# Patient Record
Sex: FEMALE | Race: WHITE | NOT HISPANIC OR LATINO | Employment: OTHER | URBAN - METROPOLITAN AREA
[De-identification: names, ages, dates, MRNs, and addresses within clinical notes are randomized per-mention and may not be internally consistent; named-entity substitution may affect disease eponyms.]

---

## 2014-09-08 LAB — CRC RECOMMENDATION EXT: NORMAL

## 2017-01-30 RX ORDER — OXYBUTYNIN CHLORIDE 5 MG/1
5 TABLET, EXTENDED RELEASE ORAL DAILY
Qty: 90 TABLET | Refills: 2 | Status: SHIPPED | OUTPATIENT
Start: 2017-01-30 | End: 2017-07-10 | Stop reason: SDUPTHER

## 2017-01-30 NOTE — TELEPHONE ENCOUNTER
----- Message from Poly De La Torre sent at 1/30/2017 12:38 PM CST -----  Contact: pt [phone]                                Prescription Refill Request  Name of medication and dose oxybutynin (DITROPAN) 5 MG Tab    Pharmacy [facility]     Are you completely out of your medication Yes    Additional Information:

## 2017-02-02 DIAGNOSIS — N39.44 NOCTURNAL ENURESIS: ICD-10-CM

## 2017-02-02 DIAGNOSIS — N39.0 URINARY TRACT INFECTION WITHOUT HEMATURIA, SITE UNSPECIFIED: Primary | ICD-10-CM

## 2017-02-02 RX ORDER — OXYBUTYNIN CHLORIDE 5 MG/1
5 TABLET ORAL NIGHTLY
Qty: 90 TABLET | Refills: 2 | Status: SHIPPED | OUTPATIENT
Start: 2017-02-02 | End: 2017-07-10 | Stop reason: SDUPTHER

## 2017-02-02 NOTE — TELEPHONE ENCOUNTER
"----- Message from Hilaria Romero MA sent at 2/1/2017  3:09 PM CST -----  Contact: [phone]  The oxybutynin XL was sent to her pharmacy but she needs the oxybutynin (DITROPAN) 5 MG Tab (the one she takes at night). Also she is concerned that she has a very bad UTI,  She said her urine smell like smells "rotten ammonia"     [address] [id number] [phone]    "

## 2017-02-06 ENCOUNTER — LAB VISIT (OUTPATIENT)
Dept: LAB | Facility: HOSPITAL | Age: 67
End: 2017-02-06
Attending: UROLOGY
Payer: MEDICARE

## 2017-02-06 DIAGNOSIS — N39.0 URINARY TRACT INFECTION WITHOUT HEMATURIA, SITE UNSPECIFIED: ICD-10-CM

## 2017-02-06 PROCEDURE — 87186 SC STD MICRODIL/AGAR DIL: CPT

## 2017-02-06 PROCEDURE — 87088 URINE BACTERIA CULTURE: CPT

## 2017-02-06 PROCEDURE — 87077 CULTURE AEROBIC IDENTIFY: CPT

## 2017-02-06 PROCEDURE — 87086 URINE CULTURE/COLONY COUNT: CPT

## 2017-02-08 ENCOUNTER — TELEPHONE (OUTPATIENT)
Dept: UROLOGY | Facility: CLINIC | Age: 67
End: 2017-02-08

## 2017-02-08 DIAGNOSIS — N30.90 BLADDER INFECTION: Primary | ICD-10-CM

## 2017-02-08 LAB — BACTERIA UR CULT: NORMAL

## 2017-02-08 RX ORDER — NITROFURANTOIN 25; 75 MG/1; MG/1
100 CAPSULE ORAL 2 TIMES DAILY
Qty: 20 CAPSULE | Refills: 0 | Status: SHIPPED | OUTPATIENT
Start: 2017-02-08 | End: 2017-02-18

## 2017-02-08 RX ORDER — AMOXICILLIN AND CLAVULANATE POTASSIUM 500; 125 MG/1; MG/1
1 TABLET, FILM COATED ORAL 3 TIMES DAILY
Qty: 21 TABLET | Refills: 0 | Status: SHIPPED | OUTPATIENT
Start: 2017-02-08 | End: 2017-02-08 | Stop reason: SINTOL

## 2017-02-08 NOTE — TELEPHONE ENCOUNTER
Patient states that she did not tolerate the Augmentin but she cannot recall what the reaction is.      Sent Rx for Macrobid.  Cancelled the Augmentin Rx.

## 2017-02-08 NOTE — TELEPHONE ENCOUNTER
Patient had an ESBL E coli, sensitive to Augmentin and macrobid.  Since the Augmentin is bactericidal, this was sent to the [facility] pharmacy on [address].     Also recommended a probiotic.  [facility] Ultra 25 Billion CFU Probiotic Complex, Multi Strain.  The Multi Strain is specifically the one that is important as the greater variety of strains is better.  Make sure the product is not .

## 2017-07-10 DIAGNOSIS — N39.44 NOCTURNAL ENURESIS: ICD-10-CM

## 2017-07-10 RX ORDER — OXYBUTYNIN CHLORIDE 5 MG/1
5 TABLET ORAL NIGHTLY
Qty: 90 TABLET | Refills: 0 | Status: SHIPPED | OUTPATIENT
Start: 2017-07-10 | End: 2017-08-10 | Stop reason: SDUPTHER

## 2017-08-10 DIAGNOSIS — N39.44 NOCTURNAL ENURESIS: ICD-10-CM

## 2017-08-10 RX ORDER — OXYBUTYNIN CHLORIDE 5 MG/1
5 TABLET ORAL NIGHTLY
Qty: 90 TABLET | Refills: 0 | Status: SHIPPED | OUTPATIENT
Start: 2017-08-10 | End: 2017-11-15 | Stop reason: SDUPTHER

## 2017-11-01 ENCOUNTER — OFFICE VISIT (OUTPATIENT)
Dept: NEUROLOGY | Facility: CLINIC | Age: 67
End: 2017-11-01
Payer: MEDICARE

## 2017-11-01 VITALS
BODY MASS INDEX: 30.56 KG/M2 | HEART RATE: 55 BPM | DIASTOLIC BLOOD PRESSURE: 81 MMHG | WEIGHT: 179 LBS | SYSTOLIC BLOOD PRESSURE: 145 MMHG | HEIGHT: 64 IN

## 2017-11-01 DIAGNOSIS — E11.42 TYPE 2 DIABETES MELLITUS WITH DIABETIC POLYNEUROPATHY, WITHOUT LONG-TERM CURRENT USE OF INSULIN: ICD-10-CM

## 2017-11-01 DIAGNOSIS — R29.898 LEG WEAKNESS, BILATERAL: ICD-10-CM

## 2017-11-01 DIAGNOSIS — R20.0 NUMBNESS IN BOTH HANDS: ICD-10-CM

## 2017-11-01 DIAGNOSIS — E03.9 HYPOTHYROIDISM, UNSPECIFIED TYPE: ICD-10-CM

## 2017-11-01 DIAGNOSIS — G56.03 CARPAL TUNNEL SYNDROME, BILATERAL: ICD-10-CM

## 2017-11-01 PROBLEM — E11.49 TYPE 2 DIABETES MELLITUS WITH NEUROLOGIC COMPLICATION: Status: ACTIVE | Noted: 2017-11-01

## 2017-11-01 PROCEDURE — 99204 OFFICE O/P NEW MOD 45 MIN: CPT | Mod: GC,S$GLB,, | Performed by: STUDENT IN AN ORGANIZED HEALTH CARE EDUCATION/TRAINING PROGRAM

## 2017-11-01 PROCEDURE — 99999 PR PBB SHADOW E&M-EST. PATIENT-LVL III: CPT | Mod: PBBFAC,GC,, | Performed by: STUDENT IN AN ORGANIZED HEALTH CARE EDUCATION/TRAINING PROGRAM

## 2017-11-01 RX ORDER — ATORVASTATIN CALCIUM 20 MG/1
20 TABLET, FILM COATED ORAL DAILY
COMMUNITY
Start: 2017-09-04 | End: 2019-07-30

## 2017-11-01 RX ORDER — OXYBUTYNIN CHLORIDE 5 MG/1
TABLET, EXTENDED RELEASE ORAL
Qty: 90 TABLET | Refills: 0 | Status: SHIPPED | OUTPATIENT
Start: 2017-11-01 | End: 2017-11-15 | Stop reason: SDUPTHER

## 2017-11-01 NOTE — PATIENT INSTRUCTIONS
- Purchase neutral posture wrist splints at local drug store and wear at night   - F/u EMG nerve conduction study

## 2017-11-01 NOTE — PROGRESS NOTES
Patient Name: Niki Soriano  MRN: [id number]    CC: Hand numbness    HPI: Niki Soriano is a 67 y.o. female with medical history significant for ? TM (in 1996 with residual B/L LE weakness / sensory affect), B/L carpel tunnel syndrome (s/p release in 1996), T2DM (A1C reported 6.7 on [facility]), hypothyroidism, cervical / lumbar joint complex (unclear back / neck pain) presenting with majorly concerns of B/L hand numbness x 1 month.    Insidious onset of symptoms of numbness / on intermittent basis for the last 1 month. Symptoms worse at night, with mechanical relaxation of wrist relieving the symptoms. Denied any specific localization to fingers. Associated symptoms include B/L tingling sensations in the forearms and right arm cervicalgia. Otherwise denied any focal weakness / cranial nerve deficits or ANS symptoms. No active concerns for infections / toxin exposures or metabolic derangements.     ROS:   Review of Systems   Constitutional: Negative for malaise/fatigue. Negative for weight loss.   HENT: Negative for hearing loss.   Eyes: Negative for blurred vision and double vision.   Respiratory: Negative for shortness of breath and stridor.   Cardiovascular: Negative for chest pain and palpitations.   Gastrointestinal: Negative for nausea, vomiting and constipation.   Genitourinary: Negative for frequency. Negative for urgency.   Musculoskeletal: Positive for joint pain (small joints of hands). Negative for myalgias and falls.   Skin: Negative for rash.   Neurological: Negative for dizziness and tremors. Negative for focal weakness and seizures.   Endo/Heme/Allergies: Does not bruise/bleed easily.   Psychiatric/Behavioral: Negative for memory loss. Negative for depression and hallucinations. The patient is not nervous/anxious.      Past Medical History  Past Medical History:   Diagnosis Date    Diabetes mellitus     Hypercholesteremia     Hypertension     IC (interstitial cystitis)     Spondylisthesis     Vulvodynia,  unspecified        Medications    Current Outpatient Prescriptions:     atenolol (TENORMIN) 50 MG tablet, , Disp: , Rfl:     atorvastatin (LIPITOR) 20 MG tablet, Take 20 mg by mouth once daily., Disp: , Rfl:     baclofen (LIORESAL) 20 MG tablet, , Disp: , Rfl:     duloxetine (CYMBALTA) 30 MG capsule, Take 30 mg by mouth once daily., Disp: , Rfl:     LEVOTHYROXINE SODIUM (LEVOTHYROXINE ORAL), Take 50 mcg by mouth before breakfast. , Disp: , Rfl:     lisinopril (PRINIVIL,ZESTRIL) 40 MG tablet, , Disp: , Rfl:     metformin (GLUCOPHAGE) 500 MG tablet, Take 500 mg by mouth 2 (two) times daily with meals., Disp: , Rfl:     oxybutynin (DITROPAN) 5 MG Tab, TAKE 1 TABLET (5 MG TOTAL) BY MOUTH EVERY EVENING., Disp: 90 tablet, Rfl: 0    oxybutynin (DITROPAN-XL) 5 MG TR24, TAKE 1 TABLET BY MOUTH DAILY, Disp: 90 tablet, Rfl: 0    SYNTHROID 50 mcg tablet, , Disp: , Rfl:     triamterene-hydrochlorothiazide 37.5-25 mg (DYAZIDE) 37.5-25 mg per capsule, , Disp: , Rfl:     alendronate (FOSAMAX) 70 MG tablet, Take 1 tablet (70 mg total) by mouth once a week., Disp: 12 tablet, Rfl: 4    LIDODERM 5 %(700 mg/patch), , Disp: , Rfl:     Allergies  Review of patient's allergies indicates:   Allergen Reactions    Augmentin [amoxicillin-pot clavulanate] Other (See Comments)     Patient cannot recall what she had, only that she could not tolerate the Augmentin    Tramadol Itching    Vicodin [hydrocodone-acetaminophen]     Tegretol [carbamazepine] Itching and Rash       Social History  Social History     Social History    Marital status:      Spouse name: N/A    Number of children: N/A    Years of education: N/A     Occupational History    Not on file.     Social History Main Topics    Smoking status: Never Smoker    Smokeless tobacco: Not on file    Alcohol use No    Drug use: No    Sexual activity: Yes     Partners: Male     Other Topics Concern    Not on file     Social History Narrative    No narrative on  "file       Family History  Family History   Problem Relation Age of Onset    Colon cancer Neg Hx     Ovarian cancer Neg Hx     Breast cancer Paternal Aunt        Physical Exam  BP (!) 145/81 (BP Location: Right arm, Patient Position: Sitting, BP Method: Large (Automatic))   Pulse (!) 55   Ht 5' 4" (1.626 m)   Wt 81.2 kg (179 lb)   BMI 30.73 kg/m²     General appearance: Well-developed, well-groomed.     Neurologic Exam: The patient is awake, alert and oriented. Language is fluent. Recent and remote memory are normal. Attention span and concentration are normal. Fund of knowledge is appropriate.     Cranial nerves: pupils are round and reactive to light and accommodation. Visual fields are full to confrontation. Ocular motility is full in all cardinal positions of gaze. Facial sensation is normal to pinprick and light touch. Corneal reflexes are present bilaterally. Facial activation is symmetric. Hearing is normal bilaterally. Palate elevates symmetrically and gag reflex is intact bilaterally. Shoulder elevation is symmetric and full strength bilaterally. Tongue is midline and neck rotation strength is normal bilaterally. Neck range of motion is normal.     Motor examination of all extremities demonstrates normal bulk and tone in all four limbs. There are no atrophy or fasciculations. Strength is 5/5 in the upper and 4+/5 lower extremities bilaterally without pronator drift.     Sensory examination is normal to pinprick, touch. Decreased vibration in the upper and lower extremities bilaterally, with decreased proprioception in LE. Phalen's test +    Deep tendon reflexes are 3+ and symmetric in the upper and lower extremities bilaterally. Left extensor toes response     Gait: Normal heel, toe, tandem, and casual gait.    Coordination: Finger to nose is normal in both upper extremities. Rapid alternating movements are normal in both upper and lower extremities.     General exam  Cardiovascular: regular rate and " rhythm with no murmurs, rubs or gallops. There are no carotid or vertebral artery bruits. Pulses in both upper and lower extremities are symmetric. There is no peripheral edema.   Head and neck: no cervical lymphadenopathy      Lab and Test Results    No results found for: WBC, HGB, HCT, PLT  No results found for: GLU, NA, K, CL, CO2, BUN, CREATININE, CALCIUM, MG, PHOS  No results found for: ALB, ALKPHOS, ALT, AST    Assessment and Plan    Numbness in both hands  - 67 y.o. female with medical history significant for ? TM (in 1996 with residual B/L LE weakness / sensory affect), B/L carpel tunnel syndrome (s/p release in 1996), T2DM, hypothyroidism, cervical / lumbar joint complex disease presenting with majorly concerns of B/L hand numbness x 1 month.  - Symptoms worse at night, with mechanical relaxation of wrist relieving the symptoms. Denied any specific localization to fingers.   - Associated symptoms include B/L tingling sensations in the forearms and right arm cervicalgia. Otherwise denied any focal weakness / cranial nerve deficits or ANS symptoms.   - No active concerns for infections / toxin exposures or metabolic derangements.   - examination reveals positive Phalen's test, with other chronic UMN findings / sensory polyneuropathy findings    - DDx symptoms more likely to recurrent carpel tunnel syndrome     Plan:   - Neutral posture wrist splints at night  - EMG nerve conduction study, no other labs at this moment  - F/u with possible referral to hand surgeon if positive EMG for release     Type 2 diabetes mellitus with neurologic complication  - as per PCP recs  - strict glycemic control     Hypothyroidism  - as per pcp recs    Leg weakness, bilateral  ? TM (in 1996 with residual B/L LE weakness / sensory affect)  - no active interventions at this moment    Carpal tunnel syndrome, bilateral  - B/L carpel tunnel syndrome (s/p release in 1996)  - presenting symptoms likely due to recurrence  - see section  above      Jolie Dhaliwal MD  Neurology Resident   [facility]  [address] [place] [address]  Pager: [phone]

## 2017-11-02 NOTE — ASSESSMENT & PLAN NOTE
- B/L carpel tunnel syndrome (s/p release in 1996)  - presenting symptoms likely due to recurrence  - see section above

## 2017-11-02 NOTE — ASSESSMENT & PLAN NOTE
? TM (in 1996 with residual B/L LE weakness / sensory affect)  - no active interventions at this moment

## 2017-11-02 NOTE — ASSESSMENT & PLAN NOTE
- 67 y.o. female with medical history significant for ? TM (in 1996 with residual B/L LE weakness / sensory affect), B/L carpel tunnel syndrome (s/p release in 1996), T2DM, hypothyroidism, cervical / lumbar joint complex disease presenting with majorly concerns of B/L hand numbness x 1 month.  - Symptoms worse at night, with mechanical relaxation of wrist relieving the symptoms. Denied any specific localization to fingers.   - Associated symptoms include B/L tingling sensations in the forearms and right arm cervicalgia. Otherwise denied any focal weakness / cranial nerve deficits or ANS symptoms.   - No active concerns for infections / toxin exposures or metabolic derangements.   - examination reveals positive Phalen's test, with other chronic UMN findings / sensory polyneuropathy findings    - DDx symptoms more likely to recurrent carpel tunnel syndrome     Plan:   - Neutral posture wrist splints at night  - EMG nerve conduction study, no other labs at this moment  - F/u with possible referral to hand surgeon if positive EMG for release

## 2017-11-14 ENCOUNTER — TELEPHONE (OUTPATIENT)
Dept: NEUROLOGY | Facility: CLINIC | Age: 67
End: 2017-11-14

## 2017-11-14 NOTE — PROGRESS NOTES
CHIEF COMPLAINT:    Mrs. Soriano is a 67 y.o. female presenting for a follow up on IC and urgency, frequency    PRESENTING ILLNESS:    Niki Soriano is a 67 y.o. female who returns for follow up.  She states she is doing well.  She continues to perform intermittent catheterization with a 10 Fr catheter, 6 times a day, does not urinate in between .  She finds that the oxybutynin XL 5 mg during the day and an extra 5 mg regular release at night helps her greatly.      REVIEW OF SYSTEMS:    Review of Systems   Constitutional: Negative.    HENT: Negative.    Eyes: Negative.    Respiratory: Negative.    Cardiovascular: Negative.    Gastrointestinal: Positive for abdominal pain.   Genitourinary: Positive for frequency and urgency.   Musculoskeletal: Positive for back pain, joint pain and myalgias.   Skin: Negative.    Neurological: Negative.    Endo/Heme/Allergies: Negative.    Psychiatric/Behavioral: Negative.      PATIENT HISTORY:    Past Medical History:   Diagnosis Date    Diabetes mellitus     Hypercholesteremia     Hypertension     IC (interstitial cystitis)     Spondylisthesis     Vulvodynia, unspecified        Past Surgical History:   Procedure Laterality Date     SECTION      x3    CYSTOSCOPY      HYSTERECTOMY      ischemic colitis      OOPHORECTOMY      PA REMOVAL OF OVARY/TUBE(S)         Family History   Problem Relation Age of Onset    Breast cancer Paternal Aunt      Social History    Marital status:      Social History Main Topics    Smoking status: Never Smoker    Smokeless tobacco: Not on file    Alcohol use No    Drug use: No    Sexual activity: Yes     Partners: Male       Allergies:  Augmentin [amoxicillin-pot clavulanate]; Tramadol; Vicodin [hydrocodone-acetaminophen]; and Tegretol [carbamazepine]    Medications:  Outpatient Encounter Prescriptions as of 11/15/2017   Medication Sig Dispense Refill    alendronate (FOSAMAX) 70 MG tablet Take 1 tablet (70 mg total) by  mouth once a week. 12 tablet 4    atenolol (TENORMIN) 50 MG tablet       atorvastatin (LIPITOR) 20 MG tablet Take 20 mg by mouth once daily.      baclofen (LIORESAL) 20 MG tablet       duloxetine (CYMBALTA) 30 MG capsule Take 30 mg by mouth once daily.      LIDODERM 5 %(700 mg/patch)       lisinopril (PRINIVIL,ZESTRIL) 40 MG tablet       metformin (GLUCOPHAGE) 500 MG tablet Take 500 mg by mouth 2 (two) times daily with meals.      oxybutynin (DITROPAN) 5 MG Tab Take 1 tablet (5 mg total) by mouth every evening. 90 tablet 3    oxybutynin (DITROPAN-XL) 5 MG TR24 Take 1 tablet (5 mg total) by mouth once daily. 90 tablet 3    SYNTHROID 50 mcg tablet       triamterene-hydrochlorothiazide 37.5-25 mg (DYAZIDE) 37.5-25 mg per capsule       LEVOTHYROXINE SODIUM (LEVOTHYROXINE ORAL) Take 50 mcg by mouth before breakfast.        No facility-administered encounter medications on file as of 11/15/2017.          PHYSICAL EXAMINATION:    The patient generally appears in good health, is appropriately interactive, and is in no apparent distress.    Skin: No lesions.    Mental: Cooperative with normal affect.    Neuro: Grossly intact.    HEENT: Normal. No evidence of lymphadenopathy.    Chest:  normal inspiratory effort.    Abdomen:  Soft, non-tender. No masses or organomegaly. Bladder is not palpable. No evidence of flank discomfort. No evidence of inguinal hernia.    Extremities: No clubbing, cyanosis, or edema    Normal external female genitalia  Urethral meatus is normal  Urethra and bladder are nontender to bimanual exam  Well supported anteriorly and posteriorly   Uterus and cervix are normal  No adnexal masses    LABS:    UA nitrite and leukocyte esterase negative.     IMPRESSION:    Encounter Diagnoses   Name Primary?    Interstitial cystitis Yes    Urinary urgency     Incomplete emptying of bladder     Type 2 diabetes mellitus with diabetic polyneuropathy, without long-term current use of insulin      Nocturnal enuresis        PLAN:    1.  Refilled the oxybutynin, stay on the same regimen  2.  Follow up in 1 year

## 2017-11-15 ENCOUNTER — OFFICE VISIT (OUTPATIENT)
Dept: UROLOGY | Facility: CLINIC | Age: 67
End: 2017-11-15
Payer: MEDICARE

## 2017-11-15 VITALS
BODY MASS INDEX: 29.47 KG/M2 | HEART RATE: 63 BPM | HEIGHT: 64 IN | DIASTOLIC BLOOD PRESSURE: 72 MMHG | WEIGHT: 172.63 LBS | SYSTOLIC BLOOD PRESSURE: 123 MMHG

## 2017-11-15 DIAGNOSIS — N39.44 NOCTURNAL ENURESIS: ICD-10-CM

## 2017-11-15 DIAGNOSIS — E11.42 TYPE 2 DIABETES MELLITUS WITH DIABETIC POLYNEUROPATHY, WITHOUT LONG-TERM CURRENT USE OF INSULIN: ICD-10-CM

## 2017-11-15 DIAGNOSIS — R33.9 INCOMPLETE EMPTYING OF BLADDER: ICD-10-CM

## 2017-11-15 DIAGNOSIS — R39.15 URINARY URGENCY: ICD-10-CM

## 2017-11-15 DIAGNOSIS — N30.10 INTERSTITIAL CYSTITIS: Primary | ICD-10-CM

## 2017-11-15 PROCEDURE — 99213 OFFICE O/P EST LOW 20 MIN: CPT | Mod: S$GLB,,, | Performed by: UROLOGY

## 2017-11-15 PROCEDURE — 87086 URINE CULTURE/COLONY COUNT: CPT

## 2017-11-15 PROCEDURE — 99999 PR PBB SHADOW E&M-EST. PATIENT-LVL IV: CPT | Mod: PBBFAC,,, | Performed by: UROLOGY

## 2017-11-15 PROCEDURE — 87077 CULTURE AEROBIC IDENTIFY: CPT

## 2017-11-15 PROCEDURE — 87088 URINE BACTERIA CULTURE: CPT

## 2017-11-15 PROCEDURE — 87186 SC STD MICRODIL/AGAR DIL: CPT

## 2017-11-15 RX ORDER — OXYBUTYNIN CHLORIDE 5 MG/1
5 TABLET, EXTENDED RELEASE ORAL DAILY
Qty: 90 TABLET | Refills: 3 | Status: SHIPPED | OUTPATIENT
Start: 2017-11-15 | End: 2018-11-16

## 2017-11-15 RX ORDER — OXYBUTYNIN CHLORIDE 5 MG/1
5 TABLET ORAL NIGHTLY
Qty: 90 TABLET | Refills: 3 | Status: SHIPPED | OUTPATIENT
Start: 2017-11-15 | End: 2018-11-16 | Stop reason: SDUPTHER

## 2017-11-17 LAB — BACTERIA UR CULT: NORMAL

## 2017-11-20 ENCOUNTER — TELEPHONE (OUTPATIENT)
Dept: UROLOGY | Facility: CLINIC | Age: 67
End: 2017-11-20

## 2017-11-20 DIAGNOSIS — N31.9 NEUROGENIC BLADDER: ICD-10-CM

## 2017-11-20 DIAGNOSIS — R82.79 POSITIVE URINE CULTURE: Primary | ICD-10-CM

## 2017-11-20 RX ORDER — GRANULES FOR ORAL 3 G/1
3 POWDER ORAL ONCE
Qty: 3 G | Refills: 0 | Status: SHIPPED | OUTPATIENT
Start: 2017-11-20 | End: 2020-06-11 | Stop reason: SDUPTHER

## 2017-11-21 ENCOUNTER — TELEPHONE (OUTPATIENT)
Dept: UROLOGY | Facility: CLINIC | Age: 67
End: 2017-11-21

## 2017-11-21 NOTE — TELEPHONE ENCOUNTER
----- Message from Jamie Singh sent at 11/21/2017  2:34 PM CST -----  Contact: Pt:[phone]  Pt called and states she is taking fosfomycin (MONUROL) 3 gram Pack and the pt would like to speak with the nurse because she is not sure on what her dosage is. Pt is not sure on how much she can take.

## 2017-11-28 NOTE — PROGRESS NOTES
I have personally seen and examined the patient along with the neurology resident, and agree with assessment and recommendations.    Varsha Chowdhury MD

## 2017-12-28 ENCOUNTER — PROCEDURE VISIT (OUTPATIENT)
Dept: NEUROLOGY | Facility: CLINIC | Age: 67
End: 2017-12-28
Payer: MEDICARE

## 2017-12-28 DIAGNOSIS — R20.0 NUMBNESS IN BOTH HANDS: ICD-10-CM

## 2017-12-28 DIAGNOSIS — G56.03 CARPAL TUNNEL SYNDROME, BILATERAL: Primary | ICD-10-CM

## 2017-12-28 PROCEDURE — 95913 NRV CNDJ TEST 13/> STUDIES: CPT | Mod: S$GLB,,, | Performed by: PSYCHIATRY & NEUROLOGY

## 2017-12-28 PROCEDURE — 95886 MUSC TEST DONE W/N TEST COMP: CPT | Mod: S$GLB,,, | Performed by: PSYCHIATRY & NEUROLOGY

## 2018-01-04 ENCOUNTER — TELEPHONE (OUTPATIENT)
Dept: NEUROLOGY | Facility: HOSPITAL | Age: 68
End: 2018-01-04

## 2018-01-04 DIAGNOSIS — G56.03 CARPAL TUNNEL SYNDROME, BILATERAL: Primary | ICD-10-CM

## 2018-01-05 NOTE — TELEPHONE ENCOUNTER
Inquired about symptoms / updated on EMG studies    Plans:  Referral to hand surgeon for B/L carpel tunnel decompression

## 2018-01-17 ENCOUNTER — TELEPHONE (OUTPATIENT)
Dept: UROLOGY | Facility: CLINIC | Age: 68
End: 2018-01-17

## 2018-01-17 NOTE — TELEPHONE ENCOUNTER
----- Message from Yohana Quiñones sent at 1/17/2018 12:50 PM CST -----  Contact: self - [phone]  [place] - left message for you yesterday - went to er at [facility] Monday night - thought she had the flu - has bladder infection - wants to discuss her condition and antibiotics -  please call patient at  [phone]

## 2018-01-19 NOTE — TELEPHONE ENCOUNTER
----- Message from Evelina Leary MA sent at 1/19/2018  1:41 PM CST -----  Contact: self  [phone]  Please read below.    ----- Message -----  From: Etelvina Dallas MA  Sent: 1/16/2018   4:30 PM  To: REJI Garcia,   I think this is for Dr Garcia.  Angelica  ----- Message -----  From: Evelina Leary MA  Sent: 1/16/2018   3:00 PM  To: Etelvina Dallas MA    States she went to the emergency department at [facility] due to a kidney infection and they gave her IV medication and sent her home with cefdinir 300 mg / omnicef 300 mg BID.  States she is calling for your advise on what she needs to do and she is still with a fever of 102.0.  States she only took one of the pills so far.

## 2018-02-01 NOTE — PROGRESS NOTES
CHIEF COMPLAINT:    Mrs. Soriano is a 67 y.o. female presenting for a follow up on UTI 3 weeks ago in a patient with history of IC    PRESENTING ILLNESS:    Niki Soriano is a 67 y.o. female who has a history of IC, presently only on oxybutynin XL 5 mg during the day and 5 mg at night immediate release.  She states she went to [facility] ER with fever, malaise.  She was found to have a UTI, and was given omnicef.  Someone made a comment that they were concerned that the infection was in her blood but she was not admitted.  She feels back to her baseline.   Has pain associated with the IC but no longer has the malodorous urine which is how she generally knows that she is infected.  She states that she went to the ER thinking that she had the flu.  She states she has had a number of UTI's.  Not using topical estrogen though she was prescribed this in the past by her OBGYN.  She states she takes a probiotic, but still has hard stools.  She has fecal incontinence with diarrhea.     REVIEW OF SYSTEMS:    Review of Systems   Constitutional: Negative.    HENT: Negative.    Eyes: Negative.    Respiratory: Negative.    Cardiovascular: Negative.    Gastrointestinal: Positive for constipation.   Genitourinary:        Pelvic pain   Musculoskeletal: Positive for joint pain and myalgias.   Skin: Negative.    Neurological: Negative.    Endo/Heme/Allergies: Negative.    Psychiatric/Behavioral: Positive for depression.       PATIENT HISTORY:    Past Medical History:   Diagnosis Date    Diabetes mellitus     Hypercholesteremia     Hypertension     IC (interstitial cystitis)     Spondylisthesis     Vulvodynia, unspecified        Past Surgical History:   Procedure Laterality Date     SECTION      x3    CYSTOSCOPY      HYSTERECTOMY      ischemic colitis      OOPHORECTOMY      PA REMOVAL OF OVARY/TUBE(S)         Family History   Problem Relation Age of Onset    Colon cancer Neg Hx     Ovarian cancer Neg Hx     Breast  cancer Paternal Aunt        Social History    Marital status:      Social History Main Topics    Smoking status: Never Smoker    Smokeless tobacco: Not on file    Alcohol use No    Drug use: No    Sexual activity: Yes     Partners: Male       Allergies:  Augmentin [amoxicillin-pot clavulanate]; Tramadol; Vicodin [hydrocodone-acetaminophen]; and Tegretol [carbamazepine]    Medications:  Outpatient Encounter Prescriptions as of 2/2/2018   Medication Sig Dispense Refill    atenolol (TENORMIN) 50 MG tablet       atorvastatin (LIPITOR) 20 MG tablet Take 20 mg by mouth once daily.      baclofen (LIORESAL) 20 MG tablet       duloxetine (CYMBALTA) 30 MG capsule Take 30 mg by mouth once daily.      estradiol (ESTRACE) 0.01 % (0.1 mg/gram) vaginal cream Place 1 g vaginally 3 (three) times a week. Place by fingertip application before bedtime three times a week (Monday, Wednesday, Friday) 45 g 3    LEVOTHYROXINE SODIUM (LEVOTHYROXINE ORAL) Take 50 mcg by mouth before breakfast.       LIDODERM 5 %(700 mg/patch)       lisinopril (PRINIVIL,ZESTRIL) 40 MG tablet       metformin (GLUCOPHAGE) 500 MG tablet Take 500 mg by mouth 2 (two) times daily with meals.      oxybutynin (DITROPAN) 5 MG Tab Take 1 tablet (5 mg total) by mouth every evening. 90 tablet 3    oxybutynin (DITROPAN-XL) 5 MG TR24 Take 1 tablet (5 mg total) by mouth once daily. 90 tablet 3    SYNTHROID 50 mcg tablet       triamterene-hydrochlorothiazide 37.5-25 mg (DYAZIDE) 37.5-25 mg per capsule       [DISCONTINUED] alendronate (FOSAMAX) 70 MG tablet Take 1 tablet (70 mg total) by mouth once a week. 12 tablet 4    [DISCONTINUED] cefdinir (OMNICEF) 300 MG capsule Take 300 mg by mouth every 12 (twelve) hours.  0     No facility-administered encounter medications on file as of 2/2/2018.          PHYSICAL EXAMINATION:    The patient generally appears in good health, is appropriately interactive, and is in no apparent distress.    Skin: No  lesions.    Mental: Cooperative with normal affect.    Neuro: Grossly intact.    HEENT: Normal. No evidence of lymphadenopathy.    Chest:  normal inspiratory effort.    Abdomen:  Soft, non-tender. No masses or organomegaly. Bladder is not palpable. No evidence of flank discomfort. No evidence of inguinal hernia.    Extremities: No clubbing, cyanosis, or edema      LABS:    Lab Results   Component Value Date    BUN 21 11/15/2017    CREATININE 0.9 11/15/2017         IMPRESSION:    Encounter Diagnoses   Name Primary?    Vaginal atrophy Yes    Recurrent UTI        PLAN:    1.  Estrace cream prescribed  2.  Renal ultrasound  3.  She states she can do an awake cystoscopy, so this was ordered  4.  Release of info for [facility].

## 2018-02-02 ENCOUNTER — OFFICE VISIT (OUTPATIENT)
Dept: UROLOGY | Facility: CLINIC | Age: 68
End: 2018-02-02
Payer: MEDICARE

## 2018-02-02 VITALS
HEART RATE: 55 BPM | SYSTOLIC BLOOD PRESSURE: 134 MMHG | HEIGHT: 64 IN | WEIGHT: 173 LBS | DIASTOLIC BLOOD PRESSURE: 69 MMHG | BODY MASS INDEX: 29.53 KG/M2

## 2018-02-02 DIAGNOSIS — N39.0 RECURRENT UTI: ICD-10-CM

## 2018-02-02 DIAGNOSIS — N95.2 VAGINAL ATROPHY: Primary | ICD-10-CM

## 2018-02-02 PROCEDURE — 1159F MED LIST DOCD IN RCRD: CPT | Mod: S$GLB,,, | Performed by: UROLOGY

## 2018-02-02 PROCEDURE — 3008F BODY MASS INDEX DOCD: CPT | Mod: S$GLB,,, | Performed by: UROLOGY

## 2018-02-02 PROCEDURE — 99999 PR PBB SHADOW E&M-EST. PATIENT-LVL IV: CPT | Mod: PBBFAC,,, | Performed by: UROLOGY

## 2018-02-02 PROCEDURE — 1125F AMNT PAIN NOTED PAIN PRSNT: CPT | Mod: S$GLB,,, | Performed by: UROLOGY

## 2018-02-02 PROCEDURE — 99213 OFFICE O/P EST LOW 20 MIN: CPT | Mod: S$GLB,,, | Performed by: UROLOGY

## 2018-02-02 RX ORDER — CEFDINIR 300 MG/1
300 CAPSULE ORAL EVERY 12 HOURS
Refills: 0 | COMMUNITY
Start: 2018-01-16 | End: 2018-02-02 | Stop reason: ALTCHOICE

## 2018-02-02 RX ORDER — LIDOCAINE HYDROCHLORIDE 20 MG/ML
JELLY TOPICAL ONCE
Status: CANCELLED | OUTPATIENT
Start: 2018-02-02 | End: 2018-02-02

## 2018-02-02 RX ORDER — ESTRADIOL 0.1 MG/G
1 CREAM VAGINAL
Qty: 45 G | Refills: 3 | Status: SHIPPED | OUTPATIENT
Start: 2018-02-02 | End: 2019-08-08 | Stop reason: SDUPTHER

## 2018-02-02 RX ORDER — CIPROFLOXACIN 250 MG/1
500 TABLET, FILM COATED ORAL ONCE
Status: CANCELLED | OUTPATIENT
Start: 2018-02-02 | End: 2018-02-02

## 2018-02-02 NOTE — PATIENT INSTRUCTIONS
Cystoscopy    Cystoscopy is a procedure that lets your doctor look directly inside your urethra and bladder. It can be used to:  · Help diagnose a problem with your urethra, bladder, or kidneys.  · Take a sample (biopsy) of bladder or urethral tissue.  · Treat certain problems (such as removing kidney stones).  · Place a stent to bypass an obstruction.  · Take special X-rays of the kidneys.  Based on the findings, your doctor may recommend other tests or treatments.  What is a cystoscope?  A cystoscope is a telescope-like instrument that contains lenses and fiberoptics (small glass wires that make bright light). The cystoscope may be straight and rigid, or flexible to bend around curves in the urethra. The doctor may look directly into the cystoscope, or project the image onto a monitor.  Getting ready  · Ask your doctor if you should stop taking any medicines before the procedure.  · Ask whether you should avoid eating or drinking anything after midnight before the procedure.  · Follow any other instructions your doctor gives you.  Tell your doctor before the exam if you:  · Take any medicines, such as aspirin or blood thinners  · Have allergies to any medicines  · Are pregnant   The procedure  Cystoscopy is done in the doctors office, surgery center, or hospital. The doctor and a nurse are present during the procedure. It takes only a few minutes, longer if a biopsy, X-ray, or treatment needs to be done.  During the procedure:  · You lie on an exam table on your back, knees bent and legs apart. You are covered with a drape.  · Your urethra and the area around it are washed. Anesthetic jelly may be applied to numb the urethra. Other pain medicine is usually not needed. In some cases, you may be offered a mild sedative to help you relax. If a more extensive procedure is to be done, such as a biopsy or kidney stone removal, general anesthesia may be needed.  · The cystoscope is inserted. A sterile fluid is put  into the bladder to expand it. You may feel pressure from this fluid.  · When the procedure is done, the cystoscope is removed.  After the procedure  If you had a sedative, general anesthesia, or spinal anesthesia, you must have someone drive you home. Once youre home:  · Drink plenty of fluids.  · You may have burning or light bleeding when you urinate--this is normal.  · Medicines may be prescribed to ease any discomfort or prevent infection. Take these as directed.  · Call your doctor if you have heavy bleeding or blood clots, burning that lasts more than a day, a fever over 100°F  (38° C), or trouble urinating.  Date Last Reviewed: 1/1/2017  © 4668-7671 [employer]. [address] [place] [address], [place] [address]. All rights reserved. This information is not intended as a substitute for professional medical care. Always follow your healthcare professional's instructions.

## 2018-02-09 ENCOUNTER — HOSPITAL ENCOUNTER (OUTPATIENT)
Dept: RADIOLOGY | Facility: HOSPITAL | Age: 68
Discharge: HOME | End: 2018-02-09
Attending: UROLOGY
Payer: MEDICARE

## 2018-02-09 DIAGNOSIS — N39.0 RECURRENT UTI: ICD-10-CM

## 2018-02-09 PROCEDURE — 76770 US EXAM ABDO BACK WALL COMP: CPT | Mod: 26,,, | Performed by: RADIOLOGY

## 2018-02-09 PROCEDURE — 76770 US EXAM ABDO BACK WALL COMP: CPT | Mod: TC

## 2018-02-12 ENCOUNTER — HOSPITAL ENCOUNTER (OUTPATIENT)
Dept: UROLOGY | Facility: HOSPITAL | Age: 68
Discharge: HOME | End: 2018-02-12
Attending: UROLOGY
Payer: MEDICARE

## 2018-02-12 VITALS — WEIGHT: 173 LBS | TEMPERATURE: 98 F | BODY MASS INDEX: 29.7 KG/M2

## 2018-02-12 DIAGNOSIS — N39.0 RECURRENT UTI: ICD-10-CM

## 2018-02-12 DIAGNOSIS — R82.90 ABNORMAL URINALYSIS: Primary | ICD-10-CM

## 2018-02-12 PROCEDURE — 87077 CULTURE AEROBIC IDENTIFY: CPT

## 2018-02-12 PROCEDURE — 87086 URINE CULTURE/COLONY COUNT: CPT

## 2018-02-12 PROCEDURE — 87186 SC STD MICRODIL/AGAR DIL: CPT

## 2018-02-12 PROCEDURE — 87088 URINE BACTERIA CULTURE: CPT

## 2018-02-12 RX ORDER — CIPROFLOXACIN 500 MG/1
500 TABLET ORAL ONCE
Status: CANCELLED | OUTPATIENT
Start: 2018-02-12 | End: 2018-02-12

## 2018-02-12 RX ORDER — CIPROFLOXACIN 500 MG/1
500 TABLET ORAL ONCE
Status: DISCONTINUED | OUTPATIENT
Start: 2018-02-12 | End: 2018-02-12

## 2018-02-12 RX ORDER — LIDOCAINE HYDROCHLORIDE 20 MG/ML
JELLY TOPICAL ONCE
Status: CANCELLED | OUTPATIENT
Start: 2018-02-12 | End: 2018-02-12

## 2018-02-12 RX ORDER — LIDOCAINE HYDROCHLORIDE 20 MG/ML
JELLY TOPICAL ONCE
Status: DISCONTINUED | OUTPATIENT
Start: 2018-02-12 | End: 2019-07-08

## 2018-02-12 RX ORDER — SULFAMETHOXAZOLE AND TRIMETHOPRIM 800; 160 MG/1; MG/1
1 TABLET ORAL 2 TIMES DAILY
Qty: 20 TABLET | Refills: 0 | Status: SHIPPED | OUTPATIENT
Start: 2018-02-12 | End: 2018-02-22

## 2018-02-12 NOTE — INTERVAL H&P NOTE
The patient has been examined and the H&P has been reviewed:    Case was cancelled.         There are no hospital problems to display for this patient.

## 2018-02-12 NOTE — INTERVAL H&P NOTE
The patient has been examined and the H&P has been reviewed:    I concur with the findings and changes have been noted since the H&P was written: patient has dysuria, urinalysis today was nitrite positive        There are no hospital problems to display for this patient.

## 2018-02-12 NOTE — H&P (VIEW-ONLY)
CHIEF COMPLAINT:    Mrs. Soriano is a 67 y.o. female presenting for a follow up on UTI 3 weeks ago in a patient with history of IC    PRESENTING ILLNESS:    Niki Soriano is a 67 y.o. female who has a history of IC, presently only on oxybutynin XL 5 mg during the day and 5 mg at night immediate release.  She states she went to [facility] ER with fever, malaise.  She was found to have a UTI, and was given omnicef.  Someone made a comment that they were concerned that the infection was in her blood but she was not admitted.  She feels back to her baseline.   Has pain associated with the IC but no longer has the malodorous urine which is how she generally knows that she is infected.  She states that she went to the ER thinking that she had the flu.  She states she has had a number of UTI's.  Not using topical estrogen though she was prescribed this in the past by her OBGYN.  She states she takes a probiotic, but still has hard stools.  She has fecal incontinence with diarrhea.     REVIEW OF SYSTEMS:    Review of Systems   Constitutional: Negative.    HENT: Negative.    Eyes: Negative.    Respiratory: Negative.    Cardiovascular: Negative.    Gastrointestinal: Positive for constipation.   Genitourinary:        Pelvic pain   Musculoskeletal: Positive for joint pain and myalgias.   Skin: Negative.    Neurological: Negative.    Endo/Heme/Allergies: Negative.    Psychiatric/Behavioral: Positive for depression.       PATIENT HISTORY:    Past Medical History:   Diagnosis Date    Diabetes mellitus     Hypercholesteremia     Hypertension     IC (interstitial cystitis)     Spondylisthesis     Vulvodynia, unspecified        Past Surgical History:   Procedure Laterality Date     SECTION      x3    CYSTOSCOPY      HYSTERECTOMY      ischemic colitis      OOPHORECTOMY      HI REMOVAL OF OVARY/TUBE(S)         Family History   Problem Relation Age of Onset    Colon cancer Neg Hx     Ovarian cancer Neg Hx     Breast  cancer Paternal Aunt        Social History    Marital status:      Social History Main Topics    Smoking status: Never Smoker    Smokeless tobacco: Not on file    Alcohol use No    Drug use: No    Sexual activity: Yes     Partners: Male       Allergies:  Augmentin [amoxicillin-pot clavulanate]; Tramadol; Vicodin [hydrocodone-acetaminophen]; and Tegretol [carbamazepine]    Medications:  Outpatient Encounter Prescriptions as of 2/2/2018   Medication Sig Dispense Refill    atenolol (TENORMIN) 50 MG tablet       atorvastatin (LIPITOR) 20 MG tablet Take 20 mg by mouth once daily.      baclofen (LIORESAL) 20 MG tablet       duloxetine (CYMBALTA) 30 MG capsule Take 30 mg by mouth once daily.      estradiol (ESTRACE) 0.01 % (0.1 mg/gram) vaginal cream Place 1 g vaginally 3 (three) times a week. Place by fingertip application before bedtime three times a week (Monday, Wednesday, Friday) 45 g 3    LEVOTHYROXINE SODIUM (LEVOTHYROXINE ORAL) Take 50 mcg by mouth before breakfast.       LIDODERM 5 %(700 mg/patch)       lisinopril (PRINIVIL,ZESTRIL) 40 MG tablet       metformin (GLUCOPHAGE) 500 MG tablet Take 500 mg by mouth 2 (two) times daily with meals.      oxybutynin (DITROPAN) 5 MG Tab Take 1 tablet (5 mg total) by mouth every evening. 90 tablet 3    oxybutynin (DITROPAN-XL) 5 MG TR24 Take 1 tablet (5 mg total) by mouth once daily. 90 tablet 3    SYNTHROID 50 mcg tablet       triamterene-hydrochlorothiazide 37.5-25 mg (DYAZIDE) 37.5-25 mg per capsule       [DISCONTINUED] alendronate (FOSAMAX) 70 MG tablet Take 1 tablet (70 mg total) by mouth once a week. 12 tablet 4    [DISCONTINUED] cefdinir (OMNICEF) 300 MG capsule Take 300 mg by mouth every 12 (twelve) hours.  0     No facility-administered encounter medications on file as of 2/2/2018.          PHYSICAL EXAMINATION:    The patient generally appears in good health, is appropriately interactive, and is in no apparent distress.    Skin: No  lesions.    Mental: Cooperative with normal affect.    Neuro: Grossly intact.    HEENT: Normal. No evidence of lymphadenopathy.    Chest:  normal inspiratory effort.    Abdomen:  Soft, non-tender. No masses or organomegaly. Bladder is not palpable. No evidence of flank discomfort. No evidence of inguinal hernia.    Extremities: No clubbing, cyanosis, or edema      LABS:    Lab Results   Component Value Date    BUN 21 11/15/2017    CREATININE 0.9 11/15/2017         IMPRESSION:    Encounter Diagnoses   Name Primary?    Vaginal atrophy Yes    Recurrent UTI        PLAN:    1.  Estrace cream prescribed  2.  Renal ultrasound  3.  She states she can do an awake cystoscopy, so this was ordered  4.  Release of info for [facility].

## 2018-02-12 NOTE — Clinical Note
I had to cancel her cystoscopy today because she was infected.  Please schedule if possible, next Monday.  Thanks.

## 2018-02-12 NOTE — INTERVAL H&P NOTE
The patient has been examined and the H&P has been reviewed:    Discussed that the renal ultrasound was normal.  The catheterized specimen was sent for culture and Bactrim was sent empirically to her preferred pharmacy.         There are no hospital problems to display for this patient.

## 2018-02-15 LAB — BACTERIA UR CULT: NORMAL

## 2018-02-19 ENCOUNTER — HOSPITAL ENCOUNTER (OUTPATIENT)
Dept: UROLOGY | Facility: HOSPITAL | Age: 68
Discharge: HOME | End: 2018-02-19
Attending: UROLOGY
Payer: MEDICARE

## 2018-02-19 VITALS
HEART RATE: 56 BPM | DIASTOLIC BLOOD PRESSURE: 77 MMHG | WEIGHT: 173 LBS | TEMPERATURE: 99 F | SYSTOLIC BLOOD PRESSURE: 144 MMHG | RESPIRATION RATE: 18 BRPM | HEIGHT: 64 IN | BODY MASS INDEX: 29.53 KG/M2

## 2018-02-19 DIAGNOSIS — N39.0 RECURRENT UTI: ICD-10-CM

## 2018-02-19 DIAGNOSIS — R82.90 ABNORMAL URINALYSIS: ICD-10-CM

## 2018-02-19 PROCEDURE — 52000 CYSTOURETHROSCOPY: CPT | Mod: ,,, | Performed by: UROLOGY

## 2018-02-19 PROCEDURE — 52000 CYSTOURETHROSCOPY: CPT

## 2018-02-19 RX ORDER — LIDOCAINE HYDROCHLORIDE 20 MG/ML
JELLY TOPICAL ONCE
Status: COMPLETED | OUTPATIENT
Start: 2018-02-19 | End: 2018-02-19

## 2018-02-19 RX ORDER — TRIMETHOPRIM 100 MG/1
100 TABLET ORAL NIGHTLY
Qty: 30 TABLET | Refills: 3 | Status: SHIPPED | OUTPATIENT
Start: 2018-02-19 | End: 2018-06-05

## 2018-02-19 RX ADMIN — LIDOCAINE HYDROCHLORIDE: 20 JELLY TOPICAL at 11:02

## 2018-02-19 NOTE — INTERVAL H&P NOTE
The patient has been examined and the H&P has been reviewed:    I concur with the findings and changes have been noted since the H&P was written: was found to have a positive urine culture.  On appropriate antibiotics.    There are no hospital problems to display for this patient.   for cystoscopy

## 2018-02-19 NOTE — PATIENT INSTRUCTIONS
What to Expect After a Cystoscopy  For the next 24-48 hours, you may feel a mild burning when you urinate. This burning is normal and expected. Drink plenty of water to dilute the urine to help relieve the burning sensation. You may also see a small amount of blood in your urine after the procedure.    Unless you are already taking antibiotics, you may be given an antibiotic after the test to prevent infection.    Signs and Symptoms to Report  Call the [facility] at [phone] if you develop any of the following:  · Fever of 101 degrees or higher  · Chills or persistent bleeding  · Inability to urinate  ·

## 2018-02-19 NOTE — H&P (VIEW-ONLY)
CHIEF COMPLAINT:    Mrs. Soriano is a 67 y.o. female presenting for a follow up on UTI 3 weeks ago in a patient with history of IC    PRESENTING ILLNESS:    Niki Soriano is a 67 y.o. female who has a history of IC, presently only on oxybutynin XL 5 mg during the day and 5 mg at night immediate release.  She states she went [place] with fever, malaise.  She was found to have a UTI, and was given omnicef.  Someone made a comment that they were concerned that the infection was in her blood but she was not admitted.  She feels back to her baseline.   Has pain associated with the IC but no longer has the malodorous urine which is how she generally knows that she is infected.  She states that she went to the ER thinking that she had the flu.  She states she has had a number of UTI's.  Not using topical estrogen though she was prescribed this in the past by her OBGYN.  She states she takes a probiotic, but still has hard stools.  She has fecal incontinence with diarrhea.     REVIEW OF SYSTEMS:    Review of Systems   Constitutional: Negative.    HENT: Negative.    Eyes: Negative.    Respiratory: Negative.    Cardiovascular: Negative.    Gastrointestinal: Positive for constipation.   Genitourinary:        Pelvic pain   Musculoskeletal: Positive for joint pain and myalgias.   Skin: Negative.    Neurological: Negative.    Endo/Heme/Allergies: Negative.    Psychiatric/Behavioral: Positive for depression.       PATIENT HISTORY:    Past Medical History:   Diagnosis Date    Diabetes mellitus     Hypercholesteremia     Hypertension     IC (interstitial cystitis)     Spondylisthesis     Vulvodynia, unspecified        Past Surgical History:   Procedure Laterality Date     SECTION      x3    CYSTOSCOPY      HYSTERECTOMY      ischemic colitis      OOPHORECTOMY      MA REMOVAL OF OVARY/TUBE(S)         Family History   Problem Relation Age of Onset    Colon cancer Neg Hx     Ovarian cancer Neg Hx     Breast  cancer Paternal Aunt        Social History    Marital status:      Social History Main Topics    Smoking status: Never Smoker    Smokeless tobacco: Not on file    Alcohol use No    Drug use: No    Sexual activity: Yes     Partners: Male       Allergies:  Augmentin [amoxicillin-pot clavulanate]; Tramadol; Vicodin [hydrocodone-acetaminophen]; and Tegretol [carbamazepine]    Medications:  Outpatient Encounter Prescriptions as of 2/2/2018   Medication Sig Dispense Refill    atenolol (TENORMIN) 50 MG tablet       atorvastatin (LIPITOR) 20 MG tablet Take 20 mg by mouth once daily.      baclofen (LIORESAL) 20 MG tablet       duloxetine (CYMBALTA) 30 MG capsule Take 30 mg by mouth once daily.      estradiol (ESTRACE) 0.01 % (0.1 mg/gram) vaginal cream Place 1 g vaginally 3 (three) times a week. Place by fingertip application before bedtime three times a week (Monday, Wednesday, Friday) 45 g 3    LEVOTHYROXINE SODIUM (LEVOTHYROXINE ORAL) Take 50 mcg by mouth before breakfast.       LIDODERM 5 %(700 mg/patch)       lisinopril (PRINIVIL,ZESTRIL) 40 MG tablet       metformin (GLUCOPHAGE) 500 MG tablet Take 500 mg by mouth 2 (two) times daily with meals.      oxybutynin (DITROPAN) 5 MG Tab Take 1 tablet (5 mg total) by mouth every evening. 90 tablet 3    oxybutynin (DITROPAN-XL) 5 MG TR24 Take 1 tablet (5 mg total) by mouth once daily. 90 tablet 3    SYNTHROID 50 mcg tablet       triamterene-hydrochlorothiazide 37.5-25 mg (DYAZIDE) 37.5-25 mg per capsule       [DISCONTINUED] alendronate (FOSAMAX) 70 MG tablet Take 1 tablet (70 mg total) by mouth once a week. 12 tablet 4    [DISCONTINUED] cefdinir (OMNICEF) 300 MG capsule Take 300 mg by mouth every 12 (twelve) hours.  0     No facility-administered encounter medications on file as of 2/2/2018.          PHYSICAL EXAMINATION:    The patient generally appears in good health, is appropriately interactive, and is in no apparent distress.    Skin: No  lesions.    Mental: Cooperative with normal affect.    Neuro: Grossly intact.    HEENT: Normal. No evidence of lymphadenopathy.    Chest:  normal inspiratory effort.    Abdomen:  Soft, non-tender. No masses or organomegaly. Bladder is not palpable. No evidence of flank discomfort. No evidence of inguinal hernia.    Extremities: No clubbing, cyanosis, or edema      LABS:    Lab Results   Component Value Date    BUN 21 11/15/2017    CREATININE 0.9 11/15/2017         IMPRESSION:    Encounter Diagnoses   Name Primary?    Vaginal atrophy Yes    Recurrent UTI        PLAN:    1.  Estrace cream prescribed  2.  Renal ultrasound  3.  She states she can do an awake cystoscopy, so this was ordered  4.  Release of info for [facility].

## 2018-02-19 NOTE — PROCEDURES
CYSTOSCOPY REPORT    Pre Procedure Diagnosis:  Recurrent UTI    Post Procedure Diagnosis: generalized cystitis, (consistent with history of recent UTI.)    Anesthesia: 10 cc 2% lidocaine jelly applied per urethra.    14 FR Flexible Olympus cystoscope used.    FINDINGS:  Dome, anterior, posterior, lateral walls and bladder base free of papillary abnormalities.  There were butter colored nodules throughout the bladder.  Right and left ureteral orifices in the normal postion and configuration, both effluxed clear urine.  Bladder neck and urethra were normal.    Specimen:  none    The patient was taken to the cystoscopy suite and placed in dorsal lithotomy position.  The genitalia was prepped and draped  in the usual sterile fashion.  Two percent lidocaine jelly was inserted in the urethra.  After sufficent time had passed to allow good local anesthesia, the cystoscope was inserted in the urethra and passed into the bladder visualizing the urethra along its entire course.  The dome, anterior, posterior and lateral walls were examined systematically.  The ureteral orifices were in their usual position and configuration.  The cystoscope was turned upon itself 180 degrees to visualize the bladder neck.  The cystoscope was then brought to the level of the bladder neck, the water was turned on and the urethra was visualized.  The cystoscope was removed and the patient was instructed to urinate prior to leaving the office.     Post procedure medication:  She is on Bactrim for 3 more days.        ASSESSMENT/PLAN:  67 year old woman status post flexible cystoscopy.  1. Push fluids for 24 hours.  2. May see blood in the urine, this should gradually improve over the next 2-3 days.  3. The patient was instructed to return to the office or go to the emergency should fever, chills, cloudy urine, or inability to urinate develop.  4. Follow up in  4 months.

## 2018-04-16 ENCOUNTER — TELEPHONE (OUTPATIENT)
Dept: UROLOGY | Facility: CLINIC | Age: 68
End: 2018-04-16

## 2018-04-16 DIAGNOSIS — N39.0 URINARY TRACT INFECTION WITHOUT HEMATURIA, SITE UNSPECIFIED: Primary | ICD-10-CM

## 2018-04-16 NOTE — TELEPHONE ENCOUNTER
"----- Message from Hilaria Romero MA sent at 4/16/2018  4:20 PM CDT -----  Contact: Home: [phone]   Pt said she has  Another "full blown painful UTI" and said she is on medication to prevent them from happening.        "

## 2018-04-18 ENCOUNTER — LAB VISIT (OUTPATIENT)
Dept: LAB | Facility: HOSPITAL | Age: 68
End: 2018-04-18
Attending: UROLOGY
Payer: MEDICARE

## 2018-04-18 DIAGNOSIS — N39.0 URINARY TRACT INFECTION WITHOUT HEMATURIA, SITE UNSPECIFIED: ICD-10-CM

## 2018-04-18 PROCEDURE — 87088 URINE BACTERIA CULTURE: CPT

## 2018-04-18 PROCEDURE — 87186 SC STD MICRODIL/AGAR DIL: CPT

## 2018-04-18 PROCEDURE — 87086 URINE CULTURE/COLONY COUNT: CPT

## 2018-04-18 PROCEDURE — 87077 CULTURE AEROBIC IDENTIFY: CPT

## 2018-04-20 LAB — BACTERIA UR CULT: NORMAL

## 2018-04-23 ENCOUNTER — TELEPHONE (OUTPATIENT)
Dept: UROLOGY | Facility: CLINIC | Age: 68
End: 2018-04-23

## 2018-04-23 DIAGNOSIS — N30.90 BLADDER INFECTION: Primary | ICD-10-CM

## 2018-04-23 RX ORDER — NITROFURANTOIN 25; 75 MG/1; MG/1
100 CAPSULE ORAL 2 TIMES DAILY
Qty: 14 CAPSULE | Refills: 0 | Status: SHIPPED | OUTPATIENT
Start: 2018-04-23 | End: 2018-04-30

## 2018-05-21 ENCOUNTER — TELEPHONE (OUTPATIENT)
Dept: UROLOGY | Facility: CLINIC | Age: 68
End: 2018-05-21

## 2018-05-21 DIAGNOSIS — N39.0 URINARY TRACT INFECTION WITHOUT HEMATURIA, SITE UNSPECIFIED: Primary | ICD-10-CM

## 2018-05-21 NOTE — TELEPHONE ENCOUNTER
----- Message from Evelina Leary MA sent at 5/21/2018 12:07 PM CDT -----  Contact: self  [phone]  Needs Medical Advice    Who Called:  patient  Symptoms (please be specific):    Extreme pain and burning - strong odor  How long has patient had these symptoms:   X 10 days after competed the medication  Pharmacy name and phone # if needed:   [id number] - [place], [place] - [address] [facility] [phone] (Phone)   Communication Preference (MyChart response to Pt. (or) Call Back # and timeframe):  Phone # above   Additional Information:

## 2018-05-22 ENCOUNTER — LAB VISIT (OUTPATIENT)
Dept: LAB | Facility: HOSPITAL | Age: 68
End: 2018-05-22
Attending: UROLOGY
Payer: MEDICARE

## 2018-05-22 DIAGNOSIS — N39.0 URINARY TRACT INFECTION WITHOUT HEMATURIA, SITE UNSPECIFIED: ICD-10-CM

## 2018-05-22 PROCEDURE — 87077 CULTURE AEROBIC IDENTIFY: CPT

## 2018-05-22 PROCEDURE — 87088 URINE BACTERIA CULTURE: CPT

## 2018-05-22 PROCEDURE — 87086 URINE CULTURE/COLONY COUNT: CPT

## 2018-05-22 PROCEDURE — 87186 SC STD MICRODIL/AGAR DIL: CPT

## 2018-05-24 LAB — BACTERIA UR CULT: NORMAL

## 2018-05-25 ENCOUNTER — TELEPHONE (OUTPATIENT)
Dept: UROLOGY | Facility: CLINIC | Age: 68
End: 2018-05-25

## 2018-05-25 DIAGNOSIS — N31.9 NEUROGENIC BLADDER: ICD-10-CM

## 2018-05-25 DIAGNOSIS — N30.90 BLADDER INFECTION: Primary | ICD-10-CM

## 2018-05-25 RX ORDER — SULFAMETHOXAZOLE AND TRIMETHOPRIM 800; 160 MG/1; MG/1
1 TABLET ORAL 2 TIMES DAILY
Qty: 14 TABLET | Refills: 0 | Status: SHIPPED | OUTPATIENT
Start: 2018-05-25 | End: 2018-06-01

## 2018-05-25 NOTE — TELEPHONE ENCOUNTER
Pan sensitive E coli.  Allergies checked, has normal renal function meds reviewed.  Bactrim ordered, instructing the patient to hold the trimethoprim

## 2018-06-02 ENCOUNTER — HOSPITAL ENCOUNTER (EMERGENCY)
Facility: HOSPITAL | Age: 68
Discharge: HOME | End: 2018-06-02
Attending: EMERGENCY MEDICINE
Payer: MEDICARE

## 2018-06-02 VITALS
BODY MASS INDEX: 30.73 KG/M2 | RESPIRATION RATE: 18 BRPM | DIASTOLIC BLOOD PRESSURE: 79 MMHG | HEIGHT: 64 IN | WEIGHT: 180 LBS | SYSTOLIC BLOOD PRESSURE: 171 MMHG | TEMPERATURE: 99 F | OXYGEN SATURATION: 98 % | HEART RATE: 75 BPM

## 2018-06-02 DIAGNOSIS — M54.9 BACK PAIN, UNSPECIFIED BACK LOCATION, UNSPECIFIED BACK PAIN LATERALITY, UNSPECIFIED CHRONICITY: Primary | ICD-10-CM

## 2018-06-02 DIAGNOSIS — M54.9 BACK PAIN: ICD-10-CM

## 2018-06-02 LAB
ALBUMIN SERPL BCP-MCNC: 4.4 G/DL
ALP SERPL-CCNC: 53 U/L
ALT SERPL W/O P-5'-P-CCNC: 18 U/L
ANION GAP SERPL CALC-SCNC: 10 MMOL/L
AST SERPL-CCNC: 19 U/L
BACTERIA #/AREA URNS AUTO: NORMAL /HPF
BASOPHILS # BLD AUTO: 0.04 K/UL
BASOPHILS NFR BLD: 0.5 %
BILIRUB SERPL-MCNC: 0.7 MG/DL
BILIRUB UR QL STRIP: NEGATIVE
BUN SERPL-MCNC: 30 MG/DL
CALCIUM SERPL-MCNC: 10.3 MG/DL
CHLORIDE SERPL-SCNC: 103 MMOL/L
CLARITY UR REFRACT.AUTO: CLEAR
CO2 SERPL-SCNC: 21 MMOL/L
COLOR UR AUTO: ABNORMAL
CREAT SERPL-MCNC: 1.5 MG/DL
DIFFERENTIAL METHOD: ABNORMAL
EOSINOPHIL # BLD AUTO: 0.2 K/UL
EOSINOPHIL NFR BLD: 2.8 %
ERYTHROCYTE [DISTWIDTH] IN BLOOD BY AUTOMATED COUNT: 12.9 %
EST. GFR  (AFRICAN AMERICAN): 41.6 ML/MIN/1.73 M^2
EST. GFR  (NON AFRICAN AMERICAN): 36 ML/MIN/1.73 M^2
GLUCOSE SERPL-MCNC: 130 MG/DL
GLUCOSE UR QL STRIP: NEGATIVE
HCT VFR BLD AUTO: 35.1 %
HGB BLD-MCNC: 11.9 G/DL
HGB UR QL STRIP: NEGATIVE
IMM GRANULOCYTES # BLD AUTO: 0.02 K/UL
IMM GRANULOCYTES NFR BLD AUTO: 0.3 %
KETONES UR QL STRIP: NEGATIVE
LEUKOCYTE ESTERASE UR QL STRIP: ABNORMAL
LYMPHOCYTES # BLD AUTO: 2.2 K/UL
LYMPHOCYTES NFR BLD: 28.4 %
MCH RBC QN AUTO: 29 PG
MCHC RBC AUTO-ENTMCNC: 33.9 G/DL
MCV RBC AUTO: 86 FL
MICROSCOPIC COMMENT: NORMAL
MONOCYTES # BLD AUTO: 0.4 K/UL
MONOCYTES NFR BLD: 5.5 %
NEUTROPHILS # BLD AUTO: 4.9 K/UL
NEUTROPHILS NFR BLD: 62.5 %
NITRITE UR QL STRIP: NEGATIVE
NRBC BLD-RTO: 0 /100 WBC
PH UR STRIP: 7 [PH] (ref 5–8)
PLATELET # BLD AUTO: 181 K/UL
PMV BLD AUTO: 10.1 FL
POTASSIUM SERPL-SCNC: 4.2 MMOL/L
PROT SERPL-MCNC: 7 G/DL
PROT UR QL STRIP: NEGATIVE
RBC # BLD AUTO: 4.1 M/UL
RBC #/AREA URNS AUTO: 1 /HPF (ref 0–4)
SODIUM SERPL-SCNC: 134 MMOL/L
SP GR UR STRIP: 1.01 (ref 1–1.03)
SQUAMOUS #/AREA URNS AUTO: 1 /HPF
URN SPEC COLLECT METH UR: ABNORMAL
UROBILINOGEN UR STRIP-ACNC: NEGATIVE EU/DL
WBC # BLD AUTO: 7.81 K/UL
WBC #/AREA URNS AUTO: 2 /HPF (ref 0–5)

## 2018-06-02 PROCEDURE — 99284 EMERGENCY DEPT VISIT MOD MDM: CPT | Mod: 25

## 2018-06-02 PROCEDURE — 96360 HYDRATION IV INFUSION INIT: CPT

## 2018-06-02 PROCEDURE — 80053 COMPREHEN METABOLIC PANEL: CPT

## 2018-06-02 PROCEDURE — 85025 COMPLETE CBC W/AUTO DIFF WBC: CPT

## 2018-06-02 PROCEDURE — 96361 HYDRATE IV INFUSION ADD-ON: CPT

## 2018-06-02 PROCEDURE — 25000003 PHARM REV CODE 250: Performed by: EMERGENCY MEDICINE

## 2018-06-02 PROCEDURE — 99283 EMERGENCY DEPT VISIT LOW MDM: CPT | Mod: ,,, | Performed by: EMERGENCY MEDICINE

## 2018-06-02 PROCEDURE — 25000003 PHARM REV CODE 250: Performed by: STUDENT IN AN ORGANIZED HEALTH CARE EDUCATION/TRAINING PROGRAM

## 2018-06-02 PROCEDURE — 81001 URINALYSIS AUTO W/SCOPE: CPT

## 2018-06-02 RX ORDER — OXYCODONE AND ACETAMINOPHEN 5; 325 MG/1; MG/1
1 TABLET ORAL NIGHTLY PRN
Qty: 18 TABLET | Refills: 0 | Status: SHIPPED | OUTPATIENT
Start: 2018-06-02 | End: 2018-06-07

## 2018-06-02 RX ORDER — PREDNISONE 20 MG/1
40 TABLET ORAL DAILY
Qty: 10 TABLET | Refills: 0 | Status: SHIPPED | OUTPATIENT
Start: 2018-06-02 | End: 2018-06-07

## 2018-06-02 RX ORDER — HYDROXYZINE PAMOATE 25 MG/1
25 CAPSULE ORAL
Status: COMPLETED | OUTPATIENT
Start: 2018-06-02 | End: 2018-06-02

## 2018-06-02 RX ADMIN — SODIUM CHLORIDE 500 ML: 9 INJECTION, SOLUTION INTRAVENOUS at 08:06

## 2018-06-02 RX ADMIN — HYDROXYZINE PAMOATE 25 MG: 25 CAPSULE ORAL at 08:06

## 2018-06-02 NOTE — Clinical Note
Niki Soriano discharge to home/self care.    - Condition on Discharge: Good.  - Patient left the emergency department by wheelchair.  - The patient left the ED accompanied by a family member.  - The discharge instructions were discussed with the patient .  - They state an understanding of the discharge instructions.  - Instructed patient to go to the discharge window.

## 2018-06-02 NOTE — ED PROVIDER NOTES
"Encounter Date: 6/2/2018    SCRIBE #1 NOTE: I, Keven Murdock, am scribing for, and in the presence of,  Dr. Nicole. I have scribed the following portions of the note - the Resident attestation.       History     Chief Complaint   Patient presents with    Neck Pain     Pt reports posterior neck pain x3 weeks    Back Pain     Lower back pain x3 weeks with shooting pains down RLE.      67 F with chronic widespread back pain, interstitial cystitis, bilateral carpal tunnel syndrome presents for evaluation of back pain. Patient reports that her lower back pain on the right side has worsened in the last 4 weeks. She was started on cymbalta after relaying this to her pain medicine doctor. Her pain has been constant over this timeframe and not worsening but she became frustrated with her inability to sleep and presented today. She chronically has leg weakness and "neuropathy" and has been using a walker for ambulation for an extended period. Also has chronic incontinence which is followed by urology and apparently more well controlled recently. No fevers/chills. Patient states that her pain progresses and eventually causes pain over her entire body. When specifically questioned she describes it in the right lower back, gluteal region with occasional radiation to anterior thigh. She has tried ibuprofen, tylenol and lidocaine patches without relief and has previously had injections which provided only temporary relief. She has tried gabapentin but became too somnolent, even when taken at low doses only at night. Has nightmares on cymbalta so had to reduce dose. Tramadol and hydrocodone have produced itching and worsening anxiety and she does not want to take any medications in those classes. Following with pain management in [place].           Review of patient's allergies indicates:   Allergen Reactions    Augmentin [amoxicillin-pot clavulanate] Other (See Comments)     Patient cannot recall what she had, only that she " could not tolerate the Augmentin    Tramadol Itching    Vicodin [hydrocodone-acetaminophen]     Tegretol [carbamazepine] Itching and Rash     Past Medical History:   Diagnosis Date    Diabetes mellitus     Hypercholesteremia     Hypertension     IC (interstitial cystitis)     Spondylisthesis     Vulvodynia, unspecified      Past Surgical History:   Procedure Laterality Date     SECTION      x3    CYSTOSCOPY      HYSTERECTOMY      ischemic colitis      OOPHORECTOMY      [place] REMOVAL OF OVARY/TUBE(S)       Family History   Problem Relation Age of Onset    Breast cancer Paternal Aunt     Colon cancer Neg Hx     Ovarian cancer Neg Hx      Social History   Substance Use Topics    Smoking status: Never Smoker    Smokeless tobacco: Never Used    Alcohol use No     Review of Systems   Constitutional: Negative for chills, fever and unexpected weight change.   HENT: Negative for sore throat and trouble swallowing.    Eyes: Negative for photophobia and visual disturbance.   Respiratory: Negative for cough and shortness of breath.    Cardiovascular: Negative for chest pain, palpitations and leg swelling.   Gastrointestinal: Negative for nausea and vomiting.   Genitourinary: Positive for dysuria and hematuria.   Musculoskeletal: Positive for arthralgias, back pain, gait problem, myalgias and neck pain.   Skin: Negative for rash and wound.   Neurological: Positive for weakness and numbness.   Psychiatric/Behavioral: Positive for dysphoric mood and sleep disturbance. The patient is nervous/anxious.        Physical Exam     Initial Vitals [18 0654]   BP Pulse Resp Temp SpO2   (!) 180/91 74 (!) 22 98.2 °F (36.8 °C) 98 %      MAP       120.67         Physical Exam    Nursing note and vitals reviewed.  Constitutional:   Anxious, tearful female. Overweight.    HENT:   Mouth/Throat: No oropharyngeal exudate.   Dry mucous membranes   Eyes: Conjunctivae and EOM are normal. Pupils are equal, round, and  reactive to light. No scleral icterus.   Neck: Normal range of motion. Neck supple. No thyromegaly present.   Cardiovascular: Normal rate, regular rhythm and normal heart sounds.   Pulmonary/Chest: Breath sounds normal. No respiratory distress. She has no wheezes. She has no rales.   Abdominal: Soft. Bowel sounds are normal. She exhibits no distension. There is no tenderness. There is no rebound and no guarding.   Musculoskeletal:   Diffuse tenderness to entire spine over bony processes, paraspinal musculature, anterior upper chest wall.    Lymphadenopathy:     She has no cervical adenopathy.   Neurological: She is alert and oriented to person, place, and time. She has normal reflexes.   Normal strength in bilateral upper extremities. Left wrist in brace  Leg weakness bilaterally in all muscle groups, left greater than right (right is site of most pain)  Decreased sensation bilateral lower extremities, unchanged from baseline per patient  Reflexes 2+ and symmetric bilateral lower extremities.    Skin: Skin is warm and dry. No rash noted.   Psychiatric:   Anxious         ED Course   Procedures  Labs Reviewed   CBC W/ AUTO DIFFERENTIAL - Abnormal; Notable for the following:        Result Value    Hemoglobin 11.9 (*)     Hematocrit 35.1 (*)     All other components within normal limits   COMPREHENSIVE METABOLIC PANEL             Medical Decision Making:   History:   Old Medical Records: I decided to obtain old medical records.  Initial Assessment:   67 F with chronic back pain presents for further management. No imaging in system, she thinks last MRI 4 years ago. Chronic incontinence, sounds well controlled, following with urology. With lower extremity weakness, using walker to ambulate.   Differential Diagnosis:   Degenerative disc disease, radiculopathy, Fibromyalgia, cauda equina, chronic pain  Clinical Tests:   Lab Tests: Ordered and Reviewed  Radiological Study: Ordered and Reviewed  ED Management:  Will get MRI  "of spine given lower extremity weakness.  Will try vistaril to control anxiety, especially while in MRI scanner. Patient reports having adverse (non-allergic) symptoms to opiates and gabapentin and has already taken ibuprofen and tylenol.        APC / Resident Notes:   11:14 AM  Extensive discussion with patient and  about MRI findings. Per patient she was "kicked out" of her previous neurosurgeon's office. Probably not interested in operative management but would consider attending multi-disciplinary back clinic. Will place referral. Will give short course of steroids and short course of opiate medications at night. Continue cymbalta and Tylenol. Cr mildly increased with no evidence of UTI. Advised to hold ibuprofen and stay hydrated until re-evaluated. Has follow up scheduled.     11:38 AM  Discussed with ortho on call. Patient safe for outpatient follow up. Will refer to [facility] for further management.        Scribe Attestation:   Scribe #1: I performed the above scribed service and the documentation accurately describes the services I performed. I attest to the accuracy of the note.    Attending Attestation:   Physician Attestation Statement for Resident:  As the supervising MD   Physician Attestation Statement: I have personally seen and examined this patient.   I agree with the above history. -:   As the supervising MD I agree with the above PE.    As the supervising MD I agree with the above treatment, course, plan, and disposition.                       Clinical Impression:   The encounter diagnosis was Back pain.    MRI Lumbar Spine W WO Cont    (Results Pending)       Disposition:   Disposition: Discharged  Condition: Stable                        Freddie Pratt MD  Resident  06/02/18 5917    "

## 2018-06-02 NOTE — ED TRIAGE NOTES
Presents to ER with complaint of chronic neck and back pain.  Patient's name and date of birth checked and is correct.  LOC: The patient is awake, alert and aware of environment with an appropriate affect, the patient is oriented x 3 and speaking appropriately.  APPEARANCE: Patient resting comfortably and in no acute distress, patient is clean and well groomed, patient's clothing is properly fastened.  CARDIOVASCULAR:  Heart rate regular and even with no peripheral edema noted.  SKIN: The skin is warm and dry, patient has normal skin turgor and moist mucus membranes, skin intact, no breakdown or brusing noted.   MUSKULOSKELETAL: Patient moving all extremities well, no obvious swelling or deformities noted.  RESPIRATORY: Airway is open and patent, respirations are spontaneous, patient has a normal effort and rate.

## 2018-06-02 NOTE — DISCHARGE INSTRUCTIONS
Hold ibuprofen until seen by your other doctors.     Take the prednisone away from bedtime for the next 5 days until you are seen at the back clinic.     Continue using lidocaine patches and you can take Tylenol as needed. Continue with cymbalta and with pain management.

## 2018-06-04 ENCOUNTER — HOSPITAL ENCOUNTER (OUTPATIENT)
Dept: RADIOLOGY | Facility: HOSPITAL | Age: 68
Discharge: HOME | End: 2018-06-04
Attending: PHYSICIAN ASSISTANT
Payer: MEDICARE

## 2018-06-04 ENCOUNTER — TELEPHONE (OUTPATIENT)
Dept: SPINE | Facility: CLINIC | Age: 68
End: 2018-06-04

## 2018-06-04 DIAGNOSIS — M54.50 LUMBAR PAIN: ICD-10-CM

## 2018-06-04 DIAGNOSIS — M54.50 LUMBAR PAIN: Primary | ICD-10-CM

## 2018-06-04 PROCEDURE — 72100 X-RAY EXAM L-S SPINE 2/3 VWS: CPT | Mod: 26,,, | Performed by: RADIOLOGY

## 2018-06-04 PROCEDURE — 72100 X-RAY EXAM L-S SPINE 2/3 VWS: CPT | Mod: TC

## 2018-06-04 PROCEDURE — 72120 X-RAY BEND ONLY L-S SPINE: CPT | Mod: 26,,, | Performed by: RADIOLOGY

## 2018-06-04 NOTE — PROGRESS NOTES
"Subjective:      Patient ID: Niki Soriano is a 67 y.o. female.    Chief Complaint: Low-back Pain and Leg Pain      HPI   ([place])    History of DM, hypothyroidism, HTN, and interstitial cysitis and chronic pain. Sees outside pain management ([facility]). Has seen outside neurosurgery as well.     Seen in ED on 6/2/18 with severe back pain- had lumbar MRI and was given percocet 5 and prednisone.     She complains of constant LBP with intermittent right posterior leg pain to her foot. No left leg pain. LBP > right leg pain. Pain has been severe in last 2 months. No alleviating factors, medications stopped helping. Pain is worse with prolonged sitting. She rates her pain as a 5 on a scale of 1-10. Pain varies and can be sharp, shooting, burning, and deep. She has known DM neuropathy. Was also told she had nerve damage in her legs- she was "paralyzed" in 1996 and could not move due to trauma/stress. She has numbness, tingling. No weakness in her legs. She uses a walker to ambulate- has used this for years.     Minimal change in pain with percocet and prednisone. She is also on baclofen and cymbalta from pain management. She has been to PT years ago and it did not help. She's had lumbar ESIs (Crapazano) years okay with some relief with first injection. No surgery on her back.       Review of Systems   Constitution: Positive for weakness, malaise/fatigue and weight gain. Negative for fever, night sweats and weight loss.   HENT: Negative for hearing loss, nosebleeds and odynophagia.    Eyes: Positive for blurred vision. Negative for double vision.   Cardiovascular: Positive for palpitations. Negative for chest pain and irregular heartbeat.   Respiratory: Negative for cough, hemoptysis, shortness of breath and wheezing.    Endocrine: Negative for cold intolerance and polydipsia.   Hematologic/Lymphatic: Does not bruise/bleed easily.   Skin: Negative for dry skin, poor wound healing, rash and suspicious lesions. "   Musculoskeletal: Positive for back pain, muscle cramps and neck pain.        Positive for swelling. See HPI for pertinent positives.   Gastrointestinal: Negative for bloating, abdominal pain, constipation, diarrhea, hematochezia, melena, nausea and vomiting.   Genitourinary: Positive for dysuria. Negative for bladder incontinence, hematuria, hesitancy and incomplete emptying.        Positive for dark or discolored urine, difficulty starting/ending urine stream, poor bladder control, loss of genital sensation, and any type sexual dysfunction.    Neurological: Positive for headaches, numbness and paresthesias. Negative for disturbances in coordination, dizziness, focal weakness, loss of balance and seizures.        Positive for inability to feel hot/cold, loss of muscle mass, abnormal arm/leg sensations.    Psychiatric/Behavioral: Negative for depression and hallucinations. The patient is nervous/anxious.            Objective:        General: Niki is well-developed, well-nourished, appears stated age, in no acute distress, alert and oriented to time, place and person.     General    Vitals reviewed.  Constitutional: She is oriented to person, place, and time. She appears well-developed and well-nourished.   Pulmonary/Chest: Effort normal.   Abdominal: She exhibits no distension.   Neurological: She is alert and oriented to person, place, and time.   Psychiatric: She has a normal mood and affect. Her behavior is normal. Judgment and thought content normal.           Gait: she is in a WC. She can stand to transfer to chair. She cannot walk with out walker. Gait not tested.     On exam of the lumbar spine, Inspection of back is normal, mild lower lumbar tenderness noted.     Skin in lumbar region is warm to the touch without visible rashes.     muscle tone normal without spasm, limited range of motion without pain  Patient denies pain with lumbar ROM.    Strength testing of the bilateral LEs shows  Right hip  abduction:  +5/5  Left hip abduction:  +5/5  Right hip flexion:  +5/5  Left hip flexion:  +5/5  Right hip extensors:  +5/5  Left hip extensors:  +5/5  Right quadriceps:  +5/5  Left quadriceps:  +5/5  Right hamstring:  +5/5  Left hamstring:  +5/5  Right dorsiflexion:  +5/5  Left dorsiflexion:  +5/5  Right plantar flexion:  +5/5  Left plantar flexion:  +5/5   Right EHL:  +5/5   Left EHL:  +5/5    negative clonus of bilateral LEs.     negative straight leg raise on bilateral LEs.     DTRs:  Right patellar:  +2     Left patellar:  +2  Right achilles:  +2   Left achilles:  +2    Sensation is grossly intact in L2, L3, L4, L5, and S1 distribution.    Right hip has no pain with IR/ER. Left hip has no pain with IR/ER.       On exam of bilateral UEs, patient has full painfree ROM with no signs of clubbing, laxity, cyanosis, edema, instability, weakness, or tenderness.       XRAY INTERPRETATION:  X-rays of lumbar spine (AP/lat/flex/ext) dated 6/4/18 are personally reviewed and show facet hypertrophy L4-S1 with severe DDD, slip L4-L5.    MRI of lumbar spine dated 6/2/18 is personally reviewed and shows mild central stenosis T12-L1 with moderate left foraminal stenosis. Mild central and left foraminal stenosis L1-L2. Mild central and moderate left foraminal stenosis L2-L3. Disc bulge/facet hypertrophy L3-L4 with severe central stenosis and moderate right/severe left foraminal stenosis. Slip L4-L5 with disc bulge/facet hypertrophy, moderate central stenosis, and severe right/moderate left foraminal stenosis. Moderate right/severe left foraminal stenosis L5-S1 with facet hypertrophy.         Assessment:       1. Other spondylosis with radiculopathy, lumbar region    2. Foraminal stenosis of lumbar region    3. Other chronic pain    4. DDD (degenerative disc disease), lumbosacral    5. Spinal stenosis of lumbar region without neurogenic claudication    6. Thoracic and lumbosacral neuritis    7. Acquired spondylolisthesis    8.  "Chronic bilateral low back pain with right-sided sciatica           Plan:       Orders Placed This Encounter    Procedure Order to [facility]    gabapentin (NEURONTIN) 300 MG capsule       Worsening chronic constant LBP with intermittent right posterior leg pain to her foot. No left leg pain. LBP > right leg pain. Pain has been severe in last 2 months. Known diabetic neuropathy with "paralysis" in 1996 due to stress that she never fully recovered from. Known slip L4-L5 with severe central stenosis L3-L4, moderate central stenosis L4-L5, and multilevel foraminal stenosis. Primary pain generators are likely L3-L4 and L4-L5. Minimal improvement with recent dose pack and percocet from ED. Some improvement with previous lumbar injections years ago. Treatment options reviewed with patient along with above lumbar XRs/MRI. Following plan made:     - Set up for caudal ARIA with pain management. May also consider facet injections.   - Restart neurontin 300mg q hs to increase to tid as tolerated. May need to go up to 600mg tid. Reviewed dosing and side effects.   - Continue prn percocet from ED. She understands that we will not refill this.   - Continue cymbalta, baclofen, and prn OTC motrin.   - She is on her last day of prednisone from ED.   - No improvement with PT in the past.   - She is likely a surgical candidate if she fails above. Will see her back on [place] day for recheck. She is interested in any MIS surgery options.     ADDENDUM 6/26/18:  Patient discussed with Christal Jama NP in urology. She has chronic UTIs and was told she needed to be off antibiotics for her ARIA. Per urology, her urine culture is resistant to most oral antibiotics so she is having her take monurol 1 x dose. She also referred her to ID. Her appt is July 6. Reviewed this with Dr. Fu. He wants patient to see ID prior to considering an injection. Patient and urology aware. Recommend she f/u with Nickie in clinic after ID " appointment to discuss further.     Follow-up: Follow-up in about 6 weeks (around 7/17/2018). If there are any questions prior to this, the patient was instructed to contact the office.

## 2018-06-05 ENCOUNTER — OFFICE VISIT (OUTPATIENT)
Dept: SPINE | Facility: CLINIC | Age: 68
End: 2018-06-05
Payer: MEDICARE

## 2018-06-05 ENCOUNTER — TELEPHONE (OUTPATIENT)
Dept: PAIN MEDICINE | Facility: CLINIC | Age: 68
End: 2018-06-05

## 2018-06-05 VITALS
HEIGHT: 64 IN | HEART RATE: 59 BPM | SYSTOLIC BLOOD PRESSURE: 179 MMHG | WEIGHT: 180 LBS | DIASTOLIC BLOOD PRESSURE: 74 MMHG | BODY MASS INDEX: 30.73 KG/M2

## 2018-06-05 DIAGNOSIS — M47.26 OTHER SPONDYLOSIS WITH RADICULOPATHY, LUMBAR REGION: Primary | ICD-10-CM

## 2018-06-05 DIAGNOSIS — M48.061 FORAMINAL STENOSIS OF LUMBAR REGION: ICD-10-CM

## 2018-06-05 DIAGNOSIS — M54.17 THORACIC AND LUMBOSACRAL NEURITIS: ICD-10-CM

## 2018-06-05 DIAGNOSIS — M43.10 ACQUIRED SPONDYLOLISTHESIS: ICD-10-CM

## 2018-06-05 DIAGNOSIS — M54.41 CHRONIC BILATERAL LOW BACK PAIN WITH RIGHT-SIDED SCIATICA: ICD-10-CM

## 2018-06-05 DIAGNOSIS — G89.29 CHRONIC BILATERAL LOW BACK PAIN WITH RIGHT-SIDED SCIATICA: ICD-10-CM

## 2018-06-05 DIAGNOSIS — M54.14 THORACIC AND LUMBOSACRAL NEURITIS: ICD-10-CM

## 2018-06-05 DIAGNOSIS — G89.29 OTHER CHRONIC PAIN: ICD-10-CM

## 2018-06-05 DIAGNOSIS — M48.061 SPINAL STENOSIS OF LUMBAR REGION WITHOUT NEUROGENIC CLAUDICATION: ICD-10-CM

## 2018-06-05 DIAGNOSIS — M51.37 DDD (DEGENERATIVE DISC DISEASE), LUMBOSACRAL: ICD-10-CM

## 2018-06-05 PROCEDURE — 99204 OFFICE O/P NEW MOD 45 MIN: CPT | Mod: S$GLB,,, | Performed by: PHYSICIAN ASSISTANT

## 2018-06-05 PROCEDURE — 99999 PR PBB SHADOW E&M-EST. PATIENT-LVL IV: CPT | Mod: PBBFAC,,, | Performed by: PHYSICIAN ASSISTANT

## 2018-06-05 RX ORDER — GABAPENTIN 300 MG/1
CAPSULE ORAL
Qty: 90 CAPSULE | Refills: 2 | Status: SHIPPED | OUTPATIENT
Start: 2018-06-05 | End: 2018-09-05 | Stop reason: SDUPTHER

## 2018-06-05 NOTE — TELEPHONE ENCOUNTER
Left voice message asking for a return call to schedule for a Caudal with Dr. Fu, referred by Dr. Chaparro.

## 2018-06-07 ENCOUNTER — TELEPHONE (OUTPATIENT)
Dept: PAIN MEDICINE | Facility: CLINIC | Age: 68
End: 2018-06-07

## 2018-06-07 NOTE — TELEPHONE ENCOUNTER
Spoke with patient to schedule for a Caudal injection with Dr. Fu referred by  Dr. Chaparro,,however patient stated she was taking Bactrim for a Bladder infection  And she did not take the last day of a 10 day order and she is not sure if the infection is cleared.  Suggested she contact her PCT to find out if the  Infection has cleared up .  She will call back to scheduled once she know her bladder infection is cleared.

## 2018-06-17 DIAGNOSIS — N39.0 RECURRENT UTI: ICD-10-CM

## 2018-06-18 RX ORDER — TRIMETHOPRIM 100 MG/1
100 TABLET ORAL NIGHTLY
Qty: 30 TABLET | Refills: 3 | Status: SHIPPED | OUTPATIENT
Start: 2018-06-18 | End: 2018-07-06

## 2018-06-19 ENCOUNTER — OFFICE VISIT (OUTPATIENT)
Dept: UROLOGY | Facility: CLINIC | Age: 68
End: 2018-06-19
Payer: MEDICARE

## 2018-06-19 VITALS
WEIGHT: 176 LBS | HEIGHT: 64 IN | DIASTOLIC BLOOD PRESSURE: 76 MMHG | BODY MASS INDEX: 30.05 KG/M2 | SYSTOLIC BLOOD PRESSURE: 138 MMHG | HEART RATE: 69 BPM

## 2018-06-19 DIAGNOSIS — R10.2 PELVIC PAIN: ICD-10-CM

## 2018-06-19 DIAGNOSIS — N39.0 RECURRENT UTI: Primary | ICD-10-CM

## 2018-06-19 LAB
BILIRUB SERPL-MCNC: NORMAL MG/DL
BLOOD URINE, POC: NORMAL
COLOR, POC UA: YELLOW
GLUCOSE UR QL STRIP: NORMAL
KETONES UR QL STRIP: NORMAL
LEUKOCYTE ESTERASE URINE, POC: NORMAL
NITRITE, POC UA: NORMAL
PH, POC UA: 7
PROTEIN, POC: NORMAL
SPECIFIC GRAVITY, POC UA: 1000
UROBILINOGEN, POC UA: NORMAL

## 2018-06-19 PROCEDURE — 99214 OFFICE O/P EST MOD 30 MIN: CPT | Mod: 25,S$GLB,, | Performed by: NURSE PRACTITIONER

## 2018-06-19 PROCEDURE — 87088 URINE BACTERIA CULTURE: CPT

## 2018-06-19 PROCEDURE — 99999 PR PBB SHADOW E&M-EST. PATIENT-LVL IV: CPT | Mod: PBBFAC,,, | Performed by: NURSE PRACTITIONER

## 2018-06-19 PROCEDURE — 87077 CULTURE AEROBIC IDENTIFY: CPT

## 2018-06-19 PROCEDURE — 87086 URINE CULTURE/COLONY COUNT: CPT

## 2018-06-19 PROCEDURE — 87186 SC STD MICRODIL/AGAR DIL: CPT

## 2018-06-19 PROCEDURE — 81002 URINALYSIS NONAUTO W/O SCOPE: CPT | Mod: S$GLB,,, | Performed by: NURSE PRACTITIONER

## 2018-06-19 NOTE — PROGRESS NOTES
CHIEF COMPLAINT:    Mrs. Soriano is a 67 y.o. female presenting for a follow up on UTI 3 weeks ago in a patient with history of IC.    PRESENTING ILLNESS:    Niki Soriano is a 67 y.o. female who has a history of IC, presently only on oxybutynin XL 5 mg during the day and 5 mg at night immediate release.   Last seen with Dr. Garcia on 2/2/18.    She has a hx of recurrent UTIs and IC. She performs CIC 5-6 x daily with no leakage in btw. She has vulvodynia, vaginal atrophy, and suprapubic tenderness which is worse on the right side than it is on the left. She has malodorous urine which is how she generally knows that she is infected.  Not using topical estrogen though she was prescribed this in the past by her OBGYN.  She has a long history of lower back pain and sciatica as well.  She reports needing lower back surgery but cannot have it unless she does not have a UTI and off of an antibiotic for 10 days.     Cysto 2/19/18 with Dr. Garcia- FINDINGS:  Dome, anterior, posterior, lateral walls and bladder base free of papillary abnormalities.  There were butter colored nodules throughout the bladder.  Right and left ureteral orifices in the normal postion and configuration, both effluxed clear urine.  Bladder neck and urethra were normal.    REVIEW OF SYSTEMS:    Review of Systems   Constitutional: Negative.    HENT: Negative.    Eyes: Negative.    Respiratory: Negative.    Cardiovascular: Negative.    Gastrointestinal: Positive for constipation.   Genitourinary:        Pelvic pain   Musculoskeletal: Positive for joint pain and myalgias.   Skin: Negative.    Neurological: Negative.    Endo/Heme/Allergies: Negative.    Psychiatric/Behavioral: Positive for depression.       PATIENT HISTORY:    Past Medical History:   Diagnosis Date    Diabetes mellitus     Hypercholesteremia     Hypertension     IC (interstitial cystitis)     Spondylisthesis     Vulvodynia, unspecified        Past Surgical History:   Procedure  Laterality Date     SECTION      x3    CYSTOSCOPY      HYSTERECTOMY      ischemic colitis      OOPHORECTOMY      [place] REMOVAL OF OVARY/TUBE(S)         Family History   Problem Relation Age of Onset    Breast cancer Paternal Aunt     Colon cancer Neg Hx     Ovarian cancer Neg Hx        Social History    Marital status:      Social History Main Topics    Smoking status: Never Smoker    Smokeless tobacco: Not on file    Alcohol use No    Drug use: No    Sexual activity: Yes     Partners: Male       Allergies:  Augmentin [amoxicillin-pot clavulanate]; Tramadol; Vicodin [hydrocodone-acetaminophen]; and Tegretol [carbamazepine]    Medications:  Outpatient Encounter Prescriptions as of 2018   Medication Sig Dispense Refill    atenolol (TENORMIN) 50 MG tablet       atorvastatin (LIPITOR) 20 MG tablet Take 20 mg by mouth once daily.      baclofen (LIORESAL) 20 MG tablet       duloxetine (CYMBALTA) 30 MG capsule Take 30 mg by mouth once daily.      gabapentin (NEURONTIN) 300 MG capsule 1 po q hs x 3 days, then 1 po bid x 3 days, then 1 po tid. 90 capsule 2    LEVOTHYROXINE SODIUM (LEVOTHYROXINE ORAL) Take 50 mcg by mouth before breakfast.       LIDODERM 5 %(700 mg/patch)       lisinopril (PRINIVIL,ZESTRIL) 40 MG tablet       metformin (GLUCOPHAGE) 500 MG tablet Take 500 mg by mouth 2 (two) times daily with meals.      oxybutynin (DITROPAN) 5 MG Tab Take 1 tablet (5 mg total) by mouth every evening. 90 tablet 3    oxybutynin (DITROPAN-XL) 5 MG TR24 Take 1 tablet (5 mg total) by mouth once daily. 90 tablet 3    triamterene-hydrochlorothiazide 37.5-25 mg (DYAZIDE) 37.5-25 mg per capsule       trimethoprim (TRIMPEX) 100 mg Tab TAKE 1 TABLET (100 MG TOTAL) BY MOUTH EVERY EVENING. 30 tablet 3    estradiol (ESTRACE) 0.01 % (0.1 mg/gram) vaginal cream Place 1 g vaginally 3 (three) times a week. Place by fingertip application before bedtime three times a week (Monday, Wednesday, Friday)  45 g 3     Facility-Administered Encounter Medications as of 6/19/2018   Medication Dose Route Frequency Provider Last Rate Last Dose    lidocaine HCl 2% urojet   Urethral Once Shea Garcia MD             PHYSICAL EXAMINATION:    The patient generally appears in good health, is appropriately interactive, and is in no apparent distress.    Skin: No lesions.    Mental: Cooperative with normal affect.    Neuro: Grossly intact.    HEENT: Normal. No evidence of lymphadenopathy.    Chest:  normal inspiratory effort.    Abdomen:  Soft, non-tender. No masses or organomegaly. Bladder is not palpable. No evidence of flank discomfort.    Extremities: No clubbing, cyanosis, or edema      LABS:    Lab Results   Component Value Date    BUN 30 (H) 06/02/2018    CREATININE 1.5 (H) 06/02/2018         IMPRESSION:    Encounter Diagnoses   Name Primary?    Recurrent UTI Yes    Pelvic pain        PLAN:    -Discussed plan of care with the pt,     -Discussed UTI precautions:  Wipe front to back and avoid constipation.  Avoid caffeine.  Drink 1 liter of water daily  Void before and after intercourse  Avoid hot tub use  Cranberry supplement w/ 36 mg of PAC-helps to block bacteria from attaching to the bacteria wall  Probiotic- [employer] Ultra 25 Billion CFU Probiotic Complex, Multi Strain.  The Multi Strain is specifically the one that is important as the greater variety of strains is better.    Explained the importance of Estrace cream. Explained to use Estrace by applying a pea sized amount to urethra 3 x weekly at night. She did not need a refill.    -UC sent to the lab and will notify her with the results,     -Explained to f/u with Dr. Mclain about vulvodynia,     -Will email Spine surgery to clarify UTI and antibiotics,    -Will discuss with Dr. Garcia and notify the pt,     -RTC depending on discussion with Dr. Garcia     -I spent 25 minutes with the patient of which more than half was spent in direct consultation with the patient  in regards to our treatment and plan.

## 2018-06-21 ENCOUNTER — TELEPHONE (OUTPATIENT)
Dept: UROLOGY | Facility: CLINIC | Age: 68
End: 2018-06-21

## 2018-06-21 DIAGNOSIS — N39.0 E-COLI UTI: Primary | ICD-10-CM

## 2018-06-21 DIAGNOSIS — T83.511D URINARY TRACT INFECTION ASSOCIATED WITH CATHETERIZATION OF URINARY TRACT, UNSPECIFIED INDWELLING URINARY CATHETER TYPE, SUBSEQUENT ENCOUNTER: Primary | ICD-10-CM

## 2018-06-21 DIAGNOSIS — B96.20 E-COLI UTI: Primary | ICD-10-CM

## 2018-06-21 DIAGNOSIS — N39.0 URINARY TRACT INFECTION ASSOCIATED WITH CATHETERIZATION OF URINARY TRACT, UNSPECIFIED INDWELLING URINARY CATHETER TYPE, SUBSEQUENT ENCOUNTER: Primary | ICD-10-CM

## 2018-06-21 LAB — BACTERIA UR CULT: NORMAL

## 2018-06-21 RX ORDER — GRANULES FOR ORAL 3 G/1
3 POWDER ORAL ONCE
Qty: 3 G | Refills: 0 | Status: SHIPPED | OUTPATIENT
Start: 2018-06-21 | End: 2018-06-21

## 2018-06-21 NOTE — TELEPHONE ENCOUNTER
Spoke with pt about UC results. She is resistant to all oral antibiotics but augmentin and nitrofurantoin. She cannot tolerate the augmentin and renal function is contraindicated for nitro. Explained that we would refer to ID and get her an appt. I discussed that I would notify Mrs. Vega. She verbalized understanding.

## 2018-06-26 ENCOUNTER — TELEPHONE (OUTPATIENT)
Dept: SPINE | Facility: CLINIC | Age: 68
End: 2018-06-26

## 2018-07-06 ENCOUNTER — OFFICE VISIT (OUTPATIENT)
Dept: INFECTIOUS DISEASES | Facility: CLINIC | Age: 68
End: 2018-07-06
Payer: MEDICARE

## 2018-07-06 ENCOUNTER — TELEPHONE (OUTPATIENT)
Dept: UROLOGY | Facility: CLINIC | Age: 68
End: 2018-07-06

## 2018-07-06 VITALS
DIASTOLIC BLOOD PRESSURE: 74 MMHG | HEIGHT: 64 IN | HEART RATE: 72 BPM | SYSTOLIC BLOOD PRESSURE: 132 MMHG | TEMPERATURE: 99 F

## 2018-07-06 DIAGNOSIS — Z16.12 INFECTION DUE TO ESBL-PRODUCING ESCHERICHIA COLI: Primary | ICD-10-CM

## 2018-07-06 DIAGNOSIS — L72.3 SEBACEOUS CYST: ICD-10-CM

## 2018-07-06 DIAGNOSIS — N30.10 IC (INTERSTITIAL CYSTITIS): ICD-10-CM

## 2018-07-06 DIAGNOSIS — B96.20 E-COLI UTI: ICD-10-CM

## 2018-07-06 DIAGNOSIS — N39.0 RECURRENT UTI: ICD-10-CM

## 2018-07-06 DIAGNOSIS — N39.0 E-COLI UTI: ICD-10-CM

## 2018-07-06 DIAGNOSIS — A49.8 INFECTION DUE TO ESBL-PRODUCING ESCHERICHIA COLI: Primary | ICD-10-CM

## 2018-07-06 LAB
BACTERIA #/AREA URNS AUTO: ABNORMAL /HPF
BILIRUB UR QL STRIP: NEGATIVE
CLARITY UR REFRACT.AUTO: ABNORMAL
COLOR UR AUTO: YELLOW
GLUCOSE UR QL STRIP: NEGATIVE
HGB UR QL STRIP: NEGATIVE
KETONES UR QL STRIP: NEGATIVE
LEUKOCYTE ESTERASE UR QL STRIP: ABNORMAL
MICROSCOPIC COMMENT: ABNORMAL
NITRITE UR QL STRIP: NEGATIVE
PH UR STRIP: 7 [PH] (ref 5–8)
PROT UR QL STRIP: NEGATIVE
RBC #/AREA URNS AUTO: 1 /HPF (ref 0–4)
SP GR UR STRIP: 1.01 (ref 1–1.03)
SQUAMOUS #/AREA URNS AUTO: 0 /HPF
URN SPEC COLLECT METH UR: ABNORMAL
UROBILINOGEN UR STRIP-ACNC: 2 EU/DL
WBC #/AREA URNS AUTO: 23 /HPF (ref 0–5)

## 2018-07-06 PROCEDURE — 87086 URINE CULTURE/COLONY COUNT: CPT

## 2018-07-06 PROCEDURE — 99205 OFFICE O/P NEW HI 60 MIN: CPT | Mod: S$GLB,,, | Performed by: INTERNAL MEDICINE

## 2018-07-06 PROCEDURE — 87077 CULTURE AEROBIC IDENTIFY: CPT

## 2018-07-06 PROCEDURE — 87088 URINE BACTERIA CULTURE: CPT

## 2018-07-06 PROCEDURE — 87186 SC STD MICRODIL/AGAR DIL: CPT

## 2018-07-06 PROCEDURE — 81001 URINALYSIS AUTO W/SCOPE: CPT

## 2018-07-06 PROCEDURE — 99999 PR PBB SHADOW E&M-EST. PATIENT-LVL III: CPT | Mod: PBBFAC,,, | Performed by: INTERNAL MEDICINE

## 2018-07-06 NOTE — PROGRESS NOTES
Subjective:      Patient ID: Niki Soriano is a 67 y.o. female.    Chief Complaint:No chief complaint on file.      History of Present Illness    Referred for multiple UTIs. Patient has urinary retention from spinal injury in 1996 and spinal stenosis. Multiple self catheterizations 6 times per day - uses catheters but not self contained kits. Leaks when full. In pelvic pain constantly - has interstitial cystitis. Last hospitalized 8 months ago.     Knows that she has infection from smell. Never has external dysuria, never has fever or chills.    Urine cultures have grown multiple organisms with different sensitivities. Recently grew ESBL E.coli on 6/19 - treated with Monurol successfully.     Previous cultures:  6/19/18 ESBL E.coli  5/22/18 K. Pneumoniae  4/18/18 ESBL E.coli  2/12/18 E.coli (NOT ESBL) resistant to ampicillin and amp/sulbactam  11/15/17 E.coli (pan sensitive)  2/6/17 ESBL E.coli  6/10/16 ESBL E.coli (sensitive to ampicillin)    States that she has odor now. No fevers or chills.  Takes dyazide for HTN - causes bladder to fill quickly  Restricts her fluid intake to avoid incontinence and bladder filling      Review of Systems   Constitution: Positive for malaise/fatigue. Negative for chills, decreased appetite, fever, weakness, night sweats, weight gain and weight loss.   HENT: Negative for congestion, ear pain, hearing loss, hoarse voice, sore throat and tinnitus.    Eyes: Negative for blurred vision, redness and visual disturbance.   Cardiovascular: Positive for leg swelling. Negative for chest pain and palpitations.   Respiratory: Negative for cough, hemoptysis, shortness of breath and sputum production.    Hematologic/Lymphatic: Negative for adenopathy. Does not bruise/bleed easily.   Skin: Negative for dry skin, itching, rash and suspicious lesions.   Musculoskeletal: Positive for back pain, joint pain, myalgias and neck pain.   Gastrointestinal: Positive for heartburn. Negative for abdominal  pain, constipation, diarrhea, nausea and vomiting.   Genitourinary: Negative for dysuria, flank pain, frequency, hematuria, hesitancy and urgency.   Neurological: Positive for numbness. Negative for dizziness, headaches and paresthesias.   Psychiatric/Behavioral: Negative for depression and memory loss. The patient is nervous/anxious. The patient does not have insomnia.      Objective:   Physical Exam   Constitutional: She is oriented to person, place, and time. She appears well-developed and well-nourished.   HENT:   Head: Normocephalic and atraumatic.   Eyes: EOM are normal. Pupils are equal, round, and reactive to light.   Cardiovascular: Normal rate, regular rhythm and normal heart sounds.    Pulmonary/Chest: Effort normal and breath sounds normal.           Abdominal: Soft. Bowel sounds are normal. She exhibits no distension. There is no tenderness.   Musculoskeletal: Normal range of motion.   Neurological: She is alert and oriented to person, place, and time.   Skin: Skin is warm and dry.   Nursing note and vitals reviewed.    Assessment:       1. Infection due to ESBL-producing Escherichia coli    2. IC (interstitial cystitis)    3. Recurrent UTI    4. E-coli UTI    5. Sebaceous cyst          Plan:       Check urinalysis and culture  Consider changing Dyazide to other anti-hypertensive  Check renal function  Consider methenamine if renal function normalized  Consider action plan with monurol, followed by Ertapenem if no results.  Discussed pattern of infections suggests that infection is happening over and over again - bacteria are not being retained, but rather re-introduced. This implies that catheterizations are part of the problem - may need more self catheterizations or different method.  I spent over 50% of a 60 minute encounter counseling the patient and reviewing the chart in this complicated patient.

## 2018-07-06 NOTE — LETTER
July 7, 2018      Christal Jama, NP  [address]  [facility] [address]           [facility]ious [facility]  [address]  [facility] [address]  Phone: [phone]  Fax: [phone]          Patient: Niki Soriano   MR Number: [id number]   YOB: 1950   Date of Visit: 7/6/2018       Dear Christal Jama:    Thank you for referring Niki Soriano to me for evaluation. Attached you will find relevant portions of my assessment and plan of care.    If you have questions, please do not hesitate to call me. I look forward to following Niki Soriano along with you.    Sincerely,    Dale Putnam MD    Enclosure  CC:  No Recipients    If you would like to receive this communication electronically, please contact [email or web address] or [phone] to request more information on [facility] access.    For providers and/or their staff who would like to refer a patient to [facility], please contact us through our one-stop-shop provider referral line, [facility], at [phone].    If you feel you have received this communication in error or would no longer like to receive these types of communications, please e-mail [email or web address]

## 2018-07-09 LAB — BACTERIA UR CULT: NORMAL

## 2018-07-26 ENCOUNTER — TELEPHONE (OUTPATIENT)
Dept: INFECTIOUS DISEASES | Facility: CLINIC | Age: 68
End: 2018-07-26

## 2018-07-26 ENCOUNTER — TELEPHONE (OUTPATIENT)
Dept: UROLOGY | Facility: CLINIC | Age: 68
End: 2018-07-26

## 2018-07-26 DIAGNOSIS — N30.00 ACUTE CYSTITIS WITHOUT HEMATURIA: Primary | ICD-10-CM

## 2018-07-26 RX ORDER — CIPROFLOXACIN 500 MG/1
500 TABLET ORAL 2 TIMES DAILY
Qty: 14 TABLET | Refills: 0 | Status: SHIPPED | OUTPATIENT
Start: 2018-07-26 | End: 2018-08-02

## 2018-07-26 NOTE — TELEPHONE ENCOUNTER
Called the patient and explained that I have been in the OR all day and this is the first that I was able to address messages.  She states that antibiotics were sent for her by Dr. Martines.  Just wanted to be sure her needs were addressed.

## 2018-07-26 NOTE — TELEPHONE ENCOUNTER
Called pt to inform he Dr Key is out of town and that the Dr covering Dr Key's messages will call her back with the test results.   Pt is very upset, states this is the fifth message she leaves. Wants to speak to a doctor today to get test results and to talk about her treatment.

## 2018-07-26 NOTE — TELEPHONE ENCOUNTER
Answered the patient's phone call.  Results of urinalysis and urine cultures reviewed.  Will prescribe ciprofloxacin for 7 day course for complicated UTI.  I will send a note to Dr. Putnam to contact the patient to discuss strategies to prevent future UTIs per the patient's request.

## 2018-07-26 NOTE — TELEPHONE ENCOUNTER
----- Message from Meeta Martinez LPN sent at 7/26/2018  2:19 PM CDT -----  Contact: self  [phone]      ----- Message -----  From: Evelina Leary MA  Sent: 7/26/2018   2:07 PM  To: [facility] Staff    Needs Advice    Reason for call:    I am calling to find out if you can help me get my test results from the ID department that you sent me to.  I saw them on Friday, 7-6-18 and they did blood test and an urine test and I have called them several times for the results and they have never called me back.  I have also call patient advocacy and all they did was give me the phone number for ID.  Please help me get these results so I know what to do for my care.  Communication Preference:  Phone# above  Additional Information:  Please help me get these results.

## 2018-07-26 NOTE — TELEPHONE ENCOUNTER
----- Message from Meeta Martinez LPN sent at 7/26/2018  2:19 PM CDT -----  Contact: self  [phone]      ----- Message -----  From: Evelina Leary MA  Sent: 7/26/2018   2:07 PM  To: [facility] Staff    Needs Advice    Reason for call:    I am calling to find out if you can help me get my test results from the ID department that you sent me to.  I saw them on Friday, 7-6-18 and they did blood test and an urine test and I have called them several times for the results and they have never called me back.  I have also call patient advocacy and all they did was give me the phone number for ID.  Please help me get these results so I know what to do for my care.  Communication Preference:  Phone# above  Additional Information:  Please help me get these results.

## 2018-08-28 ENCOUNTER — OFFICE VISIT (OUTPATIENT)
Dept: SPINE | Facility: CLINIC | Age: 68
End: 2018-08-28
Payer: MEDICARE

## 2018-08-28 VITALS — WEIGHT: 176 LBS | BODY MASS INDEX: 30.05 KG/M2 | HEIGHT: 64 IN

## 2018-08-28 DIAGNOSIS — M54.17 THORACIC AND LUMBOSACRAL NEURITIS: ICD-10-CM

## 2018-08-28 DIAGNOSIS — R20.0 BILATERAL HAND NUMBNESS: ICD-10-CM

## 2018-08-28 DIAGNOSIS — N39.0 RECURRENT UTI: Primary | ICD-10-CM

## 2018-08-28 DIAGNOSIS — G89.29 CHRONIC BILATERAL LOW BACK PAIN WITH RIGHT-SIDED SCIATICA: ICD-10-CM

## 2018-08-28 DIAGNOSIS — N30.10 IC (INTERSTITIAL CYSTITIS): ICD-10-CM

## 2018-08-28 DIAGNOSIS — M47.26 OTHER SPONDYLOSIS WITH RADICULOPATHY, LUMBAR REGION: ICD-10-CM

## 2018-08-28 DIAGNOSIS — M54.41 CHRONIC BILATERAL LOW BACK PAIN WITH RIGHT-SIDED SCIATICA: ICD-10-CM

## 2018-08-28 DIAGNOSIS — M51.37 DDD (DEGENERATIVE DISC DISEASE), LUMBOSACRAL: ICD-10-CM

## 2018-08-28 DIAGNOSIS — M54.12 BRACHIAL NEURITIS: ICD-10-CM

## 2018-08-28 DIAGNOSIS — M48.061 FORAMINAL STENOSIS OF LUMBAR REGION: ICD-10-CM

## 2018-08-28 DIAGNOSIS — G89.29 OTHER CHRONIC PAIN: ICD-10-CM

## 2018-08-28 DIAGNOSIS — M54.14 THORACIC AND LUMBOSACRAL NEURITIS: ICD-10-CM

## 2018-08-28 DIAGNOSIS — M43.10 ACQUIRED SPONDYLOLISTHESIS: ICD-10-CM

## 2018-08-28 DIAGNOSIS — M48.061 SPINAL STENOSIS OF LUMBAR REGION WITHOUT NEUROGENIC CLAUDICATION: ICD-10-CM

## 2018-08-28 DIAGNOSIS — M54.2 NECK PAIN: ICD-10-CM

## 2018-08-28 PROCEDURE — 99999 PR PBB SHADOW E&M-EST. PATIENT-LVL IV: CPT | Mod: PBBFAC,,, | Performed by: PHYSICIAN ASSISTANT

## 2018-08-28 PROCEDURE — 99214 OFFICE O/P EST MOD 30 MIN: CPT | Mod: S$GLB,,, | Performed by: PHYSICIAN ASSISTANT

## 2018-08-28 NOTE — PROGRESS NOTES
"Subjective:      Patient ID: Niki Soriano is a 67 y.o. female.    Chief Complaint: Follow-up      HPI   ([facility])    History of DM, hypothyroidism, HTN, and interstitial cysitis and chronic pain. Sees outside pain management ([facility]). Has seen outside neurosurgery as well.     Known diabetic neuropathy with "paralysis" in 1996 due to stress that she never fully recovered from. Known slip L4-L5 with severe central stenosis L3-L4, moderate central stenosis L4-L5, and multilevel foraminal stenosis. Primary pain generators are likely L3-L4 and L4-L5. Minimal improvement with recent dose pack and percocet from ED. Some improvement with previous lumbar injections years ago.    Repeat ARIA (caudal) ordered at last visit but not able to be done due to frequent UTIs. She has seen ID. Was to f/u with pain management to discuss further and cancelled this appointment. Also started back on neurontin at her last visit. She is here for follow up.     Her pain has improved since her last visit. She continues with constant LBP with intermittent right posterior leg pain to her foot. No left leg pain. LBP > right leg pain. At this point her LBP is tolerable. She continues on neurontin.     Primary complaint today is intermittent neck pain with bilateral arm pain into both hands (entire). Neck pain > arm pain. Right arm pain > left arm pain. She is right hand dominant. Her neck pain is worse at night, during the day it is manageable. She rates her pain as a 4 on a scale of 1-10. Minimal improvement with ice. Pain is aching in nature with pressure. Has known bilateral CTS. Has been told she has "disc issue" in her neck years ago. She has numbness and tingling in both hands/fingers. No gross weakness noted.     She is also on baclofen and cymbalta from pain management. She is back on neurontin. No PT, injections, or surgery on her neck.     Patient denies balance issues, changes in handwriting, problems with hand dexterity, and frequent " dropping of items.      Review of Systems   Constitution: Negative for chills, fever, night sweats and weight gain.   Gastrointestinal: Negative for bowel incontinence, nausea and vomiting.   Genitourinary: Negative for bladder incontinence.   Neurological: Negative for disturbances in coordination and loss of balance.           Objective:        General: Niki is well-developed, well-nourished, appears stated age, in no acute distress, alert and oriented to time, place and person.     Ortho/SPM Exam     Patient sits comfortably in the exam room and answers questions appropriately. Grossly patient is able to move bilateral LEs without difficulty. She is in WC. Can ambulate with walker.     On exam of the cervical spine, pt has mild tenderness at base of skull on right side, no posterior cervical tenderness.     Patient has reasonable ROM of cervical spine without pain.     Skin in the cervical region is warm to the touch without visible rashes.     Strength Testing of Bilateral UEs shows  Right :  +5/5   Left :  +5/5  Right deltoid:  +5/5   Left deltoid:  +5/5  Right biceps:  +5/5   Left biceps:  +5/5  Right triceps:  +5/5   Left triceps:  +5/5  Right wrist extension:  Not tested Left wrist extension:  +5/5  Right wrist flexion:  Not tested Left wrist flexion:  +5/5  Right interosseus:  +5/5  Left interosseus:  +5/5    She has velcro wrist brace on right UE.     Sensation is grossly intact to light touch in C5, C6, C7, C8, T1 distribution.     Right shoulder has no pain with IR/ER. Good ROM of shoulder and no tenderness.   Left shoulder has no pain with IR/ER. Good ROM of shoulder and no tenderness.     negative hoffmans left UEs.    DTRs:  Right biceps:  +2     Left biceps:  +2   Right brachioradialis:  +2  Left brachioradialis:  +2        Assessment:       1. Recurrent UTI    2. IC (interstitial cystitis)    3. Bilateral hand numbness    4. Other chronic pain    5. Chronic bilateral low back pain with  "right-sided sciatica    6. Acquired spondylolisthesis    7. Thoracic and lumbosacral neuritis    8. Spinal stenosis of lumbar region without neurogenic claudication    9. Other spondylosis with radiculopathy, lumbar region    10. Foraminal stenosis of lumbar region    11. DDD (degenerative disc disease), lumbosacral    12. Brachial neuritis    13. Neck pain           Plan:       Orders Placed This Encounter    MRI CERVICAL SPINE WITHOUT CONTRAST    X-Ray Cervical Spine AP Lat with Flexion  Extension       Improvement in LBP and intermittent right posterior leg pain to her foot. No left leg pain. LBP > right leg pain. Currently her LBP is tolerable. Unable to have ESIs due to recurrent UTIs. Known diabetic neuropathy with "paralysis" in 1996 due to stress that she never fully recovered from. Known slip L4-L5 with severe central stenosis L3-L4, moderate central stenosis L4-L5, and multilevel foraminal stenosis. Primary pain generators are likely L3-L4 and L4-L5.    Primary complaint is  intermittent neck pain with bilateral arm pain into both hands (entire). Neck pain > arm pain. Right arm pain > left arm pain. No imaging to review. Previously diagnosed with bilateral CTS.     Treatment options reviewed with patient and following plan made:     - Continue with neurontin from us. Continue cymbalta and baclofen from other providers.   - Consider revisiting lumbar ESIs (caudal) if she gets worse, but will need to be off antibiotics. She should continue to follow up with ID/urology.   - May need to see pain management prior to any injections for evaluation.   - MRI of cervical spine along with cervical XRs to further evaluate bilateral UE radiculitis.   - Depending on results of imaging, consider PT. Would still need to be off antibiotics for any cervical injections.     Follow-up: Follow-up in about 2 weeks (around 9/11/2018). If there are any questions prior to this, the patient was instructed to contact the office. "

## 2018-09-05 DIAGNOSIS — M48.061 SPINAL STENOSIS OF LUMBAR REGION WITHOUT NEUROGENIC CLAUDICATION: ICD-10-CM

## 2018-09-05 DIAGNOSIS — M54.17 THORACIC AND LUMBOSACRAL NEURITIS: ICD-10-CM

## 2018-09-05 DIAGNOSIS — M47.26 OTHER SPONDYLOSIS WITH RADICULOPATHY, LUMBAR REGION: ICD-10-CM

## 2018-09-05 DIAGNOSIS — M43.10 ACQUIRED SPONDYLOLISTHESIS: ICD-10-CM

## 2018-09-05 DIAGNOSIS — G89.29 CHRONIC BILATERAL LOW BACK PAIN WITH RIGHT-SIDED SCIATICA: ICD-10-CM

## 2018-09-05 DIAGNOSIS — M51.37 DDD (DEGENERATIVE DISC DISEASE), LUMBOSACRAL: ICD-10-CM

## 2018-09-05 DIAGNOSIS — G89.29 OTHER CHRONIC PAIN: ICD-10-CM

## 2018-09-05 DIAGNOSIS — M54.14 THORACIC AND LUMBOSACRAL NEURITIS: ICD-10-CM

## 2018-09-05 DIAGNOSIS — M54.41 CHRONIC BILATERAL LOW BACK PAIN WITH RIGHT-SIDED SCIATICA: ICD-10-CM

## 2018-09-05 DIAGNOSIS — M48.061 FORAMINAL STENOSIS OF LUMBAR REGION: ICD-10-CM

## 2018-09-05 RX ORDER — GABAPENTIN 300 MG/1
300 CAPSULE ORAL 3 TIMES DAILY
Qty: 90 CAPSULE | Refills: 2 | OUTPATIENT
Start: 2018-09-05 | End: 2019-04-08 | Stop reason: SDUPTHER

## 2018-09-06 ENCOUNTER — HOSPITAL ENCOUNTER (OUTPATIENT)
Dept: RADIOLOGY | Facility: HOSPITAL | Age: 68
Discharge: HOME | End: 2018-09-06
Attending: PHYSICIAN ASSISTANT
Payer: MEDICARE

## 2018-09-06 DIAGNOSIS — R20.0 BILATERAL HAND NUMBNESS: ICD-10-CM

## 2018-09-06 DIAGNOSIS — N30.10 IC (INTERSTITIAL CYSTITIS): ICD-10-CM

## 2018-09-06 DIAGNOSIS — N39.0 RECURRENT UTI: ICD-10-CM

## 2018-09-06 DIAGNOSIS — M54.12 BRACHIAL NEURITIS: ICD-10-CM

## 2018-09-06 DIAGNOSIS — M54.2 NECK PAIN: ICD-10-CM

## 2018-09-06 PROCEDURE — 72050 X-RAY EXAM NECK SPINE 4/5VWS: CPT | Mod: 26,,, | Performed by: RADIOLOGY

## 2018-09-06 PROCEDURE — 72141 MRI NECK SPINE W/O DYE: CPT | Mod: TC

## 2018-09-06 PROCEDURE — 72050 X-RAY EXAM NECK SPINE 4/5VWS: CPT | Mod: TC

## 2018-09-06 PROCEDURE — 72141 MRI NECK SPINE W/O DYE: CPT | Mod: 26,,, | Performed by: RADIOLOGY

## 2018-09-10 ENCOUNTER — TELEPHONE (OUTPATIENT)
Dept: SPINE | Facility: CLINIC | Age: 68
End: 2018-09-10

## 2018-09-10 NOTE — PROGRESS NOTES
"Subjective:      Patient ID: Niki Soriano is a 67 y.o. female.    Chief Complaint: Follow-up      HPI   ([facility])    History of DM, hypothyroidism, HTN, and interstitial cysitis and chronic pain. Sees outside pain management ([facility]). Has seen outside neurosurgery as well.     Known diabetic neuropathy with "paralysis" in 1996 due to stress that she never fully recovered from. Known slip L4-L5 with severe central stenosis L3-L4, moderate central stenosis L4-L5, and multilevel foraminal stenosis. Primary pain generators are likely L3-L4 and L4-L5. Repeat ARIA (caudal) ordered at last visit but not able to be done due to frequent UTIs. She has seen ID.     Here to review her cervical XRs/MRI.     She continues with intermittent neck pain with bilateral arm pain into both hands (entire). Neck pain > arm pain. Right arm pain > left arm pain. Known bilateral CTS per EMG 12/17. Her neck pain is worse at night, during the day it is manageable. She rates her pain as a 4 on a scale of 1-10. Minimal improvement with ice. Pain is aching in nature with pressure. She has numbness and tingling in both hands/fingers. No gross weakness noted. We are prescribing neurontin. She is also on baclofen and cymbalta from pain management. No PT, injections, or surgery on her neck.     Lumbar spine continues to be okay.     Patient denies balance issues, changes in handwriting, problems with hand dexterity, and frequent dropping of items.      Review of Systems   Constitution: Negative for chills, fever, night sweats and weight gain.   Gastrointestinal: Negative for bowel incontinence, nausea and vomiting.   Genitourinary: Negative for bladder incontinence.   Neurological: Negative for disturbances in coordination and loss of balance.           Objective:        General: Niki is well-developed, well-nourished, appears stated age, in no acute distress, alert and oriented to time, place and person.     Ortho/SPM Exam     Patient sits " "comfortably in the exam room and answers questions appropriately. Grossly patient is able to move bilateral UEs/LEs without difficulty. She is in WC.       XRAY INTERPRETATION:  X-rays of cervical spine (AP/lat/flex/ext) dated 9/6/18 are personally reviewed and show slip C2-C3 and C3-C4 and C4-C5, moderate DDD/spondylosis C5-C7.    MRI of cervical spine dated 9/6/18 is personally reviewed and shows mild right foraminal stenosis C2-C3. Mild right/moderate left foraminal stenosis C3-C4. Mild left foraminal stenosis C4-C5. Spur/disc C5-C6 with mild central stenosis and mild right/moderate left foraminal stenosis. Spur/disc C6-C7 with mild central stenosis and moderate bilateral foraminal stenosis.         Assessment:       1. Recurrent UTI    2. IC (interstitial cystitis)    3. Bilateral hand numbness    4. Chronic bilateral low back pain with right-sided sciatica    5. Acquired spondylolisthesis    6. Thoracic and lumbosacral neuritis    7. Spinal stenosis of lumbar region without neurogenic claudication    8. Other spondylosis with radiculopathy, lumbar region    9. Foraminal stenosis of lumbar region    10. DDD (degenerative disc disease), lumbosacral    11. Neck pain    12. Brachial neuritis    13. Foraminal stenosis of cervical region    14. Cervical stenosis of spinal canal           Plan:            Improvement in LBP and intermittent right posterior leg pain to her foot. No left leg pain. LBP > right leg pain. Currently her LBP is tolerable. Unable to have ESIs due to recurrent UTIs. Known diabetic neuropathy with "paralysis" in 1996 due to stress that she never fully recovered from. Known slip L4-L5 with severe central stenosis L3-L4, moderate central stenosis L4-L5, and multilevel foraminal stenosis. Primary pain generators are likely L3-L4 and L4-L5.    She continues with mostly tolerable intermittent neck pain with bilateral arm pain into both hands (entire). Neck pain > arm pain. Right arm pain > left arm " pain. Known slip C2-C3 and C3-C4 and C4-C5 along with multilevel foraminal stenosis. She has mild central stenosis and mild right/moderate left foraminal stenosis at C5-C6 and mild central stenosis and moderate bilateral foraminal stenosis C6-C7. Neck pain likely multifactorial. Arm pain likely from foraminal stenosis C5-C7. Also with known bilateral CTS per EMG.     Treatment options reviewed with patient and following plan made:     - Continue with neurontin from us. Continue cymbalta and baclofen from other providers.   - Follow up with ID for frequent UTIs.   - Follow up with ortho hand as previously recommended by neurology. Given number for hand clinic.   - May still consider both cervical (C7-T1 IL) and lumbar ESIs (caudal), but would need to see Dr. Fu to discuss in clinic due to frequent UTIs/antibiotics.   - Given number for pain management to make appointment with Dr. Fu when she is ready.     Follow-up: Follow-up if symptoms worsen or fail to improve. If there are any questions prior to this, the patient was instructed to contact the office.

## 2018-09-11 ENCOUNTER — OFFICE VISIT (OUTPATIENT)
Dept: SPINE | Facility: CLINIC | Age: 68
End: 2018-09-11
Payer: MEDICARE

## 2018-09-11 VITALS
HEART RATE: 66 BPM | WEIGHT: 176 LBS | SYSTOLIC BLOOD PRESSURE: 124 MMHG | DIASTOLIC BLOOD PRESSURE: 66 MMHG | HEIGHT: 64 IN | BODY MASS INDEX: 30.05 KG/M2

## 2018-09-11 DIAGNOSIS — N39.0 RECURRENT UTI: Primary | ICD-10-CM

## 2018-09-11 DIAGNOSIS — M48.061 FORAMINAL STENOSIS OF LUMBAR REGION: ICD-10-CM

## 2018-09-11 DIAGNOSIS — M54.14 THORACIC AND LUMBOSACRAL NEURITIS: ICD-10-CM

## 2018-09-11 DIAGNOSIS — M54.41 CHRONIC BILATERAL LOW BACK PAIN WITH RIGHT-SIDED SCIATICA: ICD-10-CM

## 2018-09-11 DIAGNOSIS — M47.26 OTHER SPONDYLOSIS WITH RADICULOPATHY, LUMBAR REGION: ICD-10-CM

## 2018-09-11 DIAGNOSIS — M54.12 BRACHIAL NEURITIS: ICD-10-CM

## 2018-09-11 DIAGNOSIS — M48.061 SPINAL STENOSIS OF LUMBAR REGION WITHOUT NEUROGENIC CLAUDICATION: ICD-10-CM

## 2018-09-11 DIAGNOSIS — G89.29 CHRONIC BILATERAL LOW BACK PAIN WITH RIGHT-SIDED SCIATICA: ICD-10-CM

## 2018-09-11 DIAGNOSIS — R20.0 BILATERAL HAND NUMBNESS: ICD-10-CM

## 2018-09-11 DIAGNOSIS — M48.02 FORAMINAL STENOSIS OF CERVICAL REGION: ICD-10-CM

## 2018-09-11 DIAGNOSIS — M54.17 THORACIC AND LUMBOSACRAL NEURITIS: ICD-10-CM

## 2018-09-11 DIAGNOSIS — M43.10 ACQUIRED SPONDYLOLISTHESIS: ICD-10-CM

## 2018-09-11 DIAGNOSIS — N30.10 IC (INTERSTITIAL CYSTITIS): ICD-10-CM

## 2018-09-11 DIAGNOSIS — M48.02 CERVICAL STENOSIS OF SPINAL CANAL: ICD-10-CM

## 2018-09-11 DIAGNOSIS — M51.37 DDD (DEGENERATIVE DISC DISEASE), LUMBOSACRAL: ICD-10-CM

## 2018-09-11 DIAGNOSIS — M54.2 NECK PAIN: ICD-10-CM

## 2018-09-11 PROCEDURE — 99999 PR PBB SHADOW E&M-EST. PATIENT-LVL III: CPT | Mod: PBBFAC,,, | Performed by: PHYSICIAN ASSISTANT

## 2018-09-11 PROCEDURE — 1101F PT FALLS ASSESS-DOCD LE1/YR: CPT | Mod: CPTII,,, | Performed by: PHYSICIAN ASSISTANT

## 2018-09-11 PROCEDURE — 99214 OFFICE O/P EST MOD 30 MIN: CPT | Mod: S$PBB,,, | Performed by: PHYSICIAN ASSISTANT

## 2018-09-11 PROCEDURE — 99213 OFFICE O/P EST LOW 20 MIN: CPT | Mod: PBBFAC | Performed by: PHYSICIAN ASSISTANT

## 2018-09-11 NOTE — PATIENT INSTRUCTIONS
Pain management doctor I want you to see in clinic to discuss injections is Dr. Fu. He is on 9th floor ([address]) in [facility] (same place you see me) at [facility]. His number is [phone].     The hand surgeon is Dr. Bimal Daley. She is on 9th floor ([address]) in [place] [facility] (same place you see me) at [facility]. Her number is [phone].     Let me know if you need anything. My number is [phone].

## 2018-09-25 ENCOUNTER — OFFICE VISIT (OUTPATIENT)
Dept: INFECTIOUS DISEASES | Facility: CLINIC | Age: 68
End: 2018-09-25
Payer: MEDICARE

## 2018-09-25 VITALS
HEART RATE: 81 BPM | HEIGHT: 64 IN | WEIGHT: 216.5 LBS | TEMPERATURE: 98 F | SYSTOLIC BLOOD PRESSURE: 151 MMHG | DIASTOLIC BLOOD PRESSURE: 91 MMHG | BODY MASS INDEX: 36.96 KG/M2

## 2018-09-25 DIAGNOSIS — B96.1 KLEBSIELLA CYSTITIS: ICD-10-CM

## 2018-09-25 DIAGNOSIS — N30.10 IC (INTERSTITIAL CYSTITIS): ICD-10-CM

## 2018-09-25 DIAGNOSIS — K21.00 REFLUX ESOPHAGITIS: ICD-10-CM

## 2018-09-25 DIAGNOSIS — A49.8 INFECTION DUE TO ESBL-PRODUCING ESCHERICHIA COLI: ICD-10-CM

## 2018-09-25 DIAGNOSIS — N39.0 RECURRENT UTI: ICD-10-CM

## 2018-09-25 DIAGNOSIS — N30.90 KLEBSIELLA CYSTITIS: ICD-10-CM

## 2018-09-25 DIAGNOSIS — L72.3 SEBACEOUS CYST: Primary | ICD-10-CM

## 2018-09-25 DIAGNOSIS — Z16.12 INFECTION DUE TO ESBL-PRODUCING ESCHERICHIA COLI: ICD-10-CM

## 2018-09-25 PROCEDURE — 1101F PT FALLS ASSESS-DOCD LE1/YR: CPT | Mod: CPTII,,, | Performed by: INTERNAL MEDICINE

## 2018-09-25 PROCEDURE — 99999 PR PBB SHADOW E&M-EST. PATIENT-LVL III: CPT | Mod: PBBFAC,,, | Performed by: INTERNAL MEDICINE

## 2018-09-25 PROCEDURE — 99213 OFFICE O/P EST LOW 20 MIN: CPT | Mod: S$PBB,,, | Performed by: INTERNAL MEDICINE

## 2018-09-25 PROCEDURE — 99213 OFFICE O/P EST LOW 20 MIN: CPT | Mod: PBBFAC | Performed by: INTERNAL MEDICINE

## 2018-09-25 RX ORDER — CIPROFLOXACIN 500 MG/1
500 TABLET ORAL 2 TIMES DAILY
Qty: 10 TABLET | Refills: 3 | Status: SHIPPED | OUTPATIENT
Start: 2018-09-25 | End: 2018-09-30

## 2018-09-25 RX ORDER — METHENAMINE HIPPURATE 1000 MG/1
1 TABLET ORAL 2 TIMES DAILY
Qty: 60 TABLET | Refills: 5 | Status: SHIPPED | OUTPATIENT
Start: 2018-09-25 | End: 2018-10-25

## 2018-09-25 NOTE — PROGRESS NOTES
Subjective:      Patient ID: Niki Soriano is a 68 y.o. female.    Chief Complaint:No chief complaint on file.      History of Present Illness    Complains that she has a UTI now - has had odor for 10 days. Denies fever, chills, or dysuria. States that she has persistent pelvic discomfort from interstitial cystitis.   Also states that she has reflux symptoms. Was taking Zantac previously.     Took Cipro for last UTI with Klebsiella in July - no symptoms until now. Only symptom is odor.    Sebaceous cyst has resolved and not returned.    Review of Systems   Constitution: Positive for malaise/fatigue and weight loss. Negative for chills, decreased appetite, fever, weakness, night sweats and weight gain.   HENT: Negative for congestion, ear pain, hearing loss, hoarse voice, sore throat and tinnitus.    Eyes: Negative for blurred vision, redness and visual disturbance.   Cardiovascular: Positive for palpitations. Negative for chest pain and leg swelling.   Respiratory: Negative for cough, hemoptysis, shortness of breath and sputum production.    Hematologic/Lymphatic: Negative for adenopathy. Does not bruise/bleed easily.   Skin: Negative for dry skin, itching, rash and suspicious lesions.   Musculoskeletal: Positive for back pain, joint pain and neck pain. Negative for myalgias.   Gastrointestinal: Positive for heartburn. Negative for abdominal pain, constipation, diarrhea, nausea and vomiting.   Genitourinary: Negative for dysuria, flank pain, frequency, hematuria, hesitancy and urgency.   Neurological: Positive for numbness. Negative for dizziness, headaches and paresthesias.   Psychiatric/Behavioral: Negative for depression and memory loss. The patient does not have insomnia and is not nervous/anxious.      Objective:   Physical Exam   Constitutional: She appears well-developed and well-nourished.   Eyes: Conjunctivae and EOM are normal. Pupils are equal, round, and reactive to light.   Abdominal: Soft. Bowel sounds  are normal. There is no tenderness.   Nursing note and vitals reviewed.    Assessment:       1. Sebaceous cyst    2. Reflux esophagitis    3. Infection due to ESBL-producing Escherichia coli    4. Recurrent UTI    5. IC (interstitial cystitis)    6. Klebsiella cystitis          Plan:       Take Cipro for UTI. Will start methenamine for UTI prevention. Continue treatment for interstitial cystitis.

## 2018-09-28 ENCOUNTER — TELEPHONE (OUTPATIENT)
Dept: SPINE | Facility: CLINIC | Age: 68
End: 2018-09-28

## 2018-09-28 NOTE — TELEPHONE ENCOUNTER
----- Message from Katherine Bolaños sent at 9/28/2018  9:54 AM CDT -----  Contact: pt            Name of Who is Calling: Niki      What is the request in detail: requesting a call back in reference to not receiving her gabapentin. I informed the pt that I see the medication was prescribed on 9/05 with 3 refills. Pt states that she never received it, I do see that it shows print on the prescription, so does the the pt need to come and pick the script up?Are can you call in the prescription as soon as possible because she is almost out.  Please call and advise      Can the clinic reply by [facility]: no      What Number to Call Back if not in [facility]: [phone]

## 2018-10-25 ENCOUNTER — OFFICE VISIT (OUTPATIENT)
Dept: INFECTIOUS DISEASES | Facility: CLINIC | Age: 68
End: 2018-10-25
Payer: MEDICARE

## 2018-10-25 VITALS
HEIGHT: 64 IN | DIASTOLIC BLOOD PRESSURE: 72 MMHG | BODY MASS INDEX: 37.16 KG/M2 | HEART RATE: 59 BPM | SYSTOLIC BLOOD PRESSURE: 119 MMHG | TEMPERATURE: 99 F

## 2018-10-25 DIAGNOSIS — Z16.12 INFECTION DUE TO ESBL-PRODUCING ESCHERICHIA COLI: ICD-10-CM

## 2018-10-25 DIAGNOSIS — N39.0 RECURRENT UTI: Primary | ICD-10-CM

## 2018-10-25 DIAGNOSIS — R10.2 VULVAR PAIN: ICD-10-CM

## 2018-10-25 DIAGNOSIS — A49.8 INFECTION DUE TO ESBL-PRODUCING ESCHERICHIA COLI: ICD-10-CM

## 2018-10-25 DIAGNOSIS — N30.10 IC (INTERSTITIAL CYSTITIS): ICD-10-CM

## 2018-10-25 DIAGNOSIS — N94.819 VULVODYNIA, UNSPECIFIED: ICD-10-CM

## 2018-10-25 LAB
BACTERIA #/AREA URNS AUTO: NORMAL /HPF
BILIRUB UR QL STRIP: NEGATIVE
CLARITY UR REFRACT.AUTO: CLEAR
COLOR UR AUTO: YELLOW
GLUCOSE UR QL STRIP: NEGATIVE
HGB UR QL STRIP: NEGATIVE
HYALINE CASTS UR QL AUTO: 1 /LPF
KETONES UR QL STRIP: NEGATIVE
LEUKOCYTE ESTERASE UR QL STRIP: ABNORMAL
MICROSCOPIC COMMENT: NORMAL
NITRITE UR QL STRIP: NEGATIVE
PH UR STRIP: 6 [PH] (ref 5–8)
PROT UR QL STRIP: NEGATIVE
RBC #/AREA URNS AUTO: 1 /HPF (ref 0–4)
SP GR UR STRIP: 1.01 (ref 1–1.03)
SQUAMOUS #/AREA URNS AUTO: 1 /HPF
URN SPEC COLLECT METH UR: ABNORMAL
WBC #/AREA URNS AUTO: 1 /HPF (ref 0–5)

## 2018-10-25 PROCEDURE — 99213 OFFICE O/P EST LOW 20 MIN: CPT | Mod: S$PBB,,, | Performed by: INTERNAL MEDICINE

## 2018-10-25 PROCEDURE — 3288F FALL RISK ASSESSMENT DOCD: CPT | Mod: CPTII,,, | Performed by: INTERNAL MEDICINE

## 2018-10-25 PROCEDURE — 99214 OFFICE O/P EST MOD 30 MIN: CPT | Mod: PBBFAC | Performed by: INTERNAL MEDICINE

## 2018-10-25 PROCEDURE — 87086 URINE CULTURE/COLONY COUNT: CPT

## 2018-10-25 PROCEDURE — 1100F PTFALLS ASSESS-DOCD GE2>/YR: CPT | Mod: CPTII,,, | Performed by: INTERNAL MEDICINE

## 2018-10-25 PROCEDURE — 81001 URINALYSIS AUTO W/SCOPE: CPT

## 2018-10-25 PROCEDURE — 99999 PR PBB SHADOW E&M-EST. PATIENT-LVL IV: CPT | Mod: PBBFAC,,, | Performed by: INTERNAL MEDICINE

## 2018-10-25 RX ORDER — METHENAMINE HIPPURATE 1000 MG/1
1 TABLET ORAL
Qty: 90 TABLET | Refills: 5 | Status: SHIPPED | OUTPATIENT
Start: 2018-10-25 | End: 2019-07-08

## 2018-10-25 NOTE — PROGRESS NOTES
"Subjective:      Patient ID: Niki Soriano is a 68 y.o. female.    Chief Complaint:Follow-up      History of Present Illness      Notes that her urine has a different odor.  States that she still has pain but has been stating it is now "slight. "  She states that she was surprised to say this today.  She denies fever or chills.  She does continue to have some pelvic pain.    Review of Systems   Constitution: Positive for malaise/fatigue and weight gain. Negative for chills, decreased appetite, fever, weakness, night sweats and weight loss.   HENT: Negative for congestion, ear pain, hearing loss, hoarse voice, sore throat and tinnitus.    Eyes: Positive for visual disturbance. Negative for blurred vision and redness.   Cardiovascular: Positive for leg swelling. Negative for chest pain and palpitations.   Respiratory: Negative for cough, hemoptysis, shortness of breath and sputum production.    Hematologic/Lymphatic: Negative for adenopathy. Does not bruise/bleed easily.   Skin: Negative for dry skin, itching, rash and suspicious lesions.   Musculoskeletal: Positive for back pain, joint pain, myalgias and neck pain.   Gastrointestinal: Positive for heartburn. Negative for abdominal pain, constipation, diarrhea, nausea and vomiting.   Genitourinary: Negative for dysuria, flank pain, frequency, hematuria, hesitancy and urgency.   Neurological: Negative for dizziness, headaches, numbness and paresthesias.   Psychiatric/Behavioral: Negative for depression and memory loss. The patient has insomnia. The patient is not nervous/anxious.      Objective:   Physical Exam   Constitutional: She appears well-developed and well-nourished.   Eyes: Conjunctivae and EOM are normal. Pupils are equal, round, and reactive to light.   Abdominal: Soft. Bowel sounds are normal. There is no tenderness.   Nursing note and vitals reviewed.    Assessment:       1. Recurrent UTI    2. Infection due to ESBL-producing Escherichia coli    3. IC " (interstitial cystitis)    4. Vulvar pain    5. Vulvodynia, unspecified          Plan:      Increase methenamine to t.i.d..  Check urinalysis and urine culture today.   Continue IC diet.

## 2018-10-26 LAB — BACTERIA UR CULT: NO GROWTH

## 2018-11-08 ENCOUNTER — TELEPHONE (OUTPATIENT)
Dept: INFECTIOUS DISEASES | Facility: CLINIC | Age: 68
End: 2018-11-08

## 2018-11-16 DIAGNOSIS — N39.44 NOCTURNAL ENURESIS: ICD-10-CM

## 2018-11-16 RX ORDER — OXYBUTYNIN CHLORIDE 5 MG/1
5 TABLET ORAL NIGHTLY
Qty: 90 TABLET | Refills: 2 | Status: SHIPPED | OUTPATIENT
Start: 2018-11-16 | End: 2019-08-08 | Stop reason: ALTCHOICE

## 2019-01-23 ENCOUNTER — TELEPHONE (OUTPATIENT)
Dept: SPINE | Facility: CLINIC | Age: 69
End: 2019-01-23

## 2019-01-23 NOTE — TELEPHONE ENCOUNTER
Staff contacted patient and left a message on MV for patient to contact the office regarding her medication

## 2019-01-23 NOTE — TELEPHONE ENCOUNTER
----- Message from Annabelle Cuadra sent at 1/23/2019  3:35 PM CST -----  Contact: pt  Name of Who is Calling: MARCELA DO [[id number]]      What is the request in detail: pt would like a lower dosage for gabapentin (NEURONTIN) 300 MG capsule.. Please advise    Can the clinic reply by [facility]: no    What Number to Call Back if not in [facility]: [phone]

## 2019-02-11 RX ORDER — OXYBUTYNIN CHLORIDE 5 MG/1
TABLET, EXTENDED RELEASE ORAL
Qty: 90 TABLET | Refills: 1 | Status: SHIPPED | OUTPATIENT
Start: 2019-02-11 | End: 2019-05-07 | Stop reason: SDUPTHER

## 2019-03-27 RX ORDER — METHENAMINE HIPPURATE 1000 MG/1
1 TABLET ORAL 2 TIMES DAILY
Qty: 60 TABLET | Refills: 4 | Status: SHIPPED | OUTPATIENT
Start: 2019-03-27 | End: 2019-07-08

## 2019-04-08 DIAGNOSIS — M54.17 THORACIC AND LUMBOSACRAL NEURITIS: ICD-10-CM

## 2019-04-08 DIAGNOSIS — M47.26 OTHER SPONDYLOSIS WITH RADICULOPATHY, LUMBAR REGION: ICD-10-CM

## 2019-04-08 DIAGNOSIS — M43.10 ACQUIRED SPONDYLOLISTHESIS: ICD-10-CM

## 2019-04-08 DIAGNOSIS — M48.061 SPINAL STENOSIS OF LUMBAR REGION WITHOUT NEUROGENIC CLAUDICATION: ICD-10-CM

## 2019-04-08 DIAGNOSIS — G89.29 OTHER CHRONIC PAIN: ICD-10-CM

## 2019-04-08 DIAGNOSIS — M51.37 DDD (DEGENERATIVE DISC DISEASE), LUMBOSACRAL: ICD-10-CM

## 2019-04-08 DIAGNOSIS — M48.061 FORAMINAL STENOSIS OF LUMBAR REGION: ICD-10-CM

## 2019-04-08 DIAGNOSIS — M54.14 THORACIC AND LUMBOSACRAL NEURITIS: ICD-10-CM

## 2019-04-08 DIAGNOSIS — G89.29 CHRONIC BILATERAL LOW BACK PAIN WITH RIGHT-SIDED SCIATICA: ICD-10-CM

## 2019-04-08 DIAGNOSIS — M54.41 CHRONIC BILATERAL LOW BACK PAIN WITH RIGHT-SIDED SCIATICA: ICD-10-CM

## 2019-04-08 RX ORDER — GABAPENTIN 300 MG/1
300 CAPSULE ORAL 3 TIMES DAILY
Qty: 90 CAPSULE | Refills: 2 | Status: SHIPPED | OUTPATIENT
Start: 2019-04-08 | End: 2019-07-05 | Stop reason: SDUPTHER

## 2019-04-10 ENCOUNTER — TELEPHONE (OUTPATIENT)
Dept: SPINE | Facility: CLINIC | Age: 69
End: 2019-04-10

## 2019-04-10 NOTE — TELEPHONE ENCOUNTER
Patient was contacted and informed that at the moment the next available with Tatianna Vega is 05/06/19 at 8am and that she will be placed on a waiting list in case there is a cancellation she will be contacted. Patient verbalized understanding

## 2019-05-07 RX ORDER — OXYBUTYNIN CHLORIDE 5 MG/1
TABLET, EXTENDED RELEASE ORAL
Qty: 90 TABLET | Refills: 0 | Status: SHIPPED | OUTPATIENT
Start: 2019-05-07 | End: 2019-07-08 | Stop reason: SDUPTHER

## 2019-07-05 DIAGNOSIS — M54.14 THORACIC AND LUMBOSACRAL NEURITIS: ICD-10-CM

## 2019-07-05 DIAGNOSIS — M47.26 OTHER SPONDYLOSIS WITH RADICULOPATHY, LUMBAR REGION: ICD-10-CM

## 2019-07-05 DIAGNOSIS — M48.061 FORAMINAL STENOSIS OF LUMBAR REGION: ICD-10-CM

## 2019-07-05 DIAGNOSIS — M54.41 CHRONIC BILATERAL LOW BACK PAIN WITH RIGHT-SIDED SCIATICA: ICD-10-CM

## 2019-07-05 DIAGNOSIS — G89.29 CHRONIC BILATERAL LOW BACK PAIN WITH RIGHT-SIDED SCIATICA: ICD-10-CM

## 2019-07-05 DIAGNOSIS — M48.061 SPINAL STENOSIS OF LUMBAR REGION WITHOUT NEUROGENIC CLAUDICATION: ICD-10-CM

## 2019-07-05 DIAGNOSIS — M43.10 ACQUIRED SPONDYLOLISTHESIS: ICD-10-CM

## 2019-07-05 DIAGNOSIS — M54.17 THORACIC AND LUMBOSACRAL NEURITIS: ICD-10-CM

## 2019-07-05 DIAGNOSIS — M51.37 DDD (DEGENERATIVE DISC DISEASE), LUMBOSACRAL: ICD-10-CM

## 2019-07-05 DIAGNOSIS — G89.29 OTHER CHRONIC PAIN: ICD-10-CM

## 2019-07-05 RX ORDER — GABAPENTIN 300 MG/1
300 CAPSULE ORAL 3 TIMES DAILY
Qty: 90 CAPSULE | Refills: 2 | Status: SHIPPED | OUTPATIENT
Start: 2019-07-05 | End: 2019-09-28 | Stop reason: SDUPTHER

## 2019-07-08 ENCOUNTER — TELEPHONE (OUTPATIENT)
Dept: UROLOGY | Facility: CLINIC | Age: 69
End: 2019-07-08

## 2019-07-08 ENCOUNTER — OFFICE VISIT (OUTPATIENT)
Dept: UROLOGY | Facility: CLINIC | Age: 69
End: 2019-07-08
Payer: MEDICARE

## 2019-07-08 VITALS
DIASTOLIC BLOOD PRESSURE: 77 MMHG | BODY MASS INDEX: 30.39 KG/M2 | WEIGHT: 178 LBS | SYSTOLIC BLOOD PRESSURE: 117 MMHG | HEIGHT: 64 IN | HEART RATE: 65 BPM

## 2019-07-08 DIAGNOSIS — N39.44 NOCTURNAL ENURESIS: ICD-10-CM

## 2019-07-08 DIAGNOSIS — N30.10 IC (INTERSTITIAL CYSTITIS): ICD-10-CM

## 2019-07-08 DIAGNOSIS — N39.0 RECURRENT UTI: Primary | ICD-10-CM

## 2019-07-08 DIAGNOSIS — G89.29 CHRONIC PELVIC PAIN IN FEMALE: ICD-10-CM

## 2019-07-08 DIAGNOSIS — R10.2 CHRONIC PELVIC PAIN IN FEMALE: ICD-10-CM

## 2019-07-08 DIAGNOSIS — R82.71 BACTERIA IN URINE: ICD-10-CM

## 2019-07-08 DIAGNOSIS — R33.9 INCOMPLETE EMPTYING OF BLADDER: ICD-10-CM

## 2019-07-08 LAB
BILIRUB SERPL-MCNC: ABNORMAL MG/DL
BLOOD URINE, POC: ABNORMAL
COLOR, POC UA: YELLOW
GLUCOSE UR QL STRIP: ABNORMAL
KETONES UR QL STRIP: ABNORMAL
LEUKOCYTE ESTERASE URINE, POC: ABNORMAL
NITRITE, POC UA: ABNORMAL
PH, POC UA: 7
PROTEIN, POC: ABNORMAL
SPECIFIC GRAVITY, POC UA: 1.01
UROBILINOGEN, POC UA: ABNORMAL

## 2019-07-08 PROCEDURE — 1101F PR PT FALLS ASSESS DOC 0-1 FALLS W/OUT INJ PAST YR: ICD-10-PCS | Mod: CPTII,S$GLB,, | Performed by: NURSE PRACTITIONER

## 2019-07-08 PROCEDURE — 87077 CULTURE AEROBIC IDENTIFY: CPT | Mod: 59

## 2019-07-08 PROCEDURE — 99999 PR PBB SHADOW E&M-EST. PATIENT-LVL IV: CPT | Mod: PBBFAC,,, | Performed by: NURSE PRACTITIONER

## 2019-07-08 PROCEDURE — 51701 PR INSERTION OF NON-INDWELLING BLADDER CATHETERIZATION FOR RESIDUAL UR: ICD-10-PCS | Mod: S$GLB,,, | Performed by: NURSE PRACTITIONER

## 2019-07-08 PROCEDURE — 81002 URINALYSIS NONAUTO W/O SCOPE: CPT | Mod: S$GLB,,, | Performed by: NURSE PRACTITIONER

## 2019-07-08 PROCEDURE — 87086 URINE CULTURE/COLONY COUNT: CPT

## 2019-07-08 PROCEDURE — 99214 PR OFFICE/OUTPT VISIT, EST, LEVL IV, 30-39 MIN: ICD-10-PCS | Mod: 25,S$GLB,, | Performed by: NURSE PRACTITIONER

## 2019-07-08 PROCEDURE — 1101F PT FALLS ASSESS-DOCD LE1/YR: CPT | Mod: CPTII,S$GLB,, | Performed by: NURSE PRACTITIONER

## 2019-07-08 PROCEDURE — 81002 POCT URINE DIPSTICK WITHOUT MICROSCOPE: ICD-10-PCS | Mod: S$GLB,,, | Performed by: NURSE PRACTITIONER

## 2019-07-08 PROCEDURE — 99999 PR PBB SHADOW E&M-EST. PATIENT-LVL IV: ICD-10-PCS | Mod: PBBFAC,,, | Performed by: NURSE PRACTITIONER

## 2019-07-08 PROCEDURE — 87186 SC STD MICRODIL/AGAR DIL: CPT | Mod: 59

## 2019-07-08 PROCEDURE — 87088 URINE BACTERIA CULTURE: CPT

## 2019-07-08 PROCEDURE — 51701 INSERT BLADDER CATHETER: CPT | Mod: S$GLB,,, | Performed by: NURSE PRACTITIONER

## 2019-07-08 PROCEDURE — 99214 OFFICE O/P EST MOD 30 MIN: CPT | Mod: 25,S$GLB,, | Performed by: NURSE PRACTITIONER

## 2019-07-08 RX ORDER — NITROFURANTOIN 25; 75 MG/1; MG/1
100 CAPSULE ORAL 2 TIMES DAILY
Qty: 14 CAPSULE | Refills: 0 | Status: SHIPPED | OUTPATIENT
Start: 2019-07-08 | End: 2019-07-15

## 2019-07-08 RX ORDER — OXYBUTYNIN CHLORIDE 5 MG/1
5 TABLET, EXTENDED RELEASE ORAL DAILY
Qty: 90 TABLET | Refills: 3 | Status: SHIPPED | OUTPATIENT
Start: 2019-07-08 | End: 2019-08-08 | Stop reason: ALTCHOICE

## 2019-07-08 NOTE — TELEPHONE ENCOUNTER
Patient notified that Macrobid was sent  to her pharmacy.   It worked on her lasts cultures.   Also we will know more when final culture comes back.

## 2019-07-08 NOTE — PATIENT INSTRUCTIONS
"  1. Daily Probiotic (~25 billion)  2. D-Mannose 2 grams daily        Bladder Infection, Female (Adult)    Urine is normally doesn't have any bacteria in it. But bacteria can get into the urinary tract from the skin around the rectum. Or they can travel in the blood from elsewhere in the body. Once they are in your urinary tract, they can cause infection in the urethra (urethritis), the bladder (cystitis), or the kidneys (pyelonephritis).  The most common place for an infection is in the bladder. This is called a bladder infection. This is one of the most common infections in women. Most bladder infections are easily treated. They are not serious unless the infection spreads to the kidney.  The phrases "bladder infection," "UTI," and "cystitis" are often used to describe the same thing. But they are not always the same. Cystitis is an inflammation of the bladder. The most common cause of cystitis is an infection.  Symptoms  The infection causes inflammation in the urethra and bladder. This causes many of the symptoms. The most common symptoms of a bladder infection are:  · Pain or burning when urinating  · Having to urinate more often than usual  · Urgent need to urinate  · Only a small amount of urine comes out  · Blood in urine  · Abdominal discomfort. This is usually in the lower abdomen above the pubic bone.  · Cloudy urine  · Strong- or bad-smelling urine  · Unable to urinate (urinary retention)  · Unable to hold urine in (urinary incontinence)  · Fever  · Loss of appetite  · Confusion (in older adults)  Causes  Bladder infections are not contagious. You can't get one from someone else, from a toilet seat, or from sharing a bath.  The most common cause of bladder infections is bacteria from the bowels. The bacteria get onto the skin around the opening of the urethra. From there, they can get into the urine and travel up to the bladder, causing inflammation and infection. This usually happens because " of:  · Wiping improperly after urinating. Always wipe from front to back.  · Bowel incontinence  · Pregnancy  · Procedures such as having a catheter inserted  · Older age  · Not emptying your bladder. This can allow bacteria a chance to grow in your urine.  · Dehydration  · Constipation  · Sex  · Use of a diaphragm for birth control   Treatment  Bladder infections are diagnosed by a urine test. They are treated with antibiotics and usually clear up quickly without complications. Treatment helps prevent a more serious kidney infection.  Medicines  Medicines can help in the treatment of a bladder infection:  · Take antibiotics until they are used up, even if you feel better. It is important to finish them to make sure the infection has cleared.  · You can use acetaminophen or ibuprofen for pain, fever, or discomfort, unless another medicine was prescribed. If you have chronic liver or kidney disease, talk with your healthcare provider before using these medicines. Also talk with your provider if you've ever had a stomach ulcer or gastrointestinal bleeding, or are taking blood-thinner medicines.  · If you are given phenazopydridine to reduce burning with urination, it will cause your urine to become a bright orange color. This can stain clothing.  Care and prevention  These self-care steps can help prevent future infections:  · Drink plenty of fluids to prevent dehydration and flush out your bladder. Do this unless you must restrict fluids for other health reasons, or your doctor told you not to.  · Proper cleaning after going to the bathroom is important. Wipe from front to back after using the toilet to prevent the spread of bacteria.  · Urinate more often. Don't try to hold urine in for a long time.  · Wear loose-fitting clothes and cotton underwear. Avoid tight-fitting pants.  · Improve your diet and prevent constipation. Eat more fresh fruit and vegetables, and fiber, and less junk and fatty foods.  · Avoid sex  until your symptoms are gone.  · Avoid caffeine, alcohol, and spicy foods. These can irritate your bladder.  · Urinate right after intercourse to flush out your bladder.  · If you use birth control pills and have frequent bladder infections, discuss it with your doctor.  Follow-up care  Call your healthcare provider if all symptoms are not gone after 3 days of treatment. This is especially important if you have repeat infections.  If a culture was done, you will be told if your treatment needs to be changed. If directed, you can call to find out the results.  If X-rays were done, you will be told if the results will affect your treatment.  Call 911  Call 911 if any of the following occur:  · Trouble breathing  · Hard to wake up or confusion  · Fainting or loss of consciousness  · Rapid heart rate  When to seek medical advice  Call your healthcare provider right away if any of these occur:  · Fever of 100.4ºF (38.0ºC) or higher, or as directed by your healthcare provider  · Symptoms are not better by the third day of treatment  · Back or belly (abdominal) pain that gets worse  · Repeated vomiting, or unable to keep medicine down  · Weakness or dizziness  · Vaginal discharge  · Pain, redness, or swelling in the outer vaginal area (labia)  Date Last Reviewed: 10/1/2016  © 7461-2289 [employer]. [address] [place] [address], [place] [address]. All rights reserved. This information is not intended as a substitute for professional medical care. Always follow your healthcare professional's instructions.

## 2019-07-08 NOTE — PROGRESS NOTES
Subjective:       Patient ID: Niki Soriano is a 68 y.o. female.    Chief Complaint: Urinary Tract Infection    Niki Soriano is a 68 y.o. female who has a history of IC, chronic pelvic pain (vulvodynia) and recurrent UTI's.  She had been managing her incomplete bladder emptying with CIC 5-6x using a 10fr catheter.    She takes oxybutynin XL 5 mg during the day and 5 mg at night immediate release.    She is here today due to recurrent UTI. She has been seeing a ID at ;   She just completed 10 days of Cephalexin; her symptoms have not really improved.  She still have pelvic pain; urine odor has worsened; having to increased the CIC.   No n/v or fever; not hematuria.  Not having issues with constipation.  Not taking probiotic/Hipprex, estrace cream     She needs to have surgery on her hand but unable to due to recurrent uti and unable to stay off antibiotics.     Last seen with Dr. Garcia on 18.  Cysto 18 with Dr. Garcia- FINDINGS:  Dome, anterior, posterior, lateral walls and bladder base free of papillary abnormalities.    -There were butter colored nodules throughout the bladder.    -Right and left ureteral orifices in the normal postion and configuration, both effluxed clear urine.    -Bladder neck and urethra were normal.    She was last seen in clinic with RODRIGUE Jama NP 2018.      10/25/2018: No growth     2018: KLEBSIELLA PNEUMONIAE >100,000 cfu/ml   2018: ESCHERICHIA COLI ESBL >100,000 cfu/ml (MDR)  2018: KLEBSIELLA PNEUMONIAE >100,000 cfu/ml  2018: ESCHERICHIA COLI ESBL >100,000 cfu/ml (MDR)        Past Medical History:  No date: Diabetes mellitus  No date: Hypercholesteremia  No date: Hypertension  No date: IC (interstitial cystitis)  2017: Numbness in both hands  No date: Spondylisthesis  No date: Vulvodynia, unspecified    Past Surgical History:  No date:  SECTION      Comment:  x3  No date: CYSTOSCOPY  No date: HYSTERECTOMY  No date: ischemic  colitis  No date: OOPHORECTOMY  No date: IA REMOVAL OF OVARY/TUBE(S)    Review of patient's family history indicates:  Problem: Breast cancer      Relation: Paternal Aunt          Age of Onset: (Not Specified)  Problem: Colon cancer      Relation: Neg Hx          Age of Onset: (Not Specified)  Problem: Ovarian cancer      Relation: Neg Hx          Age of Onset: (Not Specified)      Social History    Socioeconomic History      Marital status:       Spouse name: Not on file      Number of children: Not on file      Years of education: Not on file      Highest education level: Not on file    Occupational History      Not on file    Social Needs      Financial resource strain: Not on file      Food insecurity:        Worry: Not on file        Inability: Not on file      Transportation needs:        Medical: Not on file        Non-medical: Not on file    Tobacco Use      Smoking status: Never Smoker      Smokeless tobacco: Never Used    Substance and Sexual Activity      Alcohol use: No      Drug use: No      Sexual activity: Yes        Partners: Male    Lifestyle      Physical activity:        Days per week: Not on file        Minutes per session: Not on file      Stress: Not on file    Relationships      Social connections:        Talks on phone: Not on file        Gets together: Not on file        Attends Congregation service: Not on file        Active member of club or organization: Not on file        Attends meetings of clubs or organizations: Not on file        Relationship status: Not on file    Other Topics      Concerns:        Not on file    Social History Narrative      Not on file      Allergies:  Augmentin (amoxicillin-pot clavulanate); Tramadol; Vicodin (hydrocodone-acetaminophen); and Tegretol (carbamazepine)    Medications:  Current Outpatient Medications:   atenolol (TENORMIN) 50 MG tablet, , Disp: , Rfl:   atorvastatin (LIPITOR) 20 MG tablet, Take 20 mg by mouth once daily., Disp: , Rfl:    baclofen (LIORESAL) 20 MG tablet, , Disp: , Rfl:   duloxetine (CYMBALTA) 30 MG capsule, Take 30 mg by mouth once daily., Disp: , Rfl:   gabapentin (NEURONTIN) 300 MG capsule, Take 1 capsule (300 mg total) by mouth 3 (three) times daily., Disp: 90 capsule, Rfl: 2  LEVOTHYROXINE SODIUM (LEVOTHYROXINE ORAL), Take 50 mcg by mouth before breakfast. , Disp: , Rfl:   lisinopril (PRINIVIL,ZESTRIL) 40 MG tablet, , Disp: , Rfl:   metformin (GLUCOPHAGE) 500 MG tablet, Take 500 mg by mouth 2 (two) times daily with meals., Disp: , Rfl:   oxybutynin (DITROPAN) 5 MG Tab, Take 1 tablet (5 mg total) by mouth every evening., Disp: 90 tablet, Rfl: 2  oxybutynin (DITROPAN-XL) 5 MG TR24, Take 1 tablet (5 mg total) by mouth once daily., Disp: 90 tablet, Rfl: 3  triamterene-hydrochlorothiazide 37.5-25 mg (DYAZIDE) 37.5-25 mg per capsule, , Disp: , Rfl:   estradiol (ESTRACE) 0.01 % (0.1 mg/gram) vaginal cream, Place 1 g vaginally 3 (three) times a week. Place by fingertip application before bedtime three times a week (Monday, Wednesday, Friday), Disp: 45 g, Rfl: 3  Current Facility-Administered Medications:   lidocaine HCl 2% urojet, , Urethral, Once, Shea Garcia MD        Review of Systems   Constitutional: Negative.  Negative for chills and fever.   HENT: Negative for facial swelling and trouble swallowing.    Eyes: Negative for visual disturbance.   Respiratory: Negative for cough, chest tightness, shortness of breath and wheezing.    Cardiovascular: Negative for chest pain and palpitations.   Gastrointestinal: Negative for abdominal pain, constipation, nausea and vomiting.   Genitourinary: Positive for difficulty urinating, frequency and urgency. Negative for dysuria, hematuria and vaginal bleeding.        (+) urgency with UTI  Chronic pelvic pain  Urine having strong odor.  No hematuria     Musculoskeletal: Positive for arthralgias, gait problem and myalgias.   Skin: Negative for rash.   Neurological: Negative for  dizziness and headaches.   Hematological: Does not bruise/bleed easily.   Psychiatric/Behavioral: Negative for behavioral problems.       Objective:      Physical Exam   Nursing note and vitals reviewed.  Constitutional: She is oriented to person, place, and time. Vital signs are normal. She appears well-developed and well-nourished. She is active and cooperative.   Urine collected by I&O cath was (+) leuks, nitrites and blood.     HENT:   Head: Normocephalic.   Right Ear: External ear normal.   Left Ear: External ear normal.   Nose: Nose normal.   Eyes: Conjunctivae and lids are normal. Right eye exhibits no discharge. Left eye exhibits no discharge. No scleral icterus.   Neck: Trachea normal and normal range of motion. Neck supple. No tracheal deviation present.   Cardiovascular: Normal rate, regular rhythm and intact distal pulses.    Pulmonary/Chest: Effort normal. No respiratory distress.   Abdominal: Soft. Bowel sounds are normal. She exhibits no distension and no mass. There is no tenderness. There is no rebound, no guarding and no CVA tenderness.   Chronic back pain     Musculoskeletal: She exhibits no edema.   Neurological: She is alert and oriented to person, place, and time.   Skin: Skin is warm, dry and intact.         Assessment:       1. Recurrent UTI    2. Bacteria in urine    3. Incomplete emptying of bladder    4. IC (interstitial cystitis)    5. Chronic pelvic pain in female    6. Nocturnal enuresis        Plan:         I spent 35 minutes with the patient of which more than half was spent in direct consultation with the patient in regards to our treatment and plan.    Education and recommendations of today's plan of care including home remedies.  We discussed her LUTS and contributory factors.  Reviewed her past urine cultures and ones with MDR.;  Cath urine collected and sent to lab for cx.  Recheck her Cr level.  Will send Rx to cover until final results.  Reviewed preventive measures:  1.  Estrace Cream  2. Daily probiotic with at least 25 billion  3. D-Mannose 2 grams daily.  Her Creatinine came back normal so started her on Macrobid based off her last cultures.

## 2019-07-08 NOTE — LETTER
July 8, 2019      Moises Webster MD  [address] [facility] [address]  [address]  [facility] [address]           [facility] - Urology 4th Floor  [address]  [place] [address]  Phone: [phone]          Patient: Niki Soriano   MR Number: [id number]   YOB: 1950   Date of Visit: 7/8/2019       Dear Dr. Moises Webster:    Thank you for referring Niki Soriano to me for evaluation. Attached you will find relevant portions of my assessment and plan of care.    If you have questions, please do not hesitate to call me. I look forward to following Niki Soriano along with you.    Sincerely,    Dina Cormier, NP    Enclosure  CC:  No Recipients    If you would like to receive this communication electronically, please contact [email or web address] or [phone] to request more information on [employer] Link access.    For providers and/or their staff who would like to refer a patient to [facility], please contact us through our one-stop-shop provider referral line, [facility], at [phone].    If you feel you have received this communication in error or would no longer like to receive these types of communications, please e-mail [email or web address]

## 2019-07-10 LAB
BACTERIA UR CULT: ABNORMAL
BACTERIA UR CULT: ABNORMAL

## 2019-07-11 ENCOUNTER — TELEPHONE (OUTPATIENT)
Dept: UROLOGY | Facility: CLINIC | Age: 69
End: 2019-07-11

## 2019-07-11 NOTE — TELEPHONE ENCOUNTER
Patient notified that the antibiotic she is taking is appropriate for the bacteria found in her urine.   Complete them;   Make sure taking the preventive meds we had discussed. Patient states she is taking the probiotic as suggested.

## 2019-07-15 ENCOUNTER — TELEPHONE (OUTPATIENT)
Dept: ORTHOPEDICS | Facility: CLINIC | Age: 69
End: 2019-07-15

## 2019-07-15 NOTE — TELEPHONE ENCOUNTER
Left a voicemail for patient to return my phone call in regards to scheduling an x-ray at their convenience. Provided the clinic's phone number.

## 2019-07-16 ENCOUNTER — TELEPHONE (OUTPATIENT)
Dept: ORTHOPEDICS | Facility: CLINIC | Age: 69
End: 2019-07-16

## 2019-07-16 DIAGNOSIS — M79.642 BILATERAL HAND PAIN: Primary | ICD-10-CM

## 2019-07-16 DIAGNOSIS — M79.641 BILATERAL HAND PAIN: Primary | ICD-10-CM

## 2019-07-16 NOTE — TELEPHONE ENCOUNTER
Called patient back in regard to phone message. Explained to her the need for XR and she verbalized understanding.

## 2019-07-16 NOTE — TELEPHONE ENCOUNTER
----- Message from Kamila Green sent at 7/16/2019  1:57 PM CDT -----  Contact: MARCELA DO [[id number]]  Name of Who is Calling: MARCELA DO [[id number]]      What is the request in detail: patient states she took nerve conduction test and it confirmed she has carpal tunnel a year ago. She would like to know do she still have to take x-ray    Can the clinic reply by [facility]:no    [facility]: [phone]

## 2019-07-17 ENCOUNTER — HOSPITAL ENCOUNTER (OUTPATIENT)
Dept: RADIOLOGY | Facility: OTHER | Age: 69
Discharge: HOME | End: 2019-07-17
Attending: PHYSICIAN ASSISTANT
Payer: MEDICARE

## 2019-07-17 ENCOUNTER — OFFICE VISIT (OUTPATIENT)
Dept: ORTHOPEDICS | Facility: CLINIC | Age: 69
End: 2019-07-17
Payer: MEDICARE

## 2019-07-17 VITALS
WEIGHT: 177.94 LBS | DIASTOLIC BLOOD PRESSURE: 76 MMHG | HEIGHT: 64 IN | BODY MASS INDEX: 30.38 KG/M2 | SYSTOLIC BLOOD PRESSURE: 121 MMHG | HEART RATE: 59 BPM

## 2019-07-17 DIAGNOSIS — R52 PAIN: ICD-10-CM

## 2019-07-17 DIAGNOSIS — R52 PAIN: Primary | ICD-10-CM

## 2019-07-17 DIAGNOSIS — G56.03 CARPAL TUNNEL SYNDROME, BILATERAL: Primary | ICD-10-CM

## 2019-07-17 PROCEDURE — 73130 XR HAND COMPLETE 3 VIEWS BILATERAL: ICD-10-PCS | Mod: 26,50,, | Performed by: RADIOLOGY

## 2019-07-17 PROCEDURE — 73130 X-RAY EXAM OF HAND: CPT | Mod: 26,50,, | Performed by: RADIOLOGY

## 2019-07-17 PROCEDURE — 73130 X-RAY EXAM OF HAND: CPT | Mod: 50,TC,FY

## 2019-07-17 PROCEDURE — 99999 PR PBB SHADOW E&M-EST. PATIENT-LVL III: CPT | Mod: PBBFAC,,, | Performed by: PHYSICIAN ASSISTANT

## 2019-07-17 PROCEDURE — 1101F PR PT FALLS ASSESS DOC 0-1 FALLS W/OUT INJ PAST YR: ICD-10-PCS | Mod: CPTII,S$GLB,, | Performed by: PHYSICIAN ASSISTANT

## 2019-07-17 PROCEDURE — 99999 PR PBB SHADOW E&M-EST. PATIENT-LVL III: ICD-10-PCS | Mod: PBBFAC,,, | Performed by: PHYSICIAN ASSISTANT

## 2019-07-17 PROCEDURE — 99203 PR OFFICE/OUTPT VISIT, NEW, LEVL III, 30-44 MIN: ICD-10-PCS | Mod: S$GLB,,, | Performed by: PHYSICIAN ASSISTANT

## 2019-07-17 PROCEDURE — 1101F PT FALLS ASSESS-DOCD LE1/YR: CPT | Mod: CPTII,S$GLB,, | Performed by: PHYSICIAN ASSISTANT

## 2019-07-17 PROCEDURE — 99203 OFFICE O/P NEW LOW 30 MIN: CPT | Mod: S$GLB,,, | Performed by: PHYSICIAN ASSISTANT

## 2019-07-17 NOTE — PROGRESS NOTES
"Subjective:      Patient ID: Niki Soriano is a 68 y.o. female.    Chief Complaint: Pain of the Right Hand and Pain of the Left Hand      HPI  Niki Soriano is a 68 y.o. female presenting today for bilateral carpal tunnel syndrome. She notes numbness, tingling, and pain in bl hands. She states she has a hx of bilateral carpal tunnel over 10 years ago. She has had recurrence of symptoms for at least two years. Symptoms are in the median nerve distribution. Numbness is constant in both hands. She has intermittent pain. She wears carpal tunnel braces with some improvement. She has previously had an EMG, results below. She has not tried injections- she is not interested in "covering up" symptoms. She is wheelchair bound. She does also use a walker, which requires her holding her full body weight with her hands. Pt has a urinary catheter and has chronic bladder infections.      Review of patient's allergies indicates:   Allergen Reactions    Augmentin [amoxicillin-pot clavulanate] Other (See Comments)     Patient cannot recall what she had, only that she could not tolerate the Augmentin    Tramadol Itching    Vicodin [hydrocodone-acetaminophen]     Tegretol [carbamazepine] Itching and Rash         Current Outpatient Medications   Medication Sig Dispense Refill    atenolol (TENORMIN) 50 MG tablet       atorvastatin (LIPITOR) 20 MG tablet Take 20 mg by mouth once daily.      baclofen (LIORESAL) 20 MG tablet       duloxetine (CYMBALTA) 30 MG capsule Take 30 mg by mouth once daily.      gabapentin (NEURONTIN) 300 MG capsule Take 1 capsule (300 mg total) by mouth 3 (three) times daily. 90 capsule 2    LEVOTHYROXINE SODIUM (LEVOTHYROXINE ORAL) Take 50 mcg by mouth before breakfast.       lisinopril (PRINIVIL,ZESTRIL) 40 MG tablet       metformin (GLUCOPHAGE) 500 MG tablet Take 500 mg by mouth 2 (two) times daily with meals.      oxybutynin (DITROPAN) 5 MG Tab Take 1 tablet (5 mg total) by mouth every evening. 90 " "tablet 2    oxybutynin (DITROPAN-XL) 5 MG TR24 Take 1 tablet (5 mg total) by mouth once daily. 90 tablet 3    triamterene-hydrochlorothiazide 37.5-25 mg (DYAZIDE) 37.5-25 mg per capsule       estradiol (ESTRACE) 0.01 % (0.1 mg/gram) vaginal cream Place 1 g vaginally 3 (three) times a week. Place by fingertip application before bedtime three times a week (Monday, Wednesday, Friday) 45 g 3     No current facility-administered medications for this visit.        Past Medical History:   Diagnosis Date    Diabetes mellitus     Hypercholesteremia     Hypertension     IC (interstitial cystitis)     Numbness in both hands 2017    Spondylisthesis     Vulvodynia, unspecified        Past Surgical History:   Procedure Laterality Date     SECTION      x3    CYSTOSCOPY      HYSTERECTOMY      ischemic colitis      OOPHORECTOMY      NM REMOVAL OF OVARY/TUBE(S)         Review of Systems:  Constitutional: Negative for chills and fever.   Respiratory: Negative for cough and shortness of breath.    Gastrointestinal: Negative for nausea and vomiting.   Skin: Negative for rash.   Neurological: Negative for dizziness and headaches.   Psychiatric/Behavioral: Negative for depression.   MSK as in HPI       OBJECTIVE:     PHYSICAL EXAM:  /76   Pulse (!) 59   Ht 5' 4" (1.626 m)   Wt 80.7 kg (177 lb 14.6 oz)   BMI 30.54 kg/m²     GEN:  NAD, well-developed, well-groomed.  NEURO: Awake, alert, and oriented. Normal attention and concentration.    PSYCH: Normal mood and affect. Behavior is normal.  HEENT: No cervical lymphadenopathy noted.  CARDIOVASCULAR: Radial pulses 2+ bilaterally. No LE edema noted.  PULMONARY: Breath sounds normal. No respiratory distress.  SKIN: Intact, no rashes.      MSK:   RUE:  Good active ROM of the wrist and fingers. +tinels and durkans over bl carpal tunnel. Negative tinels at the elbow. +thenar atrophy bilaterally. AIN/PIN/Radial/Median/Ulnar Nerves assessed in isolation without " deficit. Radial & Ulnar arteries palpated 2+. Capillary Refill <3s.      RADIOGRAPHS:  Xray bl hands 7/17/19  Impression     1. Mild osteoarthritis  2. High density focus adjacent to the left 4th DIP joint could reflect fragmented osteophyte, soft tissue calcification or small foreign body, age indeterminate.   Comments: I have personally reviewed the imaging and I agree with the above radiologist's report.    EMG 12/28/17  Impression   This is an abnormal study. There is electrophysiologic evidence of:   1. Moderate to severe bilateral carpal tunnel syndrome with active denervation of the right APB muscle and chronic denervation of bilateral APB muscles.   2. A primarily sensory neuropathy with axonal losses and reduced conduction velocities of bilateral ulnar nerves.   3. Chronic denervation of the right C7 and bilateral C8 myotomes consistent with chronic radiculopathy.       ASSESSMENT/PLAN:       ICD-10-CM ICD-9-CM   1. Carpal tunnel syndrome, bilateral G56.03 354.0     Plan:   -will have pt return to clinic to discuss carpal tunnel revision with Dr. Cid. In the meantime, she will discuss with her  and he would need to be her main caregiver for transfers, assisting with her catheter, etc. while she is recovering. She declines injections.       The patient indicates understanding of these issues and agrees to the plan.    Ca Lowe PA-C  [facility]   [facility] [place], [place]

## 2019-07-24 DIAGNOSIS — N39.0 RECURRENT UTI: Primary | ICD-10-CM

## 2019-07-30 ENCOUNTER — LAB VISIT (OUTPATIENT)
Dept: LAB | Facility: OTHER | Age: 69
End: 2019-07-30
Payer: MEDICARE

## 2019-07-30 ENCOUNTER — OFFICE VISIT (OUTPATIENT)
Dept: ORTHOPEDICS | Facility: CLINIC | Age: 69
End: 2019-07-30
Payer: MEDICARE

## 2019-07-30 VITALS
WEIGHT: 177.94 LBS | HEIGHT: 64 IN | BODY MASS INDEX: 30.38 KG/M2 | HEART RATE: 56 BPM | SYSTOLIC BLOOD PRESSURE: 120 MMHG | DIASTOLIC BLOOD PRESSURE: 76 MMHG

## 2019-07-30 DIAGNOSIS — G56.03 CARPAL TUNNEL SYNDROME, BILATERAL: Primary | ICD-10-CM

## 2019-07-30 DIAGNOSIS — N39.0 RECURRENT UTI: ICD-10-CM

## 2019-07-30 PROCEDURE — 87086 URINE CULTURE/COLONY COUNT: CPT

## 2019-07-30 PROCEDURE — 1101F PT FALLS ASSESS-DOCD LE1/YR: CPT | Mod: CPTII,S$GLB,, | Performed by: ORTHOPAEDIC SURGERY

## 2019-07-30 PROCEDURE — 99213 PR OFFICE/OUTPT VISIT, EST, LEVL III, 20-29 MIN: ICD-10-PCS | Mod: S$GLB,,, | Performed by: ORTHOPAEDIC SURGERY

## 2019-07-30 PROCEDURE — 1101F PR PT FALLS ASSESS DOC 0-1 FALLS W/OUT INJ PAST YR: ICD-10-PCS | Mod: CPTII,S$GLB,, | Performed by: ORTHOPAEDIC SURGERY

## 2019-07-30 PROCEDURE — 99213 OFFICE O/P EST LOW 20 MIN: CPT | Mod: S$GLB,,, | Performed by: ORTHOPAEDIC SURGERY

## 2019-07-30 PROCEDURE — 99999 PR PBB SHADOW E&M-EST. PATIENT-LVL III: ICD-10-PCS | Mod: PBBFAC,,, | Performed by: ORTHOPAEDIC SURGERY

## 2019-07-30 PROCEDURE — 87186 SC STD MICRODIL/AGAR DIL: CPT

## 2019-07-30 PROCEDURE — 87088 URINE BACTERIA CULTURE: CPT

## 2019-07-30 PROCEDURE — 99999 PR PBB SHADOW E&M-EST. PATIENT-LVL III: CPT | Mod: PBBFAC,,, | Performed by: ORTHOPAEDIC SURGERY

## 2019-07-30 PROCEDURE — 87077 CULTURE AEROBIC IDENTIFY: CPT

## 2019-07-30 RX ORDER — ATORVASTATIN CALCIUM 40 MG/1
80 TABLET, FILM COATED ORAL NIGHTLY
Refills: 3 | COMMUNITY
Start: 2019-06-11 | End: 2020-11-25 | Stop reason: ALTCHOICE

## 2019-07-30 NOTE — PROGRESS NOTES
"Subjective:      Patient ID: Niki Soriano is a 68 y.o. female.    Chief Complaint: Follow-up (right hand)      HPI  Niki Soriano is a 68 y.o. female presenting today for bilateral carpal tunnel syndrome. She notes numbness, tingling, and pain in bl hands. She states she has a hx of bilateral carpal tunnel over 10 years ago. She has had recurrence of symptoms for at least two years. Symptoms are in the median nerve distribution. Numbness is constant in both hands. She has intermittent pain. She wears carpal tunnel braces with some improvement. She has previously had an EMG, results below. She has not tried injections- she is not interested in "covering up" symptoms. She is wheelchair bound. She does also use a walker, which requires her holding her full body weight with her hands. Pt has a urinary catheter and has chronic bladder infections.    7/30/19  Pt presents for follow up to discuss possible surgery with Dr. Cid. Symptoms remain stable.     Review of patient's allergies indicates:   Allergen Reactions    Augmentin [amoxicillin-pot clavulanate] Other (See Comments)     Patient cannot recall what she had, only that she could not tolerate the Augmentin    Tramadol Itching    Vicodin [hydrocodone-acetaminophen]     Tegretol [carbamazepine] Itching and Rash         Current Outpatient Medications   Medication Sig Dispense Refill    atenolol (TENORMIN) 50 MG tablet       atorvastatin (LIPITOR) 40 MG tablet Take 40 mg by mouth once daily.  3    baclofen (LIORESAL) 20 MG tablet       duloxetine (CYMBALTA) 30 MG capsule Take 30 mg by mouth once daily.      gabapentin (NEURONTIN) 300 MG capsule Take 1 capsule (300 mg total) by mouth 3 (three) times daily. 90 capsule 2    LEVOTHYROXINE SODIUM (LEVOTHYROXINE ORAL) Take 50 mcg by mouth before breakfast.       lisinopril (PRINIVIL,ZESTRIL) 40 MG tablet       metformin (GLUCOPHAGE) 500 MG tablet Take 500 mg by mouth 2 (two) times daily with meals.      " "oxybutynin (DITROPAN) 5 MG Tab Take 1 tablet (5 mg total) by mouth every evening. 90 tablet 2    oxybutynin (DITROPAN-XL) 5 MG TR24 Take 1 tablet (5 mg total) by mouth once daily. 90 tablet 3    ranitidine (ZANTAC) 150 MG tablet Take 150 mg by mouth once daily.  0    triamterene-hydrochlorothiazide 37.5-25 mg (DYAZIDE) 37.5-25 mg per capsule       estradiol (ESTRACE) 0.01 % (0.1 mg/gram) vaginal cream Place 1 g vaginally 3 (three) times a week. Place by fingertip application before bedtime three times a week (Monday, Wednesday, Friday) 45 g 3     No current facility-administered medications for this visit.        Past Medical History:   Diagnosis Date    Diabetes mellitus     Hypercholesteremia     Hypertension     IC (interstitial cystitis)     Numbness in both hands 2017    Spondylisthesis     Vulvodynia, unspecified        Past Surgical History:   Procedure Laterality Date     SECTION      x3    CYSTOSCOPY      HYSTERECTOMY      ischemic colitis      OOPHORECTOMY      [place] REMOVAL OF OVARY/TUBE(S)         Review of Systems:  Constitutional: Negative for chills and fever.   Respiratory: Negative for cough and shortness of breath.    Gastrointestinal: Negative for nausea and vomiting.   Skin: Negative for rash.   Neurological: Negative for dizziness and headaches.   Psychiatric/Behavioral: Negative for depression.   MSK as in HPI       OBJECTIVE:     PHYSICAL EXAM:  /76   Pulse (!) 56   Ht 5' 4" (1.626 m)   Wt 80.7 kg (177 lb 14.6 oz)   BMI 30.54 kg/m²     GEN:  NAD, well-developed, well-groomed.  NEURO: Awake, alert, and oriented. Normal attention and concentration.    PSYCH: Normal mood and affect. Behavior is normal.  HEENT: No cervical lymphadenopathy noted.  CARDIOVASCULAR: Radial pulses 2+ bilaterally. No LE edema noted.  PULMONARY: Breath sounds normal. No respiratory distress.  SKIN: Intact, no rashes.      MSK:   RUE:  Good active ROM of the wrist and fingers. +tinels " and durkans over bl carpal tunnel. Negative tinels at the elbow. +thenar atrophy bilaterally. AIN/PIN/Radial/Median/Ulnar Nerves assessed in isolation without deficit. Radial & Ulnar arteries palpated 2+. Capillary Refill <3s.      RADIOGRAPHS:  Xray bl hands 7/17/19  Impression     1. Mild osteoarthritis  2. High density focus adjacent to the left 4th DIP joint could reflect fragmented osteophyte, soft tissue calcification or small foreign body, age indeterminate.   Comments: I have personally reviewed the imaging and I agree with the above radiologist's report.    EMG 12/28/17  Impression   This is an abnormal study. There is electrophysiologic evidence of:   1. Moderate to severe bilateral carpal tunnel syndrome with active denervation of the right APB muscle and chronic denervation of bilateral APB muscles.   2. A primarily sensory neuropathy with axonal losses and reduced conduction velocities of bilateral ulnar nerves.   3. Chronic denervation of the right C7 and bilateral C8 myotomes consistent with chronic radiculopathy.       ASSESSMENT/PLAN:       ICD-10-CM ICD-9-CM   1. Carpal tunnel syndrome, bilateral G56.03 354.0     Plan:   -Treatment options discussed. She wishes to proceed with a right carpal tunnel release revision. Consents reviewed and signed in clinic. She understands post operative protocol.   -RTC post op       The patient indicates understanding of these issues and agrees to the plan.    Ca Lowe PA-C  [facility]   [facility]  [place], [place]

## 2019-07-30 NOTE — PROGRESS NOTES
I have personally taken the history and examined the patient. I agree with the Hand Surgery PA's note. The plan will be CTR revision, pt has constant numbness which may not resolve after surgery. Pt understands this but has pain that worsens at night or with activities ( washing dishes , writing, cutting vegetables)  She will have to hang her hand off the bed at night due to pain. Pt also neck pain and has seen spine surgery in the past.  Discussed revision CTR with pt. We also discussed her arthritic long finger pain- discussed paraffin and CC

## 2019-07-30 NOTE — H&P (VIEW-ONLY)
"Subjective:      Patient ID: Niki Soriano is a 68 y.o. female.    Chief Complaint: Follow-up (right hand)      HPI  Niki Soriano is a 68 y.o. female presenting today for bilateral carpal tunnel syndrome. She notes numbness, tingling, and pain in bl hands. She states she has a hx of bilateral carpal tunnel over 10 years ago. She has had recurrence of symptoms for at least two years. Symptoms are in the median nerve distribution. Numbness is constant in both hands. She has intermittent pain. She wears carpal tunnel braces with some improvement. She has previously had an EMG, results below. She has not tried injections- she is not interested in "covering up" symptoms. She is wheelchair bound. She does also use a walker, which requires her holding her full body weight with her hands. Pt has a urinary catheter and has chronic bladder infections.    7/30/19  Pt presents for follow up to discuss possible surgery with Dr. Cid. Symptoms remain stable.     Review of patient's allergies indicates:   Allergen Reactions    Augmentin [amoxicillin-pot clavulanate] Other (See Comments)     Patient cannot recall what she had, only that she could not tolerate the Augmentin    Tramadol Itching    Vicodin [hydrocodone-acetaminophen]     Tegretol [carbamazepine] Itching and Rash         Current Outpatient Medications   Medication Sig Dispense Refill    atenolol (TENORMIN) 50 MG tablet       atorvastatin (LIPITOR) 40 MG tablet Take 40 mg by mouth once daily.  3    baclofen (LIORESAL) 20 MG tablet       duloxetine (CYMBALTA) 30 MG capsule Take 30 mg by mouth once daily.      gabapentin (NEURONTIN) 300 MG capsule Take 1 capsule (300 mg total) by mouth 3 (three) times daily. 90 capsule 2    LEVOTHYROXINE SODIUM (LEVOTHYROXINE ORAL) Take 50 mcg by mouth before breakfast.       lisinopril (PRINIVIL,ZESTRIL) 40 MG tablet       metformin (GLUCOPHAGE) 500 MG tablet Take 500 mg by mouth 2 (two) times daily with meals.      " "oxybutynin (DITROPAN) 5 MG Tab Take 1 tablet (5 mg total) by mouth every evening. 90 tablet 2    oxybutynin (DITROPAN-XL) 5 MG TR24 Take 1 tablet (5 mg total) by mouth once daily. 90 tablet 3    ranitidine (ZANTAC) 150 MG tablet Take 150 mg by mouth once daily.  0    triamterene-hydrochlorothiazide 37.5-25 mg (DYAZIDE) 37.5-25 mg per capsule       estradiol (ESTRACE) 0.01 % (0.1 mg/gram) vaginal cream Place 1 g vaginally 3 (three) times a week. Place by fingertip application before bedtime three times a week (Monday, Wednesday, Friday) 45 g 3     No current facility-administered medications for this visit.        Past Medical History:   Diagnosis Date    Diabetes mellitus     Hypercholesteremia     Hypertension     IC (interstitial cystitis)     Numbness in both hands 2017    Spondylisthesis     Vulvodynia, unspecified        Past Surgical History:   Procedure Laterality Date     SECTION      x3    CYSTOSCOPY      HYSTERECTOMY      ischemic colitis      OOPHORECTOMY      DE REMOVAL OF OVARY/TUBE(S)         Review of Systems:  Constitutional: Negative for chills and fever.   Respiratory: Negative for cough and shortness of breath.    Gastrointestinal: Negative for nausea and vomiting.   Skin: Negative for rash.   Neurological: Negative for dizziness and headaches.   Psychiatric/Behavioral: Negative for depression.   MSK as in HPI       OBJECTIVE:     PHYSICAL EXAM:  /76   Pulse (!) 56   Ht 5' 4" (1.626 m)   Wt 80.7 kg (177 lb 14.6 oz)   BMI 30.54 kg/m²     GEN:  NAD, well-developed, well-groomed.  NEURO: Awake, alert, and oriented. Normal attention and concentration.    PSYCH: Normal mood and affect. Behavior is normal.  HEENT: No cervical lymphadenopathy noted.  CARDIOVASCULAR: Radial pulses 2+ bilaterally. No LE edema noted.  PULMONARY: Breath sounds normal. No respiratory distress.  SKIN: Intact, no rashes.      MSK:   RUE:  Good active ROM of the wrist and fingers. +tinels " and durkans over bl carpal tunnel. Negative tinels at the elbow. +thenar atrophy bilaterally. AIN/PIN/Radial/Median/Ulnar Nerves assessed in isolation without deficit. Radial & Ulnar arteries palpated 2+. Capillary Refill <3s.      RADIOGRAPHS:  Xray bl hands 7/17/19  Impression     1. Mild osteoarthritis  2. High density focus adjacent to the left 4th DIP joint could reflect fragmented osteophyte, soft tissue calcification or small foreign body, age indeterminate.   Comments: I have personally reviewed the imaging and I agree with the above radiologist's report.    EMG 12/28/17  Impression   This is an abnormal study. There is electrophysiologic evidence of:   1. Moderate to severe bilateral carpal tunnel syndrome with active denervation of the right APB muscle and chronic denervation of bilateral APB muscles.   2. A primarily sensory neuropathy with axonal losses and reduced conduction velocities of bilateral ulnar nerves.   3. Chronic denervation of the right C7 and bilateral C8 myotomes consistent with chronic radiculopathy.       ASSESSMENT/PLAN:       ICD-10-CM ICD-9-CM   1. Carpal tunnel syndrome, bilateral G56.03 354.0     Plan:   -Treatment options discussed. She wishes to proceed with a right carpal tunnel release revision. Consents reviewed and signed in clinic. She understands post operative protocol.   -RTC post op       The patient indicates understanding of these issues and agrees to the plan.    Ca Lowe PA-C  [facility]   [facility]  [place], [place]

## 2019-07-31 DIAGNOSIS — G56.01 CARPAL TUNNEL SYNDROME OF RIGHT WRIST: Primary | ICD-10-CM

## 2019-08-01 LAB — BACTERIA UR CULT: ABNORMAL

## 2019-08-02 ENCOUNTER — TELEPHONE (OUTPATIENT)
Dept: UROLOGY | Facility: CLINIC | Age: 69
End: 2019-08-02

## 2019-08-02 NOTE — TELEPHONE ENCOUNTER
Left message; called to check on how she doing.  She was treated recently for (+) cx and UTI symptoms.  Repeat Cx shows some bacteria; called to see if symptomatic; she does [facility]

## 2019-08-08 ENCOUNTER — OFFICE VISIT (OUTPATIENT)
Dept: UROLOGY | Facility: CLINIC | Age: 69
End: 2019-08-08
Payer: MEDICARE

## 2019-08-08 VITALS
HEART RATE: 54 BPM | WEIGHT: 173 LBS | BODY MASS INDEX: 29.53 KG/M2 | SYSTOLIC BLOOD PRESSURE: 138 MMHG | DIASTOLIC BLOOD PRESSURE: 72 MMHG | HEIGHT: 64 IN

## 2019-08-08 DIAGNOSIS — R39.15 URINARY URGENCY: ICD-10-CM

## 2019-08-08 DIAGNOSIS — N39.0 RECURRENT UTI: Primary | ICD-10-CM

## 2019-08-08 DIAGNOSIS — N95.2 VAGINAL ATROPHY: ICD-10-CM

## 2019-08-08 DIAGNOSIS — N39.41 URGE INCONTINENCE: ICD-10-CM

## 2019-08-08 PROCEDURE — 81002 URINALYSIS NONAUTO W/O SCOPE: CPT | Mod: S$GLB,,, | Performed by: UROLOGY

## 2019-08-08 PROCEDURE — 99214 PR OFFICE/OUTPT VISIT, EST, LEVL IV, 30-39 MIN: ICD-10-PCS | Mod: 25,S$GLB,, | Performed by: UROLOGY

## 2019-08-08 PROCEDURE — 1101F PR PT FALLS ASSESS DOC 0-1 FALLS W/OUT INJ PAST YR: ICD-10-PCS | Mod: CPTII,S$GLB,, | Performed by: UROLOGY

## 2019-08-08 PROCEDURE — 99999 PR PBB SHADOW E&M-EST. PATIENT-LVL IV: CPT | Mod: PBBFAC,,, | Performed by: UROLOGY

## 2019-08-08 PROCEDURE — 81002 PR URINALYSIS NONAUTO W/O SCOPE: ICD-10-PCS | Mod: S$GLB,,, | Performed by: UROLOGY

## 2019-08-08 PROCEDURE — 99999 PR PBB SHADOW E&M-EST. PATIENT-LVL IV: ICD-10-PCS | Mod: PBBFAC,,, | Performed by: UROLOGY

## 2019-08-08 PROCEDURE — 1101F PT FALLS ASSESS-DOCD LE1/YR: CPT | Mod: CPTII,S$GLB,, | Performed by: UROLOGY

## 2019-08-08 PROCEDURE — 99214 OFFICE O/P EST MOD 30 MIN: CPT | Mod: 25,S$GLB,, | Performed by: UROLOGY

## 2019-08-08 RX ORDER — DARIFENACIN 7.5 MG/1
7.5 TABLET, EXTENDED RELEASE ORAL DAILY
Qty: 30 TABLET | Refills: 11 | Status: SHIPPED | OUTPATIENT
Start: 2019-08-08 | End: 2019-10-16

## 2019-08-08 RX ORDER — ESTRADIOL 0.1 MG/G
1 CREAM VAGINAL
Qty: 45 G | Refills: 3 | Status: SHIPPED | OUTPATIENT
Start: 2019-08-09 | End: 2019-12-09 | Stop reason: SDUPTHER

## 2019-08-08 NOTE — PATIENT INSTRUCTIONS
Use the Estrace cream three times a week before bedtime  Roverto daily, see brochure  Try to have a bowel movement three times a week  Do what you can to have good diabetic control  Discontinue the oxybutynin, take the darifenacin once a day

## 2019-08-08 NOTE — PROGRESS NOTES
CHIEF COMPLAINT:    Mrs. Soriano is a 68 y.o. female presenting for a follow up on recurrent UTI    PRESENTING ILLNESS:    Niki Soriano is a 68 y.o. female who returns stating that she has had ongoing recurrent UTI's.  She was last seen by Dina Cormier and there is a culture from 2019 but she is asymptomatic.   When she has a UTI, she has worsening of the frequency, urgency symptoms.   She is has not been using the Estrace cream.  She states she has tried to increase the fiber in her diet so bowel movements are better, but she does not defecate 3 times a week.  If she has a big bowel movement, she may not have another for a whole week.  She tried D mannose but found that it increased her glucose levels.     She self catheterizes with sterile catheterization using a 10 Fr Hi Slip Plus Ref # HS. [id number].  7 times a day.  This comes with a sterile water pouch which is burst inside of the larger pouch containing the catheter so she does not need to use, nor does she, extra lubricant.      REVIEW OF SYSTEMS:    Review of Systems   Constitutional: Negative.    HENT: Negative.    Eyes: Negative.    Respiratory: Negative.    Cardiovascular: Negative.    Gastrointestinal: Negative.    Genitourinary: Negative for dysuria and frequency.   Musculoskeletal: Positive for back pain.   Skin: Negative.    Neurological: Positive for weakness.   Endo/Heme/Allergies: Negative.    Psychiatric/Behavioral: Negative.        PATIENT HISTORY:    Past Medical History:   Diagnosis Date    Diabetes mellitus     Hypercholesteremia     Hypertension     IC (interstitial cystitis)     Numbness in both hands 2017    Spondylisthesis     Vulvodynia, unspecified        Past Surgical History:   Procedure Laterality Date     SECTION      x3    CYSTOSCOPY      HYSTERECTOMY      ischemic colitis      OOPHORECTOMY      CO REMOVAL OF OVARY/TUBE(S)         Family History   Problem Relation Age of Onset    Breast cancer  Paternal Aunt      Socioeconomic History    Marital status:    Tobacco Use    Smoking status: Never Smoker    Smokeless tobacco: Never Used   Substance and Sexual Activity    Alcohol use: No    Drug use: No    Sexual activity: Yes     Partners: Male       Allergies:  Augmentin [amoxicillin-pot clavulanate]; Tramadol; Vicodin [hydrocodone-acetaminophen]; and Tegretol [carbamazepine]    Medications:  Outpatient Encounter Medications as of 8/8/2019   Medication Sig Dispense Refill    atenolol (TENORMIN) 50 MG tablet       atorvastatin (LIPITOR) 40 MG tablet Take 40 mg by mouth once daily.  3    baclofen (LIORESAL) 20 MG tablet       duloxetine (CYMBALTA) 30 MG capsule Take 30 mg by mouth once daily.      gabapentin (NEURONTIN) 300 MG capsule Take 1 capsule (300 mg total) by mouth 3 (three) times daily. 90 capsule 2    LEVOTHYROXINE SODIUM (LEVOTHYROXINE ORAL) Take 50 mcg by mouth before breakfast.       lisinopril (PRINIVIL,ZESTRIL) 40 MG tablet       metformin (GLUCOPHAGE) 500 MG tablet Take 500 mg by mouth 2 (two) times daily with meals.      oxybutynin (DITROPAN) 5 MG Tab Take 1 tablet (5 mg total) by mouth every evening. 90 tablet 2    oxybutynin (DITROPAN-XL) 5 MG TR24 Take 1 tablet (5 mg total) by mouth once daily. 90 tablet 3    ranitidine (ZANTAC) 150 MG tablet Take 150 mg by mouth once daily.  0    triamterene-hydrochlorothiazide 37.5-25 mg (DYAZIDE) 37.5-25 mg per capsule       estradiol (ESTRACE) 0.01 % (0.1 mg/gram) vaginal cream Place 1 g vaginally 3 (three) times a week. Place by fingertip application before bedtime three times a week (Monday, Wednesday, Friday) 45 g 3     No facility-administered encounter medications on file as of 8/8/2019.          PHYSICAL EXAMINATION:    The patient generally appears in good health, is appropriately interactive, and is in no apparent distress.    Skin: No lesions.    Mental: Cooperative with normal affect.    Neuro: Grossly intact.    HEENT:  Normal. No evidence of lymphadenopathy.    Chest:  normal inspiratory effort.    Abdomen:  Soft, non-tender. No masses or organomegaly. Bladder is not palpable. No evidence of flank discomfort. No evidence of inguinal hernia.    Extremities: No clubbing, cyanosis, or edema    Normal external female genitalia  Grade II urogenital atrophy  Urethral meatus is normal  Urethra and bladder are nontender to bimanual exam  Well supported anteriorly and posteriorly   Uterus and cervix are normal  No adnexal masses    LABS:    Lab Results   Component Value Date    BUN 28 (H) 07/06/2018    CREATININE 1.0 07/08/2019     UA 1.010, pH 6, ++ leuk, tr protein, tr blood.      Her last cystoscopy was 2/2018    IMPRESSION:    Encounter Diagnoses   Name Primary?    Recurrent UTI Yes       PLAN:    1. Discontinue the oxybutynin due to concern for cognitive function  2.  darifenacin was prescribed.  3.  The catheterized specimen was sent for culture  4.  Renewed the Estrace cream  5.  Ellura instead of the D mannose  6.  She is having hand surgery on 8/21.  Recommended that she start the antibiotics leonel sent 4 days prior to surgery so she can have her surgery.        Note:  Urine culture was not sent today as she just had a pan sensitive culture from 7/30/2019

## 2019-08-13 ENCOUNTER — TELEPHONE (OUTPATIENT)
Dept: UROLOGY | Facility: CLINIC | Age: 69
End: 2019-08-13

## 2019-08-13 DIAGNOSIS — N39.0 BACTERIAL UTI: Primary | ICD-10-CM

## 2019-08-13 DIAGNOSIS — A49.9 BACTERIAL UTI: Primary | ICD-10-CM

## 2019-08-13 RX ORDER — SULFAMETHOXAZOLE AND TRIMETHOPRIM 800; 160 MG/1; MG/1
1 TABLET ORAL 2 TIMES DAILY
Qty: 20 TABLET | Refills: 0 | Status: SHIPPED | OUTPATIENT
Start: 2019-08-13 | End: 2019-08-23

## 2019-08-13 NOTE — TELEPHONE ENCOUNTER
----- Message from Meeta Martinez LPN sent at 8/13/2019  4:12 PM CDT -----  Contact: pt: [phone]      ----- Message -----  From: Jag Pittman  Sent: 8/13/2019   3:10 PM  To: Jose [facility]div[facility] Staff    Needs Advice    Reason for call: pt state a Rx was suppose to be sent to pharmacy for UTI but was not sent         Communication Preference: pt: [phone]

## 2019-08-14 ENCOUNTER — TELEPHONE (OUTPATIENT)
Dept: UROLOGY | Facility: CLINIC | Age: 69
End: 2019-08-14

## 2019-08-14 NOTE — TELEPHONE ENCOUNTER
----- Message from Hilaria Romero MA sent at 8/14/2019  2:53 PM CDT -----  Contact: [phone]  Needs Advice    Reason for call: pt states that she is supposed to start taking antibiotics on Sunday for an upcoming procedure with Dr Garcia but as of today nothing has been called in yet         Communication Preference:    Additional Information: [id number]    [facility]/pharmacy #[id number] [facility], [place] - [address] [place] [address] [address] [phone] (Phone)  [phone] (Fax)

## 2019-08-16 ENCOUNTER — HOSPITAL ENCOUNTER (OUTPATIENT)
Dept: RADIOLOGY | Facility: HOSPITAL | Age: 69
Discharge: HOME | End: 2019-08-16
Attending: UROLOGY
Payer: MEDICARE

## 2019-08-16 DIAGNOSIS — N39.0 RECURRENT UTI: ICD-10-CM

## 2019-08-16 PROCEDURE — 76770 US KIDNEY: ICD-10-PCS | Mod: 26,,, | Performed by: RADIOLOGY

## 2019-08-16 PROCEDURE — 76770 US EXAM ABDO BACK WALL COMP: CPT | Mod: 26,,, | Performed by: RADIOLOGY

## 2019-08-16 PROCEDURE — 76770 US EXAM ABDO BACK WALL COMP: CPT | Mod: TC

## 2019-08-20 ENCOUNTER — TELEPHONE (OUTPATIENT)
Dept: ORTHOPEDICS | Facility: CLINIC | Age: 69
End: 2019-08-20

## 2019-08-20 ENCOUNTER — ANESTHESIA EVENT (OUTPATIENT)
Dept: SURGERY | Facility: HOSPITAL | Age: 69
End: 2019-08-20
Payer: MEDICARE

## 2019-08-20 NOTE — TELEPHONE ENCOUNTER
Called and spoke with patient and give arrived time for 6:30 also give location of Sx and give Post op appointment information. Patient communicated understanding.

## 2019-08-21 ENCOUNTER — ANESTHESIA (OUTPATIENT)
Dept: SURGERY | Facility: HOSPITAL | Age: 69
End: 2019-08-21
Payer: MEDICARE

## 2019-08-21 ENCOUNTER — HOSPITAL ENCOUNTER (OUTPATIENT)
Facility: HOSPITAL | Age: 69
Discharge: HOME | End: 2019-08-21
Attending: ORTHOPAEDIC SURGERY | Admitting: ORTHOPAEDIC SURGERY
Payer: MEDICARE

## 2019-08-21 VITALS
SYSTOLIC BLOOD PRESSURE: 128 MMHG | OXYGEN SATURATION: 97 % | RESPIRATION RATE: 15 BRPM | HEIGHT: 64 IN | WEIGHT: 173 LBS | BODY MASS INDEX: 29.53 KG/M2 | DIASTOLIC BLOOD PRESSURE: 62 MMHG | HEART RATE: 78 BPM | TEMPERATURE: 98 F

## 2019-08-21 DIAGNOSIS — G56.00 CARPAL TUNNEL SYNDROME: Primary | ICD-10-CM

## 2019-08-21 LAB — POCT GLUCOSE: 120 MG/DL (ref 70–110)

## 2019-08-21 PROCEDURE — 64721 CARPAL TUNNEL SURGERY: CPT | Mod: RT,,, | Performed by: ORTHOPAEDIC SURGERY

## 2019-08-21 PROCEDURE — 71000044 HC DOSC ROUTINE RECOVERY FIRST HOUR: Performed by: ORTHOPAEDIC SURGERY

## 2019-08-21 PROCEDURE — 64727 INTERNAL NEUROLYSIS: CPT | Mod: RT,,, | Performed by: ORTHOPAEDIC SURGERY

## 2019-08-21 PROCEDURE — 63600175 PHARM REV CODE 636 W HCPCS: Performed by: NURSE ANESTHETIST, CERTIFIED REGISTERED

## 2019-08-21 PROCEDURE — 64999 PR, ORTHO NERVE WRAPPING: ICD-10-PCS | Mod: ,,, | Performed by: ORTHOPAEDIC SURGERY

## 2019-08-21 PROCEDURE — 82962 GLUCOSE BLOOD TEST: CPT | Performed by: ORTHOPAEDIC SURGERY

## 2019-08-21 PROCEDURE — 71000015 HC POSTOP RECOV 1ST HR: Performed by: ORTHOPAEDIC SURGERY

## 2019-08-21 PROCEDURE — D9220A PRA ANESTHESIA: ICD-10-PCS | Mod: CRNA,,, | Performed by: NURSE ANESTHETIST, CERTIFIED REGISTERED

## 2019-08-21 PROCEDURE — 25000003 PHARM REV CODE 250: Performed by: NURSE ANESTHETIST, CERTIFIED REGISTERED

## 2019-08-21 PROCEDURE — D9220A PRA ANESTHESIA: Mod: ANES,,, | Performed by: ANESTHESIOLOGY

## 2019-08-21 PROCEDURE — 37000008 HC ANESTHESIA 1ST 15 MINUTES: Performed by: ORTHOPAEDIC SURGERY

## 2019-08-21 PROCEDURE — 64727 PR INTERN NERVE REVIS,OPER MICROSCOPE: ICD-10-PCS | Mod: RT,,, | Performed by: ORTHOPAEDIC SURGERY

## 2019-08-21 PROCEDURE — D9220A PRA ANESTHESIA: ICD-10-PCS | Mod: ANES,,, | Performed by: ANESTHESIOLOGY

## 2019-08-21 PROCEDURE — 63600175 PHARM REV CODE 636 W HCPCS: Performed by: STUDENT IN AN ORGANIZED HEALTH CARE EDUCATION/TRAINING PROGRAM

## 2019-08-21 PROCEDURE — 37000009 HC ANESTHESIA EA ADD 15 MINS: Performed by: ORTHOPAEDIC SURGERY

## 2019-08-21 PROCEDURE — 25000003 PHARM REV CODE 250: Performed by: ORTHOPAEDIC SURGERY

## 2019-08-21 PROCEDURE — 36000706: Performed by: ORTHOPAEDIC SURGERY

## 2019-08-21 PROCEDURE — D9220A PRA ANESTHESIA: Mod: CRNA,,, | Performed by: NURSE ANESTHETIST, CERTIFIED REGISTERED

## 2019-08-21 PROCEDURE — 64721 PR REVISE MEDIAN N/CARPAL TUNNEL SURG: ICD-10-PCS | Mod: RT,,, | Performed by: ORTHOPAEDIC SURGERY

## 2019-08-21 PROCEDURE — 64999 UNLISTED PX NERVOUS SYSTEM: CPT | Mod: ,,, | Performed by: ORTHOPAEDIC SURGERY

## 2019-08-21 PROCEDURE — 36000707: Performed by: ORTHOPAEDIC SURGERY

## 2019-08-21 PROCEDURE — 27800903 OPTIME MED/SURG SUP & DEVICES OTHER IMPLANTS: Performed by: ORTHOPAEDIC SURGERY

## 2019-08-21 PROCEDURE — 27201423 OPTIME MED/SURG SUP & DEVICES STERILE SUPPLY: Performed by: ORTHOPAEDIC SURGERY

## 2019-08-21 DEVICE — PROTECTOR AXOGUARD NERVE 10X40: Type: IMPLANTABLE DEVICE | Site: HAND | Status: FUNCTIONAL

## 2019-08-21 DEVICE — MEMBRANE CLARIX 1K 2.5CMX2.5CM: Type: IMPLANTABLE DEVICE | Site: HAND | Status: FUNCTIONAL

## 2019-08-21 RX ORDER — SODIUM CHLORIDE 9 MG/ML
INJECTION, SOLUTION INTRAVENOUS CONTINUOUS
Status: DISCONTINUED | OUTPATIENT
Start: 2019-08-21 | End: 2019-08-21 | Stop reason: HOSPADM

## 2019-08-21 RX ORDER — GLYCOPYRROLATE 0.2 MG/ML
INJECTION INTRAMUSCULAR; INTRAVENOUS
Status: DISCONTINUED | OUTPATIENT
Start: 2019-08-21 | End: 2019-08-21

## 2019-08-21 RX ORDER — FENTANYL CITRATE 50 UG/ML
25 INJECTION, SOLUTION INTRAMUSCULAR; INTRAVENOUS EVERY 5 MIN PRN
Status: DISCONTINUED | OUTPATIENT
Start: 2019-08-21 | End: 2019-08-21 | Stop reason: HOSPADM

## 2019-08-21 RX ORDER — BUPIVACAINE HYDROCHLORIDE 2.5 MG/ML
INJECTION, SOLUTION EPIDURAL; INFILTRATION; INTRACAUDAL
Status: DISCONTINUED
Start: 2019-08-21 | End: 2019-08-21 | Stop reason: WASHOUT

## 2019-08-21 RX ORDER — BACITRACIN 500 [USP'U]/G
OINTMENT TOPICAL
Status: DISCONTINUED
Start: 2019-08-21 | End: 2019-08-21 | Stop reason: WASHOUT

## 2019-08-21 RX ORDER — FENTANYL CITRATE 50 UG/ML
INJECTION, SOLUTION INTRAMUSCULAR; INTRAVENOUS
Status: DISCONTINUED | OUTPATIENT
Start: 2019-08-21 | End: 2019-08-21

## 2019-08-21 RX ORDER — SODIUM CHLORIDE 0.9 % (FLUSH) 0.9 %
10 SYRINGE (ML) INJECTION
Status: DISCONTINUED | OUTPATIENT
Start: 2019-08-21 | End: 2019-08-21 | Stop reason: HOSPADM

## 2019-08-21 RX ORDER — LIDOCAINE HYDROCHLORIDE 10 MG/ML
INJECTION, SOLUTION EPIDURAL; INFILTRATION; INTRACAUDAL; PERINEURAL
Status: DISCONTINUED | OUTPATIENT
Start: 2019-08-21 | End: 2019-08-21 | Stop reason: HOSPADM

## 2019-08-21 RX ORDER — EPHEDRINE SULFATE 50 MG/ML
INJECTION, SOLUTION INTRAVENOUS
Status: DISCONTINUED | OUTPATIENT
Start: 2019-08-21 | End: 2019-08-21

## 2019-08-21 RX ORDER — ONDANSETRON 2 MG/ML
4 INJECTION INTRAMUSCULAR; INTRAVENOUS ONCE AS NEEDED
Status: DISCONTINUED | OUTPATIENT
Start: 2019-08-21 | End: 2019-08-21 | Stop reason: HOSPADM

## 2019-08-21 RX ORDER — CEFAZOLIN SODIUM 1 G/3ML
2 INJECTION, POWDER, FOR SOLUTION INTRAMUSCULAR; INTRAVENOUS
Status: COMPLETED | OUTPATIENT
Start: 2019-08-21 | End: 2019-08-21

## 2019-08-21 RX ORDER — HYDROCODONE BITARTRATE AND ACETAMINOPHEN 10; 325 MG/1; MG/1
1 TABLET ORAL EVERY 4 HOURS PRN
Qty: 20 TABLET | Refills: 0 | Status: SHIPPED | OUTPATIENT
Start: 2019-08-21 | End: 2020-01-26

## 2019-08-21 RX ORDER — LIDOCAINE HYDROCHLORIDE 10 MG/ML
INJECTION INFILTRATION; PERINEURAL
Status: DISCONTINUED
Start: 2019-08-21 | End: 2019-08-21 | Stop reason: HOSPADM

## 2019-08-21 RX ORDER — PROPOFOL 10 MG/ML
VIAL (ML) INTRAVENOUS
Status: DISCONTINUED | OUTPATIENT
Start: 2019-08-21 | End: 2019-08-21

## 2019-08-21 RX ORDER — PROPOFOL 10 MG/ML
VIAL (ML) INTRAVENOUS CONTINUOUS PRN
Status: DISCONTINUED | OUTPATIENT
Start: 2019-08-21 | End: 2019-08-21

## 2019-08-21 RX ORDER — DOCUSATE SODIUM 100 MG/1
100 CAPSULE, LIQUID FILLED ORAL 2 TIMES DAILY
Qty: 60 CAPSULE | Refills: 0 | Status: SHIPPED | OUTPATIENT
Start: 2019-08-21 | End: 2019-09-04

## 2019-08-21 RX ORDER — ONDANSETRON 2 MG/ML
INJECTION INTRAMUSCULAR; INTRAVENOUS
Status: DISCONTINUED | OUTPATIENT
Start: 2019-08-21 | End: 2019-08-21

## 2019-08-21 RX ORDER — PHENYLEPHRINE HYDROCHLORIDE 10 MG/ML
INJECTION INTRAVENOUS
Status: DISCONTINUED | OUTPATIENT
Start: 2019-08-21 | End: 2019-08-21

## 2019-08-21 RX ORDER — MIDAZOLAM HYDROCHLORIDE 1 MG/ML
INJECTION, SOLUTION INTRAMUSCULAR; INTRAVENOUS
Status: DISCONTINUED | OUTPATIENT
Start: 2019-08-21 | End: 2019-08-21

## 2019-08-21 RX ORDER — BUPIVACAINE HYDROCHLORIDE 2.5 MG/ML
INJECTION, SOLUTION EPIDURAL; INFILTRATION; INTRACAUDAL
Status: DISCONTINUED | OUTPATIENT
Start: 2019-08-21 | End: 2019-08-21 | Stop reason: HOSPADM

## 2019-08-21 RX ORDER — BACITRACIN ZINC 500 UNIT/G
OINTMENT (GRAM) TOPICAL
Status: DISCONTINUED | OUTPATIENT
Start: 2019-08-21 | End: 2019-08-21 | Stop reason: HOSPADM

## 2019-08-21 RX ADMIN — PROPOFOL 40 MG: 10 INJECTION, EMULSION INTRAVENOUS at 09:08

## 2019-08-21 RX ADMIN — PHENYLEPHRINE HYDROCHLORIDE 100 MCG: 10 INJECTION INTRAVENOUS at 09:08

## 2019-08-21 RX ADMIN — EPHEDRINE SULFATE 10 MG: 50 INJECTION, SOLUTION INTRAMUSCULAR; INTRAVENOUS; SUBCUTANEOUS at 09:08

## 2019-08-21 RX ADMIN — SODIUM CHLORIDE, SODIUM GLUCONATE, SODIUM ACETATE, POTASSIUM CHLORIDE, MAGNESIUM CHLORIDE, SODIUM PHOSPHATE, DIBASIC, AND POTASSIUM PHOSPHATE: .53; .5; .37; .037; .03; .012; .00082 INJECTION, SOLUTION INTRAVENOUS at 09:08

## 2019-08-21 RX ADMIN — EPHEDRINE SULFATE 15 MG: 50 INJECTION, SOLUTION INTRAMUSCULAR; INTRAVENOUS; SUBCUTANEOUS at 09:08

## 2019-08-21 RX ADMIN — MIDAZOLAM HYDROCHLORIDE 1 MG: 1 INJECTION, SOLUTION INTRAMUSCULAR; INTRAVENOUS at 08:08

## 2019-08-21 RX ADMIN — SODIUM CHLORIDE 1000 ML: 0.9 INJECTION, SOLUTION INTRAVENOUS at 07:08

## 2019-08-21 RX ADMIN — ONDANSETRON 4 MG: 2 INJECTION INTRAMUSCULAR; INTRAVENOUS at 09:08

## 2019-08-21 RX ADMIN — PHENYLEPHRINE HYDROCHLORIDE 200 MCG: 10 INJECTION INTRAVENOUS at 09:08

## 2019-08-21 RX ADMIN — PROPOFOL 150 MCG/KG/MIN: 10 INJECTION, EMULSION INTRAVENOUS at 09:08

## 2019-08-21 RX ADMIN — FENTANYL CITRATE 25 MCG: 50 INJECTION, SOLUTION INTRAMUSCULAR; INTRAVENOUS at 09:08

## 2019-08-21 RX ADMIN — PROPOFOL 30 MG: 10 INJECTION, EMULSION INTRAVENOUS at 10:08

## 2019-08-21 RX ADMIN — CEFAZOLIN 2 G: 330 INJECTION, POWDER, FOR SOLUTION INTRAMUSCULAR; INTRAVENOUS at 09:08

## 2019-08-21 RX ADMIN — GLYCOPYRROLATE 0.2 MG: 0.2 INJECTION, SOLUTION INTRAMUSCULAR; INTRAVENOUS at 09:08

## 2019-08-21 RX ADMIN — SODIUM CHLORIDE: 0.9 INJECTION, SOLUTION INTRAVENOUS at 09:08

## 2019-08-21 NOTE — ANESTHESIA POSTPROCEDURE EVALUATION
Anesthesia Post Evaluation    Patient: Niki Soriano    Procedure(s) Performed: Procedure(s) (LRB):  RELEASE, CARPAL TUNNEL, REVISION (Right)    Final Anesthesia Type: MAC  Patient location during evaluation: PACU  Patient participation: Yes- Able to Participate  Level of consciousness: awake and alert and oriented  Post-procedure vital signs: reviewed and stable  Pain management: adequate  Airway patency: patent  PONV status at discharge: No PONV  Anesthetic complications: no      Cardiovascular status: blood pressure returned to baseline and hemodynamically stable  Respiratory status: unassisted, spontaneous ventilation and room air  Hydration status: euvolemic  Follow-up not needed.          Vitals Value Taken Time   /62 8/21/2019 11:01 AM   Temp 36.7 °C (98 °F) 8/21/2019 11:00 AM   Pulse 76 8/21/2019 11:03 AM   Resp 15 8/21/2019 11:00 AM   SpO2 98 % 8/21/2019 11:03 AM   Vitals shown include unvalidated device data.      No case tracking events are documented in the log.      Pain/Phylicia Score: Hpylicia Score: 10 (8/21/2019 11:00 AM)

## 2019-08-21 NOTE — ANESTHESIA PREPROCEDURE EVALUATION
08/21/2019  Niki Soriano is a 68 y.o., female   Pre-operative evaluation for Procedure(s) (LRB):  RELEASE, CARPAL TUNNEL, REVISION (Right)    Niki Soriano is a 68 y.o. female     Patient Active Problem List   Diagnosis    Vulvar pain    Vulvodynia, unspecified    Vulvar vestibulitis    Spasm of muscle    Type 2 diabetes mellitus with neurologic complication    Hypothyroidism    Leg weakness, bilateral    Carpal tunnel syndrome, bilateral    IC (interstitial cystitis)    Recurrent UTI    E-coli UTI    Sebaceous cyst    Reflux esophagitis    Klebsiella cystitis    Carpal tunnel syndrome       Review of patient's allergies indicates:   Allergen Reactions    Augmentin [amoxicillin-pot clavulanate] Other (See Comments)     Patient cannot recall what she had, only that she could not tolerate the Augmentin    Tramadol Itching    Tegretol [carbamazepine] Itching and Rash    Vicodin [hydrocodone-acetaminophen] Itching and Anxiety       No current facility-administered medications on file prior to encounter.      Current Outpatient Medications on File Prior to Encounter   Medication Sig Dispense Refill    atenolol (TENORMIN) 50 MG tablet every evening.       atorvastatin (LIPITOR) 40 MG tablet Take 40 mg by mouth once daily.  3    baclofen (LIORESAL) 20 MG tablet 2 (two) times daily.       duloxetine (CYMBALTA) 30 MG capsule Take 30 mg by mouth once daily.      gabapentin (NEURONTIN) 300 MG capsule Take 1 capsule (300 mg total) by mouth 3 (three) times daily. 90 capsule 2    LEVOTHYROXINE SODIUM (LEVOTHYROXINE ORAL) Take 50 mcg by mouth before breakfast.       lisinopril (PRINIVIL,ZESTRIL) 40 MG tablet every evening.       metformin (GLUCOPHAGE) 500 MG tablet Take 500 mg by mouth 2 (two) times daily with meals.      ranitidine (ZANTAC) 150 MG tablet Take 150 mg by mouth once daily.  0     triamterene-hydrochlorothiazide 37.5-25 mg (DYAZIDE) 37.5-25 mg per capsule every morning.          Past Surgical History:   Procedure Laterality Date     SECTION      x3    CYSTOSCOPY      HYSTERECTOMY      ischemic colitis      OOPHORECTOMY      [place] REMOVAL OF OVARY/TUBE(S)       Anesthesia Evaluation    I have reviewed the Patient Summary Reports.     I have reviewed the Medications.     Review of Systems  Anesthesia Hx:  No problems with previous Anesthesia Denies Hx of Anesthetic complications  History of prior surgery of interest to airway management or planning: Denies Family Hx of Anesthesia complications.   Denies Personal Hx of Anesthesia complications.   Social:  No Alcohol Use, Non-Smoker    Hematology/Oncology:  Hematology Normal   Oncology Normal     EENT/Dental:EENT/Dental Normal   Cardiovascular:   Exercise tolerance: good Hypertension hyperlipidemia    Pulmonary:  Pulmonary Normal    Renal/:  Renal/ Normal     Hepatic/GI:  Hepatic/GI Normal    Neurological:  Neurology Normal    Endocrine:   Diabetes, type 2 Hypothyroidism    Psych:  Psychiatric Normal       Body mass index is 29.7 kg/m².      Physical Exam  General:  Well nourished, Obesity    Airway/Jaw/Neck:  Airway Findings: Mouth Opening: Normal Tongue: Normal  General Airway Assessment: Adult, Average  Mallampati: II  TM Distance: Normal, at least 6 cm  Jaw/Neck Findings:  Neck ROM: Normal ROM      Dental:  Dental Findings: In tact   Chest/Lungs:  Chest/Lungs Findings: Normal Respiratory Rate, Clear to auscultation     Heart/Vascular:  Heart Findings: Rate: Normal  Rhythm: Regular Rhythm        Mental Status:  Mental Status Findings:  Alert and Oriented, Cooperative         Anesthesia Plan  Type of Anesthesia, risks & benefits discussed:  Anesthesia Type:  MAC  Patient's Preference:   Intra-op Monitoring Plan: standard ASA monitors  Intra-op Monitoring Plan Comments:   Post Op Pain Control Plan: per primary service following  discharge from PACU, IV/PO Opioids PRN and multimodal analgesia  Post Op Pain Control Plan Comments:   Induction:   IV  Beta Blocker:  Patient is on a Beta-Blocker and has received one dose within the past 24 hours (No further documentation required).       Informed Consent: Patient understands risks and agrees with Anesthesia plan.  Questions answered. Anesthesia consent signed with patient.  ASA Score: 2     Day of Surgery Review of History & Physical:    H&P update referred to the surgeon.         Ready For Surgery From Anesthesia Perspective.

## 2019-08-21 NOTE — DISCHARGE INSTRUCTIONS
PATIENT INSTRUCTIONS  POST-ANESTHESIA    IMMEDIATELY FOLLOWING SURGERY:  Do not drive or operate machinery for the first twenty four hours after surgery.  Do not make any important decisions for twenty four hours after surgery or while taking narcotic pain medications or sedatives.  If you develop intractable nausea and vomiting or a severe headache please notify your doctor immediately.    FOLLOW-UP:  Please make an appointment with your surgeon as instructed. You do not need to follow up with anesthesia unless specifically instructed to do so.    WOUND CARE INSTRUCTIONS (if applicable):  Keep a dry clean dressing on the anesthesia/puncture wound site if there is drainage.  Once the wound has quit draining you may leave it open to air.  Generally you should leave the bandage intact for twenty four hours unless there is drainage.  If the epidural site drains for more than 36-48 hours please call the anesthesia department.    QUESTIONS?:  Please feel free to call your physician or the hospital  if you have any questions, and they will be happy to assist you.           Recovery After Procedural Sedation (Adult)  You have been given medicine by vein to make you sleep during your surgery. This may have included both a pain medicine and sleeping medicine. Most of the effects have worn off. But you may still have some drowsiness for the next 6 to 8 hours.  Home care  Follow these guidelines when you get home:  · For the next 8 hours, you should be watched by a responsible adult. This person should make sure your condition is not getting worse.  · Don't drink any alcohol for the next 24 hours.  · Don't drive, operate dangerous machinery, or make important business or personal decisions during the next 24 hours.  Note: Your healthcare provider may tell you not to take any medicine by mouth for pain or sleep in the next 4 hours. These medicines may react with the medicines you were given in the hospital. This could  cause a much stronger response than usual.  Follow-up care  Follow up with your healthcare provider if you are not alert and back to your usual level of activity within 12 hours.  When to seek medical advice  Call your healthcare provider right away if any of these occur:  · Drowsiness gets worse  · Weakness or dizziness gets worse  · Repeated vomiting  · You can't be awakened   Date Last Reviewed: 10/18/2016  © 6085-9638 "[employer]. [address] [place] [address], [place] [address]. All rights reserved. This information is not intended as a substitute for professional medical care. Always follow your healthcare professional's instructions.  [facility] Anesthesia Department  [address] [facility]  [phone]

## 2019-08-21 NOTE — PLAN OF CARE
Pt and  at bedside. Pt and family updated on d/c instructions and verbalized understanding. Pt consent forms from procedure in chart. Pt states pain in left wrist is a 4 out of 10 and denies wanting to take any medications. Will continue to prepare pt for discharge. Pharmacy called and bringing prescriptions to bedside

## 2019-08-21 NOTE — BRIEF OP NOTE
[facility]  Brief Operative Note     SUMMARY     Surgery Date: 8/21/2019     Surgeon(s) and Role:     * Annabelle Cid MD - Primary     * Honorio Workman MD - Resident - Assisting        Pre-op Diagnosis:  Carpal tunnel syndrome of right wrist [G56.01]    Post-op Diagnosis:  Post-Op Diagnosis Codes:     * Carpal tunnel syndrome of right wrist [G56.01]    Procedure(s) (LRB):  RELEASE, CARPAL TUNNEL, REVISION (Right)    Anesthesia: Local MAC    Description of the findings of the procedure: as above    Findings/Key Components: as above    Estimated Blood Loss: * No values recorded between 8/21/2019  9:18 AM and 8/21/2019 10:15 AM *         Specimens:   Specimen (12h ago, onward)    None          Discharge Note    SUMMARY     Admit Date: 8/21/2019    Discharge Date and Time:  08/21/2019 10:16 AM    Hospital Course (synopsis of major diagnoses, care, treatment, and services provided during the course of the hospital stay): Pt admitted for outpatient procedure, tolerated well.  Recovered in PACU and was discharged home on day of surgery.       Final Diagnosis: Post-Op Diagnosis Codes:     * Carpal tunnel syndrome of right wrist [G56.01]    Disposition: Home or Self Care    Follow Up/Patient Instructions:     Medications:  Reconciled Home Medications:      Medication List      START taking these medications    docusate sodium 100 mg capsule  Take 100 mg by mouth 2 (two) times daily.     HYDROcodone-acetaminophen  mg per tablet  Commonly known as:  NORCO  Take 1 tablet by mouth every 4 (four) hours as needed for Pain.        CONTINUE taking these medications    atenolol 50 MG tablet  Commonly known as:  TENORMIN  every evening.     atorvastatin 40 MG tablet  Commonly known as:  LIPITOR  Take 40 mg by mouth once daily.     baclofen 20 MG tablet  Commonly known as:  LIORESAL  2 (two) times daily.     darifenacin 7.5 MG Tb24  Commonly known as:  ENABLEX  Take 1 tablet (7.5 mg total) by mouth  once daily.     DULoxetine 30 MG capsule  Commonly known as:  CYMBALTA  Take 30 mg by mouth once daily.     estradiol 0.01 % (0.1 mg/gram) vaginal cream  Commonly known as:  ESTRACE  Place 1 g vaginally 3 (three) times a week. Place by fingertip application before bedtime three times a week (Monday, Wednesday, Friday)     gabapentin 300 MG capsule  Commonly known as:  NEURONTIN  Take 1 capsule (300 mg total) by mouth 3 (three) times daily.     LEVOTHYROXINE ORAL  Take 50 mcg by mouth before breakfast.     lisinopril 40 MG tablet  Commonly known as:  PRINIVIL,ZESTRIL  every evening.     metFORMIN 500 MG tablet  Commonly known as:  GLUCOPHAGE  Take 500 mg by mouth 2 (two) times daily with meals.     ranitidine 150 MG tablet  Commonly known as:  ZANTAC  Take 150 mg by mouth once daily.     sulfamethoxazole-trimethoprim 800-160mg 800-160 mg Tab  Commonly known as:  BACTRIM DS  Take 1 tablet by mouth 2 (two) times daily. for 10 days     triamterene-hydrochlorothiazide 37.5-25 mg 37.5-25 mg per capsule  Commonly known as:  DYAZIDE  every morning.          Discharge Procedure Orders   Call MD for:  temperature >100.4     Call MD for:  persistent nausea and vomiting or diarrhea     Call MD for:  severe uncontrolled pain     Call MD for:  redness, tenderness, or signs of infection (pain, swelling, redness, odor or green/yellow discharge around incision site)     Call MD for:  difficulty breathing or increased cough     Call MD for:  severe persistent headache     Call MD for:  worsening rash     Call MD for:  persistent dizziness, light-headedness, or visual disturbances     Call MD for:  increased confusion or weakness     Leave dressing on - Keep it clean, dry, and intact until clinic visit     Follow-up Information     Follow up In 2 weeks.

## 2019-08-21 NOTE — TRANSFER OF CARE
"Anesthesia Transfer of Care Note    Patient: Niki Soriano    Procedure(s) Performed: Procedure(s) (LRB):  RELEASE, CARPAL TUNNEL, REVISION (Right)    Patient location: PACU    Anesthesia Type: MAC    Transport from OR: Transported from OR on 2-3 L/min O2 by NC with adequate spontaneous ventilation    Post pain: adequate analgesia    Post assessment: no apparent anesthetic complications and tolerated procedure well    Post vital signs: stable    Level of consciousness: awake and alert    Nausea/Vomiting: no nausea/vomiting    Complications: none    Transfer of care protocol was followed      Last vitals:   Visit Vitals  BP (!) 157/74   Pulse (!) 52   Temp 36.8 °C (98.2 °F) (Oral)   Resp 17   Ht 5' 4" (1.626 m)   Wt 78.5 kg (173 lb)   SpO2 97%   Breastfeeding? No   BMI 29.70 kg/m²     "

## 2019-08-22 ENCOUNTER — TELEPHONE (OUTPATIENT)
Dept: ORTHOPEDICS | Facility: CLINIC | Age: 69
End: 2019-08-22

## 2019-08-22 NOTE — TELEPHONE ENCOUNTER
Called patient in regard to phone message. She discussed her concerns with me and I informed her that I would talk with one of the physician assistants in regard to what we discussed. I informed her that I would follow up with her once I got a response back.     She verbalized understanding.

## 2019-08-22 NOTE — OP NOTE
DATE OF PROCEDURE:  08/21/2019.    SERVICE:  Orthopedics.    ATTENDING SURGEON:  Annabelle Cid M.D.    RESIDENT SURGEON:  Nakul Workman.    PREOPERATIVE DIAGNOSIS:  Right recurrent carpal tunnel syndrome.    POSTOPERATIVE DIAGNOSIS:  Right recurrent carpal tunnel syndrome.    PROCEDURES PERFORMED:  1.  Right median nerve neurolysis at the wrist under loupe magnification.  2.  Bi-layer technique of median nerve with AxoGen nerve wrap and Clarix patch.  3.  Splint application, right upper extremity.    ANESTHESIA:  Regional MAC.    FLUIDS:  Lactated Ringer's.    BLOOD LOSS:  None.  No blood was given.    TOURNIQUET TIME:  Less than 1 hour.    PACKS AND DRAINS:  None.    IMPLANTS:  1.  AxoGen nerve wrap.  2.  Clarix patch.    COMPLICATIONS:  None.    INDICATIONS FOR PROCEDURE:  Ms. Soriano is a 68-year-old female who has failed   conservative treatment for her numbness and tingling of right hand.  She is   wheelchair bound or uses a walker.  She relies on her hands significantly and   this was a difficult decision for the patient.  However, she continued to have   significant difficulty with the hand.  Nerve testing was positive.  After much   discussion with the patient, we elected for surgical intervention.  Risks and   benefits were explained to the patient in clinic.  Consents were signed in the   clinic.    PROCEDURE IN DETAIL:  After the correct site was marked with the patient's   participation in the holding area, the patient underwent regional anesthesia,   was brought to the Operating Room, placed in supine position, and underwent MAC   anesthesia.  A well-padded nonsterile tourniquet was placed on the right upper   extremity.  The right upper extremity was prepped and draped in normal sterile   fashion.  A timeout was conducted for the correct site and procedure to be   indicated.  Intravenous antibiotics were given to the patient preoperatively.    After the arm was prepped and draped and timeout was  conducted, a standard   longitudinal incision was marked out in Alvarado's cardinal lines using her   original incision and then extending it across the forearm.  The arm was   exsanguinated with an Esmarch.  Tourniquet was insufflated to 250 mmHg.    Incision was made.  Careful dissection down to the median nerve was maintained   in the forearm.  The patient did appear to have swelling visually under the   skin.  However, when the incision was made, the median nerve was very bulbous   and this in fact was the structure that was swollen underneath the skin.    Careful dissection around the median nerve was performed.  Of note, the   retinaculum was still present in the proximal wrist, retinaculum was released.    A complete median nerve neurolysis was conducted under loupe magnification of   this very bulbous median nerve.  The nerve was then wrapped with an AxoGen nerve   wrap using 6-0 nylon.  Clarix was placed into the area with a second bilayer   technique.  The area was irrigated with copious amounts of normal saline.    Vicryl and nylon closed the skin.  Sterile dressing was applied.  Tourniquet was   deflated.  Brisk capillary refill ensued.  The patient was placed in a   well-padded volar slab splint, tolerated the procedure well and was brought to   the recovery area in stable condition.    POSTOPERATIVE PLAN FOR THIS PATIENT:  Keep the dressing clean, dry and intact.    At 2 weeks' time sutures to be removed and the patient to be in a brace.  She   will still be weight protective for four to six weeks.      LES/HN  dd: 08/22/2019 08:12:04 (CDT)  td: 08/22/2019 08:27:08 (SHREYAS)  Doc ID   #[id number]  Job ID #[id number]    CC:

## 2019-08-22 NOTE — TELEPHONE ENCOUNTER
----- Message from Sandoval Escobar sent at 8/22/2019  1:38 PM CDT -----  Contact: Pt   Name of Who is Calling: MARCELA DO [[id number]]    What is the request in detail:Pt is requesting a call back regarding her surgery......  Please contact to further discuss and advise      Can the clinic reply by [facility]: No     [facility] [phone]

## 2019-08-22 NOTE — OP NOTE
[facility]  Surgery Department  Operative Note    SUMMARY     Date of Procedure: 8/21/2019     Procedure: Procedure(s) (LRB):  RELEASE, CARPAL TUNNEL, REVISION (Right)     Surgeon(s) and Role:     * Annabelle Cid MD - Primary     * Honorio Workman MD - Resident - Assisting      dictation [id number]  PrImplants:   Implant Name Type Inv. Item Serial No.  Lot No. LRB No. Used   PROTECTOR AXOGUARD NERVE [id number] - [id number]  PROTECTOR AXOGUARD NERVE 10X40  [employer] [id number] Right 1   MEMBRANE CLARIX 1K 2.5CMX2.5CM - [id number]  MEMBRANE CLARIX 1K 2.5CMX2.5CM [id number] [employer]  Right 1       Specimens:   Specimen (12h ago, onward)    None                  Condition: Good    Disposition: PACU - hemodynamically stable.    Attestation: I performed the procedure.    Discharge Note    SUMMARY     Admit Date: 8/21/2019    Discharge Date and Time:  08/22/2019 8:12 AM    Hospital Course (synopsis of major diagnoses, care, treatment, and services provided during the course of the hospital stay): surgery     Final Diagnosis: Post-Op Diagnosis Codes:     * Carpal tunnel syndrome of right wrist [G56.01]    Disposition: Home or Self Care    Follow Up/Patient Instructions:     Medications:  Reconciled Home Medications:      Medication List      START taking these medications    HYDROcodone-acetaminophen  mg per tablet  Commonly known as:  NORCO  Take 1 tablet by mouth every 4 (four) hours as needed for Pain.     STOOL SOFTENER 100 MG capsule  Generic drug:  docusate sodium  Take 1 capsule (100 mg total) by mouth 2 (two) times daily.        CONTINUE taking these medications    atenolol 50 MG tablet  Commonly known as:  TENORMIN  every evening.     atorvastatin 40 MG tablet  Commonly known as:  LIPITOR  Take 40 mg by mouth once daily.     baclofen 20 MG tablet  Commonly known as:  LIORESAL  2 (two) times daily.     darifenacin 7.5 MG Tb24  Commonly known as:   ENABLEX  Take 1 tablet (7.5 mg total) by mouth once daily.     DULoxetine 30 MG capsule  Commonly known as:  CYMBALTA  Take 30 mg by mouth once daily.     estradiol 0.01 % (0.1 mg/gram) vaginal cream  Commonly known as:  ESTRACE  Place 1 g vaginally 3 (three) times a week. Place by fingertip application before bedtime three times a week (Monday, Wednesday, Friday)     gabapentin 300 MG capsule  Commonly known as:  NEURONTIN  Take 1 capsule (300 mg total) by mouth 3 (three) times daily.     LEVOTHYROXINE ORAL  Take 50 mcg by mouth before breakfast.     lisinopril 40 MG tablet  Commonly known as:  PRINIVIL,ZESTRIL  every evening.     metFORMIN 500 MG tablet  Commonly known as:  GLUCOPHAGE  Take 500 mg by mouth 2 (two) times daily with meals.     ranitidine 150 MG tablet  Commonly known as:  ZANTAC  Take 150 mg by mouth once daily.     sulfamethoxazole-trimethoprim 800-160mg 800-160 mg Tab  Commonly known as:  BACTRIM DS  Take 1 tablet by mouth 2 (two) times daily. for 10 days     triamterene-hydrochlorothiazide 37.5-25 mg 37.5-25 mg per capsule  Commonly known as:  DYAZIDE  every morning.          Discharge Procedure Orders   Call MD for:  temperature >100.4     Call MD for:  persistent nausea and vomiting or diarrhea     Call MD for:  severe uncontrolled pain     Call MD for:  redness, tenderness, or signs of infection (pain, swelling, redness, odor or green/yellow discharge around incision site)     Call MD for:  difficulty breathing or increased cough     Call MD for:  severe persistent headache     Call MD for:  worsening rash     Call MD for:  persistent dizziness, light-headedness, or visual disturbances     Call MD for:  increased confusion or weakness     Leave dressing on - Keep it clean, dry, and intact until clinic visit     Follow-up Information     Follow up In 2 weeks.

## 2019-08-27 NOTE — INTERVAL H&P NOTE
The patient has been examined and the H&P has been reviewed:    I concur with the findings and no changes have occurred since H&P was written.    Anesthesia/Surgery risks, benefits and alternative options discussed and understood by patient/family.          Active Hospital Problems    Diagnosis  POA    *Carpal tunnel syndrome [G56.00]  Yes      Resolved Hospital Problems   No resolved problems to display.

## 2019-08-29 ENCOUNTER — TELEPHONE (OUTPATIENT)
Dept: ORTHOPEDICS | Facility: CLINIC | Age: 69
End: 2019-08-29

## 2019-08-29 NOTE — TELEPHONE ENCOUNTER
Called patient to inform her of what Ca Lowe advised. Was unable to make contact or leave a voice mail on the cell number listed.

## 2019-08-29 NOTE — TELEPHONE ENCOUNTER
----- Message from Ca Lowe PA-C sent at 8/22/2019  4:38 PM CDT -----  If it is causing pain, recommend getting assistance with catherization. Otherwise, she can use her other hand as much as possible.  ----- Message -----  From: Ciro Holley, PCT  Sent: 8/22/2019   3:59 PM  To: Ca Lowe PA-C    This patient had CTR yesterday with Bimal.    Evidently she has to catheterize herself multiple times daily and she has been using the hand that was operated on. She said that she and Dr. Wallis discussed that prior to surgery and Dr. Wallis did not seem concerned.     She said it's causing her some additional pain when she does it (of course) and is concerned she is going to mess up the result by continuing to do so.     Let me know what you think,  CP

## 2019-08-30 ENCOUNTER — TELEPHONE (OUTPATIENT)
Dept: ORTHOPEDICS | Facility: CLINIC | Age: 69
End: 2019-08-30

## 2019-09-04 ENCOUNTER — DOCUMENTATION ONLY (OUTPATIENT)
Dept: ORTHOPEDICS | Facility: CLINIC | Age: 69
End: 2019-09-04

## 2019-09-04 ENCOUNTER — OFFICE VISIT (OUTPATIENT)
Dept: ORTHOPEDICS | Facility: CLINIC | Age: 69
End: 2019-09-04
Payer: MEDICARE

## 2019-09-04 VITALS
DIASTOLIC BLOOD PRESSURE: 74 MMHG | BODY MASS INDEX: 29.53 KG/M2 | HEIGHT: 64 IN | SYSTOLIC BLOOD PRESSURE: 112 MMHG | WEIGHT: 173 LBS | HEART RATE: 60 BPM

## 2019-09-04 DIAGNOSIS — G56.01 CARPAL TUNNEL SYNDROME OF RIGHT WRIST: Primary | ICD-10-CM

## 2019-09-04 DIAGNOSIS — Z47.89 ORTHOPEDIC AFTERCARE: ICD-10-CM

## 2019-09-04 PROCEDURE — 99024 PR POST-OP FOLLOW-UP VISIT: ICD-10-PCS | Mod: S$GLB,,, | Performed by: PHYSICIAN ASSISTANT

## 2019-09-04 PROCEDURE — 29125 APPL SHORT ARM SPLINT STATIC: CPT | Mod: 58,RT,S$GLB, | Performed by: PHYSICIAN ASSISTANT

## 2019-09-04 PROCEDURE — 99999 PR PBB SHADOW E&M-EST. PATIENT-LVL V: ICD-10-PCS | Mod: PBBFAC,,, | Performed by: PHYSICIAN ASSISTANT

## 2019-09-04 PROCEDURE — 99999 PR PBB SHADOW E&M-EST. PATIENT-LVL V: CPT | Mod: PBBFAC,,, | Performed by: PHYSICIAN ASSISTANT

## 2019-09-04 PROCEDURE — 29125 PR APPLY FOREARM SPLINT,STATIC: ICD-10-PCS | Mod: 58,RT,S$GLB, | Performed by: PHYSICIAN ASSISTANT

## 2019-09-04 PROCEDURE — 99024 POSTOP FOLLOW-UP VISIT: CPT | Mod: S$GLB,,, | Performed by: PHYSICIAN ASSISTANT

## 2019-09-04 NOTE — PROGRESS NOTES
"Ms. Soriano is here today for a post-operative visit.  She is 14 days status post Right median nerve neurolysis at the wrist under loupe magnification with Bi-layer technique of median nerve with AxoGen nerve wrap and Clarix patch by Dr. Cid on 8/21/19. She reports that she has needed to use the hand regularly for helping with self cath and placing pressure on the elbow and forearm for wheelchair or walker positioning."  Pain is mild.  She has not yet noticed any improvement in finger sensation.  She reports that she did not have any pain associated with carpal tunnel syndrome prior to surgery, still does not have any pain.  She denies fever, chills, and sweats since the time of the surgery.     Physical exam:    Vitals:    09/04/19 1114   BP: 112/74   Pulse: 60   Weight: 78.5 kg (173 lb)   Height: 5' 4" (1.626 m)   PainSc: 0-No pain     Vital signs are stable, patient is afebrile.  Patient is well dressed and well groomed, no acute distress.  Alert and oriented to person, place, and time.  Post op dressing taken down.  Incision is clean; incision is superficially open/raw in the central aspect of the incision, deep layers are intact. Incision is intact distally and proximally.  There is no erythema or exudate.  There is no sign of any infection. She is NVI- continues to have decreased sensation right median nerve distribution. Sutures removed at the proximal and distal aspects of the incision, sutures remain in place in the central aspect of the incision.      Assessment: 14 days status post Right median nerve neurolysis at the wrist under loupe magnification with Bi-layer technique of median nerve with AxoGen nerve wrap and Clarix patch    Plan:  Niki was seen today for post-op evaluation and post-op evaluation.    Diagnoses and all orders for this visit:    Carpal tunnel syndrome of right wrist    Orthopedic aftercare        - PO instruction reviewed and provided to patient  - New clean dressing and Volar " slab splint applied today  - discussed nerve healing time line  - Follow up next week  - Call with questions or concerns  - per OP note: At 2 weeks' time sutures to be removed and the patient to be in a brace.  She will still be weight protective for four to six weeks.

## 2019-09-11 ENCOUNTER — OFFICE VISIT (OUTPATIENT)
Dept: ORTHOPEDICS | Facility: CLINIC | Age: 69
End: 2019-09-11
Payer: MEDICARE

## 2019-09-11 VITALS
SYSTOLIC BLOOD PRESSURE: 131 MMHG | HEIGHT: 64 IN | WEIGHT: 173 LBS | DIASTOLIC BLOOD PRESSURE: 81 MMHG | BODY MASS INDEX: 29.53 KG/M2 | HEART RATE: 59 BPM

## 2019-09-11 DIAGNOSIS — G56.01 CARPAL TUNNEL SYNDROME OF RIGHT WRIST: Primary | ICD-10-CM

## 2019-09-11 DIAGNOSIS — Z47.89 ORTHOPEDIC AFTERCARE: ICD-10-CM

## 2019-09-11 PROCEDURE — 99024 PR POST-OP FOLLOW-UP VISIT: ICD-10-PCS | Mod: S$GLB,,, | Performed by: PHYSICIAN ASSISTANT

## 2019-09-11 PROCEDURE — 99999 PR PBB SHADOW E&M-EST. PATIENT-LVL IV: ICD-10-PCS | Mod: PBBFAC,,, | Performed by: PHYSICIAN ASSISTANT

## 2019-09-11 PROCEDURE — 99999 PR PBB SHADOW E&M-EST. PATIENT-LVL IV: CPT | Mod: PBBFAC,,, | Performed by: PHYSICIAN ASSISTANT

## 2019-09-11 PROCEDURE — 99024 POSTOP FOLLOW-UP VISIT: CPT | Mod: S$GLB,,, | Performed by: PHYSICIAN ASSISTANT

## 2019-09-11 NOTE — PROGRESS NOTES
"Ms. Soriano is here today for a post-operative visit.  She is 21 days status post Right median nerve neurolysis at the wrist under loupe magnification with Bi-layer technique of median nerve with AxoGen nerve wrap and Clarix patch by Dr. Cid on 8/21/19. She reports that she has needed to use the hand regularly for helping with self cath and placing pressure on the elbow and forearm for wheelchair or walker positioning."  Pain is mild, 2/10.  She has not yet noticed any improvement in finger sensation.  She reports that she did not have any pain associated with carpal tunnel syndrome prior to surgery, still does not have any pain.  She denies fever, chills, and sweats since the time of the surgery.     Physical exam:    Vitals:    09/11/19 0954   BP: 131/81   Pulse: (!) 59   Weight: 78.5 kg (173 lb)   Height: 5' 4" (1.626 m)   PainSc:   2     Vital signs are stable, patient is afebrile.  Patient is well dressed and well groomed, no acute distress.  Alert and oriented to person, place, and time.  Splint and dressing taken down.  Incision is clean; incision is superficially open/raw in the central aspect of the incision, deep layers are intact - improved slightly from prior visit. Incision is intact distally and proximally.  There is no erythema or purulent drainage.  There is no sign of any infection. She is NVI- continues to have decreased sensation right median nerve distribution. Remaining sutures removed today, incision remains intact deeply.      Assessment: 21 days status post Right median nerve neurolysis at the wrist under loupe magnification with Bi-layer technique of median nerve with AxoGen nerve wrap and Clarix patch    Plan:  Niki was seen today for pain.    Diagnoses and all orders for this visit:    Carpal tunnel syndrome of right wrist    Orthopedic aftercare        - PO instruction reviewed and provided to patient  - Wrist brace provided (15 minutes spent preparing, fitting, and educating on " brace)  - Discussed incision care  - again discussed nerve healing time line  - Follow up next week  - Call with questions or concerns  - per OP note: At 2 weeks' time sutures to be removed and the patient to be in a brace.  She will still be weight protective for four to six weeks.

## 2019-09-24 ENCOUNTER — CLINICAL SUPPORT (OUTPATIENT)
Dept: REHABILITATION | Facility: HOSPITAL | Age: 69
End: 2019-09-24
Payer: MEDICARE

## 2019-09-24 DIAGNOSIS — R29.898 DECREASED GRIP STRENGTH: ICD-10-CM

## 2019-09-24 PROCEDURE — 97110 THERAPEUTIC EXERCISES: CPT

## 2019-09-24 PROCEDURE — 97165 OT EVAL LOW COMPLEX 30 MIN: CPT

## 2019-09-24 PROCEDURE — G8985 CARRY GOAL STATUS: HCPCS | Mod: CJ

## 2019-09-24 PROCEDURE — G8984 CARRY CURRENT STATUS: HCPCS | Mod: CK

## 2019-09-24 NOTE — PLAN OF CARE
"  [facility] Occupational Therapy  Initial Evaluation     Name: Niki Soriano  [facility] Number: [id number]    Therapy Diagnosis:   Encounter Diagnosis   Name Primary?    Decreased  strength      Physician: Riya Villa PA    Physician Orders:   status post Right median nerve neurolysis at the wrist under loupe magnification with Bi-layer technique of median nerve with AxoGen nerve wrap and Clarix patch Eval and Treat, modalities as needed 1-2 times/week for 6+ weeks     Medical Diagnosis: G56.01 (ICD-10-CM) - Carpal tunnel syndrome of right wrist  Surgical Procedure and Date: DATE OF PROCEDURE:  08/21/2019.     status post Right median nerve neurolysis at the wrist under loupe magnification with Bi-layer technique of median nerve with AxoGen nerve wrap and Clarix patch Eval and Treat, modalities as needed 1-2 times/week for 6+ weeks       Evaluation Date: 9/24/2019  Insurance: [employer]  Insurance Authorization period Expiration: 10/08/2019     Plan of Care Certification Period: 9/24/2019 to 11/24/2019    Visit # / Visits Authortized: 1 / 6     Time In:10:00 AM  Time Out: 11:00 AM  Total Billable Time: 60 minutes  Charges: 1 low eval, 1 TE    Precautions: Diabetes    Subjective     Involved Side: right  Dominant Side: Right  Date of Onset: Pt reports that she began having num bness in her fingers approximately 2 years ago  Mechanism of Injury: incidious  History of Current Condition: Pt reports that she had a CTR over 20 years ago and did not have any issues until 2 years ago.  Imaging: X-ray Hand 7/17/2019      Impression       1. Mild osteoarthritis  2. High density focus adjacent to the left 4th DIP joint could reflect fragmented osteophyte, soft tissue calcification or small foreign body, age indeterminate.       Previous Therapy: yes    Patient's Goals for Therapy: "I don't know what to expect." "I would like to decrease the numbness."    Pain:  Functional Pain Scale Rating 0-10: "   0/10 on average  0/10 at best  1/10 at worst  Location: right wrist  Description: stinging  Aggravating Factors: wearing brace with bandaid  Easing Factors: taking brace off    Occupation:  retired  Working presently: home-maker  Duties: household chores    Functional Limitations/Social History:    Previous functional status includes: Independent with all ADLs.     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    Niki Soriano  has a past surgical history that includes Hysterectomy; pr removal of ovary/tube(s);  section; Oophorectomy; Cystoscopy; ischemic colitis; and Carpal tunnel release (Right, 2019).    Niki has a current medication list which includes the following prescription(s): atenolol, atorvastatin, baclofen, darifenacin, duloxetine, estradiol, gabapentin, hydrocodone-acetaminophen, levothyroxine sodium, lisinopril, metformin, ranitidine, and triamterene-hydrochlorothiazide 37.5-25 mg.    Review of patient's allergies indicates:   Allergen Reactions    Augmentin [amoxicillin-pot clavulanate] Other (See Comments)     Patient cannot recall what she had, only that she could not tolerate the Augmentin    Tramadol Itching    Tegretol [carbamazepine] Itching and Rash    Vicodin [hydrocodone-acetaminophen] Itching and Anxiety          Objective   Observation/Appearance:  Deformities noted: Arthritic nodules  in PIPs and DIPs and small wound opening over incision site approximately 1 cm by 1 cm over mid volar wrist crease.    Edema. Measured in centimeters.   9/24/2019 9/24/2019    Left Right   Proximal Wrist Crease 16 17.5   Mid palm 19 19.5   MCPs 19.5 19.5     Hand ROM:  WFL    Wrist ROM  Date 9/24/2019 9/24/2019    Left Right   Wrist ext/flex 60/55 45/30   Wrist RD/UD 15/25 10/15       Sensation  Median Nerve Distribution 9/24/2019 9/24/2019    Left Right   [place] Kenneth     Normal 1.65-2.83     Diminished Light Touch 3.22-3.61 X median and ulnar X ulnar   Diminished Protective 3.84-4.31     Loss of Protective 4.56-6.65     Untestable >6.65  X median      Strength (Dyanmometer) and Pinch Strength (Pinch Gauge)  Measured in pdeounds and psi. Average of three trials.   9/24/2019 9/24/2019    Left Right   Rung II def def   Vanegas Pinch def def   3pt Pinch def def   2pt Pinch def def           CMS Impairment/Limitation/Restriction for FOTO Hand Survey    Therapist reviewed FOTO scores for Niki Soriano on 9/24/2019.   FOTO documents entered into [employer] - see Media section.    Limitation Score: 50%  Category: Carrying    Current : CK = at least 40% but < 60% impaired, limited or restricted  Goal: CJ = at least 20% but < 40% impaired, limited or restricted           Treatment     Treatment Time In: 10:00 AM  Treatment Time Out: 11:00 AM  Total Treatment time separate from Evaluation time:10 minutes    Niki received the following supervised modalities after being cleared for contradictions for 15 minutes:   -Patient received MH x 15 min to right hand to increase blood flow, circulation and tissue elasticity prior to therex    Niki received the following manual therapy techniques for 0 minutes:   -NT    Niki received therapeutic exercises for 10 minutes including:  -AROM as follows: TGEs, finger ext, abd/add, wrist ext/flex and RD/UD x 10 reps    Niki participated in dynamic functional therapeutic activities to improve  functional performance for 0  minutes, including:  -NT    Home Exercise Program/Education:  Issued HEP (see patient instructions in EMR) and educated on modality use for pain management . Exercises were reviewed and Niki was able to demonstrate them prior to the end of the session.   Pt received a written copy of exercises to perform at home. Niki demonstrated good  understanding of the education provided.  Pt was advised to perform these exercises free of pain, and to stop performing them if pain occurs.    Patient/Family Education: role of OT, goals for OT, scheduling/cancellations - pt verbalized understanding. Discussed insurance limitations with patient.    Additional Education provided: wound care, wash with antibacterial soap,  sterile guaze then wrap with Coban    Assessment     Niki Soriano is a 69 y.o. female referred to outpatient occupational/hand therapy and presents with a medical diagnosis of right carpal tunnel syndrome, resulting in range of motion deficit, edema, decreased functional use and demonstrates limitations as described in the chart below. Following a brief medical record review it is determined that pt will benefit from occupational therapy services in order to maximize pain free and/or functional use of right hand.     The patient's rehab potential is Good.     Anticipated barriers to occupational therapy: DM  Pt has no cultural, educational or language barriers to learning provided.    Profile and History Assessment of Occupational Performance Level of Clinical Decision Making Complexity Score   Occupational Profile:   Niki Soriano is a 69 y.o. 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 Niki Soriano has difficulty with  grooming and dressing  housework/household chores  affecting his/her daily functional abilities. His/her main goal for therapy is to decrease numbness in her hand.     Comorbidities:    has a past medical history of Diabetes mellitus,  Hypercholesteremia, Hypertension, IC (interstitial cystitis), Numbness in both hands, Spondylisthesis, and Vulvodynia, unspecified.        Medical and Therapy History Review:   Brief               Performance Deficits    Physical:  Joint Mobility  Muscle Power/Strength  Muscle Endurance  Skin Integrity/Scar Formation  Edema   Strength  Pinch Strength    Cognitive:  No Deficits    Psychosocial:    No Deficits     Clinical Decision Making:  low    Assessment Process:  Problem-Focused Assessments    Modification/Need for Assistance:  Not Necessary    Intervention Selection:  Limited Treatment Options       low  Based on PMHX, co morbidities , data from assessments and functional level of assistance required with task and clinical presentation directly impacting function.       The following goals were discussed with the patient and patient is in agreement with them as to be addressed in the treatment plan.     Goals:   Short Term (4 weeks on 10/24/2019):  1)   Patient to be IND with HEP and modalities for pain management  2)   Increase ROM 10 to 15 degrees for wrist ext/flex  to increase functional hand use for grasping  3)   Measure /pinch at 6 weeks post op.  4)   Decrease edema .2-.3 cm to increase joint mobility /flexibility for improved overall functional hand use.   5) Pt's wound will have full wound closure.    Long Term (by discharge):    1)   Patient to score at CJ on FOTO to demonstrate improved perception of functional right hand use.  2)   Pt will return to prior level of function for ADLs and household management.       Plan   Certification Period/Plan of care expiration: 9/24/2019 to 11/24/2019.    Outpatient Occupational Therapy 1 times weekly for 8 weeks may include the following interventions: Paraffin, Fluidotherapy, Modalities for pain management, US 3 mhz, Therapeutic exercises/activities., Strengthening, Edema Control, Scar Management and Wound Care.      Jennifer Peguero, OT

## 2019-09-24 NOTE — PATIENT INSTRUCTIONS
"[facility] - OCCUPATIONAL THERAPY  HOME EXERCISE PROGRAM     Complete the following massages for 2 minutes each, 2 x/day.                       Complete the following exercises with 10 repetitions each, 4 x/day.       AROM: Isolated MCP Flexion / Extension ("Wave")   Bend only your large, bottom knuckles. Hold 3 seconds. Keep the tips of your fingers straight. Straighten fingers.    AROM: Isolated IPJ Flexion / Extension ("Hook")  Bend only your middle and end knuckles. Hold 3 seconds.   Straighten your fingers.     AROM: MCP and PIP Flexion / Extension ("Straight Fist")  Bend your bottom and middle knuckles, keeping the tips of your fingers straight. Try to touch the pads of your fingers on your palm. Hold 3 seconds. Straighten your fingers.     AROM: Composite Flexion / Extension ("Full Fist")  Bend every joint in your hand into a fist. Hold 3 seconds. Straighten your fingers.     AROM: Composte Extension ("Finger Lifts")  Lift your finger off of the table one at a time. Hold 3 seconds. Relax your finger.    AROM: Abduction / Adduction  With hand flat on table, spread all fingers apart, then bring them together as close as possible.        AROM: Wrist Flexion / Extension               Bend your wrist forward and back as far as possible.      AROM: Wrist Radial / Ulnar Deviation  Bend your wrist from side to side as far as possible.    Copyright © [employer]. All rights reserved.     Therapist: ENMANUEL Whitaker CHT       "

## 2019-09-26 ENCOUNTER — OFFICE VISIT (OUTPATIENT)
Dept: ORTHOPEDICS | Facility: CLINIC | Age: 69
End: 2019-09-26
Payer: MEDICARE

## 2019-09-26 VITALS
DIASTOLIC BLOOD PRESSURE: 66 MMHG | HEIGHT: 64 IN | SYSTOLIC BLOOD PRESSURE: 100 MMHG | BODY MASS INDEX: 29.53 KG/M2 | WEIGHT: 173 LBS | HEART RATE: 77 BPM

## 2019-09-26 DIAGNOSIS — Z47.89 ORTHOPEDIC AFTERCARE: ICD-10-CM

## 2019-09-26 DIAGNOSIS — G56.01 CARPAL TUNNEL SYNDROME OF RIGHT WRIST: Primary | ICD-10-CM

## 2019-09-26 PROCEDURE — 99999 PR PBB SHADOW E&M-EST. PATIENT-LVL IV: CPT | Mod: PBBFAC,,, | Performed by: PHYSICIAN ASSISTANT

## 2019-09-26 PROCEDURE — 99024 POSTOP FOLLOW-UP VISIT: CPT | Mod: S$GLB,,, | Performed by: PHYSICIAN ASSISTANT

## 2019-09-26 PROCEDURE — 99999 PR PBB SHADOW E&M-EST. PATIENT-LVL IV: ICD-10-PCS | Mod: PBBFAC,,, | Performed by: PHYSICIAN ASSISTANT

## 2019-09-26 PROCEDURE — 99024 PR POST-OP FOLLOW-UP VISIT: ICD-10-PCS | Mod: S$GLB,,, | Performed by: PHYSICIAN ASSISTANT

## 2019-09-26 NOTE — PROGRESS NOTES
"Ms. Soriano is here today for a post-operative visit.  She is 36 days status post Right median nerve neurolysis at the wrist under loupe magnification with Bi-layer technique of median nerve with AxoGen nerve wrap and Clarix patch by Dr. Cid on 8/21/19. She reports that she has needed to use the hand regularly for helping with self cath."  Pain is mild, 2/10.  She has not yet noticed any improvement in finger sensation, but does have improvement in long finger pain and motion.  She has left carpal tunnel symptoms as well, owns a wrist brace but does not use it.  She reports that her left hand numbness is worse at night.  She denies fever, chills, and sweats since the time of the surgery.     Physical exam:    Vitals:    09/26/19 1048   BP: 100/66   Pulse: 77   Weight: 78.5 kg (173 lb)   Height: 5' 4" (1.626 m)   PainSc:   3     Vital signs are stable, patient is afebrile.  Patient is well dressed and well groomed, no acute distress.  Alert and oriented to person, place, and time.  Dressing taken down.  Incision is clean; incision has a small scab in the central aspect of the incision. Incision is intact distally and proximally.  There is no erythema or purulent drainage. There is mild-moderate edema  There is no sign of any infection. She is NVI- continues to have decreased sensation right median nerve distribution.       Assessment: 36 days status post Right median nerve neurolysis at the wrist under loupe magnification with Bi-layer technique of median nerve with AxoGen nerve wrap and Clarix patch    Plan:  Niki was seen today for post-op evaluation.    Diagnoses and all orders for this visit:    Carpal tunnel syndrome of right wrist    Orthopedic aftercare          - Wrist brace as needed  - Discussed wound care  - again discussed nerve healing time line  - Night time bracing for left wrist  - Follow up in 6 weeks, sooner if needed  - OT as scheduled  - Call with questions or concerns  - per OP note: At 2 " weeks' time sutures to be removed and the patient to be in a brace.  She will still be weight protective for four to six weeks.

## 2019-09-28 DIAGNOSIS — M54.17 THORACIC AND LUMBOSACRAL NEURITIS: ICD-10-CM

## 2019-09-28 DIAGNOSIS — M54.41 CHRONIC BILATERAL LOW BACK PAIN WITH RIGHT-SIDED SCIATICA: ICD-10-CM

## 2019-09-28 DIAGNOSIS — G89.29 CHRONIC BILATERAL LOW BACK PAIN WITH RIGHT-SIDED SCIATICA: ICD-10-CM

## 2019-09-28 DIAGNOSIS — M51.37 DDD (DEGENERATIVE DISC DISEASE), LUMBOSACRAL: ICD-10-CM

## 2019-09-28 DIAGNOSIS — M54.14 THORACIC AND LUMBOSACRAL NEURITIS: ICD-10-CM

## 2019-09-28 DIAGNOSIS — M43.10 ACQUIRED SPONDYLOLISTHESIS: ICD-10-CM

## 2019-09-28 DIAGNOSIS — M48.061 FORAMINAL STENOSIS OF LUMBAR REGION: ICD-10-CM

## 2019-09-28 DIAGNOSIS — M48.061 SPINAL STENOSIS OF LUMBAR REGION WITHOUT NEUROGENIC CLAUDICATION: ICD-10-CM

## 2019-09-28 DIAGNOSIS — M47.26 OTHER SPONDYLOSIS WITH RADICULOPATHY, LUMBAR REGION: ICD-10-CM

## 2019-09-28 DIAGNOSIS — G89.29 OTHER CHRONIC PAIN: ICD-10-CM

## 2019-09-30 RX ORDER — GABAPENTIN 300 MG/1
CAPSULE ORAL
Qty: 90 CAPSULE | Refills: 0 | Status: SHIPPED | OUTPATIENT
Start: 2019-09-30 | End: 2020-01-06

## 2019-09-30 NOTE — TELEPHONE ENCOUNTER
Patient not seen in over a year. I gave her a one month supply of gabapentin. She needs to get from PCP from now on or be seen for recheck in clinic. Please let her know.

## 2019-10-01 ENCOUNTER — CLINICAL SUPPORT (OUTPATIENT)
Dept: REHABILITATION | Facility: HOSPITAL | Age: 69
End: 2019-10-01
Payer: MEDICARE

## 2019-10-01 DIAGNOSIS — R29.898 DECREASED GRIP STRENGTH: ICD-10-CM

## 2019-10-01 PROCEDURE — 97530 THERAPEUTIC ACTIVITIES: CPT

## 2019-10-01 NOTE — PROGRESS NOTES
Occupational Therapy Daily Treatment Note     Date: 10/1/2019  Name: Niki Soriano  [facility] Number: [id number]    Therapy Diagnosis:   Encounter Diagnosis   Name Primary?    Decreased  strength      Physician: Riya Villa PA    Physician Orders:   status post Right median nerve neurolysis at the wrist under loupe magnification with Bi-layer technique of median nerve with AxoGen nerve wrap and Clarix patch Eval and Treat, modalities as needed 1-2 times/week for 6+ weeks      Medical Diagnosis: G56.01 (ICD-10-CM) - Carpal tunnel syndrome of right wrist  Surgical Procedure and Date: DATE OF PROCEDURE:  08/21/2019.     status post Right median nerve neurolysis at the wrist under loupe magnification with Bi-layer technique of median nerve with AxoGen nerve wrap and Clarix patch Eval and Treat, modalities as needed 1-2 times/week for 6+ weeks         Evaluation Date: 9/24/2019  Insurance: [employer]  Insurance Authorization period Expiration: 10/08/2019      Plan of Care Certification Period: 9/24/2019 to 11/24/2019     Visit # / Visits Authortized: 2 / 6      Time In:12:00 PM  Time Out: 12:45 PM  Total Billable Time: 30 minutes  Charges: 2 TA     Precautions: Diabetes      Subjective     Pt reports: she was compliant with home exercise program given last session.   Response to previous treatment: wound is healing  Functional change: none noted    Pain: 1/10  Location: right wrist      Objective   Observation/Appearance:  Deformities noted: Arthritic nodules in PIPs and DIPs and small scab noted over incision site approximately 1 cm by 1 cm over mid volar wrist crease.     Edema. Measured in centimeters.    9/24/2019 9/24/2019     Left Right   Proximal Wrist Crease 16 17.5   Mid palm 19 19.5   MCPs 19.5 19.5      Hand ROM:  WFL     Wrist ROM  Date 9/24/2019 9/24/2019     Left Right   Wrist ext/flex 60/55 45/30   Wrist RD/UD 15/25 10/15         Sensation  Median Nerve Distribution  9/24/2019 9/24/2019     Left Right   [place] Kenneth       Normal 1.65-2.83       Diminished Light Touch 3.22-3.61 X median and ulnar X ulnar   Diminished Protective 3.84-4.31       Loss of Protective 4.56-6.65       Untestable >6.65   X median       Strength (Dyanmometer) and Pinch Strength (Pinch Gauge)  Measured in pdeounds and psi. Average of three trials.    9/24/2019 9/24/2019     Left Right   Rung II def def   Vanegas Pinch def def   3pt Pinch def def   2pt Pinch def def       Niki received the following supervised modalities after being cleared for contradictions for 15 minutes:   -Patient received MH x 15 min to right hand to increase blood flow, circulation and tissue elasticity prior to therex     Niki received the following manual therapy techniques for 5 minutes:   -STM to right wrist     Niki participated in dynamic functional therapeutic activities to improve functional performance for 25 minutes, including:  -AROM as follows: TGEs, finger abd/add, finger ext, pinky slides, wrist ext/flex and RD/UD x 10 reps  -towel scrunches x 5 reps  -hand gripper with 2 yellow bands x 30 reps  -3 containers small pom poms nesting        Home Exercises and Education Provided     Education provided:   - none today  - Progress towards goals: Excellent    Written Home Exercises Provided: Patient instructed to cont prior HEP.  Exercises were reviewed and Niki was able to demonstrate them prior to the end of the session.  Niki demonstrated good  understanding of the education provided.     See EMR under Patient Instructions for exercises provided prior visit.     Niki demonstrated good  understanding of the education provided.         Assessment   Niki Soriano is a 69 y.o. female referred to outpatient occupational/hand therapy and presents with a medical diagnosis of right carpal tunnel syndrome, resulting in range of motion deficit, edema and decreased functional use of right hand. Advanced functional  activities wit mild difficulty. Wound is healing.    Pt would continue to benefit from skilled OT.      Niki is progressing well towards her goals and there are no updates to goals at this time. Pt prognosis is Excellent.     Pt will continue to benefit from skilled outpatient occupational therapy to address the deficits listed in the problem list on initial evaluation provide pt/family education and to maximize pt's level of independence in the home and community environment.     Anticipated barriers to occupational therapy: DM    Pt's spiritual, cultural and educational needs considered and pt agreeable to plan of care and goals.    Goals:  Short Term (4 weeks on 10/24/2019):  1)   Patient to be IND with HEP and modalities for pain management. Met 10/1/2019  2)   Increase ROM 10 to 15 degrees for wrist ext/flex  to increase functional hand use for grasping. -progressing  3)   Measure /pinch at 6 weeks post op.- progressing  4)   Decrease edema .2-.3 cm to increase joint mobility /flexibility for improved overall functional hand use. -progressing  5) Pt's wound will have full wound closure.-progressing     Long Term (by discharge):     1)   Patient to score at  on FOTO to demonstrate improved perception of functional right hand use.-progressing  2)   Pt will return to prior level of function for ADLs and household management.-progressing         Plan   Certification Period/Plan of care expiration: 9/24/2019 to 11/24/2019.     Outpatient Occupational Therapy 1 times weekly for 8 weeks may include the following interventions: Paraffin, Fluidotherapy, Modalities for pain management, US 3 mhz, Therapeutic exercises/activities., Strengthening, Edema Control, Scar Management and Wound Care.    Discussed Plan of Care with patient: Yes  Updates/Grading for next session: Measure /pinch next session.      Jennifer Peguero, OT

## 2019-10-08 ENCOUNTER — CLINICAL SUPPORT (OUTPATIENT)
Dept: REHABILITATION | Facility: HOSPITAL | Age: 69
End: 2019-10-08
Payer: MEDICARE

## 2019-10-08 DIAGNOSIS — R29.898 DECREASED GRIP STRENGTH: ICD-10-CM

## 2019-10-08 PROCEDURE — 97530 THERAPEUTIC ACTIVITIES: CPT

## 2019-10-08 NOTE — PROGRESS NOTES
Occupational Therapy Daily Treatment Note     Date: 10/8/2019  Name: Niki Soriano  [facility] Number: [id number]    Therapy Diagnosis:   Encounter Diagnosis   Name Primary?    Decreased  strength      Physician: Riya Villa PA    Physician Orders:   status post Right median nerve neurolysis at the wrist under loupe magnification with Bi-layer technique of median nerve with AxoGen nerve wrap and Clarix patch Eval and Treat, modalities as needed 1-2 times/week for 6+ weeks      Medical Diagnosis: G56.01 (ICD-10-CM) - Carpal tunnel syndrome of right wrist  Surgical Procedure and Date: DATE OF PROCEDURE:  08/21/2019.     status post Right median nerve neurolysis at the wrist under loupe magnification with Bi-layer technique of median nerve with AxoGen nerve wrap and Clarix patch Eval and Treat, modalities as needed 1-2 times/week for 6+ weeks         Evaluation Date: 9/24/2019  Insurance: [employer]  Insurance Authorization period Expiration: 10/08/2019      Plan of Care Certification Period: 9/24/2019 to 11/24/2019     Visit # / Visits Authortized: 3 / 6      Time In:10:00 AM  Time Out: 10:45 PM  Total Billable Time: 30 minutes  Charges: 2 TA     Precautions: Diabetes      Subjective     Pt reports: she was compliant with home exercise program given last session.   Response to previous treatment: wound is healing  Functional change: none noted    Pain: 1/10  Location: right wrist      Objective   Observation/Appearance:  Deformities noted: Arthritic nodules in PIPs and DIPs and small scab noted over incision site approximately .5 cm by .5 cm over mid volar wrist crease.     Edema. Measured in centimeters.    9/24/2019 9/24/2019     Left Right   Proximal Wrist Crease 16 17.5   Mid palm 19 19.5   MCPs 19.5 19.5      Hand ROM:  WFL     Wrist ROM  Date 9/24/2019 9/24/2019     Left Right   Wrist ext/flex 60/55 45/30   Wrist RD/UD 15/25 10/15         Sensation  Median Nerve Distribution  9/24/2019 9/24/2019     Left Right   [place] Kenneth       Normal 1.65-2.83       Diminished Light Touch 3.22-3.61 X median and ulnar X ulnar   Diminished Protective 3.84-4.31       Loss of Protective 4.56-6.65       Untestable >6.65   X median       Strength (Dyanmometer) and Pinch Strength (Pinch Gauge)  Measured in pdeounds and psi. Average of three trials.    9/24/2019 9/24/2019     Left Right   Rung II def def   Vanegas Pinch def def   3pt Pinch def def   2pt Pinch def def       Niki received the following supervised modalities after being cleared for contradictions for 15 minutes:   -Patient received MH x 15 min to right hand to increase blood flow, circulation and tissue elasticity prior to therex     Niki received the following manual therapy techniques for 5 minutes:   -STM to right wrist     Niki participated in dynamic functional therapeutic activities to improve functional performance for 25 minutes, including:  -AROM as follows: TGEs, finger abd/add, finger ext, pinky slides, wrist ext/flex and RD/UD x 10 reps  -towel scrunches x 5 reps  -hand gripper with 2 yellow bands x 30 reps  -3 containers small pom poms nesting        Home Exercises and Education Provided     Education provided:   - none today  - Progress towards goals: Excellent    Written Home Exercises Provided: Patient instructed to cont prior HEP.  Exercises were reviewed and Niki was able to demonstrate them prior to the end of the session.  Niki demonstrated good  understanding of the education provided.     See EMR under Patient Instructions for exercises provided prior visit.     Niki demonstrated good  understanding of the education provided.         Assessment   Niki Soriano is a 69 y.o. female referred to outpatient occupational/hand therapy and presents with a medical diagnosis of right carpal tunnel syndrome, resulting in range of motion deficit, edema and decreased functional use of right hand. Continued functional  activities wit mild difficulty. Wound is healing.    Pt would continue to benefit from skilled OT.      Niki is progressing well towards her goals and there are no updates to goals at this time. Pt prognosis is Excellent.     Pt will continue to benefit from skilled outpatient occupational therapy to address the deficits listed in the problem list on initial evaluation provide pt/family education and to maximize pt's level of independence in the home and community environment.     Anticipated barriers to occupational therapy: DM    Pt's spiritual, cultural and educational needs considered and pt agreeable to plan of care and goals.    Goals:  Short Term (4 weeks on 10/24/2019):  1)   Patient to be IND with HEP and modalities for pain management. Met 10/1/2019  2)   Increase ROM 10 to 15 degrees for wrist ext/flex  to increase functional hand use for grasping. -progressing  3)   Measure /pinch at 6 weeks post op.- progressing  4)   Decrease edema .2-.3 cm to increase joint mobility /flexibility for improved overall functional hand use. -progressing  5) Pt's wound will have full wound closure.-progressing     Long Term (by discharge):     1)   Patient to score at  on FOTO to demonstrate improved perception of functional right hand use.-progressing  2)   Pt will return to prior level of function for ADLs and household management.-progressing         Plan   Certification Period/Plan of care expiration: 9/24/2019 to 11/24/2019.     Outpatient Occupational Therapy 1 times weekly for 8 weeks may include the following interventions: Paraffin, Fluidotherapy, Modalities for pain management, US 3 mhz, Therapeutic exercises/activities., Strengthening, Edema Control, Scar Management and Wound Care.    Discussed Plan of Care with patient: Yes  Updates/Grading for next session: Measure /pinch next session.      Jennifer Peguero, OT

## 2019-10-16 ENCOUNTER — OFFICE VISIT (OUTPATIENT)
Dept: UROLOGY | Facility: CLINIC | Age: 69
End: 2019-10-16
Payer: MEDICARE

## 2019-10-16 VITALS
DIASTOLIC BLOOD PRESSURE: 72 MMHG | SYSTOLIC BLOOD PRESSURE: 120 MMHG | BODY MASS INDEX: 29.53 KG/M2 | WEIGHT: 173 LBS | HEIGHT: 64 IN | HEART RATE: 58 BPM

## 2019-10-16 DIAGNOSIS — N30.10 IC (INTERSTITIAL CYSTITIS): ICD-10-CM

## 2019-10-16 DIAGNOSIS — R39.15 URINARY URGENCY: ICD-10-CM

## 2019-10-16 DIAGNOSIS — N39.0 RECURRENT UTI: ICD-10-CM

## 2019-10-16 DIAGNOSIS — N39.41 URGE INCONTINENCE: ICD-10-CM

## 2019-10-16 DIAGNOSIS — N30.90 BLADDER INFECTION: Primary | ICD-10-CM

## 2019-10-16 DIAGNOSIS — R33.9 INCOMPLETE EMPTYING OF BLADDER: ICD-10-CM

## 2019-10-16 PROBLEM — B96.1 KLEBSIELLA CYSTITIS: Status: RESOLVED | Noted: 2018-09-25 | Resolved: 2019-10-16

## 2019-10-16 PROBLEM — B96.20 E-COLI UTI: Status: RESOLVED | Noted: 2018-07-06 | Resolved: 2019-10-16

## 2019-10-16 PROCEDURE — 87088 URINE BACTERIA CULTURE: CPT

## 2019-10-16 PROCEDURE — 87186 SC STD MICRODIL/AGAR DIL: CPT

## 2019-10-16 PROCEDURE — 51701 PR INSERTION OF NON-INDWELLING BLADDER CATHETERIZATION FOR RESIDUAL UR: ICD-10-PCS | Mod: S$GLB,,, | Performed by: UROLOGY

## 2019-10-16 PROCEDURE — 1101F PR PT FALLS ASSESS DOC 0-1 FALLS W/OUT INJ PAST YR: ICD-10-PCS | Mod: CPTII,S$GLB,, | Performed by: UROLOGY

## 2019-10-16 PROCEDURE — 87077 CULTURE AEROBIC IDENTIFY: CPT

## 2019-10-16 PROCEDURE — 99213 PR OFFICE/OUTPT VISIT, EST, LEVL III, 20-29 MIN: ICD-10-PCS | Mod: 25,S$GLB,, | Performed by: UROLOGY

## 2019-10-16 PROCEDURE — 81002 PR URINALYSIS NONAUTO W/O SCOPE: ICD-10-PCS | Mod: S$GLB,,, | Performed by: UROLOGY

## 2019-10-16 PROCEDURE — 87086 URINE CULTURE/COLONY COUNT: CPT

## 2019-10-16 PROCEDURE — 99999 PR PBB SHADOW E&M-EST. PATIENT-LVL IV: ICD-10-PCS | Mod: PBBFAC,,, | Performed by: UROLOGY

## 2019-10-16 PROCEDURE — 99213 OFFICE O/P EST LOW 20 MIN: CPT | Mod: 25,S$GLB,, | Performed by: UROLOGY

## 2019-10-16 PROCEDURE — 99999 PR PBB SHADOW E&M-EST. PATIENT-LVL IV: CPT | Mod: PBBFAC,,, | Performed by: UROLOGY

## 2019-10-16 PROCEDURE — 1101F PT FALLS ASSESS-DOCD LE1/YR: CPT | Mod: CPTII,S$GLB,, | Performed by: UROLOGY

## 2019-10-16 PROCEDURE — 51701 INSERT BLADDER CATHETER: CPT | Mod: S$GLB,,, | Performed by: UROLOGY

## 2019-10-16 PROCEDURE — 81002 URINALYSIS NONAUTO W/O SCOPE: CPT | Mod: S$GLB,,, | Performed by: UROLOGY

## 2019-10-16 RX ORDER — DARIFENACIN 7.5 MG/1
7.5 TABLET, EXTENDED RELEASE ORAL 2 TIMES DAILY
Qty: 60 TABLET | Refills: 11 | Status: SHIPPED | OUTPATIENT
Start: 2019-10-16 | End: 2020-02-13

## 2019-10-16 RX ORDER — DOXYCYCLINE 100 MG/1
100 CAPSULE ORAL 2 TIMES DAILY
Qty: 14 CAPSULE | Refills: 0 | Status: SHIPPED | OUTPATIENT
Start: 2019-10-16 | End: 2019-10-23

## 2019-10-16 NOTE — PROGRESS NOTES
CHIEF COMPLAINT:    Mrs. Soriano is a 69 y.o. female presenting for a bladder infection    PRESENTING ILLNESS:    Niki Soriano is a 69 y.o. female who has a history of IC, recurrent UTI's.  She states she noted a malodorous urine and dysuria.  She states that she has taken the Ellura and is applying the Estrace cream.  She states she understood why she had to use it after Googling it and she realized it was to prevent UTI (she has been counselled on this in the past.)  This is her first UTI since she started Ellura and using the cream.     She states that the darifenacin does not work as well as the oxybutynin.  However, it is safer for cognitive function than the oxybutynin.  It is not lasting long enough to cover throughout the night.  She feels that it works during the day.      REVIEW OF SYSTEMS:    Review of Systems   Constitutional: Negative.    HENT: Negative.    Eyes: Negative.    Respiratory: Negative.    Cardiovascular: Negative.    Gastrointestinal: Negative.    Genitourinary:        Incomplete bladder emptying.  Self catheterizes   Musculoskeletal: Positive for back pain.        Uses a wheel chair   Skin: Negative.    Endo/Heme/Allergies: Negative.    Psychiatric/Behavioral: Negative.        PATIENT HISTORY:    Past Medical History:   Diagnosis Date    Diabetes mellitus     Hypercholesteremia     Hypertension     IC (interstitial cystitis)     Numbness in both hands 2017    Spondylisthesis     Vulvodynia, unspecified        Past Surgical History:   Procedure Laterality Date    CARPAL TUNNEL RELEASE Right 2019    Procedure: RELEASE, CARPAL TUNNEL, REVISION;  Surgeon: Annabelle Cid MD;  Location: [facility] OR [address] [place] [address];  Service: Orthopedics;  Laterality: Right;  Axogen, Clarix     SECTION      x3    CYSTOSCOPY      HYSTERECTOMY      ischemic colitis      OOPHORECTOMY      MS REMOVAL OF OVARY/TUBE(S)         Family History   Problem Relation Age of Onset    Breast cancer  Paternal Aunt      Socioeconomic History    Marital status:    Tobacco Use    Smoking status: Never Smoker    Smokeless tobacco: Never Used   Substance and Sexual Activity    Alcohol use: No    Drug use: No    Sexual activity: Yes     Partners: Male       Allergies:  Augmentin [amoxicillin-pot clavulanate]; Tramadol; Tegretol [carbamazepine]; and Vicodin [hydrocodone-acetaminophen]    Medications:  Outpatient Encounter Medications as of 10/16/2019   Medication Sig Dispense Refill    atenolol (TENORMIN) 50 MG tablet every evening.       atorvastatin (LIPITOR) 40 MG tablet Take 40 mg by mouth once daily.  3    baclofen (LIORESAL) 20 MG tablet 2 (two) times daily.       darifenacin (ENABLEX) 7.5 MG Tb24 Take 1 tablet (7.5 mg total) by mouth once daily. 30 tablet 11    duloxetine (CYMBALTA) 30 MG capsule Take 30 mg by mouth once daily.      gabapentin (NEURONTIN) 300 MG capsule TAKE 1 CAPSULE BY MOUTH THREE TIMES A DAY 90 capsule 0    LEVOTHYROXINE SODIUM (LEVOTHYROXINE ORAL) Take 50 mcg by mouth before breakfast.       lisinopril (PRINIVIL,ZESTRIL) 40 MG tablet every evening.       metformin (GLUCOPHAGE) 500 MG tablet Take 500 mg by mouth 2 (two) times daily with meals.      ranitidine (ZANTAC) 150 MG tablet Take 150 mg by mouth once daily.  0    triamterene-hydrochlorothiazide 37.5-25 mg (DYAZIDE) 37.5-25 mg per capsule every morning.       doxycycline (MONODOX) 100 MG capsule Take 1 capsule (100 mg total) by mouth 2 (two) times daily. Use sunscreen or cover and continue 1 week after completed. for 7 days 14 capsule 0    estradiol (ESTRACE) 0.01 % (0.1 mg/gram) vaginal cream Place 1 g vaginally 3 (three) times a week. Place by fingertip application before bedtime three times a week (Monday, Wednesday, Friday) 45 g 3    HYDROcodone-acetaminophen (NORCO)  mg per tablet Take 1 tablet by mouth every 4 (four) hours as needed for Pain. (Patient not taking: Reported on 10/16/2019) 20 tablet 0      No facility-administered encounter medications on file as of 10/16/2019.          PHYSICAL EXAMINATION:    The patient generally appears in good health, is appropriately interactive, and is in no apparent distress.    Skin: No lesions.    Mental: Cooperative with normal affect.    Neuro: Grossly intact.    HEENT: Normal. No evidence of lymphadenopathy.    Chest:  normal inspiratory effort.    Abdomen:  Soft, non-tender. No masses or organomegaly. Bladder is not palpable. No evidence of flank discomfort. No evidence of inguinal hernia.    Extremities: No clubbing, cyanosis, or edema    Normal external female genitalia  Urethral meatus is normal  Urethra and bladder are nontender to bimanual exam  Well supported anteriorly and posteriorly   Uterus and cervix are normal  No adnexal masses  Amount in bladder (she had catheterized earlier) was 40 ml    LABS:    Lab Results   Component Value Date    BUN 28 (H) 07/06/2018    CREATININE 1.0 07/08/2019     UA 1.005, pH 7, ++ leuk, ++ nitrite, tr blood otherwise, negative    IMPRESSION:    Encounter Diagnoses   Name Primary?    Bladder infection Yes    Incomplete emptying of bladder     IC (interstitial cystitis)     Recurrent UTI        PLAN:    1.  The catheterized specimen was sent for culture  2.  Doxycycline sent to preferred pharmacy  3.  Follow up in 6 months.   4.  Continue the Ellura and the use of Estrace cream.  5.  Reordered the darifenacin to bid dosing since it is not lasting long enough.

## 2019-10-18 LAB — BACTERIA UR CULT: ABNORMAL

## 2019-10-21 ENCOUNTER — TELEPHONE (OUTPATIENT)
Dept: UROLOGY | Facility: CLINIC | Age: 69
End: 2019-10-21

## 2019-10-22 ENCOUNTER — CLINICAL SUPPORT (OUTPATIENT)
Dept: REHABILITATION | Facility: HOSPITAL | Age: 69
End: 2019-10-22
Payer: MEDICARE

## 2019-10-22 DIAGNOSIS — R29.898 DECREASED GRIP STRENGTH: ICD-10-CM

## 2019-10-22 PROCEDURE — 97530 THERAPEUTIC ACTIVITIES: CPT

## 2019-10-22 PROCEDURE — 97022 WHIRLPOOL THERAPY: CPT

## 2019-10-22 NOTE — PROGRESS NOTES
Occupational Therapy Discharge Note     Date: 10/22/2019  Name: Niki Soriano  [facility] Number: [id number]    Therapy Diagnosis:   Encounter Diagnosis   Name Primary?    Decreased  strength      Physician: Riya Villa PA    Physician Orders:   status post Right median nerve neurolysis at the wrist under loupe magnification with Bi-layer technique of median nerve with AxoGen nerve wrap and Clarix patch Eval and Treat, modalities as needed 1-2 times/week for 6+ weeks      Medical Diagnosis: G56.01 (ICD-10-CM) - Carpal tunnel syndrome of right wrist  Surgical Procedure and Date: DATE OF PROCEDURE:  08/21/2019.     status post Right median nerve neurolysis at the wrist under loupe magnification with Bi-layer technique of median nerve with AxoGen nerve wrap and Clarix patch Eval and Treat, modalities as needed 1-2 times/week for 6+ weeks         Evaluation Date: 9/24/2019  Insurance: [employer]  Insurance Authorization period Expiration: 10/08/2019      Plan of Care Certification Period: 9/24/2019 to 11/24/2019     Visit # / Visits Authortized: 4/ 6      Time In:10:00 AM  Time Out: 11:00 AM  Total Billable Time: 30 minutes  Charges: 2 TA, fluido     Precautions: Diabetes      Subjective     Pt reports: she was compliant with home exercise program given last session. She states that only her middle finger hurts occasionally. Pt states that she is pleased with her progress and would like to be discharged.   Response to previous treatment: wound is healing  Functional change: Pt reports that she is able scrub her stove and perform all ADLs without difficulty.    Pain: 0/10  Location: right wrist      Objective   Observation/Appearance:  Deformities noted: Arthritic nodules in PIPs and DIPs and small scab noted over incision site approximately .25 cm by .25 cm over mid volar wrist crease.     Edema. Measured in centimeters.    9/24/2019 9/24/2019 10/22/2019     Left Right Right    Proximal Wrist Crease 16 17.5 18   Mid palm 19 19.5 19.7   MCPs 19.5 19.5 20      Hand ROM:  WFL     Wrist ROM  Date   9/24/2019   9/24/2019   10/22/2019     Left Right Right   Wrist ext/flex 60/55 45/30 62/50   Wrist RD/UD 15/25 10/15 20/35         Sensation  Median Nerve Distribution 9/24/2019 9/24/2019 10/22/2019     Left Right Right   [place] Kenneth        Normal 1.65-2.83        Diminished Light Touch 3.22-3.61 X median and ulnar X ulnar X-ulnar   Diminished Protective 3.84-4.31     X- thenar eminence median   Loss of Protective 4.56-6.65     X-thumb and index median   Untestable >6.65   X median        Strength (Dyanmometer) and Pinch Strength (Pinch Gauge)  Measured in pdeounds and psi. Average of three trials.      9/24/2019   9/24/2019   10/22/2019   10/22/2019     Left Right Left Right   Rung II def def 45 47   Vanegas Pinch def def 12 13   3pt Pinch def def 9 9   2pt Pinch def def 9 9       CMS Impairment/Limitation/Restriction for FOTO Hand Survey    Therapist reviewed FOTO scores for Niki Soriano on 10/22/2019.   FOTO documents entered into [employer] - see Media section.    Limitation Score: 28%  Category: Carrying    Current : CJ = at least 20% but < 40% impaired, limited or restricted  Goal: CJ = at least 20% but < 40% impaired, limited or restricted  Discharge: CJ = at least 20% but < 40% impaired, limited or restricted             Niki received the following supervised modalities after being cleared for contradictions for 15 minutes:   -Patient received fluido x 15 min to right hand to increase blood flow, circulation and tissue elasticity prior to therex     Niki received the following manual therapy techniques for 5 minutes:   -STM to right wrist manual and with mini-massager     Niki participated in dynamic functional therapeutic activities to improve functional performance for 25 minutes, including:  -AROM as follows: TGEs, finger abd/add, finger ext, pinky slides, wrist ext/flex and RD/UD x  10 reps  -towel scrunches x 5 reps  -hand gripper with 1 red and  2 yellow bands x 30 reps  -wrist 3 ways with 2 lbs. 2 x 15 reps  -large pom poms with green clip x 3 containers    Issued medium compression glove for edema.    Home Exercises and Education Provided     Education provided:   - reviewed scar massage  - Progress towards goals: Excellent    Written Home Exercises Provided: Patient instructed to cont prior HEP.  Exercises were reviewed and Niki was able to demonstrate them prior to the end of the session.  Niki demonstrated good  understanding of the education provided.     See EMR under Patient Instructions for exercises provided prior visit.     Niki demonstrated good  understanding of the education provided.         Assessment   Niki Soriano is a 69 y.o. female referred to outpatient occupational/hand therapy and presents with a medical diagnosis of right carpal tunnel syndrome, resulting in range of motion deficit, edema and decreased functional use of right hand. Pt has made significant gains in AROM of her wrist. Edema has slightly increased in her right hand so she was issued a compression glove. Sensory testing show improvement in the median nerve.  and pinch measured today and those measurements are WFL for ADL activities. Pt reports no pain in her wrist, but occasional middle finger pain. Pt stated that she would like to be d/c because she is able to perform all of her activities at home without difficulty.    Pt would continue to benefit from skilled OT.      Niki is progressing well towards her goals and there are no updates to goals at this time. Pt prognosis is Excellent.     Pt will continue to benefit from skilled outpatient occupational therapy to address the deficits listed in the problem list on initial evaluation provide pt/family education and to maximize pt's level of independence in the home and community environment.     Anticipated barriers to occupational therapy:  DM    Pt's spiritual, cultural and educational needs considered and pt agreeable to plan of care and goals.    Goals:  Short Term (4 weeks on 10/24/2019):  1)   Patient to be IND with HEP and modalities for pain management. Met 10/1/2019  2)   Increase ROM 10 to 15 degrees for wrist ext/flex  to increase functional hand use for grasping. -Met 10/22/2019  3)   Measure /pinch at 6 weeks post op.- Met 10/22/2019  4)   Decrease edema .2-.3 cm to increase joint mobility /flexibility for improved overall functional hand use. -progressing  5) Pt's wound will have full wound closure.- progressing     Long Term (by discharge):     1)   Patient to score at  on FOTO to demonstrate improved perception of functional right hand use.-progressing  2)   Pt will return to prior level of function for ADLs and household management.- Met 10/22/2019         Plan: Discharge from OT per pt request. Pt is pleased with her progress.   Certification Period/Plan of care expiration: 9/24/2019 to 11/24/2019.     Outpatient Occupational Therapy 1 times weekly for 8 weeks may include the following interventions: Paraffin, Fluidotherapy, Modalities for pain management, US 3 mhz, Therapeutic exercises/activities., Strengthening, Edema Control, Scar Management and Wound Care.    Discussed Plan of Care with patient: Yes  Updates/Grading for next session: Measure /pinch next session.      Jennifer Peguero, OT

## 2019-10-23 PROBLEM — R29.898 DECREASED GRIP STRENGTH: Status: RESOLVED | Noted: 2019-09-24 | Resolved: 2019-10-23

## 2019-10-29 ENCOUNTER — TELEPHONE (OUTPATIENT)
Dept: UROLOGY | Facility: CLINIC | Age: 69
End: 2019-10-29

## 2019-10-29 NOTE — TELEPHONE ENCOUNTER
----- Message from Peggy Manrique MA sent at 10/28/2019 12:47 PM CDT -----  Contact: self/[phone]  PT states she was told to start to take 2 a day and not she is in need of a refill to be sent to [facility] Pt states taking 2 a day left her with enough until Thursday.

## 2019-11-05 ENCOUNTER — TELEPHONE (OUTPATIENT)
Dept: ORTHOPEDICS | Facility: CLINIC | Age: 69
End: 2019-11-05

## 2019-11-07 ENCOUNTER — OFFICE VISIT (OUTPATIENT)
Dept: ORTHOPEDICS | Facility: CLINIC | Age: 69
End: 2019-11-07
Payer: MEDICARE

## 2019-11-07 VITALS
HEART RATE: 92 BPM | DIASTOLIC BLOOD PRESSURE: 65 MMHG | SYSTOLIC BLOOD PRESSURE: 102 MMHG | BODY MASS INDEX: 29.53 KG/M2 | WEIGHT: 173 LBS | HEIGHT: 64 IN

## 2019-11-07 DIAGNOSIS — G56.03 CARPAL TUNNEL SYNDROME, BILATERAL: Primary | ICD-10-CM

## 2019-11-07 DIAGNOSIS — M65.331 TRIGGER MIDDLE FINGER OF RIGHT HAND: ICD-10-CM

## 2019-11-07 PROCEDURE — 99024 POSTOP FOLLOW-UP VISIT: CPT | Mod: S$GLB,,, | Performed by: PHYSICIAN ASSISTANT

## 2019-11-07 PROCEDURE — 99999 PR PBB SHADOW E&M-EST. PATIENT-LVL IV: ICD-10-PCS | Mod: PBBFAC,,, | Performed by: PHYSICIAN ASSISTANT

## 2019-11-07 PROCEDURE — 99999 PR PBB SHADOW E&M-EST. PATIENT-LVL IV: CPT | Mod: PBBFAC,,, | Performed by: PHYSICIAN ASSISTANT

## 2019-11-07 PROCEDURE — 99024 PR POST-OP FOLLOW-UP VISIT: ICD-10-PCS | Mod: S$GLB,,, | Performed by: PHYSICIAN ASSISTANT

## 2019-11-07 NOTE — PROGRESS NOTES
"Ms. Soriano is here today for a post-operative visit.  She is 78 days status post Right median nerve neurolysis at the wrist under loupe magnification with Bi-layer technique of median nerve with AxoGen nerve wrap and Clarix patch by Dr. Cid on 8/21/19. She reports that she has noticed some improvement in her right finger numbness, but continues to experience numbness.  She reports that the right long finger continues to be most bothersome, it seems to occasionally stick in a flexed position.  She has left carpal tunnel symptoms as well, owns a wrist brace but does not regularly use it.  She reports that her left hand numbness is worse at night, but not as severe as the right.  She was attending occupational therapy, has discontinued this but continues to perform HEP.  She denies fever, chills, and sweats since the time of the surgery.     Physical exam:    Vitals:    11/07/19 1010   BP: 102/65   Pulse: 92   Weight: 78.5 kg (173 lb)   Height: 5' 4" (1.626 m)   PainSc:   4     Vital signs are stable, patient is afebrile.  Patient is well dressed and well groomed, no acute distress.  Alert and oriented to person, place, and time.  Incision is healing well-clean dry and intact.  There is no erythema or purulent drainage. There is mild edema, thickening at the incision. She is tender to palpation at the A1 pulley of the right long finger, palpable thickening appreciated. There is no sign of any infection. She is NVI- continues to have decreased sensation right median nerve distribution, but reports that it is improved.  Fair to good finger range of motion bilaterally. No triggering on exam today.       Assessment: 78 days status post Right median nerve neurolysis at the wrist under loupe magnification with Bi-layer technique of median nerve with AxoGen nerve wrap and Clarix patch  Right long finger trigger finger    Plan:  Niki was seen today for post-op evaluation.    Diagnoses and all orders for this " visit:    Carpal tunnel syndrome, bilateral    Trigger middle finger of right hand          - Discussed arthritis and trigger finger signs and symptoms, discussed exam findings.  Discussed conservative and surgical treatment options.  She is not interested in steroid injection or surgery.  - again discussed nerve healing time line  - Night time bracing for left wrist  - Follow up as needed  - Continue HEP  - Call with questions or concerns

## 2019-12-09 DIAGNOSIS — N39.0 RECURRENT UTI: ICD-10-CM

## 2019-12-09 DIAGNOSIS — N95.2 VAGINAL ATROPHY: ICD-10-CM

## 2019-12-09 RX ORDER — ESTRADIOL 0.1 MG/G
CREAM VAGINAL
Qty: 42.5 G | Refills: 3 | Status: SHIPPED | OUTPATIENT
Start: 2019-12-09 | End: 2021-11-11 | Stop reason: SDUPTHER

## 2020-01-06 DIAGNOSIS — M51.37 DDD (DEGENERATIVE DISC DISEASE), LUMBOSACRAL: ICD-10-CM

## 2020-01-06 DIAGNOSIS — M43.10 ACQUIRED SPONDYLOLISTHESIS: ICD-10-CM

## 2020-01-06 DIAGNOSIS — M54.17 THORACIC AND LUMBOSACRAL NEURITIS: ICD-10-CM

## 2020-01-06 DIAGNOSIS — M48.061 FORAMINAL STENOSIS OF LUMBAR REGION: ICD-10-CM

## 2020-01-06 DIAGNOSIS — M54.41 CHRONIC BILATERAL LOW BACK PAIN WITH RIGHT-SIDED SCIATICA: ICD-10-CM

## 2020-01-06 DIAGNOSIS — M54.14 THORACIC AND LUMBOSACRAL NEURITIS: ICD-10-CM

## 2020-01-06 DIAGNOSIS — G89.29 OTHER CHRONIC PAIN: ICD-10-CM

## 2020-01-06 DIAGNOSIS — M47.26 OTHER SPONDYLOSIS WITH RADICULOPATHY, LUMBAR REGION: ICD-10-CM

## 2020-01-06 DIAGNOSIS — M48.061 SPINAL STENOSIS OF LUMBAR REGION WITHOUT NEUROGENIC CLAUDICATION: ICD-10-CM

## 2020-01-06 DIAGNOSIS — G89.29 CHRONIC BILATERAL LOW BACK PAIN WITH RIGHT-SIDED SCIATICA: ICD-10-CM

## 2020-01-06 RX ORDER — GABAPENTIN 300 MG/1
CAPSULE ORAL
Qty: 270 CAPSULE | Refills: 0 | Status: SHIPPED | OUTPATIENT
Start: 2020-01-06 | End: 2020-02-13 | Stop reason: SDUPTHER

## 2020-01-26 ENCOUNTER — HOSPITAL ENCOUNTER (EMERGENCY)
Facility: HOSPITAL | Age: 70
Discharge: HOME | End: 2020-01-26
Attending: EMERGENCY MEDICINE
Payer: MEDICARE

## 2020-01-26 VITALS
SYSTOLIC BLOOD PRESSURE: 131 MMHG | OXYGEN SATURATION: 97 % | HEART RATE: 95 BPM | HEIGHT: 64 IN | WEIGHT: 166 LBS | DIASTOLIC BLOOD PRESSURE: 76 MMHG | TEMPERATURE: 98 F | BODY MASS INDEX: 28.34 KG/M2 | RESPIRATION RATE: 18 BRPM

## 2020-01-26 DIAGNOSIS — G89.29 CHRONIC NECK PAIN: Primary | ICD-10-CM

## 2020-01-26 DIAGNOSIS — M54.2 CHRONIC NECK PAIN: Primary | ICD-10-CM

## 2020-01-26 DIAGNOSIS — M54.2 NECK PAIN: ICD-10-CM

## 2020-01-26 LAB
ALBUMIN SERPL BCP-MCNC: 4.4 G/DL (ref 3.5–5.2)
ALP SERPL-CCNC: 60 U/L (ref 55–135)
ALT SERPL W/O P-5'-P-CCNC: 35 U/L (ref 10–44)
ANION GAP SERPL CALC-SCNC: 13 MMOL/L (ref 8–16)
AST SERPL-CCNC: 48 U/L (ref 10–40)
BACTERIA #/AREA URNS AUTO: ABNORMAL /HPF
BASOPHILS # BLD AUTO: 0.06 K/UL (ref 0–0.2)
BASOPHILS NFR BLD: 0.8 % (ref 0–1.9)
BILIRUB SERPL-MCNC: 0.4 MG/DL (ref 0.1–1)
BILIRUB UR QL STRIP: NEGATIVE
BUN SERPL-MCNC: 28 MG/DL (ref 8–23)
CALCIUM SERPL-MCNC: 10.6 MG/DL (ref 8.7–10.5)
CHLORIDE SERPL-SCNC: 106 MMOL/L (ref 95–110)
CLARITY UR REFRACT.AUTO: ABNORMAL
CO2 SERPL-SCNC: 21 MMOL/L (ref 23–29)
COLOR UR AUTO: ABNORMAL
CREAT SERPL-MCNC: 1.1 MG/DL (ref 0.5–1.4)
DIFFERENTIAL METHOD: ABNORMAL
EOSINOPHIL # BLD AUTO: 0.3 K/UL (ref 0–0.5)
EOSINOPHIL NFR BLD: 3.8 % (ref 0–8)
ERYTHROCYTE [DISTWIDTH] IN BLOOD BY AUTOMATED COUNT: 13 % (ref 11.5–14.5)
EST. GFR  (AFRICAN AMERICAN): 59.6 ML/MIN/1.73 M^2
EST. GFR  (NON AFRICAN AMERICAN): 51.7 ML/MIN/1.73 M^2
GLUCOSE SERPL-MCNC: 172 MG/DL (ref 70–110)
GLUCOSE UR QL STRIP: NEGATIVE
HCT VFR BLD AUTO: 41 % (ref 37–48.5)
HGB BLD-MCNC: 13 G/DL (ref 12–16)
HGB UR QL STRIP: NEGATIVE
IMM GRANULOCYTES # BLD AUTO: 0.02 K/UL (ref 0–0.04)
IMM GRANULOCYTES NFR BLD AUTO: 0.3 % (ref 0–0.5)
KETONES UR QL STRIP: NEGATIVE
LEUKOCYTE ESTERASE UR QL STRIP: NEGATIVE
LYMPHOCYTES # BLD AUTO: 1.9 K/UL (ref 1–4.8)
LYMPHOCYTES NFR BLD: 27 % (ref 18–48)
MCH RBC QN AUTO: 28.6 PG (ref 27–31)
MCHC RBC AUTO-ENTMCNC: 31.7 G/DL (ref 32–36)
MCV RBC AUTO: 90 FL (ref 82–98)
MICROSCOPIC COMMENT: ABNORMAL
MONOCYTES # BLD AUTO: 0.4 K/UL (ref 0.3–1)
MONOCYTES NFR BLD: 5.6 % (ref 4–15)
NEUTROPHILS # BLD AUTO: 4.5 K/UL (ref 1.8–7.7)
NEUTROPHILS NFR BLD: 62.5 % (ref 38–73)
NITRITE UR QL STRIP: NEGATIVE
NRBC BLD-RTO: 0 /100 WBC
PH UR STRIP: 6 [PH] (ref 5–8)
PLATELET # BLD AUTO: 191 K/UL (ref 150–350)
PMV BLD AUTO: 10.5 FL (ref 9.2–12.9)
POTASSIUM SERPL-SCNC: 4.3 MMOL/L (ref 3.5–5.1)
PROT SERPL-MCNC: 7.8 G/DL (ref 6–8.4)
PROT UR QL STRIP: NEGATIVE
RBC # BLD AUTO: 4.54 M/UL (ref 4–5.4)
RBC #/AREA URNS AUTO: 2 /HPF (ref 0–4)
SODIUM SERPL-SCNC: 140 MMOL/L (ref 136–145)
SP GR UR STRIP: 1.01 (ref 1–1.03)
SQUAMOUS #/AREA URNS AUTO: 0 /HPF
URN SPEC COLLECT METH UR: ABNORMAL
WBC # BLD AUTO: 7.18 K/UL (ref 3.9–12.7)
WBC #/AREA URNS AUTO: 5 /HPF (ref 0–5)

## 2020-01-26 PROCEDURE — 25000003 PHARM REV CODE 250: Performed by: EMERGENCY MEDICINE

## 2020-01-26 PROCEDURE — 93005 ELECTROCARDIOGRAM TRACING: CPT

## 2020-01-26 PROCEDURE — 96374 THER/PROPH/DIAG INJ IV PUSH: CPT

## 2020-01-26 PROCEDURE — 85025 COMPLETE CBC W/AUTO DIFF WBC: CPT

## 2020-01-26 PROCEDURE — 81001 URINALYSIS AUTO W/SCOPE: CPT

## 2020-01-26 PROCEDURE — 99285 PR EMERGENCY DEPT VISIT,LEVEL V: ICD-10-PCS | Mod: ,,, | Performed by: EMERGENCY MEDICINE

## 2020-01-26 PROCEDURE — 96375 TX/PRO/DX INJ NEW DRUG ADDON: CPT

## 2020-01-26 PROCEDURE — 99285 EMERGENCY DEPT VISIT HI MDM: CPT | Mod: ,,, | Performed by: EMERGENCY MEDICINE

## 2020-01-26 PROCEDURE — 96361 HYDRATE IV INFUSION ADD-ON: CPT

## 2020-01-26 PROCEDURE — 63600175 PHARM REV CODE 636 W HCPCS: Performed by: EMERGENCY MEDICINE

## 2020-01-26 PROCEDURE — 93010 EKG 12-LEAD: ICD-10-PCS | Mod: ,,, | Performed by: INTERNAL MEDICINE

## 2020-01-26 PROCEDURE — 80053 COMPREHEN METABOLIC PANEL: CPT

## 2020-01-26 PROCEDURE — 93010 ELECTROCARDIOGRAM REPORT: CPT | Mod: ,,, | Performed by: INTERNAL MEDICINE

## 2020-01-26 PROCEDURE — 99285 EMERGENCY DEPT VISIT HI MDM: CPT | Mod: 25

## 2020-01-26 RX ORDER — LIDOCAINE 50 MG/G
1 PATCH TOPICAL
Status: DISCONTINUED | OUTPATIENT
Start: 2020-01-26 | End: 2020-01-26 | Stop reason: HOSPADM

## 2020-01-26 RX ORDER — LIDOCAINE 50 MG/G
1 PATCH TOPICAL DAILY
Qty: 10 PATCH | Refills: 0 | Status: SHIPPED | OUTPATIENT
Start: 2020-01-26 | End: 2020-02-05

## 2020-01-26 RX ORDER — METHOCARBAMOL 500 MG/1
500 TABLET, FILM COATED ORAL 3 TIMES DAILY PRN
Qty: 15 TABLET | Refills: 0 | Status: SHIPPED | OUTPATIENT
Start: 2020-01-26 | End: 2020-01-31

## 2020-01-26 RX ORDER — NAPROXEN 375 MG/1
375 TABLET ORAL 2 TIMES DAILY WITH MEALS
Qty: 10 TABLET | Refills: 0 | Status: SHIPPED | OUTPATIENT
Start: 2020-01-26 | End: 2020-01-31

## 2020-01-26 RX ORDER — KETOROLAC TROMETHAMINE 30 MG/ML
15 INJECTION, SOLUTION INTRAMUSCULAR; INTRAVENOUS
Status: COMPLETED | OUTPATIENT
Start: 2020-01-26 | End: 2020-01-26

## 2020-01-26 RX ORDER — DIAZEPAM 10 MG/2ML
2 INJECTION INTRAMUSCULAR
Status: COMPLETED | OUTPATIENT
Start: 2020-01-26 | End: 2020-01-26

## 2020-01-26 RX ADMIN — KETOROLAC TROMETHAMINE 15 MG: 30 INJECTION, SOLUTION INTRAMUSCULAR; INTRAVENOUS at 12:01

## 2020-01-26 RX ADMIN — LIDOCAINE 1 PATCH: 50 PATCH TOPICAL at 12:01

## 2020-01-26 RX ADMIN — SODIUM CHLORIDE 1000 ML: 0.9 INJECTION, SOLUTION INTRAVENOUS at 12:01

## 2020-01-26 RX ADMIN — DIAZEPAM 2 MG: 5 INJECTION, SOLUTION INTRAMUSCULAR; INTRAVENOUS at 01:01

## 2020-01-26 NOTE — ED NOTES
Discharge instructions and medications reviewed with the pt and the pt assisted to the waiting room.  Pt states no other questions or concerns and VSS at discharge.  All needs met.

## 2020-01-26 NOTE — ED TRIAGE NOTES
C/o intermittent posterior neck pain/pressure with right arm numbness x 1 month. Had a recent MRI but reports symptoms are worse. Takes Neurontin.  Denies recent injury or trauma.

## 2020-01-26 NOTE — ED NOTES
Patient identifiers have been checked and are correct.       LOC: The patient is awake, alert, and aware of environment. The patient is oriented x 3 and speaking appropriately.   PSYCHOSOCIAL: Patient is tearful but cooperative.   SKIN: The skin is warm, dry  RESPIRATORY: Airway is open and patent. Bilateral chest rise and fall. Respirations are spontaneous, even and unlabored. Normal effort and rate noted. No accessory muscle use noted.   CARDIAC: Patient has a normal rate.  NEUROLOGIC: Eyes open spontaneously. Speech clear. Tolerating saliva secretions well. Able to follow commands, demonstrating ability to actively and appropriately communicate within context of current conversation. Symmetrical facial muscles. Moving all extremities. Movement is purposeful. No evidence of impaired sensation.   MUSCULOSKELETAL: No obvious deformities noted.

## 2020-01-26 NOTE — DISCHARGE INSTRUCTIONS
Today your evaluation including your CT scan of your brain and your labs were normal. There was no abnormality seen.  Your treated with anti-inflammatory and muscle relaxant.  Your were also given a lidocaine patch.  Please follow-up with your primary care or your spine specialist for repeat evaluation.  You will be discharged with anti-inflammatory and muscle relaxant, please take these as prescribed.  If you have any worsening symptoms, follow up sooner.    Our goal in the emergency department is to always give you outstanding care and exceptional service. You may receive a survey by mail or e-mail in the next week regarding your experience in our ED. We would greatly appreciate your completing and returning the survey. Your feedback provides us with a way to recognize our staff who give very good care and it helps us learn how to improve when your experience was below our aspiration of excellence.

## 2020-01-26 NOTE — ED NOTES
Pt instructed to provide a urine sample and states that she self caths at home.  She will provide a sample shortly.

## 2020-01-26 NOTE — ED PROVIDER NOTES
Encounter Date: 1/26/2020    SCRIBE #1 NOTE: I, Bryon Armstrong, am scribing for, and in the presence of,  Dr. Castelan. I have scribed the entire note.       History     Chief Complaint   Patient presents with    Neck Pain     worsening over the last night - has known neck problems - reports head pressure - no unilateral weakness noted at triage, no blurred vision      Time patient was seen by the provider: 11:47 AM      The patient is a 69 y.o. female with co-morbidities including: HTN, diabetes, and spondylisthesis, who presents to the ED with a complaint of right sided neck pain, described as pressure, with associated pressure on the right side of her face and head, and right arm numbness. Her symptoms are worsened when lying down. She has taken ibuprofen and tizanidine without relief. She was recently seen at the spine clinic at [facility] and had a recent MRI. She is on Neurontin for other pain. The onset of her symptoms have been gradual, chronic, and similar to her pain 6 weeks ago.  Patient just states she has worsening exacerbation periodically, and last night was 1 of those nights.  Current pain scale 7/10, worsened with pressure in the posterior head.  She denies any neurologic deficits, weakness, falls or trauma. She endorses having radicular symptoms of her right arm similar previous symptoms which she was evaluated for by her Spine Clinic.  She had a previous MRI recently.  Denies any fevers, chills, nausea, vomiting, diarrhea, abdominal pain, chest pain, lightheadedness or dizziness.  Denies any gait instability.  No other aggravating or alleviating factors.    The history is provided by the patient.     Review of patient's allergies indicates:   Allergen Reactions    Augmentin [amoxicillin-pot clavulanate] Other (See Comments)     Patient cannot recall what she had, only that she could not tolerate the Augmentin    Tramadol Itching    Tegretol [carbamazepine] Itching and Rash    Vicodin  [hydrocodone-acetaminophen] Itching and Anxiety     Past Medical History:   Diagnosis Date    Diabetes mellitus     Hypercholesteremia     Hypertension     IC (interstitial cystitis)     Numbness in both hands 2017    Spondylisthesis     Vulvodynia, unspecified      Past Surgical History:   Procedure Laterality Date    CARPAL TUNNEL RELEASE Right 2019    Procedure: RELEASE, CARPAL TUNNEL, REVISION;  Surgeon: Annabelle Cid MD;  Location: [facility] OR [address] [place] [address];  Service: Orthopedics;  Laterality: Right;  Axogen, Clarix     SECTION      x3    CYSTOSCOPY      HYSTERECTOMY      ischemic colitis      OOPHORECTOMY      TN REMOVAL OF OVARY/TUBE(S)       Family History   Problem Relation Age of Onset    Breast cancer Paternal Aunt     Colon cancer Neg Hx     Ovarian cancer Neg Hx      Social History     Tobacco Use    Smoking status: Never Smoker    Smokeless tobacco: Never Used   Substance Use Topics    Alcohol use: No    Drug use: No     Review of Systems   Constitutional: Negative for chills and fever.   HENT: Negative for rhinorrhea and sore throat.    Eyes: Negative for pain and visual disturbance.   Respiratory: Negative for shortness of breath.    Cardiovascular: Negative for chest pain and palpitations.   Gastrointestinal: Negative for nausea and vomiting.   Genitourinary: Negative for dysuria and frequency.   Musculoskeletal: Positive for neck pain (right sided). Negative for back pain.   Skin: Negative for rash.   Neurological: Positive for numbness (right arm) and headaches (pressure on the right side of her head and face). Negative for weakness.       Physical Exam     Initial Vitals [20 1134]   BP Pulse Resp Temp SpO2   (!) 163/109 97 16 97.3 °F (36.3 °C) 100 %      MAP       --         Physical Exam    Nursing note and vitals reviewed.    Gen/Constitutional: Interactive. No acute distress  Head: Normocephalic, Atraumatic  Neck: supple, no masses or LAD, no  JVD  Eyes: PERRLA, conjunctiva clear  Ears, Nose and Throat: No rhinorrhea or stridor.  Cardiac:  Regular rate, Reg Rhythm, No murmur, rubs or gallops  Pulmonary: CTA Bilat, no wheezes, rhonchi, rales.  GI: Abdomen soft, non-tender, non-distended; no rebound or guarding  : No CVA tenderness.  Musculoskeletal: Extremities warm, well perfused, no erythema, no edema  Cervical spines:  Upper neck muscles along the sides of her neck tender with point tenderness along the musculature.  There is no bony step-off, no midline spinal tenderness.  Skin: No rashes    Psych: Normal affect  Detailed Neurologic exam:  Mental Status:  Normal attention and reasoning. There is no evidence of a thought disorder. Affect and mood were unremarkable. Speech was normal and prosody was intact. Verbal expression and comprehension are intact. Immediate recall, recent and remote memory were intact.  Neck:  Supple. No cervical bruits.  Cranial Nerves:  Visual fields were normal to confrontation and visual acuity was at baseline. Funduscopy was limited by pupillary light response. Pupils were of equal (4mm to 3mm bilaterally) and exhibited a normal direct and consensual response to light. There was no APD. Ocular motility was full and there was no nystagmus evident. Strength of masticatory muscles was normal. Facial sensation was normal. Corneal reflexes were present. No facial weakness. Hearing acuity was normal. Palatal movements and gag reflex were intact. Sternocleidomastoid and trapezii strength were normal. Tongue protruded in the midline and showed no atrophy, tremor or fasciculations.  Motor Examination (bulk, tone, strength):  Motor examination showed normal muscle bulk, tone, and strength throughout. Rapid alternating movements were normal. There were no adventitious movements noted.  Muscle Stretch reflexes (MSR's):  Muscle stretch reflexes were symmetric and normoactive. Plantar responses were flexor bilaterally. No pathologic  reflexes were appreciated.  Sensory:  Normal light-touch and position sense.  Cerebellar and coordination:  Coordination examination showed normal rapid alternating movements. Finger-finger and finger-nose testing were unremarkable.  Romberg test:  Romberg was negative.  Gait and Station:  Erect posture was normal. There was no postural instability.   Spine:  Normal range of motion. No tenderness on palpation. SLR negative.      ED Course   Procedures  Labs Reviewed   URINALYSIS, REFLEX TO URINE CULTURE - Abnormal; Notable for the following components:       Result Value    Appearance, UA Cloudy (*)     All other components within normal limits    Narrative:     Preferred Collection Type->Urine, Clean Catch   CBC W/ AUTO DIFFERENTIAL - Abnormal; Notable for the following components:    Mean Corpuscular Hemoglobin Conc 31.7 (*)     All other components within normal limits   COMPREHENSIVE METABOLIC PANEL - Abnormal; Notable for the following components:    CO2 21 (*)     Glucose 172 (*)     BUN, Bld 28 (*)     Calcium 10.6 (*)     AST 48 (*)     eGFR if  59.6 (*)     eGFR if non  51.7 (*)     All other components within normal limits   URINALYSIS MICROSCOPIC - Abnormal; Notable for the following components:    Bacteria Many (*)     All other components within normal limits    Narrative:     Preferred Collection Type->Urine, Clean Catch     EKG Readings: (Independently Interpreted)   Initial Reading: No STEMI. Rhythm: Normal Sinus Rhythm. Heart Rate: 94.   Normal intervals. Normal axis.      ECG Results          EKG 12-lead (Final result)  Result time 01/27/20 09:19:13    Final result by Interface, Lab In [facility] (01/27/20 09:19:13)                 Narrative:    Test Reason : M54.2,    Vent. Rate : 094 BPM     Atrial Rate : 094 BPM     P-R Int : 188 ms          QRS Dur : 092 ms      QT Int : 356 ms       P-R-T Axes : 027 040 028 degrees     QTc Int : 445 ms    Normal sinus  rhythm  Cannot rule out Inferior infarct ,age undetermined  Cannot rule out Anterior infarct ,age undetermined  Abnormal ECG  No previous ECGs available  Confirmed by CLIVE GLYNN MD ([id number]) on 1/27/2020 9:18:59 AM    Referred By: [email or web address]   SELF           Confirmed By:CLIVE GLYNN MD                            Imaging Results          CT Head Without Contrast (Final result)  Result time 01/26/20 14:45:48    Final result by Pedro Gilmore MD (01/26/20 14:45:48)                 Impression:      1. No acute intracranial abnormalities, noting sequela of chronic microvascular ischemic change and senescent change.  2. Focus of hypoattenuation along the periphery of the left cerebellum most likely correlates with artifact as artifact is seen on orthogonal imaging in this location.      Electronically signed by: Pedro Gilmore MD  Date:    01/26/2020  Time:    14:45             Narrative:    EXAMINATION:  CT HEAD WITHOUT CONTRAST    CLINICAL HISTORY:  Headache, basilar or orbital;Focal neuro deficit, new, fixed or worsening, >6 hours;    TECHNIQUE:  Low dose axial images were obtained through the head.  Coronal and sagittal reformations were also performed. Contrast was not administered.    COMPARISON:  None.    FINDINGS:  There is generalized cerebral volume loss.  There is hypoattenuation in a periventricular fashion, likely sequela of chronic microvascular ischemic change.  There is a focus of low attenuation along the periphery of the left cerebellum, likely artifact.  There is no evidence of acute major vascular territory infarct, hemorrhage, or mass.  There is no hydrocephalus.  There are no abnormal extra-axial fluid collections.  There is mild mucous membrane thickening of the ethmoid sinuses, otherwise the visualized paranasal sinuses and mastoid air cells are clear, and there is no evidence of calvarial fracture.  The visualized soft tissues are unremarkable.                                 Medical  Decision Making:   History:   Old Medical Records: I decided to obtain old medical records.  Old Records Summarized: records from clinic visits.  Initial Assessment:   The patient is a 69 y.o. female with co-morbidities including: HTN, diabetes, and spondylisthesis, who presents to the ED with a complaint of right sided neck pain.  Differential Diagnosis:   Differential diagnosis includes but is not limited to:  Acute on chronic neck pain, cervical radiculopathy, cervical stenosis, hydrocephaly, intracranial process, subarachnoid hemorrhage, tension headache, musculoskeletal, infectious/meningitis.      Independently Interpreted Test(s):   I have ordered and independently interpreted EKG Reading(s) - see prior notes  Clinical Tests:   Lab Tests: Ordered and Reviewed  Radiological Study: Ordered and Reviewed  Medical Tests: Reviewed and Ordered    Emergent evaluation of patient presenting with acute exacerbation of chronic neck pain. She has known radiculopathy, and states acute exacerbation.  She is afebrile, vital signs are stable. Physical exam findings with no focal neurologic deficits, 5/5 strength, DTRs 2+, no ataxia, no gait instability, and no stroke-like symptoms.  She has NIH negative. However she has point tenderness along the posterior scalp extending up to the parietal region.  She describes this as pressure with numbness in her right hand.  It is unclear with this is similar to her radiculopathy but pain appears to be much worse than before.  ECG was obtained with no signs of ischemia or STEMI on my read.  An IV line was placed, labs were drawn, patient given IV pain medication to include Toradol, IV fluids and a lidocaine patch.  CT head was obtained and UA obtained.  Negative CT head without hydrocephaly, or intracranial findings acutely.  Her pain is improved with the lidocaine patch and anti-inflammatory.  I also gave her IV Valium as a muscle relaxant which showed significant improvement in her  symptoms.  Will trial and outpatient course of muscle relaxants and NSAIDs with close follow-up with her back and Spine Clinic.  I do not suspect acute stroke, CVA, meningitis, carotid dissection or acute cervical myelopathy requiring surgical intervention.  No fevers, chills, doubt infectious etiology to include meningitis or encephalitis.  Plan is to have patient follow up with her back and Spine Clinic in the next few days for repeat evaluation, continued anti-inflammatory use, lidocaine patches and muscle relaxants as needed.  Instructed use heat packs, massage and creams as needed.  At this time no other red flags for any other acute or emergent pathology. Patient agreeable to discharge plan. Strict ED precautions and return instructions discussed at length and patient verbalized understanding. All questions were answered and ample time was given for questions.      Complexity:  High risk-level 5          Scribe Attestation:   Scribe #1: I performed the above scribed service and the documentation accurately describes the services I performed. I attest to the accuracy of the note.    I, Dr. Macario Castelan, personally performed the services described in this documentation. All medical record entries made by the scribe were at my direction and in my presence.  I have reviewed the chart and agree that the record reflects my personal performance and is accurate and complete.                         Clinical Impression:       ICD-10-CM ICD-9-CM   1. Chronic neck pain M54.2 723.1    G89.29 338.29   2. Neck pain M54.2 723.1         Disposition:   Disposition: Discharged  Condition: Stable            Macario Castelan DO  Dept of Emergency Medicine   [facility]  Spectralink: [id number]           Macario Castelan DO  01/27/20 2108

## 2020-01-31 ENCOUNTER — TELEPHONE (OUTPATIENT)
Dept: UROLOGY | Facility: CLINIC | Age: 70
End: 2020-01-31

## 2020-01-31 DIAGNOSIS — N30.90 BLADDER INFECTION: Primary | ICD-10-CM

## 2020-01-31 DIAGNOSIS — N39.0 RECURRENT UTI: ICD-10-CM

## 2020-01-31 NOTE — TELEPHONE ENCOUNTER
----- Message from Sheyla Carter sent at 1/31/2020  1:36 PM CST -----  Contact: Niki  Ms. Soriano needs to schedule an appointment sooner than April 2. She has a bladder infection. She also need discuss her darifenacin (ENABLEX) 7.5 MG Tb24 prescription. She can be reached at [phone]

## 2020-01-31 NOTE — LETTER
2020    Niki Soriano  [address]  [place] [address]             [facility] - Urology 4th Floor  [address]  [place] [address]  Phone: [phone] To the Medical Director at [facility]    Niki Soriano  1950 has a long history of interstitial cystitis (IC).  She cannot empty her bladder and has been on sterile intermittent catheterization due to recurrent UTI and the incomplete bladder emptying.  In spite of performing catheterization, she still has urgency which is quite uncomfortable given the IC.  She also has lower extremity weakness so it is not like she can run to the toilet to self catheterize.  She has been on oxybutynin but since the publication of the article in [employer] last year, ([employer] [facility]. 2019;179(8):2410-6674), which showed a correlation with the dose of antimuscarinic agent and dementia, she was switched to darifenacin which is a safer antimuscarinic agent because it does not cross the blood brain barrier and if there is greater porosity and some does get through, it is actively pumped out.      Given that she is 69, has other comorbidities, and given that the darifenacin works for her (it works best when given 7.5 mg twice a day) I have seen that her symptoms are better and the treatment is appropriate, and medically necessary.  She tolerates it well.  If I can provide you with any additional information please contact me at the number on the letterhead, or fax me at [phone].    Sincerely,        Shea Garcia MD

## 2020-02-03 ENCOUNTER — LAB VISIT (OUTPATIENT)
Dept: LAB | Facility: HOSPITAL | Age: 70
End: 2020-02-03
Attending: UROLOGY
Payer: MEDICARE

## 2020-02-03 DIAGNOSIS — N39.0 RECURRENT UTI: ICD-10-CM

## 2020-02-03 PROCEDURE — 87086 URINE CULTURE/COLONY COUNT: CPT

## 2020-02-03 PROCEDURE — 87088 URINE BACTERIA CULTURE: CPT

## 2020-02-03 PROCEDURE — 87186 SC STD MICRODIL/AGAR DIL: CPT

## 2020-02-03 PROCEDURE — 87077 CULTURE AEROBIC IDENTIFY: CPT

## 2020-02-05 LAB — BACTERIA UR CULT: ABNORMAL

## 2020-02-06 ENCOUNTER — TELEPHONE (OUTPATIENT)
Dept: UROLOGY | Facility: CLINIC | Age: 70
End: 2020-02-06

## 2020-02-06 DIAGNOSIS — N31.9 NEUROGENIC BLADDER: ICD-10-CM

## 2020-02-06 DIAGNOSIS — N39.41 URGE INCONTINENCE: ICD-10-CM

## 2020-02-06 DIAGNOSIS — N30.90 BLADDER INFECTION: Primary | ICD-10-CM

## 2020-02-06 RX ORDER — CEFPODOXIME PROXETIL 200 MG/1
200 TABLET, FILM COATED ORAL 2 TIMES DAILY
Qty: 14 TABLET | Refills: 0 | Status: SHIPPED | OUTPATIENT
Start: 2020-02-06 | End: 2020-02-13

## 2020-02-07 RX ORDER — OXYBUTYNIN CHLORIDE 5 MG/1
5 TABLET, EXTENDED RELEASE ORAL DAILY
Qty: 30 TABLET | Refills: 11 | Status: SHIPPED | OUTPATIENT
Start: 2020-02-07 | End: 2020-05-12 | Stop reason: ALTCHOICE

## 2020-02-07 RX ORDER — SULFAMETHOXAZOLE AND TRIMETHOPRIM 800; 160 MG/1; MG/1
1 TABLET ORAL 2 TIMES DAILY
Qty: 14 TABLET | Refills: 0 | Status: SHIPPED | OUTPATIENT
Start: 2020-02-07 | End: 2020-02-14

## 2020-02-07 NOTE — TELEPHONE ENCOUNTER
Bactrim DS sent instead of the vantin  She is running out of the darifenacin and has been taking the oxybutynin XL 5 mg.  I will write a letter of necessity and will see if they will go back to providing the darifenacin at the $10 copay, the copay went up to $100 (which I think is unethical)

## 2020-02-07 NOTE — TELEPHONE ENCOUNTER
----- Message from Sravani Montaño sent at 2/7/2020  2:05 PM CST -----  Contact: [phone]  Calling in reference to alternative medications due cost rxdarifenacin (ENABLEX) 7.5 MG Tb24 and cefpodoxime (VANTIN) 200 MG tablet. Please call

## 2020-02-13 ENCOUNTER — OFFICE VISIT (OUTPATIENT)
Dept: SPINE | Facility: CLINIC | Age: 70
End: 2020-02-13
Payer: MEDICARE

## 2020-02-13 VITALS
SYSTOLIC BLOOD PRESSURE: 96 MMHG | DIASTOLIC BLOOD PRESSURE: 55 MMHG | HEART RATE: 91 BPM | HEIGHT: 64 IN | BODY MASS INDEX: 28.34 KG/M2 | RESPIRATION RATE: 18 BRPM | WEIGHT: 166 LBS | OXYGEN SATURATION: 100 %

## 2020-02-13 DIAGNOSIS — M48.061 SPINAL STENOSIS OF LUMBAR REGION WITHOUT NEUROGENIC CLAUDICATION: ICD-10-CM

## 2020-02-13 DIAGNOSIS — M48.061 FORAMINAL STENOSIS OF LUMBAR REGION: ICD-10-CM

## 2020-02-13 DIAGNOSIS — M51.37 DDD (DEGENERATIVE DISC DISEASE), LUMBOSACRAL: ICD-10-CM

## 2020-02-13 DIAGNOSIS — M54.12 CERVICAL RADICULOPATHY: Primary | ICD-10-CM

## 2020-02-13 DIAGNOSIS — G89.29 CHRONIC BILATERAL LOW BACK PAIN WITH RIGHT-SIDED SCIATICA: ICD-10-CM

## 2020-02-13 DIAGNOSIS — G89.29 OTHER CHRONIC PAIN: ICD-10-CM

## 2020-02-13 DIAGNOSIS — M47.26 OTHER SPONDYLOSIS WITH RADICULOPATHY, LUMBAR REGION: ICD-10-CM

## 2020-02-13 DIAGNOSIS — M43.10 ACQUIRED SPONDYLOLISTHESIS: ICD-10-CM

## 2020-02-13 DIAGNOSIS — M54.14 THORACIC AND LUMBOSACRAL NEURITIS: ICD-10-CM

## 2020-02-13 DIAGNOSIS — M54.41 CHRONIC BILATERAL LOW BACK PAIN WITH RIGHT-SIDED SCIATICA: ICD-10-CM

## 2020-02-13 DIAGNOSIS — M54.17 THORACIC AND LUMBOSACRAL NEURITIS: ICD-10-CM

## 2020-02-13 PROCEDURE — 99999 PR PBB SHADOW E&M-EST. PATIENT-LVL IV: CPT | Mod: PBBFAC,,, | Performed by: NURSE PRACTITIONER

## 2020-02-13 PROCEDURE — 1159F PR MEDICATION LIST DOCUMENTED IN MEDICAL RECORD: ICD-10-PCS | Mod: S$GLB,,, | Performed by: NURSE PRACTITIONER

## 2020-02-13 PROCEDURE — 99204 OFFICE O/P NEW MOD 45 MIN: CPT | Mod: S$GLB,,, | Performed by: NURSE PRACTITIONER

## 2020-02-13 PROCEDURE — 1125F PR PAIN SEVERITY QUANTIFIED, PAIN PRESENT: ICD-10-PCS | Mod: S$GLB,,, | Performed by: NURSE PRACTITIONER

## 2020-02-13 PROCEDURE — 1159F MED LIST DOCD IN RCRD: CPT | Mod: S$GLB,,, | Performed by: NURSE PRACTITIONER

## 2020-02-13 PROCEDURE — 99999 PR PBB SHADOW E&M-EST. PATIENT-LVL IV: ICD-10-PCS | Mod: PBBFAC,,, | Performed by: NURSE PRACTITIONER

## 2020-02-13 PROCEDURE — 1125F AMNT PAIN NOTED PAIN PRSNT: CPT | Mod: S$GLB,,, | Performed by: NURSE PRACTITIONER

## 2020-02-13 PROCEDURE — 99204 PR OFFICE/OUTPT VISIT, NEW, LEVL IV, 45-59 MIN: ICD-10-PCS | Mod: S$GLB,,, | Performed by: NURSE PRACTITIONER

## 2020-02-13 PROCEDURE — 1101F PR PT FALLS ASSESS DOC 0-1 FALLS W/OUT INJ PAST YR: ICD-10-PCS | Mod: CPTII,S$GLB,, | Performed by: NURSE PRACTITIONER

## 2020-02-13 PROCEDURE — 1101F PT FALLS ASSESS-DOCD LE1/YR: CPT | Mod: CPTII,S$GLB,, | Performed by: NURSE PRACTITIONER

## 2020-02-13 RX ORDER — GABAPENTIN 300 MG/1
300 CAPSULE ORAL 3 TIMES DAILY
Qty: 270 CAPSULE | Refills: 0 | Status: SHIPPED | OUTPATIENT
Start: 2020-02-13 | End: 2021-04-26

## 2020-02-13 RX ORDER — METHOCARBAMOL 500 MG/1
500 TABLET, FILM COATED ORAL 2 TIMES DAILY PRN
Qty: 60 TABLET | Refills: 0 | Status: SHIPPED | OUTPATIENT
Start: 2020-02-13 | End: 2020-03-14

## 2020-02-13 NOTE — PROGRESS NOTES
Chronic Pain - New Consult    Referring Physician: No ref. provider found    Chief Complaint: No chief complaint on file.       SUBJECTIVE: Disclaimer: This note has been generated using voice-recognition software. There may be typographical errors that have been missed during proof-reading    Initial encounter:    Niki Soriano presents to the clinic for the evaluation of neck and right upper extremity pain. The pain started years ago and symptoms have been worsening.  She was evaluated in the ED on 1/26/20 after she said the symptoms significantly worsened over about 3 days.  She reported right sided neck pain and pressure.  She has pain to there right posterior skull with radiation upwards, causing headaches.  She has radiation into the arm and numbness in the fingers.  She underwent right CTR surgery on 8/21/19 with Dr. Cid.  She is asking for an updated cervical MRI.  She did have a head CT in the ED which did not show any acute changes.      Brief history:    Pain Description:    The pain is located in the neck area and radiates to the right upper extremity.      At BEST  2/10     At WORST  9/10 on the WORST day.      On average pain is rated as 4/10.     Today the pain is rated as 3/10    The pain is described as crushing, stabbing and throbbing      Symptoms interfere with daily activity and sleeping.     Exacerbating factors: Laying.      Mitigating factors rest.     Patient denies urinary incontinence and bowel incontinence.  Patient denies any suicidal or homicidal ideations    Pain Medications:  Current:  Gabapentin 300 mg TID  Robaxin 500 mg PRN  Baclofen 20 mg QHS  Cymbalta 30 mg QD    Tried in Past:  NSAIDs - yes  TCA -Never  SNRI - Cymbalta  Anti-convulsants - Gabapentin  Muscle Relaxants - Robaxin (helpful), Baclofen (for back), Zanaflex (side effects)  Opioids- yes    Physical Therapy/Home Exercise: yes       report:  Not applicable    Pain Procedures:   Reports [facility] in the past    Chiropractor -never  Acupuncture - never  TENS unit -never  Spinal decompression -never  Joint replacement -never    CMP  Sodium   Date Value Ref Range Status   01/26/2020 140 136 - 145 mmol/L Final     Potassium   Date Value Ref Range Status   01/26/2020 4.3 3.5 - 5.1 mmol/L Final     Comment:     *Result may be interfered by visible hemolysis     Chloride   Date Value Ref Range Status   01/26/2020 106 95 - 110 mmol/L Final     CO2   Date Value Ref Range Status   01/26/2020 21 (L) 23 - 29 mmol/L Final     Glucose   Date Value Ref Range Status   01/26/2020 172 (H) 70 - 110 mg/dL Final     BUN, Bld   Date Value Ref Range Status   01/26/2020 28 (H) 8 - 23 mg/dL Final     Creatinine   Date Value Ref Range Status   01/26/2020 1.1 0.5 - 1.4 mg/dL Final     Calcium   Date Value Ref Range Status   01/26/2020 10.6 (H) 8.7 - 10.5 mg/dL Final     Total Protein   Date Value Ref Range Status   01/26/2020 7.8 6.0 - 8.4 g/dL Final     Comment:     *Result may be interfered by visible hemolysis     Albumin   Date Value Ref Range Status   01/26/2020 4.4 3.5 - 5.2 g/dL Final     Total Bilirubin   Date Value Ref Range Status   01/26/2020 0.4 0.1 - 1.0 mg/dL Final     Comment:     For infants and newborns, interpretation of results should be based  on gestational age, weight and in agreement with clinical  observations.  Premature Infant recommended reference ranges:  Up to 24 hours.............<8.0 mg/dL  Up to 48 hours............<12.0 mg/dL  3-5 days..................<15.0 mg/dL  6-29 days.................<15.0 mg/dL       Alkaline Phosphatase   Date Value Ref Range Status   01/26/2020 60 55 - 135 U/L Final     AST   Date Value Ref Range Status   01/26/2020 48 (H) 10 - 40 U/L Final     Comment:     *Result may be interfered by visible hemolysis     ALT   Date Value Ref Range Status   01/26/2020 35 10 - 44 U/L Final     Anion Gap   Date Value Ref Range Status   01/26/2020 13 8 - 16 mmol/L Final      eGFR if    Date Value Ref Range Status   01/26/2020 59.6 (A) >60 mL/min/1.73 m^2 Final     eGFR if non    Date Value Ref Range Status   01/26/2020 51.7 (A) >60 mL/min/1.73 m^2 Final     Comment:     Calculation used to obtain the estimated glomerular filtration  rate (eGFR) is the CKD-EPI equation.        Lab Results   Component Value Date    WBC 7.18 01/26/2020    HGB 13.0 01/26/2020    HCT 41.0 01/26/2020    MCV 90 01/26/2020     01/26/2020         Imaging:     EXAMINATION:  CT HEAD WITHOUT CONTRAST    CLINICAL HISTORY:  Headache, basilar or orbital;Focal neuro deficit, new, fixed or worsening, >6 hours;    TECHNIQUE:  Low dose axial images were obtained through the head.  Coronal and sagittal reformations were also performed. Contrast was not administered.    COMPARISON:  None.    FINDINGS:  There is generalized cerebral volume loss.  There is hypoattenuation in a periventricular fashion, likely sequela of chronic microvascular ischemic change.  There is a focus of low attenuation along the periphery of the left cerebellum, likely artifact.  There is no evidence of acute major vascular territory infarct, hemorrhage, or mass.  There is no hydrocephalus.  There are no abnormal extra-axial fluid collections.  There is mild mucous membrane thickening of the ethmoid sinuses, otherwise the visualized paranasal sinuses and mastoid air cells are clear, and there is no evidence of calvarial fracture.  The visualized soft tissues are unremarkable.   Impression       1. No acute intracranial abnormalities, noting sequela of chronic microvascular ischemic change and senescent change.  2. Focus of hypoattenuation along the periphery of the left cerebellum most likely correlates with artifact as artifact is seen on orthogonal imaging in this location.     Jayden Saenz MD 9/6/2018       Narrative     EXAMINATION:  MRI CERVICAL SPINE WITHOUT CONTRAST    CLINICAL  HISTORY:  evaluate bilateral UE radiculitis; Urinary tract infection, site not specified    TECHNIQUE:  Multiplanar, multisequence MR images of the cervical spine were performed without the administration of contrast.    COMPARISON:  Cervical spine radiograph 09/06/2018.    FINDINGS:  Alignment: Straightening of the normal cervical lordosis of the spine.  Retrolisthesis of C5 on C6 and C6 on C7.    Vertebrae: Normal marrow signal. No fracture.    Discs: Mild disc space height narrowing at the T3-T4 level.  Small annular fissure at the C3-C4 level.    Cord: No abnormal cord signal    Skull base and craniocervical junction: Normal.    Degenerative findings:    C2-C3: Right facet arthropathy results in mild right-sided neural narrowing.    C3-C4: Uncovertebral spurring and facet arthropathy results in mild right and moderate left neural foraminal narrowing.    C4-C5: Left facet arthropathy result in mild left-sided neural foraminal narrowing.    C5-C6: Posterior disc osteophyte complex and ligamentum flavum hypertrophy result in mild spinal canal stenosis and moderate left and mild right neural foraminal narrowing.    C6-C7: Posterior disc osteophyte complex, ligamentum flavum hypertrophy, and facet arthropathy results in mild spinal canal stenosis and moderate bilateral neural foraminal narrowing.    C7-T1: No significant spinal canal stenosis or neural foraminal narrowing.    T1-T2: No significant spinal canal stenosis or neural foraminal narrowing.    T2-T3: No significant spinal canal stenosis or neural foraminal narrowing.    Paraspinal muscles & soft tissues: Unremarkable.      Impression       Multilevel degenerative changes of the cervical spine most pronounced at the C5-C6 and C6-C7 levels as above         Past Medical History:   Diagnosis Date    Diabetes mellitus     Hypercholesteremia     Hypertension     IC (interstitial cystitis)     Numbness in both hands 11/1/2017    Spondylisthesis      Vulvodynia, unspecified      Past Surgical History:   Procedure Laterality Date    CARPAL TUNNEL RELEASE Right 2019    Procedure: RELEASE, CARPAL TUNNEL, REVISION;  Surgeon: Annabelle iCd MD;  Location: [facility] OR [address] [place] [address];  Service: Orthopedics;  Laterality: Right;  Axogen, Clarix     SECTION      x3    CYSTOSCOPY      HYSTERECTOMY      ischemic colitis      OOPHORECTOMY      OK REMOVAL OF OVARY/TUBE(S)       Social History     Socioeconomic History    Marital status:      Spouse name: Not on file    Number of children: Not on file    Years of education: Not on file    Highest education level: Not on file   Occupational History    Not on file   Social Needs    Financial resource strain: Not on file    Food insecurity:     Worry: Not on file     Inability: Not on file    Transportation needs:     Medical: Not on file     Non-medical: Not on file   Tobacco Use    Smoking status: Never Smoker    Smokeless tobacco: Never Used   Substance and Sexual Activity    Alcohol use: No    Drug use: No    Sexual activity: Yes     Partners: Male   Lifestyle    Physical activity:     Days per week: Not on file     Minutes per session: Not on file    Stress: Not on file   Relationships    Social connections:     Talks on phone: Not on file     Gets together: Not on file     Attends Quaker service: Not on file     Active member of club or organization: Not on file     Attends meetings of clubs or organizations: Not on file     Relationship status: Not on file   Other Topics Concern    Not on file   Social History Narrative    Not on file     Family History   Problem Relation Age of Onset    Breast cancer Paternal Aunt     Colon cancer Neg Hx     Ovarian cancer Neg Hx        Review of patient's allergies indicates:   Allergen Reactions    Augmentin [amoxicillin-pot clavulanate] Other (See Comments)     Patient cannot recall what she had, only that she could not tolerate the  Augmentin    Tramadol Itching    Tegretol [carbamazepine] Itching and Rash    Vicodin [hydrocodone-acetaminophen] Itching and Anxiety       Current Outpatient Medications   Medication Sig    atorvastatin (LIPITOR) 40 MG tablet Take 40 mg by mouth once daily.    baclofen (LIORESAL) 20 MG tablet 2 (two) times daily.     duloxetine (CYMBALTA) 30 MG capsule Take 30 mg by mouth once daily.    estradiol (ESTRACE) 0.01 % (0.1 mg/gram) vaginal cream PLEASE SEE ATTACHED FOR DETAILED DIRECTIONS    gabapentin (NEURONTIN) 300 MG capsule Take 1 capsule (300 mg total) by mouth 3 (three) times daily.    LEVOTHYROXINE SODIUM (LEVOTHYROXINE ORAL) Take 50 mcg by mouth before breakfast.     lisinopril (PRINIVIL,ZESTRIL) 40 MG tablet every evening.     metformin (GLUCOPHAGE) 500 MG tablet Take 500 mg by mouth 2 (two) times daily with meals.    oxybutynin (DITROPAN-XL) 5 MG TR24 Take 1 tablet (5 mg total) by mouth once daily.    sulfamethoxazole-trimethoprim 800-160mg (BACTRIM DS) 800-160 mg Tab Take 1 tablet by mouth 2 (two) times daily. for 7 days    triamterene-hydrochlorothiazide 37.5-25 mg (DYAZIDE) 37.5-25 mg per capsule every morning.     methocarbamol (ROBAXIN) 500 MG Tab Take 1 tablet (500 mg total) by mouth 2 (two) times daily as needed (muscle spasms).     No current facility-administered medications for this visit.        REVIEW OF SYSTEMS:    GENERAL:  No weight loss, malaise or fevers.  HEENT:   No recent changes in vision or hearing  NECK:  Negative for lumps, no difficulty with swallowing.  RESPIRATORY:  Negative for cough, wheezing or shortness of breath, patient denies any recent URI.  CARDIOVASCULAR:  Negative for chest pain, leg swelling or palpitations.  GI:  Negative for abdominal discomfort, blood in stools or black stools or change in bowel habits. H/O IC.  MUSCULOSKELETAL:  See HPI.  SKIN:  Negative for lesions, rash, and itching.  PSYCH:  No mood disorder or recent psychosocial stressors.   "Patients sleep is not disturbed secondary to pain.  HEMATOLOGY/LYMPHOLOGY:  Negative for prolonged bleeding, bruising easily or swollen nodes.  Patient is not currently taking any anti-coagulants  ENDO: No history of diabetes or thyroid dysfunction  NEURO:   No history of headaches, syncope, paralysis, seizures or tremors.  All other reviewed and negative other than HPI.    OBJECTIVE:    BP (!) 96/55   Pulse 91   Resp 18   Ht 5' 4" (1.626 m)   Wt 75.3 kg (166 lb 0.1 oz)   SpO2 100%   BMI 28.49 kg/m²     PHYSICAL EXAMINATION:    GENERAL: Well appearing, in no acute distress, alert and oriented x3.  PSYCH:  Mood and affect appropriate.  SKIN: Skin color, texture, turgor normal, no rashes or lesions.  HEAD/FACE:  Normocephalic, atraumatic. Cranial nerves grossly intact.  NECK: There is pain to palpation over the cervical paraspinous muscles. Spurling Negative. Limited ROM with pain on flexion, extension, and lateral flexion.  Positive facet loading bilaterally, R>L.  CV: RRR with palpation of the radial artery.  EXTREMITIES: Peripheral joint ROM is full and pain free without obvious instability or laxity in all four extremities. No deformities, edema, or skin discoloration. Good capillary refill.  MUSCULOSKELETAL: Right hand  strength is 4/5, left is 5/5.  No atrophy or tone abnormalities are noted.  NEURO: Bilateral upper and lower extremity coordination and muscle stretch reflexes are physiologic and symmetric.  Plantar response are downgoing. No clonus.  Decreased sensation to BUE.  GAIT: Antalgic- presents in wheelchair.    ASSESSMENT: 69 y.o. year old female with neck and right arm pain, consistent with the following:    Encounter Diagnoses   Name Primary?    Other spondylosis with radiculopathy, lumbar region     Foraminal stenosis of lumbar region     Other chronic pain     DDD (degenerative disc disease), lumbosacral     Spinal stenosis of lumbar region without neurogenic claudication     " Thoracic and lumbosacral neuritis     Acquired spondylolisthesis     Chronic bilateral low back pain with right-sided sciatica     Cervical radiculopathy Yes       PLAN:     - Head CT reviewed with patient.    - I will order updated cervical MRI.    - We discussed multiple interventional options including cervical ARIA as well as C2, C3 and TON MBB/RFA.  She does not wish to pursue at this time.  She is upset because her appointment was made in error and she thought she was seeing Tatianna Vega.  She would like me to call her with MRI results and recommendations which I think is reasonable.    - RTC PRN.    The above plan and management options were discussed at length with patient. Patient is in agreement with the above and verbalized understanding. It will be communicated with the referring physician via electronic record, fax, or mail.    Solange Morrow  02/13/2020

## 2020-02-21 ENCOUNTER — HOSPITAL ENCOUNTER (OUTPATIENT)
Dept: RADIOLOGY | Facility: HOSPITAL | Age: 70
Discharge: HOME | End: 2020-02-21
Attending: NURSE PRACTITIONER
Payer: MEDICARE

## 2020-02-21 DIAGNOSIS — M54.12 CERVICAL RADICULOPATHY: ICD-10-CM

## 2020-02-21 PROCEDURE — 72141 MRI CERVICAL SPINE WITHOUT CONTRAST: ICD-10-PCS | Mod: 26,,, | Performed by: RADIOLOGY

## 2020-02-21 PROCEDURE — 72141 MRI NECK SPINE W/O DYE: CPT | Mod: 26,,, | Performed by: RADIOLOGY

## 2020-02-21 PROCEDURE — 72141 MRI NECK SPINE W/O DYE: CPT | Mod: TC

## 2020-02-27 ENCOUNTER — TELEPHONE (OUTPATIENT)
Dept: PAIN MEDICINE | Facility: CLINIC | Age: 70
End: 2020-02-27

## 2020-02-27 NOTE — TELEPHONE ENCOUNTER
Staff contacted pt regarding her message concerning MRI    Pt stated she had additional questions regarding this matter.    Staff informed pt otto MARY will contact her back regarding her concerns    Pt verbalized understanding and confirmed

## 2020-02-27 NOTE — TELEPHONE ENCOUNTER
----- Message from Abdoul Watsno sent at 2/27/2020 10:59 AM CST -----  Contact: pt  Name of Who is Calling: pt    What is the request in detail: is having additional questions regarding her MRI. Please contact to further discuss and advise      Can the clinic reply by [facility]:     What Number [facility] Back [facility]: [phone]

## 2020-03-01 ENCOUNTER — HOSPITAL ENCOUNTER (INPATIENT)
Facility: HOSPITAL | Age: 70
LOS: 5 days | Discharge: HOME | DRG: 286 | End: 2020-03-06
Attending: EMERGENCY MEDICINE | Admitting: INTERNAL MEDICINE
Payer: MEDICARE

## 2020-03-01 DIAGNOSIS — I50.9 NEW ONSET OF CONGESTIVE HEART FAILURE: ICD-10-CM

## 2020-03-01 DIAGNOSIS — I50.21 ACUTE SYSTOLIC HEART FAILURE: Primary | ICD-10-CM

## 2020-03-01 DIAGNOSIS — A49.8 INFECTION DUE TO ESBL-PRODUCING KLEBSIELLA PNEUMONIAE: ICD-10-CM

## 2020-03-01 DIAGNOSIS — R06.02 SHORTNESS OF BREATH: ICD-10-CM

## 2020-03-01 DIAGNOSIS — Z16.12 INFECTION DUE TO ESBL-PRODUCING KLEBSIELLA PNEUMONIAE: ICD-10-CM

## 2020-03-01 DIAGNOSIS — I25.10 CORONARY ARTERY DISEASE, ANGINA PRESENCE UNSPECIFIED, UNSPECIFIED VESSEL OR LESION TYPE, UNSPECIFIED WHETHER NATIVE OR TRANSPLANTED HEART: ICD-10-CM

## 2020-03-01 DIAGNOSIS — E03.9 HYPOTHYROIDISM, UNSPECIFIED TYPE: ICD-10-CM

## 2020-03-01 DIAGNOSIS — E11.42 TYPE 2 DIABETES MELLITUS WITH DIABETIC POLYNEUROPATHY, WITHOUT LONG-TERM CURRENT USE OF INSULIN: ICD-10-CM

## 2020-03-01 DIAGNOSIS — I50.20 SYSTOLIC CONGESTIVE HEART FAILURE, UNSPECIFIED HF CHRONICITY: ICD-10-CM

## 2020-03-01 DIAGNOSIS — N39.0 ACUTE UTI (URINARY TRACT INFECTION): ICD-10-CM

## 2020-03-01 DIAGNOSIS — R06.02 SOB (SHORTNESS OF BREATH): ICD-10-CM

## 2020-03-01 DIAGNOSIS — I50.20 SYSTOLIC CHF: ICD-10-CM

## 2020-03-01 LAB
ALBUMIN SERPL BCP-MCNC: 4.1 G/DL (ref 3.5–5.2)
ALP SERPL-CCNC: 79 U/L (ref 55–135)
ALT SERPL W/O P-5'-P-CCNC: 69 U/L (ref 10–44)
ANION GAP SERPL CALC-SCNC: 13 MMOL/L (ref 8–16)
AST SERPL-CCNC: 41 U/L (ref 10–40)
BACTERIA #/AREA URNS AUTO: ABNORMAL /HPF
BASOPHILS # BLD AUTO: 0.05 K/UL (ref 0–0.2)
BASOPHILS NFR BLD: 0.8 % (ref 0–1.9)
BILIRUB SERPL-MCNC: 0.8 MG/DL (ref 0.1–1)
BILIRUB UR QL STRIP: NEGATIVE
BNP SERPL-MCNC: 3286 PG/ML (ref 0–99)
BUN SERPL-MCNC: 29 MG/DL (ref 8–23)
CALCIUM SERPL-MCNC: 10.3 MG/DL (ref 8.7–10.5)
CHLORIDE SERPL-SCNC: 106 MMOL/L (ref 95–110)
CHOLEST SERPL-MCNC: 170 MG/DL (ref 120–199)
CHOLEST/HDLC SERPL: 3.3 {RATIO} (ref 2–5)
CLARITY UR REFRACT.AUTO: ABNORMAL
CO2 SERPL-SCNC: 20 MMOL/L (ref 23–29)
COLOR UR AUTO: YELLOW
CREAT SERPL-MCNC: 1.1 MG/DL (ref 0.5–1.4)
D DIMER PPP IA.FEU-MCNC: 1.5 MG/L FEU
DIFFERENTIAL METHOD: ABNORMAL
EOSINOPHIL # BLD AUTO: 0.3 K/UL (ref 0–0.5)
EOSINOPHIL NFR BLD: 4.3 % (ref 0–8)
ERYTHROCYTE [DISTWIDTH] IN BLOOD BY AUTOMATED COUNT: 14.4 % (ref 11.5–14.5)
EST. GFR  (AFRICAN AMERICAN): 59.6 ML/MIN/1.73 M^2
EST. GFR  (NON AFRICAN AMERICAN): 51.7 ML/MIN/1.73 M^2
GLUCOSE SERPL-MCNC: 120 MG/DL (ref 70–110)
GLUCOSE UR QL STRIP: NEGATIVE
HCT VFR BLD AUTO: 36.6 % (ref 37–48.5)
HDLC SERPL-MCNC: 52 MG/DL (ref 40–75)
HDLC SERPL: 30.6 % (ref 20–50)
HGB BLD-MCNC: 11.3 G/DL (ref 12–16)
HGB UR QL STRIP: NEGATIVE
IMM GRANULOCYTES # BLD AUTO: 0 K/UL (ref 0–0.04)
IMM GRANULOCYTES NFR BLD AUTO: 0 % (ref 0–0.5)
INR PPP: 1.1 (ref 0.8–1.2)
IRON SERPL-MCNC: 44 UG/DL (ref 30–160)
KETONES UR QL STRIP: NEGATIVE
LDLC SERPL CALC-MCNC: 93.2 MG/DL (ref 63–159)
LEUKOCYTE ESTERASE UR QL STRIP: ABNORMAL
LIPASE SERPL-CCNC: 46 U/L (ref 4–60)
LYMPHOCYTES # BLD AUTO: 1.5 K/UL (ref 1–4.8)
LYMPHOCYTES NFR BLD: 24.5 % (ref 18–48)
MCH RBC QN AUTO: 28.8 PG (ref 27–31)
MCHC RBC AUTO-ENTMCNC: 30.9 G/DL (ref 32–36)
MCV RBC AUTO: 93 FL (ref 82–98)
MICROSCOPIC COMMENT: ABNORMAL
MONOCYTES # BLD AUTO: 0.3 K/UL (ref 0.3–1)
MONOCYTES NFR BLD: 5.1 % (ref 4–15)
NEUTROPHILS # BLD AUTO: 4 K/UL (ref 1.8–7.7)
NEUTROPHILS NFR BLD: 65.3 % (ref 38–73)
NITRITE UR QL STRIP: NEGATIVE
NONHDLC SERPL-MCNC: 118 MG/DL
NRBC BLD-RTO: 0 /100 WBC
PH UR STRIP: 7 [PH] (ref 5–8)
PLATELET # BLD AUTO: 202 K/UL (ref 150–350)
PMV BLD AUTO: 10.7 FL (ref 9.2–12.9)
POTASSIUM SERPL-SCNC: 4.1 MMOL/L (ref 3.5–5.1)
PROT SERPL-MCNC: 6.9 G/DL (ref 6–8.4)
PROT UR QL STRIP: NEGATIVE
PROTHROMBIN TIME: 11.3 SEC (ref 9–12.5)
RBC # BLD AUTO: 3.92 M/UL (ref 4–5.4)
RBC #/AREA URNS AUTO: 0 /HPF (ref 0–4)
SODIUM SERPL-SCNC: 139 MMOL/L (ref 136–145)
SP GR UR STRIP: 1.01 (ref 1–1.03)
SQUAMOUS #/AREA URNS AUTO: 1 /HPF
T4 FREE SERPL-MCNC: 1.26 NG/DL (ref 0.71–1.51)
T4 FREE SERPL-MCNC: 1.29 NG/DL (ref 0.71–1.51)
TRIGL SERPL-MCNC: 124 MG/DL (ref 30–150)
TROPONIN I SERPL DL<=0.01 NG/ML-MCNC: 0.95 NG/ML (ref 0–0.03)
TROPONIN I SERPL DL<=0.01 NG/ML-MCNC: 0.98 NG/ML (ref 0–0.03)
TSH SERPL DL<=0.005 MIU/L-ACNC: 4.07 UIU/ML (ref 0.4–4)
TSH SERPL DL<=0.005 MIU/L-ACNC: 4.67 UIU/ML (ref 0.4–4)
URN SPEC COLLECT METH UR: ABNORMAL
WBC # BLD AUTO: 6.09 K/UL (ref 3.9–12.7)
WBC #/AREA URNS AUTO: 7 /HPF (ref 0–5)

## 2020-03-01 PROCEDURE — 99285 PR EMERGENCY DEPT VISIT,LEVEL V: ICD-10-PCS | Mod: ,,, | Performed by: EMERGENCY MEDICINE

## 2020-03-01 PROCEDURE — 84443 ASSAY THYROID STIM HORMONE: CPT

## 2020-03-01 PROCEDURE — 83540 ASSAY OF IRON: CPT

## 2020-03-01 PROCEDURE — 84484 ASSAY OF TROPONIN QUANT: CPT | Mod: 91

## 2020-03-01 PROCEDURE — 81001 URINALYSIS AUTO W/SCOPE: CPT

## 2020-03-01 PROCEDURE — 99285 EMERGENCY DEPT VISIT HI MDM: CPT | Mod: 25

## 2020-03-01 PROCEDURE — 63600175 PHARM REV CODE 636 W HCPCS: Performed by: EMERGENCY MEDICINE

## 2020-03-01 PROCEDURE — 84443 ASSAY THYROID STIM HORMONE: CPT | Mod: 91

## 2020-03-01 PROCEDURE — 84484 ASSAY OF TROPONIN QUANT: CPT

## 2020-03-01 PROCEDURE — 25000003 PHARM REV CODE 250: Performed by: INTERNAL MEDICINE

## 2020-03-01 PROCEDURE — 85610 PROTHROMBIN TIME: CPT

## 2020-03-01 PROCEDURE — 93005 ELECTROCARDIOGRAM TRACING: CPT

## 2020-03-01 PROCEDURE — 85025 COMPLETE CBC W/AUTO DIFF WBC: CPT

## 2020-03-01 PROCEDURE — 63600175 PHARM REV CODE 636 W HCPCS: Performed by: INTERNAL MEDICINE

## 2020-03-01 PROCEDURE — 96372 THER/PROPH/DIAG INJ SC/IM: CPT | Mod: 59

## 2020-03-01 PROCEDURE — G0378 HOSPITAL OBSERVATION PER HR: HCPCS

## 2020-03-01 PROCEDURE — 93010 EKG 12-LEAD: ICD-10-PCS | Mod: 76,,, | Performed by: INTERNAL MEDICINE

## 2020-03-01 PROCEDURE — 84439 ASSAY OF FREE THYROXINE: CPT | Mod: 91

## 2020-03-01 PROCEDURE — 36415 COLL VENOUS BLD VENIPUNCTURE: CPT

## 2020-03-01 PROCEDURE — 85379 FIBRIN DEGRADATION QUANT: CPT

## 2020-03-01 PROCEDURE — 11000001 HC ACUTE MED/SURG PRIVATE ROOM

## 2020-03-01 PROCEDURE — P9612 CATHETERIZE FOR URINE SPEC: HCPCS

## 2020-03-01 PROCEDURE — 80061 LIPID PANEL: CPT

## 2020-03-01 PROCEDURE — 99285 EMERGENCY DEPT VISIT HI MDM: CPT | Mod: ,,, | Performed by: EMERGENCY MEDICINE

## 2020-03-01 PROCEDURE — 84439 ASSAY OF FREE THYROXINE: CPT

## 2020-03-01 PROCEDURE — 83880 ASSAY OF NATRIURETIC PEPTIDE: CPT

## 2020-03-01 PROCEDURE — 96374 THER/PROPH/DIAG INJ IV PUSH: CPT

## 2020-03-01 PROCEDURE — 25000003 PHARM REV CODE 250: Performed by: EMERGENCY MEDICINE

## 2020-03-01 PROCEDURE — 93010 ELECTROCARDIOGRAM REPORT: CPT | Mod: ,,, | Performed by: INTERNAL MEDICINE

## 2020-03-01 PROCEDURE — 80053 COMPREHEN METABOLIC PANEL: CPT

## 2020-03-01 PROCEDURE — 83690 ASSAY OF LIPASE: CPT

## 2020-03-01 PROCEDURE — 96376 TX/PRO/DX INJ SAME DRUG ADON: CPT

## 2020-03-01 PROCEDURE — 25500020 PHARM REV CODE 255: Performed by: EMERGENCY MEDICINE

## 2020-03-01 PROCEDURE — 93010 ELECTROCARDIOGRAM REPORT: CPT | Mod: 76,,, | Performed by: INTERNAL MEDICINE

## 2020-03-01 RX ORDER — LEVOTHYROXINE SODIUM 50 UG/1
50 TABLET ORAL
Status: DISCONTINUED | OUTPATIENT
Start: 2020-03-02 | End: 2020-03-06 | Stop reason: HOSPADM

## 2020-03-01 RX ORDER — SPIRONOLACTONE 25 MG/1
25 TABLET ORAL DAILY
Status: DISCONTINUED | OUTPATIENT
Start: 2020-03-02 | End: 2020-03-02

## 2020-03-01 RX ORDER — AMOXICILLIN 250 MG
1 CAPSULE ORAL 2 TIMES DAILY PRN
Status: DISCONTINUED | OUTPATIENT
Start: 2020-03-01 | End: 2020-03-06 | Stop reason: HOSPADM

## 2020-03-01 RX ORDER — ONDANSETRON 4 MG/1
4 TABLET, ORALLY DISINTEGRATING ORAL EVERY 8 HOURS PRN
Status: DISCONTINUED | OUTPATIENT
Start: 2020-03-01 | End: 2020-03-01

## 2020-03-01 RX ORDER — DULOXETIN HYDROCHLORIDE 30 MG/1
30 CAPSULE, DELAYED RELEASE ORAL DAILY
Status: DISCONTINUED | OUTPATIENT
Start: 2020-03-02 | End: 2020-03-06 | Stop reason: HOSPADM

## 2020-03-01 RX ORDER — OXYBUTYNIN CHLORIDE 5 MG/1
5 TABLET, EXTENDED RELEASE ORAL DAILY
Status: DISCONTINUED | OUTPATIENT
Start: 2020-03-02 | End: 2020-03-06 | Stop reason: HOSPADM

## 2020-03-01 RX ORDER — METHOCARBAMOL 500 MG/1
500 TABLET, FILM COATED ORAL 2 TIMES DAILY PRN
Status: DISCONTINUED | OUTPATIENT
Start: 2020-03-01 | End: 2020-03-06 | Stop reason: HOSPADM

## 2020-03-01 RX ORDER — BACLOFEN 10 MG/1
20 TABLET ORAL 2 TIMES DAILY
Status: DISCONTINUED | OUTPATIENT
Start: 2020-03-01 | End: 2020-03-06 | Stop reason: HOSPADM

## 2020-03-01 RX ORDER — FUROSEMIDE 10 MG/ML
40 INJECTION INTRAMUSCULAR; INTRAVENOUS
Status: COMPLETED | OUTPATIENT
Start: 2020-03-01 | End: 2020-03-01

## 2020-03-01 RX ORDER — CARVEDILOL 3.12 MG/1
3.12 TABLET ORAL 2 TIMES DAILY WITH MEALS
Status: DISCONTINUED | OUTPATIENT
Start: 2020-03-01 | End: 2020-03-01

## 2020-03-01 RX ORDER — PROMETHAZINE HYDROCHLORIDE 12.5 MG/1
25 TABLET ORAL EVERY 6 HOURS PRN
Status: DISCONTINUED | OUTPATIENT
Start: 2020-03-01 | End: 2020-03-06 | Stop reason: HOSPADM

## 2020-03-01 RX ORDER — ASPIRIN 325 MG
325 TABLET ORAL
Status: COMPLETED | OUTPATIENT
Start: 2020-03-01 | End: 2020-03-01

## 2020-03-01 RX ORDER — NAPROXEN SODIUM 220 MG/1
81 TABLET, FILM COATED ORAL DAILY
Status: DISCONTINUED | OUTPATIENT
Start: 2020-03-02 | End: 2020-03-06 | Stop reason: HOSPADM

## 2020-03-01 RX ORDER — GABAPENTIN 300 MG/1
300 CAPSULE ORAL 3 TIMES DAILY
Status: DISCONTINUED | OUTPATIENT
Start: 2020-03-01 | End: 2020-03-06 | Stop reason: HOSPADM

## 2020-03-01 RX ORDER — SODIUM CHLORIDE 0.9 % (FLUSH) 0.9 %
10 SYRINGE (ML) INJECTION
Status: DISCONTINUED | OUTPATIENT
Start: 2020-03-01 | End: 2020-03-06 | Stop reason: HOSPADM

## 2020-03-01 RX ORDER — ENOXAPARIN SODIUM 100 MG/ML
40 INJECTION SUBCUTANEOUS EVERY 24 HOURS
Status: DISCONTINUED | OUTPATIENT
Start: 2020-03-01 | End: 2020-03-06 | Stop reason: HOSPADM

## 2020-03-01 RX ORDER — LISINOPRIL 20 MG/1
40 TABLET ORAL DAILY
Status: DISCONTINUED | OUTPATIENT
Start: 2020-03-01 | End: 2020-03-02

## 2020-03-01 RX ORDER — ATORVASTATIN CALCIUM 20 MG/1
40 TABLET, FILM COATED ORAL DAILY
Status: DISCONTINUED | OUTPATIENT
Start: 2020-03-02 | End: 2020-03-03

## 2020-03-01 RX ORDER — ONDANSETRON 8 MG/1
8 TABLET, ORALLY DISINTEGRATING ORAL EVERY 6 HOURS PRN
Status: DISCONTINUED | OUTPATIENT
Start: 2020-03-01 | End: 2020-03-06 | Stop reason: HOSPADM

## 2020-03-01 RX ORDER — FUROSEMIDE 10 MG/ML
40 INJECTION INTRAMUSCULAR; INTRAVENOUS
Status: DISCONTINUED | OUTPATIENT
Start: 2020-03-01 | End: 2020-03-02

## 2020-03-01 RX ADMIN — ENOXAPARIN SODIUM 40 MG: 100 INJECTION SUBCUTANEOUS at 05:03

## 2020-03-01 RX ADMIN — ASPIRIN 325 MG ORAL TABLET 325 MG: 325 PILL ORAL at 02:03

## 2020-03-01 RX ADMIN — BACLOFEN 20 MG: 10 TABLET ORAL at 09:03

## 2020-03-01 RX ADMIN — LISINOPRIL 40 MG: 20 TABLET ORAL at 05:03

## 2020-03-01 RX ADMIN — FUROSEMIDE 40 MG: 10 INJECTION, SOLUTION INTRAMUSCULAR; INTRAVENOUS at 02:03

## 2020-03-01 RX ADMIN — IOHEXOL 75 ML: 350 INJECTION, SOLUTION INTRAVENOUS at 03:03

## 2020-03-01 RX ADMIN — FUROSEMIDE 40 MG: 10 INJECTION, SOLUTION INTRAMUSCULAR; INTRAVENOUS at 05:03

## 2020-03-01 RX ADMIN — GABAPENTIN 300 MG: 300 CAPSULE ORAL at 09:03

## 2020-03-01 NOTE — ASSESSMENT & PLAN NOTE
"Incomplete emptying of bladder  Pt self caths and chronic weakness following "spinal cord infection" in 1996  "

## 2020-03-01 NOTE — CONSULTS
3/1/2020    CC: SOB    HPI:  Niki Soriano is a 69 y.o. lady who presents with shortness of breath.    Pt states that for about the past 5 weeks she has had increasing shortness of breath, leg swelling, weight gain and had pnd last night which prompted her to present to the ER. She states that her leg swelling was noted by her PCP who gave her lasix but that did not seem to help. She denies any chest pain associated but does complain of some palpitations.     PMH:  Past Medical History:   Diagnosis Date    Diabetes mellitus     Hypercholesteremia     Hypertension     IC (interstitial cystitis)     Numbness in both hands 2017    Spondylisthesis     Vulvodynia, unspecified        PSH:  Past Surgical History:   Procedure Laterality Date    CARPAL TUNNEL RELEASE Right 2019    Procedure: RELEASE, CARPAL TUNNEL, REVISION;  Surgeon: Annabelle Cid MD;  Location: [facility] OR [address] [place] [address];  Service: Orthopedics;  Laterality: Right;  Axogen, Clarix     SECTION      x3    CYSTOSCOPY      HYSTERECTOMY      ischemic colitis      OOPHORECTOMY      MO REMOVAL OF OVARY/TUBE(S)         Family:  Family History   Problem Relation Age of Onset    Breast cancer Paternal Aunt     Colon cancer Neg Hx     Ovarian cancer Neg Hx        Social:  Social History     Socioeconomic History    Marital status:      Spouse name: Not on file    Number of children: Not on file    Years of education: Not on file    Highest education level: Not on file   Occupational History    Not on file   Social Needs    Financial resource strain: Not on file    Food insecurity:     Worry: Not on file     Inability: Not on file    Transportation needs:     Medical: Not on file     Non-medical: Not on file   Tobacco Use    Smoking status: Never Smoker    Smokeless tobacco: Never Used   Substance and Sexual Activity    Alcohol use: No    Drug use: No    Sexual activity: Yes     Partners: Male   Lifestyle    Physical  activity:     Days per week: Not on file     Minutes per session: Not on file    Stress: Not on file   Relationships    Social connections:     Talks on phone: Not on file     Gets together: Not on file     Attends Zoroastrian service: Not on file     Active member of club or organization: Not on file     Attends meetings of clubs or organizations: Not on file     Relationship status: Not on file   Other Topics Concern    Not on file   Social History Narrative    Not on file       Medications:  No current facility-administered medications on file prior to encounter.      Current Outpatient Medications on File Prior to Encounter   Medication Sig Dispense Refill    atorvastatin (LIPITOR) 40 MG tablet Take 40 mg by mouth once daily.  3    baclofen (LIORESAL) 20 MG tablet 2 (two) times daily.       duloxetine (CYMBALTA) 30 MG capsule Take 30 mg by mouth once daily.      gabapentin (NEURONTIN) 300 MG capsule Take 1 capsule (300 mg total) by mouth 3 (three) times daily. 270 capsule 0    LEVOTHYROXINE SODIUM (LEVOTHYROXINE ORAL) Take 50 mcg by mouth before breakfast.       lisinopril (PRINIVIL,ZESTRIL) 40 MG tablet every evening.       metformin (GLUCOPHAGE) 500 MG tablet Take 500 mg by mouth 2 (two) times daily with meals.      methocarbamol (ROBAXIN) 500 MG Tab Take 1 tablet (500 mg total) by mouth 2 (two) times daily as needed (muscle spasms). 60 tablet 0    oxybutynin (DITROPAN-XL) 5 MG TR24 Take 1 tablet (5 mg total) by mouth once daily. 30 tablet 11    triamterene-hydrochlorothiazide 37.5-25 mg (DYAZIDE) 37.5-25 mg per capsule every morning.       estradiol (ESTRACE) 0.01 % (0.1 mg/gram) vaginal cream PLEASE SEE ATTACHED FOR DETAILED DIRECTIONS 42.5 g 3       Allergies:  Review of patient's allergies indicates:   Allergen Reactions    Augmentin [amoxicillin-pot clavulanate] Other (See Comments)     Patient cannot recall what she had, only that she could not tolerate the Augmentin    Tramadol Itching     Tegretol [carbamazepine] Itching and Rash    Vicodin [hydrocodone-acetaminophen] Itching and Anxiety       Tobacco, alcohol, drugs:  Social History     Tobacco Use   Smoking Status Never Smoker   Smokeless Tobacco Never Used     Social History     Substance and Sexual Activity   Alcohol Use No     Social History     Substance and Sexual Activity   Drug Use No       ROS:  Review of Systems   All other systems reviewed and are negative.      Vitals:  Temp: 98.3 °F (36.8 °C) (03/01/20 1106)  Pulse: 96 (03/01/20 1606)  Resp: 17 (03/01/20 1606)  BP: 127/80 (03/01/20 1606)  SpO2: 100 % (03/01/20 1606)  Temp:  [98.3 °F (36.8 °C)]   Pulse:  []   Resp:  [17-19]   BP: (124-134)/(68-80)   SpO2:  [98 %-100 %]     Ins/Outs:  No intake or output data in the 24 hours ending 03/01/20 1709    PE:  Physical Exam   GEN: Alert and oriented in NAD  NECK: + JVD appreciated to jaw  CVS: RRR, s1/s2, no MRG  PULM: slight rales  ABD: NT/ND BS +  Extremities: warm and dry, palpable pulses, moderate edema  NEURO: Alert and oriented x 3  PSYCH: appropriate affect.         Labs:  CBC with Diff:   Recent Labs   Lab 03/01/20  1211   WBC 6.09   HGB 11.3*   HCT 36.6*      LYMPH 24.5  1.5   MONO 5.1  0.3   EOSINOPHIL 4.3       COAG:  Recent Labs   Lab 03/01/20  1211   INR 1.1       CMP:   Recent Labs   Lab 03/01/20  1211   *   CALCIUM 10.3   ALBUMIN 4.1   PROT 6.9      K 4.1   CO2 20*      BUN 29*   CREATININE 1.1   ALKPHOS 79   ALT 69*   AST 41*   BILITOT 0.8     Estimated Creatinine Clearance: 49.1 mL/min (based on SCr of 1.1 mg/dL).    .  Recent Labs   Lab 03/01/20  1211   TROPONINI 0.980*   BNP 3,286*         Diagnostic Results:  Ejection Fractions   No results found for: EF     Assessment and Plan  Niki Soriano is a 69 y.o. lady who we are consulted for shortness of breath     Acute decompensated heart failure.   Pt is 69 with multiple comorbidities who presents with 5 weeks of increasing shortness of  breath, weight gain, leg swelling, orthopnea and pnd. On exam BP stable and saturating well. +JVD to jaw, mild crackles, LE edema noted. CXR with cardiomegaly, pulmonary vascular congestion. BNP > 3000, Trop 0.9. Suspect this is all from heart failure.     Plan   Admit to medicine  Diuresis with Lasix 40 mg IV BID  Echo (If EF is depressed would recommend interventional consult for angiogram)  Will likely need a holter monitor on discharge with significant amount of PVCs on EKG, this could also be contributing to her heart failure.  Can trend troponin suspect that it will be flat, however if it significantly increases would start NSTEMI pathway.       Derrell Zelaya MD  Cardiology Fellow PGY-6

## 2020-03-01 NOTE — Clinical Note
Next appt patient is on the wait list for 1 year follow up  Last appt 12/10/18    Refill request for  Adderall 20 mg 1 tablet twice daily  Last refilled info;  5/15/19 #60  Refill unable to be completed per standing protocol due to; controlled medication  Orders pended, and routed to provider for approval.     Catheter is inserted into the left ventricle.

## 2020-03-01 NOTE — SUBJECTIVE & OBJECTIVE
Past Medical History:   Diagnosis Date    Diabetes mellitus     Hypercholesteremia     Hypertension     IC (interstitial cystitis)     Numbness in both hands 2017    Spondylisthesis     Vulvodynia, unspecified        Past Surgical History:   Procedure Laterality Date    CARPAL TUNNEL RELEASE Right 2019    Procedure: RELEASE, CARPAL TUNNEL, REVISION;  Surgeon: Annabelle Cid MD;  Location: [facility] OR [address] [place] [address];  Service: Orthopedics;  Laterality: Right;  Axogen, Clarix     SECTION      x3    CYSTOSCOPY      HYSTERECTOMY      ischemic colitis      OOPHORECTOMY      ME REMOVAL OF OVARY/TUBE(S)         Review of patient's allergies indicates:   Allergen Reactions    Augmentin [amoxicillin-pot clavulanate] Other (See Comments)     Patient cannot recall what she had, only that she could not tolerate the Augmentin    Tramadol Itching    Tegretol [carbamazepine] Itching and Rash    Vicodin [hydrocodone-acetaminophen] Itching and Anxiety       No current facility-administered medications on file prior to encounter.      Current Outpatient Medications on File Prior to Encounter   Medication Sig    atorvastatin (LIPITOR) 40 MG tablet Take 40 mg by mouth once daily.    baclofen (LIORESAL) 20 MG tablet 2 (two) times daily.     duloxetine (CYMBALTA) 30 MG capsule Take 30 mg by mouth once daily.    gabapentin (NEURONTIN) 300 MG capsule Take 1 capsule (300 mg total) by mouth 3 (three) times daily.    LEVOTHYROXINE SODIUM (LEVOTHYROXINE ORAL) Take 50 mcg by mouth before breakfast.     lisinopril (PRINIVIL,ZESTRIL) 40 MG tablet every evening.     metformin (GLUCOPHAGE) 500 MG tablet Take 500 mg by mouth 2 (two) times daily with meals.    methocarbamol (ROBAXIN) 500 MG Tab Take 1 tablet (500 mg total) by mouth 2 (two) times daily as needed (muscle spasms).    oxybutynin (DITROPAN-XL) 5 MG TR24 Take 1 tablet (5 mg total) by mouth once daily.    triamterene-hydrochlorothiazide 37.5-25 mg  (DYAZIDE) 37.5-25 mg per capsule every morning.     estradiol (ESTRACE) 0.01 % (0.1 mg/gram) vaginal cream PLEASE SEE ATTACHED FOR DETAILED DIRECTIONS     Family History     Problem Relation (Age of Onset)    Breast cancer Paternal Aunt        Tobacco Use    Smoking status: Never Smoker    Smokeless tobacco: Never Used   Substance and Sexual Activity    Alcohol use: No    Drug use: No    Sexual activity: Yes     Partners: Male     Review of Systems   Constitutional: Positive for appetite change and unexpected weight change. Negative for fatigue and fever.   HENT: Negative for trouble swallowing and voice change.    Eyes: Negative for photophobia and visual disturbance.   Respiratory: Positive for cough and wheezing. Negative for shortness of breath.    Cardiovascular: Negative for chest pain, palpitations and leg swelling.   Gastrointestinal: Positive for constipation. Negative for diarrhea, nausea and vomiting.   Endocrine: Negative for polydipsia, polyphagia and polyuria.   Genitourinary: Positive for difficulty urinating (chronic). Negative for dysuria and frequency.   Musculoskeletal: Negative for arthralgias and myalgias.   Skin: Negative for rash and wound.   Neurological: Positive for weakness (chronic b/l lower extremity weakness). Negative for tremors.   Hematological: Negative for adenopathy. Does not bruise/bleed easily.   Psychiatric/Behavioral: Negative for dysphoric mood. The patient is not nervous/anxious.      Objective:     Vital Signs (Most Recent):  Temp: 98.3 °F (36.8 °C) (03/01/20 1106)  Pulse: 96 (03/01/20 1606)  Resp: 17 (03/01/20 1606)  BP: 127/80 (03/01/20 1606)  SpO2: 100 % (03/01/20 1606) Vital Signs (24h Range):  Temp:  [98.3 °F (36.8 °C)] 98.3 °F (36.8 °C)  Pulse:  [] 96  Resp:  [17-19] 17  SpO2:  [98 %-100 %] 100 %  BP: (124-134)/(68-80) 127/80     Weight: 76.7 kg (169 lb)  Body mass index is 29.01 kg/m².    Physical Exam   Constitutional: She is oriented to person, place,  and time. She appears well-developed and well-nourished.   HENT:   Head: Normocephalic and atraumatic.   Eyes: Pupils are equal, round, and reactive to light. EOM are normal.   Neck: Normal range of motion. Neck supple. No tracheal deviation present. No thyromegaly present.   Cardiovascular: Regular rhythm. Tachycardia present.   No murmur heard.  Pulmonary/Chest: Effort normal. She has wheezes in the left lower field.   Abdominal: Soft. Bowel sounds are normal. There is no tenderness.   Musculoskeletal: Normal range of motion. She exhibits edema (trace b/l feet and ankles). She exhibits no tenderness.   Lymphadenopathy:     She has no cervical adenopathy.   Neurological: She is alert and oriented to person, place, and time. She has normal reflexes. No cranial nerve deficit.   Skin: Skin is warm and dry.   Psychiatric: She has a normal mood and affect. Her behavior is normal.   Nursing note and vitals reviewed.        CRANIAL NERVES     CN III, IV, VI   Pupils are equal, round, and reactive to light.  Extraocular motions are normal.        Significant Labs: All pertinent labs within the past 24 hours have been reviewed.    Significant Imaging: I have reviewed all pertinent imaging results/findings within the past 24 hours.

## 2020-03-01 NOTE — H&P
"[facility] Hospital Medicine  History & Physical    Patient Name: Niki Soriano  MRN: [id number]  Admission Date: 3/1/2020  Attending Physician: Lilia De La Cruz MD   Primary Care Provider: Moises Webster MD    [facility] Medicine Team: [facility] MED Y Evelin Knight PA-C     Patient information was obtained from patient, past medical records and ER records.     Subjective:     Principal Problem:New onset of congestive heart failure    Chief Complaint:   Chief Complaint   Patient presents with    Shortness of Breath        HPI: 69 y.o. 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  Pt reports increasing SOB over the past several weeks.  Pt reports being prescribed lasix about 2 weeks ago, she took daily for a week with improvement in symptoms.  She cannot recall the dose she was prescribed.  In the past week she has gained 8 lbs.  She usually sleeps flat on her back with a robe rolled up instead of a pillow but recently has had to sleep on her side due to difficulty breathing.  She also reports burning abdominal pain, decreased appetite, b/l lower extremity edema, dry cough, constipation.  She currently denies recent fever, chills, diaphoresis, chest pain or pressure, sick contacts, syncope, dizziness, lightheadedness.  Pt does report having URI ~1 week ago.    In the ED, BNP 3286, troponin 0.980, d dimer 1.5, CTA negative for PE, pleural effusion R>L, BUN 29, Cr 1.1, AST 41, ALT 69, normal t bili, normal lipase.  UA unremarkable.    Past Medical History:   Diagnosis Date    Diabetes mellitus     Hypercholesteremia     Hypertension     IC (interstitial cystitis)     Numbness in both hands 11/1/2017    Spondylisthesis     Vulvodynia, " unspecified        Past Surgical History:   Procedure Laterality Date    CARPAL TUNNEL RELEASE Right 2019    Procedure: RELEASE, CARPAL TUNNEL, REVISION;  Surgeon: Annabelle Cid MD;  Location: [facility] OR [address] [place] [address];  Service: Orthopedics;  Laterality: Right;  Ingris, [place]     SECTION      x3    CYSTOSCOPY      HYSTERECTOMY      ischemic colitis      OOPHORECTOMY      SD REMOVAL OF OVARY/TUBE(S)         Review of patient's allergies indicates:   Allergen Reactions    Augmentin [amoxicillin-pot clavulanate] Other (See Comments)     Patient cannot recall what she had, only that she could not tolerate the Augmentin    Tramadol Itching    Tegretol [carbamazepine] Itching and Rash    Vicodin [hydrocodone-acetaminophen] Itching and Anxiety       No current facility-administered medications on file prior to encounter.      Current Outpatient Medications on File Prior to Encounter   Medication Sig    atorvastatin (LIPITOR) 40 MG tablet Take 40 mg by mouth once daily.    baclofen (LIORESAL) 20 MG tablet 2 (two) times daily.     duloxetine (CYMBALTA) 30 MG capsule Take 30 mg by mouth once daily.    gabapentin (NEURONTIN) 300 MG capsule Take 1 capsule (300 mg total) by mouth 3 (three) times daily.    LEVOTHYROXINE SODIUM (LEVOTHYROXINE ORAL) Take 50 mcg by mouth before breakfast.     lisinopril (PRINIVIL,ZESTRIL) 40 MG tablet every evening.     metformin (GLUCOPHAGE) 500 MG tablet Take 500 mg by mouth 2 (two) times daily with meals.    methocarbamol (ROBAXIN) 500 MG Tab Take 1 tablet (500 mg total) by mouth 2 (two) times daily as needed (muscle spasms).    oxybutynin (DITROPAN-XL) 5 MG TR24 Take 1 tablet (5 mg total) by mouth once daily.    triamterene-hydrochlorothiazide 37.5-25 mg (DYAZIDE) 37.5-25 mg per capsule every morning.     estradiol (ESTRACE) 0.01 % (0.1 mg/gram) vaginal cream PLEASE SEE ATTACHED FOR DETAILED DIRECTIONS     Family History     Problem Relation (Age of Onset)     Breast cancer Paternal Aunt        Tobacco Use    Smoking status: Never Smoker    Smokeless tobacco: Never Used   Substance and Sexual Activity    Alcohol use: No    Drug use: No    Sexual activity: Yes     Partners: Male     Review of Systems   Constitutional: Positive for appetite change and unexpected weight change. Negative for fatigue and fever.   HENT: Negative for trouble swallowing and voice change.    Eyes: Negative for photophobia and visual disturbance.   Respiratory: Positive for cough and wheezing. Negative for shortness of breath.    Cardiovascular: Negative for chest pain, palpitations and leg swelling.   Gastrointestinal: Positive for constipation. Negative for diarrhea, nausea and vomiting.   Endocrine: Negative for polydipsia, polyphagia and polyuria.   Genitourinary: Positive for difficulty urinating (chronic). Negative for dysuria and frequency.   Musculoskeletal: Negative for arthralgias and myalgias.   Skin: Negative for rash and wound.   Neurological: Positive for weakness (chronic b/l lower extremity weakness). Negative for tremors.   Hematological: Negative for adenopathy. Does not bruise/bleed easily.   Psychiatric/Behavioral: Negative for dysphoric mood. The patient is not nervous/anxious.      Objective:     Vital Signs (Most Recent):  Temp: 98.3 °F (36.8 °C) (03/01/20 1106)  Pulse: 96 (03/01/20 1606)  Resp: 17 (03/01/20 1606)  BP: 127/80 (03/01/20 1606)  SpO2: 100 % (03/01/20 1606) Vital Signs (24h Range):  Temp:  [98.3 °F (36.8 °C)] 98.3 °F (36.8 °C)  Pulse:  [] 96  Resp:  [17-19] 17  SpO2:  [98 %-100 %] 100 %  BP: (124-134)/(68-80) 127/80     Weight: 76.7 kg (169 lb)  Body mass index is 29.01 kg/m².    Physical Exam   Constitutional: She is oriented to person, place, and time. She appears well-developed and well-nourished.   HENT:   Head: Normocephalic and atraumatic.   Eyes: Pupils are equal, round, and reactive to light. EOM are normal.   Neck: Normal range of motion. Neck  "supple. No tracheal deviation present. No thyromegaly present.   Cardiovascular: Regular rhythm. Tachycardia present.   No murmur heard.  Pulmonary/Chest: Effort normal. She has wheezes in the left lower field.   Abdominal: Soft. Bowel sounds are normal. There is no tenderness.   Musculoskeletal: Normal range of motion. She exhibits edema (trace b/l feet and ankles). She exhibits no tenderness.   Lymphadenopathy:     She has no cervical adenopathy.   Neurological: She is alert and oriented to person, place, and time. She has normal reflexes. No cranial nerve deficit.   Skin: Skin is warm and dry.   Psychiatric: She has a normal mood and affect. Her behavior is normal.   Nursing note and vitals reviewed.        CRANIAL NERVES     CN III, IV, VI   Pupils are equal, round, and reactive to light.  Extraocular motions are normal.        Significant Labs: All pertinent labs within the past 24 hours have been reviewed.    Significant Imaging: I have reviewed all pertinent imaging results/findings within the past 24 hours.    Assessment/Plan:     * New onset of congestive heart failure  No known history.  No echo on file.  She does report upper respiratory infection ~1 week ago.  - BNP:  3286 CTA: Small bibasilar pleural effusions mild associated atelectasis, right greater than left.  -Echo pending  - O2 Sats 100% on RA  - Daily weights  - Strict I&O's   - Low salt cardiac diet; 1500mL fluid restriction  - Cardiac monitoring  - Cardiology consult as new onset CHF and elevated troponin.  Will trend troponin.            Leg weakness, bilateral  Incomplete emptying of bladder  Pt self caths and chronic weakness following "spinal cord infection" in 1996    Hypothyroidism  TSH add on  Continue lt4 50 mcg daily      Type 2 diabetes mellitus with neurologic complication  Home regimen: metformin  Recent HgbA1c none  Hospital regimen: low dose correction scale  Hyper/hypoglycemia protocols in place  Diabetic Diet          VTE Risk " Mitigation (From admission, onward)         Ordered     enoxaparin injection 40 mg  Daily      03/01/20 1622     Place RANJIT hose  Until discontinued      03/01/20 1622     Place sequential compression device  Until discontinued      03/01/20 1622     IP VTE HIGH RISK PATIENT  Once      03/01/20 1622                   Evelin Knight PA-C  Department of Hospital Medicine   [facility]

## 2020-03-01 NOTE — ASSESSMENT & PLAN NOTE
Home regimen: metformin  Recent HgbA1c none  Hospital regimen: low dose correction scale  Hyper/hypoglycemia protocols in place  Diabetic Diet

## 2020-03-01 NOTE — ED NOTES
Patient identifiers verified and correct for Bo Prince. Pt arrives with complaint of shortness of breath and abd pain. Pt reports SOB started 5 weeks ago, gotten worse last night. Pt denies cardiac hx. Pt reports 20 years ago pt had episode of asthma. Pt reports abd pain started 2 weeks ago, gotten worse last night. Pt reports last bowel movement this morning. Denies n/v/d. Denies chest pain    LOC: The patient is awake, alert and aware of environment with an appropriate affect, the patient is oriented x 3 and speaking appropriately.   APPEARANCE: Patient appears comfortable and in no acute distress, patient is clean and well groomed.  SKIN: The skin is warm and dry, color consistent with ethnicity, patient has normal skin turgor and moist mucus membranes, skin intact, no breakdown or bruising noted.   MUSCULOSKELETAL: Patient unable to walk at baseline, uses wheelchair due to spinal hx.   RESPIRATORY: Airway is open and patent, respirations are spontaneous, patient has a normal effort and rate, no accessory muscle use noted, pt placed on continuous pulse ox with O2 sats noted at 97% on room air.  CARDIAC: Pt placed on cardiac monitor. Patient has a normal rate and regular rhythm, no edema noted, capillary refill < 3 seconds.   GASTRO: Soft and non tender to palpation, no distention noted, normoactive bowel sounds present in all four quadrants. Pt states bowel movements have been regular.  : Pt denies any pain or frequency with urination.  NEURO: Pt opens eyes spontaneously, behavior appropriate to situation, follows commands, facial expression symmetrical, bilateral hand grasp equal and even, purposeful motor response noted, normal sensation in all extremities when touched with a finger.

## 2020-03-01 NOTE — Clinical Note
40 ml injected throughout the case. 160 mL total wasted during the case. 200 mL total used in the case.

## 2020-03-01 NOTE — HPI
"69 y.o. 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  Pt reports increasing SOB over the past several weeks.  Pt reports being prescribed lasix about 2 weeks ago, she took daily for a week with improvement in symptoms.  She cannot recall the dose she was prescribed.  In the past week she has gained 8 lbs.  She usually sleeps flat on her back with a robe rolled up instead of a pillow but recently has had to sleep on her side due to difficulty breathing.  She also reports burning abdominal pain, decreased appetite, b/l lower extremity edema, dry cough, constipation.  She currently denies recent fever, chills, diaphoresis, chest pain or pressure, sick contacts, syncope, dizziness, lightheadedness.  Pt does report having URI ~1 week ago.    In the ED, BNP 3286, troponin 0.980, d dimer 1.5, CTA negative for PE, pleural effusion R>L, BUN 29, Cr 1.1, AST 41, ALT 69, normal t bili, normal lipase.  UA unremarkable.  "

## 2020-03-01 NOTE — ED PROVIDER NOTES
"Encounter Date: 3/1/2020    SCRIBE #1 NOTE: IShilpi, xander scribing for, and in the presence of, Lilia De La Cruz MD. the EKG reading. Other sections scribed: HPI, ROS, PE, MDM.       History     Chief Complaint   Patient presents with    Shortness of Breath     Time patient was seen by the provider: 11:41 AM      The patient is a 69 y.o. female with co-morbidities including: DM, HTN, and hypercholesteremia, who presents to the ED with a complaint of intermittent SOB for the past few weeks. Patient's associated symptoms are burning abdominal pain, decreased appetite, weight gain, bilateral lower extremity edema, and dry cough. Patient's abdominal pain and SOB were the worst last night, and patient was unable to sleep well last night due to these symptoms. She states that her shortness of breath is worse with lying flat.  Patient presented to the ED in January 2020 for neck pain, and she asked her pain management nurse if her SOB could be caused by her neck pain. Patient's SOB is worsened when lays down, and is relieved when she sits up.  She has also had some upper abdominal pain and this is not  worsened when she takes a deep breath. Patient's weight changed from 165 to 173 in approximately a week. Patient was prescribed Lasix for her bilateral lower extremity edema by her PCP a few weeks ago which helped with her edema temporary early but it has returned.     Patient reports that she is not consuming as much water as she usually does. Patient has difficulty with constipation. Patient uses a catheter in order to urinate. Patient requires a walker to ambulate. 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 Patient denies fevers, chills, dysuria, trouble defecating, and chest pain.     The history is provided by the patient, the spouse and medical records.     Review of patient's allergies " indicates:   Allergen Reactions    Augmentin [amoxicillin-pot clavulanate] Other (See Comments)     Patient cannot recall what she had, only that she could not tolerate the Augmentin    Tramadol Itching    Tegretol [carbamazepine] Itching and Rash    Vicodin [hydrocodone-acetaminophen] Itching and Anxiety     Past Medical History:   Diagnosis Date    Diabetes mellitus     Hypercholesteremia     Hypertension     IC (interstitial cystitis)     Numbness in both hands 2017    Spondylisthesis     Vulvodynia, unspecified      Past Surgical History:   Procedure Laterality Date    CARPAL TUNNEL RELEASE Right 2019    Procedure: RELEASE, CARPAL TUNNEL, REVISION;  Surgeon: Annabelle Cid MD;  Location: [facility] OR [address] [place] [address];  Service: Orthopedics;  Laterality: Right;  Axogen, Clarix     SECTION      x3    CYSTOSCOPY      HYSTERECTOMY      ischemic colitis      OOPHORECTOMY      HI REMOVAL OF OVARY/TUBE(S)       Family History   Problem Relation Age of Onset    Breast cancer Paternal Aunt     Colon cancer Neg Hx     Ovarian cancer Neg Hx      Social History     Tobacco Use    Smoking status: Never Smoker    Smokeless tobacco: Never Used   Substance Use Topics    Alcohol use: No    Drug use: No     Review of Systems   Constitutional: Positive for appetite change (decreased)  and unexpected weight change (increased) . Negative for chills and fever.   HENT: Negative for sore throat.    Respiratory: Positive for cough (dry) and shortness of breath.    Cardiovascular: Positive for leg swelling (bilateral) . Negative for chest pain.   Gastrointestinal: Positive for abdominal pain. Negative for nausea.        Negative for difficultly defecating.    Genitourinary: Negative for dysuria.   Musculoskeletal: Negative for back pain.        Negative for trouble defecating.    Skin: Negative for rash.   Neurological: Negative for weakness.   Hematological: Does not bruise/bleed easily.        Physical Exam     Initial Vitals [03/01/20 1106]   BP Pulse Resp Temp SpO2   134/77 109 18 98.3 °F (36.8 °C) 100 %      MAP       --         Physical Exam    Nursing note and vitals reviewed.  Constitutional: No distress.   Patient was visibly dyspneic from wheelchair to examination bench.     HENT:   Head: Normocephalic and atraumatic.   Right Ear: External ear normal.   Left Ear: External ear normal.   Mouth/Throat: Oropharynx is clear and moist.   Eyes: EOM are normal. Pupils are equal, round, and reactive to light.   Neck: Normal range of motion. Neck supple.   Cardiovascular: Regular rhythm and normal heart sounds. Exam reveals no gallop and no friction rub.    No murmur heard.  Tachycardic.    Pulmonary/Chest: She has wheezes. She has no rhonchi. She has no rales.   Faint inspiratory and expiratory coarse wheezing at left base.    Abdominal: Soft. She exhibits no distension. There is no tenderness.   Musculoskeletal: Normal range of motion. She exhibits edema.   Trace edema of bilateral feet and ankles, left greater than right. Decreased capillary refill of her bilateral lower extremities.    Neurological: She is alert and oriented to person, place, and time. No cranial nerve deficit or sensory deficit.   Slightly decreased bilateral lower extremity strength, left than right.   Skin: Skin is warm and dry.   Psychiatric: Her behavior is normal. Thought content normal.         ED Course   Procedures  Labs Reviewed   CBC W/ AUTO DIFFERENTIAL - Abnormal; Notable for the following components:       Result Value    RBC 3.92 (*)     Hemoglobin 11.3 (*)     Hematocrit 36.6 (*)     Mean Corpuscular Hemoglobin Conc 30.9 (*)     All other components within normal limits   COMPREHENSIVE METABOLIC PANEL - Abnormal; Notable for the following components:    CO2 20 (*)     Glucose 120 (*)     BUN, Bld 29 (*)     AST 41 (*)     ALT 69 (*)     eGFR if  59.6 (*)     eGFR if non  51.7 (*)      All other components within normal limits   TROPONIN I - Abnormal; Notable for the following components:    Troponin I 0.980 (*)     All other components within normal limits   B-TYPE NATRIURETIC PEPTIDE - Abnormal; Notable for the following components:    BNP 3,286 (*)     All other components within normal limits   D DIMER, QUANTITATIVE - Abnormal; Notable for the following components:    D-Dimer 1.50 (*)     All other components within normal limits   URINALYSIS, REFLEX TO URINE CULTURE - Abnormal; Notable for the following components:    Appearance, UA Hazy (*)     Leukocytes, UA Trace (*)     All other components within normal limits    Narrative:     Preferred Collection Type->Urine, Clean Catch   URINALYSIS MICROSCOPIC - Abnormal; Notable for the following components:    WBC, UA 7 (*)     All other components within normal limits    Narrative:     Preferred Collection Type->Urine, Clean Catch   TSH - Abnormal; Notable for the following components:    TSH 4.672 (*)     All other components within normal limits    Narrative:     ADD ON TSH PER [facility]/ORDER# [id number] @ 16:20 3/1/2020  LIPAS added per Dr. Anastacia Ma, order ID [id number] 03/01/20 13:17   PROTIME-INR   LIPASE   LIPASE    Narrative:     LIPAS added per Dr. Anastacia Ma, order ID [id number] 03/01/20 13:17   TSH   TROPONIN I   TSH   LIPID PANEL   T4, FREE   IRON   T4, FREE     EKG Readings: (Independently Interpreted)   Normal sinus rhythm. 86 bpm. Frequent PVCs. T-wave inversions in inferior leads and V5 and V6. When compared to EKG from 1/26/2020, this is changed.        Imaging Results          CTA Chest Non-Coronary (PE Study) (Final result)  Result time 03/01/20 15:31:16    Final result by Elan Meza MD (03/01/20 15:31:16)                 Impression:      1. No evidence of pulmonary embolism.  2. Small bibasilar pleural effusions mild associated atelectasis, right greater than left.  3. Cardiomegaly with minimal ground-glass  changes.  Minimal pulmonary edema is a consideration.      Electronically signed by: Elan Rudd  Date:    03/01/2020  Time:    15:31             Narrative:    EXAMINATION:  CTA CHEST NON CORONARY    CLINICAL HISTORY:  Chest pain, acute, PE suspected, intermed prob, positive D-dimer;    TECHNIQUE:  Low dose axial images, sagittal and coronal reformations were obtained from the thoracic inlet to the lung bases following the IV administration of 100 mL of Omnipaque 350.  Contrast timing was optimized to evaluate the pulmonary arteries.  MIP images were performed.    COMPARISON:  None    FINDINGS:  Pulmonary arteries are adequately enhanced.  No filling defects are identified to indicate pulmonary embolism.    Aorta is normal in caliber with no evidence of aneurysm or dissection.    The heart is mildly.  Minimal ground-glass changes may be associated with minimal pulmonary edema.    Small bibasilar pleural effusions, right greater than left.  No pericardial effusion.    No evidence of pneumothorax.    No mass or lobar consolidation.    Mild bibasilar atelectasis.    No acute osseous abnormality.                               X-Ray Chest PA And Lateral (Final result)  Result time 03/01/20 13:09:18    Final result by Servando Lofton MD (03/01/20 13:09:18)                 Impression:      As above      Electronically signed by: Servando Lofton MD  Date:    03/01/2020  Time:    13:09             Narrative:    EXAMINATION:  XR CHEST PA AND LATERAL    CLINICAL HISTORY:  Shortness of breath    TECHNIQUE:  PA and lateral views of the chest were performed.    COMPARISON:  None    FINDINGS:  Cardiac silhouette is enlarged.    There is diffuse prominence of the bronchovascular markings and pulmonary interstitium suggesting mild vascular congestive changes.  No focal parenchymal consolidation or definite pleural effusion.  No pneumothorax visualized.  Multilevel degenerative findings noted throughout the visualized  spine.                                 Medical Decision Making:   History:   Old Medical Records: I decided to obtain old medical records.  Old Records Summarized: records from previous admission(s) and records from clinic visits.       <> Summary of Records: Patient's last ED visit was 1/26/2020 for acute exasperation of her chronic neck pain. Patient follows with pain management, and her last appointment with them was 2/13/2020.   Initial Assessment:   68 y/o female with history of chronic pain and urinary retention and lower extremity weakness with SOB and abdominal pain that has been gradually worsening. Patient also reports weight gain.  Differential Diagnosis:   Differential diagnosis includes but is not limited to: CHF, PE, PNA, renal failure, pancreatitis, and acute colicystitis. It is possible given her history of spinal cord issues that her abdominal sensation may be altered and her SOB may be a result of her abdominal pain.       Independently Interpreted Test(s):   I have ordered and independently interpreted X-rays - see prior notes.  I have ordered and independently interpreted EKG Reading(s) - see prior notes  Clinical Tests:   Lab Tests: Ordered and Reviewed  Radiological Study: Ordered and Reviewed  Medical Tests: Ordered and Reviewed  ED Management:  Will check lipase and LFTs. Will also check D-dimer to rule out blood clot. Will check CXR to rule out PNA. EKG shows ectopy and T wave inversions. Will check trop and BNP.             Scribe Attestation:   Scribe #1: I performed the above scribed service and the documentation accurately describes the services I performed. I attest to the accuracy of the note.    Attending Attestation:             Attending ED Notes:   4:37 PM  Given the patient's EKG changes, elevated troponin, and significantly elevated BNP, I am concerned about the possibility of ischemia as a cause of her new onset CHF.  I have discussed the case with cardiology and requested that  they see the patient prior to admit Internal Medicine.  Have discussed the case with Dr. Lake, [facility] who has agreed to admit the patient.  I have advised the patient that her PE study shows no evidence of PE and I have also advised her of my concern for new onset CHF.  She and her  have expressed understanding.                        Clinical Impression:       ICD-10-CM ICD-9-CM   1. Shortness of breath R06.02 786.05   2. SOB (shortness of breath) R06.02 786.05   3. New onset of congestive heart failure I50.9 428.0   4.  Elevated troponin.          ED Disposition Condition    Observation                           Lilia De La Cruz MD  03/01/20 5446

## 2020-03-02 PROBLEM — N17.9 AKI (ACUTE KIDNEY INJURY): Status: ACTIVE | Noted: 2020-03-02

## 2020-03-02 LAB
ADENOVIRUS: NOT DETECTED
ANION GAP SERPL CALC-SCNC: 13 MMOL/L (ref 8–16)
ASCENDING AORTA: 2.84 CM
AV INDEX (PROSTH): 0.81
AV MEAN GRADIENT: 6 MMHG
AV PEAK GRADIENT: 12 MMHG
AV VALVE AREA: 2.52 CM2
AV VELOCITY RATIO: 0.71
BORDETELLA PARAPERTUSSIS (IS1001): NOT DETECTED
BORDETELLA PERTUSSIS (PTXP): NOT DETECTED
BSA FOR ECHO PROCEDURE: 1.82 M2
BUN SERPL-MCNC: 36 MG/DL (ref 8–23)
CALCIUM SERPL-MCNC: 9.9 MG/DL (ref 8.7–10.5)
CHLAMYDIA PNEUMONIAE: NOT DETECTED
CHLORIDE SERPL-SCNC: 102 MMOL/L (ref 95–110)
CO2 SERPL-SCNC: 23 MMOL/L (ref 23–29)
CORONAVIRUS 229E, COMMON COLD VIRUS: NOT DETECTED
CORONAVIRUS HKU1, COMMON COLD VIRUS: NOT DETECTED
CORONAVIRUS NL63, COMMON COLD VIRUS: NOT DETECTED
CORONAVIRUS OC43, COMMON COLD VIRUS: NOT DETECTED
CREAT SERPL-MCNC: 1.5 MG/DL (ref 0.5–1.4)
CV ECHO LV RWT: 0.25 CM
DOP CALC AO PEAK VEL: 1.72 M/S
DOP CALC AO VTI: 20.6 CM
DOP CALC LVOT AREA: 3.1 CM2
DOP CALC LVOT DIAMETER: 1.99 CM
DOP CALC LVOT PEAK VEL: 1.22 M/S
DOP CALC LVOT STROKE VOLUME: 51.91 CM3
DOP CALCLVOT PEAK VEL VTI: 16.7 CM
E WAVE DECELERATION TIME: 154.91 MSEC
E/A RATIO: 2.47
E/E' RATIO: 29.2 M/S
ECHO LV POSTERIOR WALL: 0.65 CM (ref 0.6–1.1)
EST. GFR  (AFRICAN AMERICAN): 41 ML/MIN/1.73 M^2
EST. GFR  (NON AFRICAN AMERICAN): 35.5 ML/MIN/1.73 M^2
ESTIMATED AVG GLUCOSE: 120 MG/DL (ref 68–131)
FLUBV RNA NPH QL NAA+NON-PROBE: NOT DETECTED
FRACTIONAL SHORTENING: 13 % (ref 28–44)
GLUCOSE SERPL-MCNC: 99 MG/DL (ref 70–110)
HAV IGM SERPL QL IA: NEGATIVE
HBA1C MFR BLD HPLC: 5.8 % (ref 4–5.6)
HBV CORE IGM SERPL QL IA: NEGATIVE
HBV SURFACE AG SERPL QL IA: NEGATIVE
HCV AB SERPL QL IA: NEGATIVE
HPIV1 RNA NPH QL NAA+NON-PROBE: NOT DETECTED
HPIV2 RNA NPH QL NAA+NON-PROBE: NOT DETECTED
HPIV3 RNA NPH QL NAA+NON-PROBE: NOT DETECTED
HPIV4 RNA NPH QL NAA+NON-PROBE: NOT DETECTED
HUMAN METAPNEUMOVIRUS: NOT DETECTED
INFLUENZA A (SUBTYPES H1,H1-2009,H3): NOT DETECTED
INTERVENTRICULAR SEPTUM: 0.79 CM (ref 0.6–1.1)
LA MAJOR: 4.65 CM
LA MINOR: 4.41 CM
LA WIDTH: 4.14 CM
LEFT ATRIUM SIZE: 4 CM
LEFT ATRIUM VOLUME INDEX: 35.7 ML/M2
LEFT ATRIUM VOLUME: 63.72 CM3
LEFT INTERNAL DIMENSION IN SYSTOLE: 4.54 CM (ref 2.1–4)
LEFT VENTRICLE DIASTOLIC VOLUME INDEX: 58.98 ML/M2
LEFT VENTRICLE DIASTOLIC VOLUME: 105.21 ML
LEFT VENTRICLE MASS INDEX: 71 G/M2
LEFT VENTRICLE SYSTOLIC VOLUME INDEX: 39.1 ML/M2
LEFT VENTRICLE SYSTOLIC VOLUME: 69.74 ML
LEFT VENTRICULAR INTERNAL DIMENSION IN DIASTOLE: 5.2 CM (ref 3.5–6)
LEFT VENTRICULAR MASS: 127.19 G
LV LATERAL E/E' RATIO: 24.33 M/S
LV SEPTAL E/E' RATIO: 36.5 M/S
MAGNESIUM SERPL-MCNC: 1.4 MG/DL (ref 1.6–2.6)
MV PEAK A VEL: 0.59 M/S
MV PEAK E VEL: 1.46 M/S
MYCOPLASMA PNEUMONIAE: NOT DETECTED
PHOSPHATE SERPL-MCNC: 4.4 MG/DL (ref 2.7–4.5)
PISA TR MAX VEL: 3.01 M/S
POCT GLUCOSE: 100 MG/DL (ref 70–110)
POCT GLUCOSE: 153 MG/DL (ref 70–110)
POCT GLUCOSE: 92 MG/DL (ref 70–110)
POTASSIUM SERPL-SCNC: 3.5 MMOL/L (ref 3.5–5.1)
PULM VEIN S/D RATIO: 0.63
PV PEAK D VEL: 0.86 M/S
PV PEAK S VEL: 0.54 M/S
RA MAJOR: 4.5 CM
RA PRESSURE: 8 MMHG
RA WIDTH: 3.03 CM
RESPIRATORY INFECTION PANEL SOURCE: ABNORMAL
RIGHT VENTRICULAR END-DIASTOLIC DIMENSION: 4.01 CM
RSV RNA NPH QL NAA+NON-PROBE: NOT DETECTED
RV TISSUE DOPPLER FREE WALL SYSTOLIC VELOCITY 1 (APICAL 4 CHAMBER VIEW): 8.79 CM/S
RV+EV RNA NPH QL NAA+NON-PROBE: DETECTED
SINUS: 2.86 CM
SODIUM SERPL-SCNC: 138 MMOL/L (ref 136–145)
STJ: 2.41 CM
TDI LATERAL: 0.06 M/S
TDI SEPTAL: 0.04 M/S
TDI: 0.05 M/S
TR MAX PG: 36 MMHG
TRICUSPID ANNULAR PLANE SYSTOLIC EXCURSION: 1.71 CM
TROPONIN I SERPL DL<=0.01 NG/ML-MCNC: 0.84 NG/ML (ref 0–0.03)
TV REST PULMONARY ARTERY PRESSURE: 44 MMHG

## 2020-03-02 PROCEDURE — 96375 TX/PRO/DX INJ NEW DRUG ADDON: CPT

## 2020-03-02 PROCEDURE — 83735 ASSAY OF MAGNESIUM: CPT

## 2020-03-02 PROCEDURE — 97802 MEDICAL NUTRITION INDIV IN: CPT

## 2020-03-02 PROCEDURE — 63600175 PHARM REV CODE 636 W HCPCS: Performed by: INTERNAL MEDICINE

## 2020-03-02 PROCEDURE — 94761 N-INVAS EAR/PLS OXIMETRY MLT: CPT

## 2020-03-02 PROCEDURE — 25000003 PHARM REV CODE 250: Performed by: PHYSICIAN ASSISTANT

## 2020-03-02 PROCEDURE — 11000001 HC ACUTE MED/SURG PRIVATE ROOM

## 2020-03-02 PROCEDURE — 87798 DETECT AGENT NOS DNA AMP: CPT

## 2020-03-02 PROCEDURE — 83036 HEMOGLOBIN GLYCOSYLATED A1C: CPT

## 2020-03-02 PROCEDURE — 80048 BASIC METABOLIC PNL TOTAL CA: CPT

## 2020-03-02 PROCEDURE — 80074 ACUTE HEPATITIS PANEL: CPT

## 2020-03-02 PROCEDURE — 96376 TX/PRO/DX INJ SAME DRUG ADON: CPT

## 2020-03-02 PROCEDURE — 84100 ASSAY OF PHOSPHORUS: CPT

## 2020-03-02 PROCEDURE — 36415 COLL VENOUS BLD VENIPUNCTURE: CPT

## 2020-03-02 PROCEDURE — 63600175 PHARM REV CODE 636 W HCPCS: Performed by: PHYSICIAN ASSISTANT

## 2020-03-02 PROCEDURE — 25000003 PHARM REV CODE 250: Performed by: INTERNAL MEDICINE

## 2020-03-02 RX ORDER — POTASSIUM CHLORIDE 20 MEQ/15ML
40 SOLUTION ORAL ONCE
Status: COMPLETED | OUTPATIENT
Start: 2020-03-02 | End: 2020-03-02

## 2020-03-02 RX ORDER — BENZONATATE 100 MG/1
100 CAPSULE ORAL 3 TIMES DAILY PRN
Status: DISCONTINUED | OUTPATIENT
Start: 2020-03-02 | End: 2020-03-06 | Stop reason: HOSPADM

## 2020-03-02 RX ORDER — MAGNESIUM SULFATE HEPTAHYDRATE 40 MG/ML
2 INJECTION, SOLUTION INTRAVENOUS ONCE
Status: COMPLETED | OUTPATIENT
Start: 2020-03-02 | End: 2020-03-02

## 2020-03-02 RX ADMIN — METHOCARBAMOL TABLETS 500 MG: 500 TABLET, COATED ORAL at 04:03

## 2020-03-02 RX ADMIN — ASPIRIN 81 MG CHEWABLE TABLET 81 MG: 81 TABLET CHEWABLE at 10:03

## 2020-03-02 RX ADMIN — GABAPENTIN 300 MG: 300 CAPSULE ORAL at 09:03

## 2020-03-02 RX ADMIN — FUROSEMIDE 40 MG: 10 INJECTION, SOLUTION INTRAMUSCULAR; INTRAVENOUS at 06:03

## 2020-03-02 RX ADMIN — OXYBUTYNIN CHLORIDE 5 MG: 5 TABLET, EXTENDED RELEASE ORAL at 10:03

## 2020-03-02 RX ADMIN — LEVOTHYROXINE SODIUM 50 MCG: 50 TABLET ORAL at 06:03

## 2020-03-02 RX ADMIN — ATORVASTATIN CALCIUM 40 MG: 20 TABLET, FILM COATED ORAL at 10:03

## 2020-03-02 RX ADMIN — BACLOFEN 20 MG: 10 TABLET ORAL at 09:03

## 2020-03-02 RX ADMIN — BACLOFEN 20 MG: 10 TABLET ORAL at 10:03

## 2020-03-02 RX ADMIN — ENOXAPARIN SODIUM 40 MG: 100 INJECTION SUBCUTANEOUS at 05:03

## 2020-03-02 RX ADMIN — BENZONATATE 100 MG: 100 CAPSULE ORAL at 05:03

## 2020-03-02 RX ADMIN — DULOXETINE HYDROCHLORIDE 30 MG: 30 CAPSULE, DELAYED RELEASE ORAL at 10:03

## 2020-03-02 RX ADMIN — POTASSIUM CHLORIDE 40 MEQ: 1.5 SOLUTION ORAL at 10:03

## 2020-03-02 RX ADMIN — MAGNESIUM SULFATE IN WATER 2 G: 40 INJECTION, SOLUTION INTRAVENOUS at 11:03

## 2020-03-02 NOTE — CONSULTS
Food & Nutrition  Education    Diet Education:  Fluid and salt restriction diet   Time Spent: 10 minutes  Learners: patient     Nutrition Education provided with handouts: Heart failure nutrition therapy     Comments: Pt with good PO intake, 100% of meals. States she follows a loose low Na diet at home, but is aware of restrictions. Discussed fluid restriction and sodium restriction, with recommended foods and foods to avoid. Pt states she drinks 4-5 water bottles per day and was not aware of FR but is willing to be compliant. Reviewed handouts on heart failure diet and left at bedside for pt to review. Pt verbalized understanding.   Good PO intake and wt loss ~5 lbs fluid related, no indications of malnutrition at this time.     All questions and concerns answered. Dietitian's contact information provided.       Follow-Up: 3/9/20    Please Re-consult as needed    Thanks!  Monica Pollock RD, LDN

## 2020-03-02 NOTE — ASSESSMENT & PLAN NOTE
RAHUL  No known cardiac history.  No echo on file.  She does report upper respiratory infection ~1 week ago.  - BNP:  3286 CTA: Small bibasilar pleural effusions mild associated atelectasis, right greater than left.  -Echo pending  - O2 Sats 100% on RA  - Daily weights.  3/2 down 8 lbs.  - Strict I&O's   - Low salt cardiac diet; 1500mL fluid restriction  - Cardiac monitoring  - Cardiology consult as new onset CHF and elevated troponin. Troponin trended down.  No CP.  - 3/2 awaiting echo.  Held spironolactone this morning and will discuss with cards lasix dosing for this evening as now with RAHUL.  Cr 1.1->1.5 (baseline 1.0-1.1).

## 2020-03-02 NOTE — ASSESSMENT & PLAN NOTE
"Incomplete emptying of bladder  Pt self caths and chronic weakness following "spinal cord infection" in 1996  Pt reports completing 7 day course of bactrim approximately 2 weeks ago for UTI.  "

## 2020-03-02 NOTE — ASSESSMENT & PLAN NOTE
Home regimen: metformin  Recent HgbA1c 5.8%  Hospital regimen: low dose correction scale  Hyper/hypoglycemia protocols in place  Diabetic Diet

## 2020-03-02 NOTE — SUBJECTIVE & OBJECTIVE
Interval History:  no acute events overnight, no new complaints.  Swelling significantly improved and wt down 8 lbs.  Reviewed change in Cr with patient.    Review of Systems   Constitutional: Positive for unexpected weight change. Negative for appetite change, fatigue and fever.   HENT: Negative for trouble swallowing and voice change.    Eyes: Negative for photophobia and visual disturbance.   Respiratory: Negative for cough, shortness of breath and wheezing.    Cardiovascular: Negative for chest pain, palpitations and leg swelling.   Gastrointestinal: Positive for constipation. Negative for diarrhea, nausea and vomiting.   Endocrine: Negative for polydipsia, polyphagia and polyuria.   Genitourinary: Positive for difficulty urinating (chronic). Negative for dysuria and frequency.   Musculoskeletal: Negative for arthralgias and myalgias.   Skin: Negative for rash and wound.   Neurological: Positive for weakness (chronic b/l lower extremity weakness). Negative for tremors.   Hematological: Negative for adenopathy. Does not bruise/bleed easily.   Psychiatric/Behavioral: Negative for dysphoric mood. The patient is not nervous/anxious.      Objective:     Vital Signs (Most Recent):  Temp: 97.8 °F (36.6 °C) (03/02/20 0822)  Pulse: 76 (03/02/20 1110)  Resp: 18 (03/02/20 0822)  BP: 101/61 (03/02/20 0822)  SpO2: (!) 93 % (03/02/20 0822) Vital Signs (24h Range):  Temp:  [97.1 °F (36.2 °C)-98.4 °F (36.9 °C)] 97.8 °F (36.6 °C)  Pulse:  [] 76  Resp:  [17-22] 18  SpO2:  [93 %-100 %] 93 %  BP: (101-135)/(58-95) 101/61     Weight: 73.5 kg (161 lb 14.9 oz)  Body mass index is 27.79 kg/m².    Intake/Output Summary (Last 24 hours) at 3/2/2020 1138  Last data filed at 3/2/2020 0900  Gross per 24 hour   Intake --   Output 1500 ml   Net -1500 ml      Physical Exam   Constitutional: She is oriented to person, place, and time. She appears well-developed and well-nourished.   HENT:   Head: Normocephalic and atraumatic.   Eyes:  Pupils are equal, round, and reactive to light. EOM are normal.   Neck: Normal range of motion. Neck supple. No tracheal deviation present. No thyromegaly present.   Cardiovascular: Regular rhythm. Tachycardia present.   No murmur heard.  Pulmonary/Chest: Effort normal. She has wheezes in the left lower field.   Abdominal: Soft. Bowel sounds are normal. There is no tenderness.   Musculoskeletal: Normal range of motion. She exhibits edema (trace b/l feet and ankles, improved). She exhibits no tenderness.   Lymphadenopathy:     She has no cervical adenopathy.   Neurological: She is alert and oriented to person, place, and time. She has normal reflexes. No cranial nerve deficit.   Skin: Skin is warm and dry.   Psychiatric: She has a normal mood and affect. Her behavior is normal.   Nursing note and vitals reviewed.    Significant Labs: All pertinent labs within the past 24 hours have been reviewed.    Significant Imaging: I have reviewed all pertinent imaging results/findings within the past 24 hours.

## 2020-03-02 NOTE — PLAN OF CARE
POC reviewed with pt, pt verbalizes understanding. Pt A/O x 4. GCS of 15. Pt reports no pain, n/v/d, HA or SOB. VSS. No fall or acute event occurred on this shift. Pt belongings and call light are within reach, bed rails x 2, bed in lowest position, HOB elevated. NADN. Will continue to monitor.

## 2020-03-02 NOTE — HOSPITAL COURSE
Pt placed in observation for new-onset acute decompensated HF.  BNP>3000, troponin 0.9 trended down, CTA negative for PE.  CXR with cardiomegaly, pulmonary vascular congestion.  CHF pathway initiated.  Given lasix 40 mg IV bid. She is down 10 lbs since admission and euvolemic on exam.  Lasix held evening of 3/2 and 3/3.  Echo demonstrated severe systolic dysfunction with significant wall motion abnormality (EF 2-25%) as well as severe left ventricular diastolic dysfunction consistent with restrictive physiology.  Interventional Cardiology consulted and Left heart cath 3/3 revealing 3 vessel CAD.  Given her significant issues with ambulation (reliant on a walker), severe left ventricular dysfunction, and diffuse severe coronary artery disease which is non-bypassable medical management was recommended by CTS.  General Cardiology would like to pursue viability study to see if PCI for RCA and OM are an option.- Resting PET flows showed apex is likely scar but inferior and lateral walls demonstrate adequate uptake at rest so there may be utility in revascularization of significant RCA and OM lesions after trial of medical therapy. Patient with Klebsiella ESBL (self caths since 1996). ID consulted and recommended not to treat and follow-up urology     Follow up general cards and interventional cards clinic.

## 2020-03-02 NOTE — ED NOTES
Patient on cardiac monitor   Normal Sinus Rhythm, HR 89 with PVC's  Confirmed with Julio Cesar in War Room

## 2020-03-02 NOTE — PLAN OF CARE
03/02/20 1546   Discharge Assessment   Assessment Type Discharge Planning Assessment   Assessment information obtained from? Medical Record   Expected Length of Stay (days) 2   Prior to hospitilization cognitive status: Alert/Oriented   Current cognitive status: Alert/Oriented   Lives With spouse   Able to Return to Prior Arrangements yes   Is patient able to care for self after discharge? Yes   Patient's perception of discharge disposition home or selfcare   Equipment Currently Used at Home none   Discharge Plan A Home   Discharge Plan B Home with family      SW attempted to complete discharge planning at bedside. Patient was not in room on first attempt and asleep on second attempt. SW obtained information from the patient's medical chart. Patient lives at home with his spouse. Patient was independent of ADL's prior to hospital admission. SW will continue to follow and assist with needs as indicated.    Moises Webster MD    [id number] [place], [place]  [facility] [address] [address]  Phone: [phone] Fax: [phone]    Delmis Chen LMSW  [facility]  [id number]

## 2020-03-02 NOTE — PROGRESS NOTES
"[facility] Hospital Medicine  Progress Note    Patient Name: Niki Soriano  MRN: [id number]  Patient Class: OP- Observation   Admission Date: 3/1/2020  Length of Stay: 0 days  Attending Physician: JANET Lake MD  Primary Care Provider: Moises Webster MD    [facility] Medicine Team: [facility] MED Y Evelin Knight PA-C    Subjective:     Principal Problem:New onset of congestive heart failure        HPI:  69 y.o. 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  Pt reports increasing SOB over the past several weeks.  Pt reports being prescribed lasix about 2 weeks ago, she took daily for a week with improvement in symptoms.  She cannot recall the dose she was prescribed.  In the past week she has gained 8 lbs.  She usually sleeps flat on her back with a robe rolled up instead of a pillow but recently has had to sleep on her side due to difficulty breathing.  She also reports burning abdominal pain, decreased appetite, b/l lower extremity edema, dry cough, constipation.  She currently denies recent fever, chills, diaphoresis, chest pain or pressure, sick contacts, syncope, dizziness, lightheadedness.  Pt does report having URI ~1 week ago.    In the ED, BNP 3286, troponin 0.980, d dimer 1.5, CTA negative for PE, pleural effusion R>L, BUN 29, Cr 1.1, AST 41, ALT 69, normal t bili, normal lipase.  UA unremarkable.    Overview/Hospital Course:  Pt placed in observation for acute decompensated HF.  BNP>3000, troponin 0.9 trended down, CTA negative for PE.  CXR with cardiomegaly, pulmonary vascular congestion.  CHF pathway initiated.  Lasix 40 mg IV bid.  Echo.    Interval History:  no acute events overnight, no new complaints.  Swelling significantly improved " and wt down 8 lbs.  Reviewed change in Cr with patient.    Review of Systems   Constitutional: Positive for unexpected weight change. Negative for appetite change, fatigue and fever.   HENT: Negative for trouble swallowing and voice change.    Eyes: Negative for photophobia and visual disturbance.   Respiratory: Negative for cough, shortness of breath and wheezing.    Cardiovascular: Negative for chest pain, palpitations and leg swelling.   Gastrointestinal: Positive for constipation. Negative for diarrhea, nausea and vomiting.   Endocrine: Negative for polydipsia, polyphagia and polyuria.   Genitourinary: Positive for difficulty urinating (chronic). Negative for dysuria and frequency.   Musculoskeletal: Negative for arthralgias and myalgias.   Skin: Negative for rash and wound.   Neurological: Positive for weakness (chronic b/l lower extremity weakness). Negative for tremors.   Hematological: Negative for adenopathy. Does not bruise/bleed easily.   Psychiatric/Behavioral: Negative for dysphoric mood. The patient is not nervous/anxious.      Objective:     Vital Signs (Most Recent):  Temp: 97.8 °F (36.6 °C) (03/02/20 0822)  Pulse: 76 (03/02/20 1110)  Resp: 18 (03/02/20 0822)  BP: 101/61 (03/02/20 0822)  SpO2: (!) 93 % (03/02/20 0822) Vital Signs (24h Range):  Temp:  [97.1 °F (36.2 °C)-98.4 °F (36.9 °C)] 97.8 °F (36.6 °C)  Pulse:  [] 76  Resp:  [17-22] 18  SpO2:  [93 %-100 %] 93 %  BP: (101-135)/(58-95) 101/61     Weight: 73.5 kg (161 lb 14.9 oz)  Body mass index is 27.79 kg/m².    Intake/Output Summary (Last 24 hours) at 3/2/2020 1138  Last data filed at 3/2/2020 0900  Gross per 24 hour   Intake --   Output 1500 ml   Net -1500 ml      Physical Exam   Constitutional: She is oriented to person, place, and time. She appears well-developed and well-nourished.   HENT:   Head: Normocephalic and atraumatic.   Eyes: Pupils are equal, round, and reactive to light. EOM are normal.   Neck: Normal range of motion. Neck  "supple. No tracheal deviation present. No thyromegaly present.   Cardiovascular: Regular rhythm. Tachycardia present.   No murmur heard.  Pulmonary/Chest: Effort normal. She has wheezes in the left lower field.   Abdominal: Soft. Bowel sounds are normal. There is no tenderness.   Musculoskeletal: Normal range of motion. She exhibits edema (trace b/l feet and ankles, improved). She exhibits no tenderness.   Lymphadenopathy:     She has no cervical adenopathy.   Neurological: She is alert and oriented to person, place, and time. She has normal reflexes. No cranial nerve deficit.   Skin: Skin is warm and dry.   Psychiatric: She has a normal mood and affect. Her behavior is normal.   Nursing note and vitals reviewed.    Significant Labs: All pertinent labs within the past 24 hours have been reviewed.    Significant Imaging: I have reviewed all pertinent imaging results/findings within the past 24 hours.      Assessment/Plan:      * New onset of congestive heart failure  RAHUL  No known cardiac history.  No echo on file.  She does report upper respiratory infection ~1 week ago.  - BNP:  3286 CTA: Small bibasilar pleural effusions mild associated atelectasis, right greater than left.  -Echo pending  - O2 Sats 100% on RA  - Daily weights.  3/2 down 8 lbs.  - Strict I&O's   - Low salt cardiac diet; 1500mL fluid restriction  - Cardiac monitoring  - Cardiology consult as new onset CHF and elevated troponin. Troponin trended down.  No CP.  - 3/2 awaiting echo.  Held spironolactone this morning and will discuss with cards lasix dosing for this evening as now with RAHUL.  Cr 1.1->1.5 (baseline 1.0-1.1).            Leg weakness, bilateral  Incomplete emptying of bladder  Pt self caths and chronic weakness following "spinal cord infection" in 1996  Pt reports completing 7 day course of bactrim approximately 2 weeks ago for UTI.    Hypothyroidism  TSH add on  Continue lt4 50 mcg daily as free T4 WNL      Type 2 diabetes mellitus with " neurologic complication  Home regimen: metformin  Recent HgbA1c 5.8%  Hospital regimen: low dose correction scale  Hyper/hypoglycemia protocols in place  Diabetic Diet          VTE Risk Mitigation (From admission, onward)         Ordered     enoxaparin injection 40 mg  Daily      03/01/20 1622     Place RANJIT hose  Until discontinued      03/01/20 1622     Place sequential compression device  Until discontinued      03/01/20 1622     IP VTE HIGH RISK PATIENT  Once      03/01/20 1622                      Evelin Knight PA-C  Department of Hospital Medicine   [facility]

## 2020-03-03 LAB
ABO + RH BLD: NORMAL
ANION GAP SERPL CALC-SCNC: 12 MMOL/L (ref 8–16)
ANION GAP SERPL CALC-SCNC: 12 MMOL/L (ref 8–16)
BACTERIA #/AREA URNS AUTO: ABNORMAL /HPF
BASOPHILS # BLD AUTO: 0.06 K/UL (ref 0–0.2)
BASOPHILS NFR BLD: 1 % (ref 0–1.9)
BILIRUB UR QL STRIP: NEGATIVE
BLD GP AB SCN CELLS X3 SERPL QL: NORMAL
BUN SERPL-MCNC: 33 MG/DL (ref 8–23)
BUN SERPL-MCNC: 36 MG/DL (ref 8–23)
CALCIUM SERPL-MCNC: 10 MG/DL (ref 8.7–10.5)
CALCIUM SERPL-MCNC: 10.3 MG/DL (ref 8.7–10.5)
CHLORIDE SERPL-SCNC: 101 MMOL/L (ref 95–110)
CHLORIDE SERPL-SCNC: 103 MMOL/L (ref 95–110)
CLARITY UR REFRACT.AUTO: ABNORMAL
CO2 SERPL-SCNC: 23 MMOL/L (ref 23–29)
CO2 SERPL-SCNC: 27 MMOL/L (ref 23–29)
COLOR UR AUTO: YELLOW
CREAT SERPL-MCNC: 1.1 MG/DL (ref 0.5–1.4)
CREAT SERPL-MCNC: 1.2 MG/DL (ref 0.5–1.4)
DIFFERENTIAL METHOD: ABNORMAL
EOSINOPHIL # BLD AUTO: 0.4 K/UL (ref 0–0.5)
EOSINOPHIL NFR BLD: 7.1 % (ref 0–8)
ERYTHROCYTE [DISTWIDTH] IN BLOOD BY AUTOMATED COUNT: 14.2 % (ref 11.5–14.5)
EST. GFR  (AFRICAN AMERICAN): 53.7 ML/MIN/1.73 M^2
EST. GFR  (AFRICAN AMERICAN): 59.6 ML/MIN/1.73 M^2
EST. GFR  (NON AFRICAN AMERICAN): 46.5 ML/MIN/1.73 M^2
EST. GFR  (NON AFRICAN AMERICAN): 51.7 ML/MIN/1.73 M^2
GLUCOSE SERPL-MCNC: 102 MG/DL (ref 70–110)
GLUCOSE SERPL-MCNC: 163 MG/DL (ref 70–110)
GLUCOSE UR QL STRIP: NEGATIVE
HCT VFR BLD AUTO: 37.2 % (ref 37–48.5)
HGB BLD-MCNC: 11.5 G/DL (ref 12–16)
HGB UR QL STRIP: NEGATIVE
IMM GRANULOCYTES # BLD AUTO: 0.01 K/UL (ref 0–0.04)
IMM GRANULOCYTES NFR BLD AUTO: 0.2 % (ref 0–0.5)
KETONES UR QL STRIP: NEGATIVE
LEUKOCYTE ESTERASE UR QL STRIP: ABNORMAL
LYMPHOCYTES # BLD AUTO: 2 K/UL (ref 1–4.8)
LYMPHOCYTES NFR BLD: 32.5 % (ref 18–48)
MAGNESIUM SERPL-MCNC: 1.7 MG/DL (ref 1.6–2.6)
MCH RBC QN AUTO: 28.6 PG (ref 27–31)
MCHC RBC AUTO-ENTMCNC: 30.9 G/DL (ref 32–36)
MCV RBC AUTO: 93 FL (ref 82–98)
MICROSCOPIC COMMENT: ABNORMAL
MONOCYTES # BLD AUTO: 0.5 K/UL (ref 0.3–1)
MONOCYTES NFR BLD: 7.3 % (ref 4–15)
NEUTROPHILS # BLD AUTO: 3.2 K/UL (ref 1.8–7.7)
NEUTROPHILS NFR BLD: 51.9 % (ref 38–73)
NITRITE UR QL STRIP: NEGATIVE
NRBC BLD-RTO: 0 /100 WBC
PH UR STRIP: 6 [PH] (ref 5–8)
PLATELET # BLD AUTO: 243 K/UL (ref 150–350)
PMV BLD AUTO: 11.2 FL (ref 9.2–12.9)
POCT GLUCOSE: 197 MG/DL (ref 70–110)
POTASSIUM SERPL-SCNC: 3.4 MMOL/L (ref 3.5–5.1)
POTASSIUM SERPL-SCNC: 3.6 MMOL/L (ref 3.5–5.1)
PROT UR QL STRIP: NEGATIVE
RBC # BLD AUTO: 4.02 M/UL (ref 4–5.4)
SODIUM SERPL-SCNC: 138 MMOL/L (ref 136–145)
SODIUM SERPL-SCNC: 140 MMOL/L (ref 136–145)
SP GR UR STRIP: 1.01 (ref 1–1.03)
SQUAMOUS #/AREA URNS AUTO: 1 /HPF
URN SPEC COLLECT METH UR: ABNORMAL
WBC # BLD AUTO: 6.18 K/UL (ref 3.9–12.7)
WBC #/AREA URNS AUTO: 63 /HPF (ref 0–5)

## 2020-03-03 PROCEDURE — 63600175 PHARM REV CODE 636 W HCPCS: Performed by: INTERNAL MEDICINE

## 2020-03-03 PROCEDURE — 99152 MOD SED SAME PHYS/QHP 5/>YRS: CPT | Mod: ,,, | Performed by: INTERNAL MEDICINE

## 2020-03-03 PROCEDURE — 11000001 HC ACUTE MED/SURG PRIVATE ROOM

## 2020-03-03 PROCEDURE — C1769 GUIDE WIRE: HCPCS | Performed by: INTERNAL MEDICINE

## 2020-03-03 PROCEDURE — 25000003 PHARM REV CODE 250: Performed by: PHYSICIAN ASSISTANT

## 2020-03-03 PROCEDURE — 99233 SBSQ HOSP IP/OBS HIGH 50: CPT | Mod: ,,, | Performed by: PHYSICIAN ASSISTANT

## 2020-03-03 PROCEDURE — 87077 CULTURE AEROBIC IDENTIFY: CPT

## 2020-03-03 PROCEDURE — 99153 MOD SED SAME PHYS/QHP EA: CPT | Performed by: INTERNAL MEDICINE

## 2020-03-03 PROCEDURE — 93458 L HRT ARTERY/VENTRICLE ANGIO: CPT | Mod: 26,,, | Performed by: INTERNAL MEDICINE

## 2020-03-03 PROCEDURE — 25500020 PHARM REV CODE 255: Performed by: INTERNAL MEDICINE

## 2020-03-03 PROCEDURE — 93458 L HRT ARTERY/VENTRICLE ANGIO: CPT | Performed by: INTERNAL MEDICINE

## 2020-03-03 PROCEDURE — 99152 MOD SED SAME PHYS/QHP 5/>YRS: CPT | Performed by: INTERNAL MEDICINE

## 2020-03-03 PROCEDURE — 25000003 PHARM REV CODE 250: Performed by: INTERNAL MEDICINE

## 2020-03-03 PROCEDURE — 81001 URINALYSIS AUTO W/SCOPE: CPT

## 2020-03-03 PROCEDURE — 63600175 PHARM REV CODE 636 W HCPCS: Performed by: PHYSICIAN ASSISTANT

## 2020-03-03 PROCEDURE — 80048 BASIC METABOLIC PNL TOTAL CA: CPT

## 2020-03-03 PROCEDURE — 82962 GLUCOSE BLOOD TEST: CPT

## 2020-03-03 PROCEDURE — 25000003 PHARM REV CODE 250: Performed by: STUDENT IN AN ORGANIZED HEALTH CARE EDUCATION/TRAINING PROGRAM

## 2020-03-03 PROCEDURE — C1894 INTRO/SHEATH, NON-LASER: HCPCS | Performed by: INTERNAL MEDICINE

## 2020-03-03 PROCEDURE — 36415 COLL VENOUS BLD VENIPUNCTURE: CPT

## 2020-03-03 PROCEDURE — 86850 RBC ANTIBODY SCREEN: CPT

## 2020-03-03 PROCEDURE — 87088 URINE BACTERIA CULTURE: CPT

## 2020-03-03 PROCEDURE — 80048 BASIC METABOLIC PNL TOTAL CA: CPT | Mod: 91

## 2020-03-03 PROCEDURE — 87186 SC STD MICRODIL/AGAR DIL: CPT

## 2020-03-03 PROCEDURE — 63600175 PHARM REV CODE 636 W HCPCS: Performed by: STUDENT IN AN ORGANIZED HEALTH CARE EDUCATION/TRAINING PROGRAM

## 2020-03-03 PROCEDURE — 99152 PR MOD CONSCIOUS SEDATION, SAME PHYS, 5+ YRS, FIRST 15 MIN: ICD-10-PCS | Mod: ,,, | Performed by: INTERNAL MEDICINE

## 2020-03-03 PROCEDURE — 87086 URINE CULTURE/COLONY COUNT: CPT

## 2020-03-03 PROCEDURE — 93458 PR CATH PLACE/CORON ANGIO, IMG SUPER/INTERP,W LEFT HEART VENTRICULOGRAPHY: ICD-10-PCS | Mod: 26,,, | Performed by: INTERNAL MEDICINE

## 2020-03-03 PROCEDURE — 85025 COMPLETE CBC W/AUTO DIFF WBC: CPT

## 2020-03-03 PROCEDURE — C1887 CATHETER, GUIDING: HCPCS | Performed by: INTERNAL MEDICINE

## 2020-03-03 PROCEDURE — 87205 SMEAR GRAM STAIN: CPT

## 2020-03-03 PROCEDURE — 83735 ASSAY OF MAGNESIUM: CPT

## 2020-03-03 PROCEDURE — 99223 PR INITIAL HOSPITAL CARE,LEVL III: ICD-10-PCS | Mod: GC,,, | Performed by: INTERNAL MEDICINE

## 2020-03-03 PROCEDURE — 99223 1ST HOSP IP/OBS HIGH 75: CPT | Mod: GC,,, | Performed by: INTERNAL MEDICINE

## 2020-03-03 PROCEDURE — 99233 PR SUBSEQUENT HOSPITAL CARE,LEVL III: ICD-10-PCS | Mod: ,,, | Performed by: PHYSICIAN ASSISTANT

## 2020-03-03 RX ORDER — DIPHENHYDRAMINE HCL 50 MG
50 CAPSULE ORAL ONCE
Status: COMPLETED | OUTPATIENT
Start: 2020-03-03 | End: 2020-03-03

## 2020-03-03 RX ORDER — SODIUM CHLORIDE 9 MG/ML
INJECTION, SOLUTION INTRAVENOUS CONTINUOUS
Status: DISCONTINUED | OUTPATIENT
Start: 2020-03-03 | End: 2020-03-06

## 2020-03-03 RX ORDER — FENTANYL CITRATE 50 UG/ML
INJECTION, SOLUTION INTRAMUSCULAR; INTRAVENOUS
Status: DISCONTINUED | OUTPATIENT
Start: 2020-03-03 | End: 2020-03-06 | Stop reason: HOSPADM

## 2020-03-03 RX ORDER — HEPARIN SODIUM 200 [USP'U]/100ML
INJECTION, SOLUTION INTRAVENOUS
Status: DISCONTINUED | OUTPATIENT
Start: 2020-03-03 | End: 2020-03-06 | Stop reason: HOSPADM

## 2020-03-03 RX ORDER — METOPROLOL SUCCINATE 25 MG/1
25 TABLET, EXTENDED RELEASE ORAL DAILY
Status: DISCONTINUED | OUTPATIENT
Start: 2020-03-03 | End: 2020-03-06 | Stop reason: HOSPADM

## 2020-03-03 RX ORDER — POTASSIUM CHLORIDE 20 MEQ/15ML
40 SOLUTION ORAL ONCE
Status: COMPLETED | OUTPATIENT
Start: 2020-03-03 | End: 2020-03-03

## 2020-03-03 RX ORDER — HEPARIN SODIUM 1000 [USP'U]/ML
INJECTION, SOLUTION INTRAVENOUS; SUBCUTANEOUS
Status: DISCONTINUED | OUTPATIENT
Start: 2020-03-03 | End: 2020-03-06 | Stop reason: HOSPADM

## 2020-03-03 RX ORDER — ATORVASTATIN CALCIUM 20 MG/1
80 TABLET, FILM COATED ORAL DAILY
Status: DISCONTINUED | OUTPATIENT
Start: 2020-03-04 | End: 2020-03-06 | Stop reason: HOSPADM

## 2020-03-03 RX ORDER — MIDAZOLAM HYDROCHLORIDE 1 MG/ML
INJECTION, SOLUTION INTRAMUSCULAR; INTRAVENOUS
Status: DISCONTINUED | OUTPATIENT
Start: 2020-03-03 | End: 2020-03-06 | Stop reason: HOSPADM

## 2020-03-03 RX ORDER — SODIUM CHLORIDE 9 MG/ML
INJECTION, SOLUTION INTRAVENOUS CONTINUOUS
Status: ACTIVE | OUTPATIENT
Start: 2020-03-03 | End: 2020-03-03

## 2020-03-03 RX ORDER — NITROGLYCERIN 5 MG/ML
INJECTION, SOLUTION INTRAVENOUS
Status: DISCONTINUED | OUTPATIENT
Start: 2020-03-03 | End: 2020-03-06 | Stop reason: HOSPADM

## 2020-03-03 RX ORDER — LIDOCAINE HYDROCHLORIDE 20 MG/ML
INJECTION, SOLUTION EPIDURAL; INFILTRATION; INTRACAUDAL; PERINEURAL
Status: DISCONTINUED | OUTPATIENT
Start: 2020-03-03 | End: 2020-03-06 | Stop reason: HOSPADM

## 2020-03-03 RX ORDER — LANOLIN ALCOHOL/MO/W.PET/CERES
400 CREAM (GRAM) TOPICAL
Status: DISCONTINUED | OUTPATIENT
Start: 2020-03-03 | End: 2020-03-06 | Stop reason: HOSPADM

## 2020-03-03 RX ORDER — CEFTRIAXONE 1 G/1
1 INJECTION, POWDER, FOR SOLUTION INTRAMUSCULAR; INTRAVENOUS
Status: DISCONTINUED | OUTPATIENT
Start: 2020-03-03 | End: 2020-03-06

## 2020-03-03 RX ADMIN — MAGNESIUM OXIDE TAB 400 MG (241.3 MG ELEMENTAL MG) 400 MG: 400 (241.3 MG) TAB at 04:03

## 2020-03-03 RX ADMIN — ASPIRIN 81 MG CHEWABLE TABLET 81 MG: 81 TABLET CHEWABLE at 09:03

## 2020-03-03 RX ADMIN — BENZONATATE 100 MG: 100 CAPSULE ORAL at 06:03

## 2020-03-03 RX ADMIN — CEFTRIAXONE SODIUM 1 G: 1 INJECTION, POWDER, FOR SOLUTION INTRAMUSCULAR; INTRAVENOUS at 09:03

## 2020-03-03 RX ADMIN — POTASSIUM CHLORIDE 40 MEQ: 20 SOLUTION ORAL at 04:03

## 2020-03-03 RX ADMIN — SODIUM CHLORIDE: 0.9 INJECTION, SOLUTION INTRAVENOUS at 01:03

## 2020-03-03 RX ADMIN — BACLOFEN 20 MG: 10 TABLET ORAL at 09:03

## 2020-03-03 RX ADMIN — LEVOTHYROXINE SODIUM 50 MCG: 50 TABLET ORAL at 06:03

## 2020-03-03 RX ADMIN — ENOXAPARIN SODIUM 40 MG: 100 INJECTION SUBCUTANEOUS at 05:03

## 2020-03-03 RX ADMIN — GABAPENTIN 300 MG: 300 CAPSULE ORAL at 09:03

## 2020-03-03 RX ADMIN — DIPHENHYDRAMINE HYDROCHLORIDE 50 MG: 50 CAPSULE ORAL at 12:03

## 2020-03-03 RX ADMIN — DULOXETINE HYDROCHLORIDE 30 MG: 30 CAPSULE, DELAYED RELEASE ORAL at 09:03

## 2020-03-03 RX ADMIN — MAGNESIUM OXIDE TAB 400 MG (241.3 MG ELEMENTAL MG) 400 MG: 400 (241.3 MG) TAB at 09:03

## 2020-03-03 RX ADMIN — ATORVASTATIN CALCIUM 40 MG: 20 TABLET, FILM COATED ORAL at 09:03

## 2020-03-03 RX ADMIN — METOPROLOL SUCCINATE 25 MG: 25 TABLET, FILM COATED, EXTENDED RELEASE ORAL at 04:03

## 2020-03-03 RX ADMIN — OXYBUTYNIN CHLORIDE 5 MG: 5 TABLET, EXTENDED RELEASE ORAL at 09:03

## 2020-03-03 NOTE — ASSESSMENT & PLAN NOTE
"Incomplete emptying of bladder  Pt self caths and chronic weakness following "spinal cord infection" in 1996  Pt reports completing 7 day course of bactrim approximately 2 weeks ago for UTI.  - Ordered repeat UA for malodorous urine    "

## 2020-03-03 NOTE — PROGRESS NOTES
"[facility] Hospital Medicine  Progress Note    Patient Name: Niki Soriano  MRN: [id number]  Patient Class: IP- Inpatient   Admission Date: 3/1/2020  Length of Stay: 1 days  Attending Physician: JANET Lake MD  Primary Care Provider: Moises Webster MD    [facility] Medicine Team: [facility] Evelin Knight PA-C    Subjective:     Principal Problem:New onset of congestive heart failure        HPI:  69 y.o. 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  Pt reports increasing SOB over the past several weeks.  Pt reports being prescribed lasix about 2 weeks ago, she took daily for a week with improvement in symptoms.  She cannot recall the dose she was prescribed.  In the past week she has gained 8 lbs.  She usually sleeps flat on her back with a robe rolled up instead of a pillow but recently has had to sleep on her side due to difficulty breathing.  She also reports burning abdominal pain, decreased appetite, b/l lower extremity edema, dry cough, constipation.  She currently denies recent fever, chills, diaphoresis, chest pain or pressure, sick contacts, syncope, dizziness, lightheadedness.  Pt does report having URI ~1 week ago.    In the ED, BNP 3286, troponin 0.980, d dimer 1.5, CTA negative for PE, pleural effusion R>L, BUN 29, Cr 1.1, AST 41, ALT 69, normal t bili, normal lipase.  UA unremarkable.    Overview/Hospital Course:  Pt placed in observation for new-onset acute decompensated HF.  BNP>3000, troponin 0.9 trended down, CTA negative for PE.  CXR with cardiomegaly, pulmonary vascular congestion.  CHF pathway initiated.  Given lasix 40 mg IV bid. She is down 10 lbs since admission and euvolemic on exam.  Lasix held evening of 3/2 and 3/3.  " Echo demonstrated severe systolic dysfunction with significant wall motion abnormality (EF 2-25%) as well as severe left ventricular diastolic dysfunction consistent with restrictive physiology.  Interventional Cardiology consulted and Left heart cath possibly today or tomorrow.     Interval History:  no acute events overnight.  Swelling significantly improved and wt down 10 lbs.  Cr improved. She complains of malodorous urine today and will repeat UA. Cards following and will go for left heart cath tomorrow.     Review of Systems   Constitutional: Positive for unexpected weight change. Negative for appetite change, fatigue and fever.   HENT: Negative for trouble swallowing and voice change.    Eyes: Negative for photophobia and visual disturbance.   Respiratory: Positive for shortness of breath (improving). Negative for cough and wheezing.    Cardiovascular: Negative for chest pain, palpitations and leg swelling.   Gastrointestinal: Positive for constipation. Negative for diarrhea, nausea and vomiting.   Endocrine: Negative for polydipsia, polyphagia and polyuria.   Genitourinary: Positive for difficulty urinating (chronic) and dysuria (chronic burning; malodorous urine today). Negative for flank pain, frequency and hematuria.        Patient self caths for chronic urinary retention   Musculoskeletal: Negative for arthralgias and myalgias.   Skin: Negative for rash and wound.   Neurological: Negative for tremors and weakness (chronic b/l lower extremity weakness).   Hematological: Negative for adenopathy. Does not bruise/bleed easily.   Psychiatric/Behavioral: Negative for dysphoric mood. The patient is not nervous/anxious.      Objective:     Vital Signs (Most Recent):  Temp: 97 °F (36.1 °C) (03/03/20 0516)  Pulse: 79 (03/03/20 0657)  Resp: 16 (03/03/20 0516)  BP: (!) 103/52 (03/03/20 0516)  SpO2: 98 % (03/03/20 0516) Vital Signs (24h Range):  Temp:  [96.4 °F (35.8 °C)-98.1 °F (36.7 °C)] 97 °F (36.1 °C)  Pulse:   [60-90] 79  Resp:  [16-18] 16  SpO2:  [95 %-99 %] 98 %  BP: ()/(52-76) 103/52     Weight: 72.6 kg (159 lb 15.1 oz)  Body mass index is 27.45 kg/m².    Intake/Output Summary (Last 24 hours) at 3/3/2020 1030  Last data filed at 3/3/2020 0900  Gross per 24 hour   Intake 120 ml   Output 1300 ml   Net -1180 ml      Physical Exam   Constitutional: She is oriented to person, place, and time. She appears well-developed and well-nourished.   HENT:   Head: Normocephalic and atraumatic.   Eyes: Pupils are equal, round, and reactive to light. EOM are normal.   Neck: Normal range of motion. Neck supple. No JVD present. No thyromegaly present.   Cardiovascular: Normal rate and regular rhythm.   No murmur heard.  Pulmonary/Chest: Effort normal and breath sounds normal. She has no wheezes.   Abdominal: Soft. Bowel sounds are normal. There is no tenderness.   Musculoskeletal: Normal range of motion. She exhibits no edema (trace b/l feet and ankles resolved) or tenderness.   Neurological: She is alert and oriented to person, place, and time. She has normal reflexes. No cranial nerve deficit.   Skin: Skin is warm and dry.   Psychiatric: She has a normal mood and affect. Her behavior is normal.   Nursing note and vitals reviewed.    Significant Labs: All pertinent labs within the past 24 hours have been reviewed.    Significant Imaging: I have reviewed all pertinent imaging results/findings within the past 24 hours.      Assessment/Plan:      * New onset of congestive heart failure  RAHUL, resolved  No known cardiac history. She does report upper respiratory infection ~1 week ago.  - BNP:  3286 CTA: Small bibasilar pleural effusions mild associated atelectasis, right greater than left.  - Echo: Severe systolic dysfunction with significant wall motion abnormality (EF 2-25%) as well as severe left ventricular diastolic dysfunction consistent with restrictive physiology.   - Left heart cath possibly today or tomorrow; NPO.   - O2 Sats  "100% on RA   - Daily weights.  3/3 down 10 lbs.  - Strict I&O's   - Low salt cardiac diet; 1500mL fluid restriction  - Cardiac monitoring  - Cardiology consult as new onset CHF and elevated troponin. Troponin trended down.  No CP.  - Held spironolactone and lasix evening of 3/2 and 3/3.  Cr back down to 1.1 from 1.5 (baseline 1.0-1.1).                Incomplete emptying of bladder        Leg weakness, bilateral  Incomplete emptying of bladder  Pt self caths and chronic weakness following "spinal cord infection" in 1996  Pt reports completing 7 day course of bactrim approximately 2 weeks ago for UTI.  - Ordered repeat UA for malodorous urine      Hypothyroidism  TSH add on  Continue lt4 50 mcg daily as free T4 WNL      Type 2 diabetes mellitus with neurologic complication  Home regimen: metformin  Recent HgbA1c 5.8%  Hospital regimen: low dose correction scale  Hyper/hypoglycemia protocols in place  Diabetic Diet  Controlled          VTE Risk Mitigation (From admission, onward)         Ordered     enoxaparin injection 40 mg  Daily      03/01/20 1622     Place RANJIT hose  Until discontinued      03/01/20 1622     Place sequential compression device  Until discontinued      03/01/20 1622     IP VTE HIGH RISK PATIENT  Once      03/01/20 1622                      Evelin Knight PA-C  Department of Hospital Medicine   [facility]    "

## 2020-03-03 NOTE — PROGRESS NOTES
[facility]  Cardiology  Progress Note    Patient Name: Niki Soriano  MRN: [id number]  Admission Date: 3/1/2020  Hospital Length of Stay: 1 days  Code Status: Full Code   Attending Physician: JANET Lake MD   Primary Care Physician: Moises Webster MD  Expected Discharge Date: 3/3/2020  Principal Problem:New onset of congestive heart failure    Subjective:     Hospital Course:   No notes on file    Interval History: NAEON. Feels improved shortness of breath since admission. Denies chest pain    Review of Systems   All other systems reviewed and are negative.    Objective:     Vital Signs (Most Recent):  Temp: 97 °F (36.1 °C) (03/03/20 0516)  Pulse: 79 (03/03/20 0657)  Resp: 16 (03/03/20 0516)  BP: (!) 103/52 (03/03/20 0516)  SpO2: 98 % (03/03/20 0516) Vital Signs (24h Range):  Temp:  [96.4 °F (35.8 °C)-98.1 °F (36.7 °C)] 97 °F (36.1 °C)  Pulse:  [60-90] 79  Resp:  [16-18] 16  SpO2:  [95 %-99 %] 98 %  BP: ()/(52-76) 103/52     Weight: 72.6 kg (159 lb 15.1 oz)  Body mass index is 27.45 kg/m².     SpO2: 98 %  O2 Device (Oxygen Therapy): room air      Intake/Output Summary (Last 24 hours) at 3/3/2020 0908  Last data filed at 3/2/2020 1700  Gross per 24 hour   Intake --   Output 700 ml   Net -700 ml       Lines/Drains/Airways     Peripheral Intravenous Line                 Peripheral IV - Single Lumen 03/01/20 1149 20 G Left Forearm 1 day                Physical Exam   Constitutional: She appears well-developed and well-nourished.   Neck: Normal range of motion. No JVD present.   Cardiovascular: Normal rate and regular rhythm.   No murmur heard.  Pulmonary/Chest: Effort normal. No respiratory distress. She has no rales.   Abdominal: Soft. There is no tenderness.   Musculoskeletal: Normal range of motion. She exhibits no edema.   Skin: Skin is warm and dry. No erythema. No pallor.         Assessment and Plan:       * New onset of congestive heart failure  69 F with multiple comorbidities who  presents with 5 weeks of increasing shortness of breath, weight gain, leg swelling, orthopnea and pnd. CXR with cardiomegaly, pulmonary vascular congestion. BNP > 3000, Trop 0.9, flat. Suspected ADHF  Echo with EF 20-25%, Grade III diastolic dysfunction    Plan:  - GDMT: Start Toprol 25 mg po daily  - Will hold off on ACEi/ARB for now given recent RAHUL and plan for contrast load with angiography as below  - Hold lasix for now  - Will likely need a holter monitor on discharge with significant amount of PVCs on EKG; this could also be contributing to her heart failure.  - Interventional cardiology consulted - possible angiogram planned  - Low Na diet  - Strict I/Os, daily weights        VTE Risk Mitigation (From admission, onward)         Ordered     enoxaparin injection 40 mg  Daily      03/01/20 1622     Place RANJIT hose  Until discontinued      03/01/20 1622     Place sequential compression device  Until discontinued      03/01/20 1622     IP VTE HIGH RISK PATIENT  Once      03/01/20 1622                Kenrick Miller MD  Cardiology  [facility]

## 2020-03-03 NOTE — OP NOTE
"    Post Cath Note  Referring Physician: JANET Lake MD  Procedure: Left heart cath (Left), ANGIOGRAM, CORONARY ARTERY (N/A)    Findings:   Access: Right radial    LVEDP 23 mmHg    Diffuse multi vessels disease involving LCx and RCA     See full report for further details    Closure device: Radial band    Post Cath Exam:   BP (!) 103/52 (BP Location: Left arm, Patient Position: Lying)   Pulse 82   Temp 97 °F (36.1 °C) (Oral)   Resp 16   Ht 5' 4" (1.626 m)   Wt 72.6 kg (159 lb 15.1 oz)   SpO2 98%   Breastfeeding? No   BMI 27.45 kg/m²   No unusual pain, hematoma, thrill or bruit at vascular access site.  Distal pulse present without signs of ischemia.    Recommendations:     - Routine post-cath care  - Continue aspirin 81 mg PO daily  - Routine post cath protocol.  - Maximize medical management.  - Further care by the primary team.  - IVF at 125 cc/hr for 4 hrs  - Continue medical management, Risk factor reduction  - Given her DM and Multi vessel disease, we recommend CTS consult for consideration of CABG.  - Discussed with primary and consultant services     Chavo Rosas MD  Cardiology Fellow ([id number])  Pager: [phone]      "

## 2020-03-03 NOTE — CONSULTS
"    Consult received for "CHF education - Low Na diet".       RD provided education on 3/2. Please see note.       Will continue to follow-up as previously scheduled.   Please re-consult as needed.   "

## 2020-03-03 NOTE — HPI
Ms. Niki Soriano, 69 y.o. female with prior history of Type II DM, Hypothyroidism and interstitial cystitis. She presented to ED with 5 weeks of symptoms of shortness of breath NYHA class III, orthopnea that was exacerbated by common cold symptoms. This also was associated with lower extremities swelling. She denies having chest pain prior and during these episode. She reported neck pain no chest pain. She has prior history of palpitation that occurred when she is feeling unwell especially when she have the symptoms of her underlying interstitial cystitis. She tested positive for Rhinovirus. EF showed depressed EF and segmental RWA. Currently, she is feeling better from the shortness of breath stand point, able to sleep flat with no orthopnea.       TTE:   · Severely decreased left ventricular systolic function. The estimated ejection fraction is 20-25%. Significant wall motion abnormalities seen.  · Mildly reduced right ventricular systolic function.  · Grade III (severe) left ventricular diastolic dysfunction consistent with restrictive physiology.  · Mild left atrial enlargement.  · Mild-to-moderate mitral regurgitation.  · The estimated PA systolic pressure is 44 mmHg.  · Intermediate central venous pressure (8 mmHg).

## 2020-03-03 NOTE — PLAN OF CARE
Pt with no falls or injuries this shift. Ambulating to br independently using rollator. Pt voided 1600cc this shift. I+O maintained. Pt with no s/s hypo or hyperglycemia. Accuchecks ac and hs. Pt reports chronic back pain 3/10.

## 2020-03-03 NOTE — SUBJECTIVE & OBJECTIVE
Past Medical History:   Diagnosis Date    Diabetes mellitus     Hypercholesteremia     Hypertension     IC (interstitial cystitis)     Numbness in both hands 2017    Spondylisthesis     Vulvodynia, unspecified        Past Surgical History:   Procedure Laterality Date    CARPAL TUNNEL RELEASE Right 2019    Procedure: RELEASE, CARPAL TUNNEL, REVISION;  Surgeon: Annabelle Cid MD;  Location: [facility] OR [address] [place] [address];  Service: Orthopedics;  Laterality: Right;  Axogen, Clarix     SECTION      x3    CYSTOSCOPY      HYSTERECTOMY      ischemic colitis      OOPHORECTOMY      [place] REMOVAL OF OVARY/TUBE(S)         Review of patient's allergies indicates:   Allergen Reactions    Augmentin [amoxicillin-pot clavulanate] Other (See Comments)     Patient cannot recall what she had, only that she could not tolerate the Augmentin    Tramadol Itching    Tegretol [carbamazepine] Itching and Rash    Vicodin [hydrocodone-acetaminophen] Itching and Anxiety       PTA Medications   Medication Sig    atorvastatin (LIPITOR) 40 MG tablet Take 40 mg by mouth once daily.    baclofen (LIORESAL) 20 MG tablet 2 (two) times daily.     duloxetine (CYMBALTA) 30 MG capsule Take 30 mg by mouth once daily.    gabapentin (NEURONTIN) 300 MG capsule Take 1 capsule (300 mg total) by mouth 3 (three) times daily.    LEVOTHYROXINE SODIUM (LEVOTHYROXINE ORAL) Take 50 mcg by mouth before breakfast.     lisinopril (PRINIVIL,ZESTRIL) 40 MG tablet every evening.     metformin (GLUCOPHAGE) 500 MG tablet Take 500 mg by mouth 2 (two) times daily with meals.    methocarbamol (ROBAXIN) 500 MG Tab Take 1 tablet (500 mg total) by mouth 2 (two) times daily as needed (muscle spasms).    oxybutynin (DITROPAN-XL) 5 MG TR24 Take 1 tablet (5 mg total) by mouth once daily.    triamterene-hydrochlorothiazide 37.5-25 mg (DYAZIDE) 37.5-25 mg per capsule every morning.     estradiol (ESTRACE) 0.01 % (0.1 mg/gram) vaginal cream PLEASE SEE  ATTACHED FOR DETAILED DIRECTIONS     Family History     Problem Relation (Age of Onset)    Breast cancer Paternal Aunt        Tobacco Use    Smoking status: Never Smoker    Smokeless tobacco: Never Used   Substance and Sexual Activity    Alcohol use: No    Drug use: No    Sexual activity: Yes     Partners: Male     Review of Systems   Constitution: Negative for chills and decreased appetite.   HENT: Negative for congestion.    Cardiovascular: Negative for chest pain, irregular heartbeat and leg swelling.   Respiratory: Positive for shortness of breath. Negative for cough.    Endocrine: Negative for cold intolerance and heat intolerance.   Skin: Negative for rash.   Musculoskeletal: Negative for arthritis and back pain.   Gastrointestinal: Negative for abdominal pain, constipation and diarrhea.   Genitourinary: Negative for dysuria and hematuria.   Neurological: Negative for dizziness and headaches.   Psychiatric/Behavioral: Negative for altered mental status.     Objective:     Vital Signs (Most Recent):  Temp: 97 °F (36.1 °C) (03/03/20 0516)  Pulse: 79 (03/03/20 0657)  Resp: 16 (03/03/20 0516)  BP: (!) 103/52 (03/03/20 0516)  SpO2: 98 % (03/03/20 0516) Vital Signs (24h Range):  Temp:  [96.4 °F (35.8 °C)-98.1 °F (36.7 °C)] 97 °F (36.1 °C)  Pulse:  [60-90] 79  Resp:  [16-18] 16  SpO2:  [95 %-99 %] 98 %  BP: ()/(52-76) 103/52     Weight: 72.6 kg (159 lb 15.1 oz)  Body mass index is 27.45 kg/m².    SpO2: 98 %  O2 Device (Oxygen Therapy): room air      Intake/Output Summary (Last 24 hours) at 3/3/2020 0904  Last data filed at 3/2/2020 1700  Gross per 24 hour   Intake --   Output 700 ml   Net -700 ml       Lines/Drains/Airways     Peripheral Intravenous Line                 Peripheral IV - Single Lumen 03/01/20 1149 20 G Left Forearm 1 day                Physical Exam   Constitutional: She appears well-nourished. No distress.   HENT:   Head: Normocephalic.   Eyes: Pupils are equal, round, and reactive to  light.   Neck: No JVD present. No thyromegaly present.   Cardiovascular: Normal rate and regular rhythm. Exam reveals no friction rub.   No murmur heard.  Pulses:       Radial pulses are 1+ on the right side, and 1+ on the left side.        Dorsalis pedis pulses are 1+ on the right side, and 1+ on the left side.        Posterior tibial pulses are 1+ on the right side, and 1+ on the left side.   Pulmonary/Chest: Effort normal. No respiratory distress. She has no wheezes. She has no rales.   Abdominal: Soft. She exhibits no distension.   Musculoskeletal: She exhibits no edema.   Neurological: She is alert.   Vitals reviewed.      Significant Labs:   BMP:   Recent Labs   Lab 03/01/20  1211 03/02/20  0404   * 99    138   K 4.1 3.5    102   CO2 20* 23   BUN 29* 36*   CREATININE 1.1 1.5*   CALCIUM 10.3 9.9   MG  --  1.4*   , CBC   Recent Labs   Lab 03/01/20  1211 03/03/20  0520   WBC 6.09 6.18   HGB 11.3* 11.5*   HCT 36.6* 37.2    243   , Troponin   Recent Labs   Lab 03/01/20  1211 03/01/20  1749 03/01/20  2345   TROPONINI 0.980* 0.947* 0.838*    and All pertinent lab results from the last 24 hours have been reviewed.

## 2020-03-03 NOTE — ASSESSMENT & PLAN NOTE
RAHUL, resolved  No known cardiac history. She does report upper respiratory infection ~1 week ago.  - BNP:  3286 CTA: Small bibasilar pleural effusions mild associated atelectasis, right greater than left.  - Echo: Severe systolic dysfunction with significant wall motion abnormality (EF 2-25%) as well as severe left ventricular diastolic dysfunction consistent with restrictive physiology.   - Left heart cath possibly today or tomorrow; NPO.   - O2 Sats 100% on RA   - Daily weights.  3/3 down 10 lbs.  - Strict I&O's   - Low salt cardiac diet; 1500mL fluid restriction  - Cardiac monitoring  - Cardiology consult as new onset CHF and elevated troponin. Troponin trended down.  No CP.  - Held spironolactone and lasix evening of 3/2 and 3/3.  Cr back down to 1.1 from 1.5 (baseline 1.0-1.1).

## 2020-03-03 NOTE — ASSESSMENT & PLAN NOTE
69 F with multiple comorbidities who presents with 5 weeks of increasing shortness of breath, weight gain, leg swelling, orthopnea and pnd. CXR with cardiomegaly, pulmonary vascular congestion. BNP > 3000, Trop 0.9, flat. Suspected ADHF  Echo with EF 20-25%, Grade III diastolic dysfunction    Plan:  - GDMT: Start Toprol 25 mg po daily  - Will hold off on ACEi/ARB for now given recent RAHUL and plan for contrast load with angiography as below  - Hold lasix for now  - Will likely need a holter monitor on discharge with significant amount of PVCs on EKG; this could also be contributing to her heart failure.  - Interventional cardiology consulted - possible angiogram planned  - Low Na diet  - Strict I/Os, daily weights

## 2020-03-03 NOTE — ASSESSMENT & PLAN NOTE
- No prior study to compare, however this depressed EF is most likely the etiology of her shortness of breath for the last couple of weeks  - The etiology of her newly depressed EF could be from ischemic etiology given her regional wall motion and high risk patient (age, DM); however other non-ischemic etiology such as PVC induced cardiomyopathy (given her frequent PVC can not be excluded).  - Will prepare for possible LHC tomorrow, NPO midnight, continue Aspirin and statin for now     Access: Right Radial and Reigh groin as back up   Renal Risk Score/Predicts risk of contrast-induced nephropathy (CONOR) after PCI:    Prior Contrast Allergy: No   Pre Cath Routine Benadryl 50 mg and Routine IVF NaCl PreCath      Sedation   Type of sedation: RN IV sedation    Mallampati score: II   ASA score: III    -The risks, benefits & alternatives of the procedure were explained to the patient.    -The risks of coronary angiography include but are not limited to: Bleeding, infection, heart rhythm abnormalities, allergic reactions, kidney injury, stroke and death.    -Should stenting be indicated, the patient has agreed to dual anti-platelet therapy for 1-consecutive year with a drug-eluting stent and a minimum of 1-month with the use of a bare metal stent.    -The risks of moderate sedation include hypotension, respiratory depression, arrhythmias, bronchospasm, & death.    -Informed consent was obtained & the patient is agreeable to proceed with the procedure.  -This patient will be discussed in the morning discussion with interventional cardiology staff.

## 2020-03-03 NOTE — SUBJECTIVE & OBJECTIVE
Interval History:  no acute events overnight.  Swelling significantly improved and wt down 10 lbs.  Cr improved. She complains of malodorous urine today and will repeat UA. Cards following and will go for left heart cath tomorrow.     Review of Systems   Constitutional: Positive for unexpected weight change. Negative for appetite change, fatigue and fever.   HENT: Negative for trouble swallowing and voice change.    Eyes: Negative for photophobia and visual disturbance.   Respiratory: Positive for shortness of breath (improving). Negative for cough and wheezing.    Cardiovascular: Negative for chest pain, palpitations and leg swelling.   Gastrointestinal: Positive for constipation. Negative for diarrhea, nausea and vomiting.   Endocrine: Negative for polydipsia, polyphagia and polyuria.   Genitourinary: Positive for difficulty urinating (chronic) and dysuria (chronic burning; malodorous urine today). Negative for flank pain, frequency and hematuria.        Patient self caths for chronic urinary retention   Musculoskeletal: Negative for arthralgias and myalgias.   Skin: Negative for rash and wound.   Neurological: Negative for tremors and weakness (chronic b/l lower extremity weakness).   Hematological: Negative for adenopathy. Does not bruise/bleed easily.   Psychiatric/Behavioral: Negative for dysphoric mood. The patient is not nervous/anxious.      Objective:     Vital Signs (Most Recent):  Temp: 97 °F (36.1 °C) (03/03/20 0516)  Pulse: 79 (03/03/20 0657)  Resp: 16 (03/03/20 0516)  BP: (!) 103/52 (03/03/20 0516)  SpO2: 98 % (03/03/20 0516) Vital Signs (24h Range):  Temp:  [96.4 °F (35.8 °C)-98.1 °F (36.7 °C)] 97 °F (36.1 °C)  Pulse:  [60-90] 79  Resp:  [16-18] 16  SpO2:  [95 %-99 %] 98 %  BP: ()/(52-76) 103/52     Weight: 72.6 kg (159 lb 15.1 oz)  Body mass index is 27.45 kg/m².    Intake/Output Summary (Last 24 hours) at 3/3/2020 1030  Last data filed at 3/3/2020 0900  Gross per 24 hour   Intake 120 ml    Output 1300 ml   Net -1180 ml      Physical Exam   Constitutional: She is oriented to person, place, and time. She appears well-developed and well-nourished.   HENT:   Head: Normocephalic and atraumatic.   Eyes: Pupils are equal, round, and reactive to light. EOM are normal.   Neck: Normal range of motion. Neck supple. No JVD present. No thyromegaly present.   Cardiovascular: Normal rate and regular rhythm.   No murmur heard.  Pulmonary/Chest: Effort normal and breath sounds normal. She has no wheezes.   Abdominal: Soft. Bowel sounds are normal. There is no tenderness.   Musculoskeletal: Normal range of motion. She exhibits no edema (trace b/l feet and ankles resolved) or tenderness.   Neurological: She is alert and oriented to person, place, and time. She has normal reflexes. No cranial nerve deficit.   Skin: Skin is warm and dry.   Psychiatric: She has a normal mood and affect. Her behavior is normal.   Nursing note and vitals reviewed.    Significant Labs: All pertinent labs within the past 24 hours have been reviewed.    Significant Imaging: I have reviewed all pertinent imaging results/findings within the past 24 hours.

## 2020-03-03 NOTE — PLAN OF CARE
Received report from Rj LUU in cathlab. Patient s/p [facility], AAOx3. VSS,  c/o chronic neck pain, resp even and unlabored. Right radial vascband in place No active bleeding. No hematoma noted. Post procedure protocol reviewed with patient. Understanding verbalized. Family members called to bedside. Nurse call bell within reach. Will continue to monitor per post procedure protocol.

## 2020-03-03 NOTE — CONSULTS
[facility]  Interventional Cardiology  Consult Note    Patient Name: Niki Soriano  MRN: [id number]  Admission Date: 3/1/2020  Hospital Length of Stay: 1 days  Code Status: Full Code   Attending Provider: JANET Lake MD  Consulting Provider: Carter Rosas MD  Primary Care Physician: Moises Webster MD  Principal Problem:New onset of congestive heart failure    Patient information was obtained from patient, past medical records and ER records.     Inpatient consult to Interventional Cardiology  Consult performed by: Carter Rosas MD  Consult ordered by: Evelin Knight PA-C        Subjective:     Chief Complaint:  shortness of breath      HPI:  Ms. Niki Soriano, 69 y.o. female with prior history of Type II DM, Hypothyroidism and interstitial cystitis. She presented to ED with 5 weeks of symptoms of shortness of breath NYHA class III, orthopnea that was exacerbated by common cold symptoms. This also was associated with lower extremities swelling. She denies having chest pain prior and during these episode. She reported neck pain no chest pain. She has prior history of palpitation that occurred when she is feeling unwell especially when she have the symptoms of her underlying interstitial cystitis. She tested positive for Rhinovirus. EF showed depressed EF and segmental RWA. Currently, she is feeling better from the shortness of breath stand point, able to sleep flat with no orthopnea.       TTE:   · Severely decreased left ventricular systolic function. The estimated ejection fraction is 20-25%. Significant wall motion abnormalities seen.  · Mildly reduced right ventricular systolic function.  · Grade III (severe) left ventricular diastolic dysfunction consistent with restrictive physiology.  · Mild left atrial enlargement.  · Mild-to-moderate mitral regurgitation.  · The estimated PA systolic pressure is 44 mmHg.  Intermediate central venous pressure (8 mmHg).    Past Medical History:    Diagnosis Date    Diabetes mellitus     Hypercholesteremia     Hypertension     IC (interstitial cystitis)     Numbness in both hands 2017    Spondylisthesis     Vulvodynia, unspecified        Past Surgical History:   Procedure Laterality Date    CARPAL TUNNEL RELEASE Right 2019    Procedure: RELEASE, CARPAL TUNNEL, REVISION;  Surgeon: Annabelle Cid MD;  Location: [facility] OR [address] [place] [address];  Service: Orthopedics;  Laterality: Right;  Axogen, Clarix     SECTION      x3    CYSTOSCOPY      HYSTERECTOMY      ischemic colitis      OOPHORECTOMY      [place] REMOVAL OF OVARY/TUBE(S)         Review of patient's allergies indicates:   Allergen Reactions    Augmentin [amoxicillin-pot clavulanate] Other (See Comments)     Patient cannot recall what she had, only that she could not tolerate the Augmentin    Tramadol Itching    Tegretol [carbamazepine] Itching and Rash    Vicodin [hydrocodone-acetaminophen] Itching and Anxiety       PTA Medications   Medication Sig    atorvastatin (LIPITOR) 40 MG tablet Take 40 mg by mouth once daily.    baclofen (LIORESAL) 20 MG tablet 2 (two) times daily.     duloxetine (CYMBALTA) 30 MG capsule Take 30 mg by mouth once daily.    gabapentin (NEURONTIN) 300 MG capsule Take 1 capsule (300 mg total) by mouth 3 (three) times daily.    LEVOTHYROXINE SODIUM (LEVOTHYROXINE ORAL) Take 50 mcg by mouth before breakfast.     lisinopril (PRINIVIL,ZESTRIL) 40 MG tablet every evening.     metformin (GLUCOPHAGE) 500 MG tablet Take 500 mg by mouth 2 (two) times daily with meals.    methocarbamol (ROBAXIN) 500 MG Tab Take 1 tablet (500 mg total) by mouth 2 (two) times daily as needed (muscle spasms).    oxybutynin (DITROPAN-XL) 5 MG TR24 Take 1 tablet (5 mg total) by mouth once daily.    triamterene-hydrochlorothiazide 37.5-25 mg (DYAZIDE) 37.5-25 mg per capsule every morning.     estradiol (ESTRACE) 0.01 % (0.1 mg/gram) vaginal cream PLEASE SEE ATTACHED FOR DETAILED  DIRECTIONS     Family History     Problem Relation (Age of Onset)    Breast cancer Paternal Aunt        Tobacco Use    Smoking status: Never Smoker    Smokeless tobacco: Never Used   Substance and Sexual Activity    Alcohol use: No    Drug use: No    Sexual activity: Yes     Partners: Male     Review of Systems   Constitution: Negative for chills and decreased appetite.   HENT: Negative for congestion.    Cardiovascular: Negative for chest pain, irregular heartbeat and leg swelling.   Respiratory: Positive for shortness of breath. Negative for cough.    Endocrine: Negative for cold intolerance and heat intolerance.   Skin: Negative for rash.   Musculoskeletal: Negative for arthritis and back pain.   Gastrointestinal: Negative for abdominal pain, constipation and diarrhea.   Genitourinary: Negative for dysuria and hematuria.   Neurological: Negative for dizziness and headaches.   Psychiatric/Behavioral: Negative for altered mental status.     Objective:     Vital Signs (Most Recent):  Temp: 97 °F (36.1 °C) (03/03/20 0516)  Pulse: 79 (03/03/20 0657)  Resp: 16 (03/03/20 0516)  BP: (!) 103/52 (03/03/20 0516)  SpO2: 98 % (03/03/20 0516) Vital Signs (24h Range):  Temp:  [96.4 °F (35.8 °C)-98.1 °F (36.7 °C)] 97 °F (36.1 °C)  Pulse:  [60-90] 79  Resp:  [16-18] 16  SpO2:  [95 %-99 %] 98 %  BP: ()/(52-76) 103/52     Weight: 72.6 kg (159 lb 15.1 oz)  Body mass index is 27.45 kg/m².    SpO2: 98 %  O2 Device (Oxygen Therapy): room air      Intake/Output Summary (Last 24 hours) at 3/3/2020 0904  Last data filed at 3/2/2020 1700  Gross per 24 hour   Intake --   Output 700 ml   Net -700 ml       Lines/Drains/Airways     Peripheral Intravenous Line                 Peripheral IV - Single Lumen 03/01/20 1149 20 G Left Forearm 1 day                Physical Exam   Constitutional: She appears well-nourished. No distress.   HENT:   Head: Normocephalic.   Eyes: Pupils are equal, round, and reactive to light.   Neck: No JVD  present. No thyromegaly present.   Cardiovascular: Normal rate and regular rhythm. Exam reveals no friction rub.   No murmur heard.  Pulses:       Radial pulses are 1+ on the right side, and 1+ on the left side.        Dorsalis pedis pulses are 1+ on the right side, and 1+ on the left side.        Posterior tibial pulses are 1+ on the right side, and 1+ on the left side.   Pulmonary/Chest: Effort normal. No respiratory distress. She has no wheezes. She has no rales.   Abdominal: Soft. She exhibits no distension.   Musculoskeletal: She exhibits no edema.   Neurological: She is alert.   Vitals reviewed.      Significant Labs:   BMP:   Recent Labs   Lab 03/01/20  1211 03/02/20  0404   * 99    138   K 4.1 3.5    102   CO2 20* 23   BUN 29* 36*   CREATININE 1.1 1.5*   CALCIUM 10.3 9.9   MG  --  1.4*   , CBC   Recent Labs   Lab 03/01/20  1211 03/03/20  0520   WBC 6.09 6.18   HGB 11.3* 11.5*   HCT 36.6* 37.2    243   , Troponin   Recent Labs   Lab 03/01/20  1211 03/01/20  1749 03/01/20  2345   TROPONINI 0.980* 0.947* 0.838*    and All pertinent lab results from the last 24 hours have been reviewed.    Assessment and Plan:     * New onset of congestive heart failure  - No prior study to compare, however this depressed EF is most likely the etiology of her shortness of breath for the last couple of weeks  - The etiology of her newly depressed EF could be from ischemic etiology given her regional wall motion and high risk patient (age, DM); however other non-ischemic etiology such as PVC induced cardiomyopathy (given her frequent PVC can not be excluded).  - Will prepare for possible [facility] NPO now, continue Aspirin and statin for now     Access: Right Radial and Reigh groin as back up   Renal Risk Score/Predicts risk of contrast-induced nephropathy (CONOR) after PCI:    Prior Contrast Allergy: No   Pre Cath Routine Benadryl 50 mg and Routine IVF NaCl PreCath      Sedation   Type of sedation: RN IV  sedation    Mallampati score: II   ASA score: III    -The risks, benefits & alternatives of the procedure were explained to the patient.    -The risks of coronary angiography include but are not limited to: Bleeding, infection, heart rhythm abnormalities, allergic reactions, kidney injury, stroke and death.    -Should stenting be indicated, the patient has agreed to dual anti-platelet therapy for 1-consecutive year with a drug-eluting stent and a minimum of 1-month with the use of a bare metal stent.    -The risks of moderate sedation include hypotension, respiratory depression, arrhythmias, bronchospasm, & death.    -Informed consent was obtained & the patient is agreeable to proceed with the procedure.  -This patient will be discussed in the morning discussion with interventional cardiology staff.        VTE Risk Mitigation (From admission, onward)         Ordered     enoxaparin injection 40 mg  Daily      03/01/20 1622     Place RANJIT hose  Until discontinued      03/01/20 1622     Place sequential compression device  Until discontinued      03/01/20 1622     IP VTE HIGH RISK PATIENT  Once      03/01/20 1622              Thank you for your consult. I will follow-up with patient. Please contact us if you have any additional questions.    Carter Rosas MD  Interventional Cardiology   [facility]

## 2020-03-03 NOTE — ASSESSMENT & PLAN NOTE
Home regimen: metformin  Recent HgbA1c 5.8%  Hospital regimen: low dose correction scale  Hyper/hypoglycemia protocols in place  Diabetic Diet  Controlled

## 2020-03-04 PROBLEM — N39.0 ACUTE UTI (URINARY TRACT INFECTION): Status: ACTIVE | Noted: 2020-03-04

## 2020-03-04 PROBLEM — I25.10 CAD (CORONARY ARTERY DISEASE): Status: ACTIVE | Noted: 2020-03-04

## 2020-03-04 LAB
ANION GAP SERPL CALC-SCNC: 9 MMOL/L (ref 8–16)
BASOPHILS # BLD AUTO: 0.05 K/UL (ref 0–0.2)
BASOPHILS NFR BLD: 0.8 % (ref 0–1.9)
BUN SERPL-MCNC: 30 MG/DL (ref 8–23)
CALCIUM SERPL-MCNC: 9.6 MG/DL (ref 8.7–10.5)
CHLORIDE SERPL-SCNC: 106 MMOL/L (ref 95–110)
CO2 SERPL-SCNC: 23 MMOL/L (ref 23–29)
CREAT SERPL-MCNC: 1.1 MG/DL (ref 0.5–1.4)
DIFFERENTIAL METHOD: ABNORMAL
EOSINOPHIL # BLD AUTO: 0.4 K/UL (ref 0–0.5)
EOSINOPHIL NFR BLD: 6.7 % (ref 0–8)
ERYTHROCYTE [DISTWIDTH] IN BLOOD BY AUTOMATED COUNT: 14.2 % (ref 11.5–14.5)
EST. GFR  (AFRICAN AMERICAN): 59.6 ML/MIN/1.73 M^2
EST. GFR  (NON AFRICAN AMERICAN): 51.7 ML/MIN/1.73 M^2
GLUCOSE SERPL-MCNC: 102 MG/DL (ref 70–110)
GRAM STN SPEC: NORMAL
GRAM STN SPEC: NORMAL
HCT VFR BLD AUTO: 34.5 % (ref 37–48.5)
HGB BLD-MCNC: 10.5 G/DL (ref 12–16)
IMM GRANULOCYTES # BLD AUTO: 0.01 K/UL (ref 0–0.04)
IMM GRANULOCYTES NFR BLD AUTO: 0.2 % (ref 0–0.5)
LYMPHOCYTES # BLD AUTO: 2.5 K/UL (ref 1–4.8)
LYMPHOCYTES NFR BLD: 38.2 % (ref 18–48)
MAGNESIUM SERPL-MCNC: 1.8 MG/DL (ref 1.6–2.6)
MCH RBC QN AUTO: 28.1 PG (ref 27–31)
MCHC RBC AUTO-ENTMCNC: 30.4 G/DL (ref 32–36)
MCV RBC AUTO: 92 FL (ref 82–98)
MONOCYTES # BLD AUTO: 0.6 K/UL (ref 0.3–1)
MONOCYTES NFR BLD: 8.9 % (ref 4–15)
NEUTROPHILS # BLD AUTO: 3 K/UL (ref 1.8–7.7)
NEUTROPHILS NFR BLD: 45.2 % (ref 38–73)
NRBC BLD-RTO: 0 /100 WBC
PLATELET # BLD AUTO: 245 K/UL (ref 150–350)
PMV BLD AUTO: 10.8 FL (ref 9.2–12.9)
POCT GLUCOSE: 103 MG/DL (ref 70–110)
POCT GLUCOSE: 106 MG/DL (ref 70–110)
POCT GLUCOSE: 114 MG/DL (ref 70–110)
POCT GLUCOSE: 93 MG/DL (ref 70–110)
POTASSIUM SERPL-SCNC: 3.9 MMOL/L (ref 3.5–5.1)
RBC # BLD AUTO: 3.74 M/UL (ref 4–5.4)
SODIUM SERPL-SCNC: 138 MMOL/L (ref 136–145)
WBC # BLD AUTO: 6.54 K/UL (ref 3.9–12.7)

## 2020-03-04 PROCEDURE — 83735 ASSAY OF MAGNESIUM: CPT

## 2020-03-04 PROCEDURE — 99232 SBSQ HOSP IP/OBS MODERATE 35: CPT | Mod: GC,,, | Performed by: INTERNAL MEDICINE

## 2020-03-04 PROCEDURE — 85025 COMPLETE CBC W/AUTO DIFF WBC: CPT

## 2020-03-04 PROCEDURE — 36415 COLL VENOUS BLD VENIPUNCTURE: CPT

## 2020-03-04 PROCEDURE — 25000003 PHARM REV CODE 250: Performed by: INTERNAL MEDICINE

## 2020-03-04 PROCEDURE — 11000001 HC ACUTE MED/SURG PRIVATE ROOM

## 2020-03-04 PROCEDURE — 63600175 PHARM REV CODE 636 W HCPCS: Performed by: PHYSICIAN ASSISTANT

## 2020-03-04 PROCEDURE — 99233 SBSQ HOSP IP/OBS HIGH 50: CPT | Mod: ,,, | Performed by: PHYSICIAN ASSISTANT

## 2020-03-04 PROCEDURE — 99233 PR SUBSEQUENT HOSPITAL CARE,LEVL III: ICD-10-PCS | Mod: ,,, | Performed by: PHYSICIAN ASSISTANT

## 2020-03-04 PROCEDURE — 82962 GLUCOSE BLOOD TEST: CPT

## 2020-03-04 PROCEDURE — 99232 PR SUBSEQUENT HOSPITAL CARE,LEVL II: ICD-10-PCS | Mod: GC,,, | Performed by: INTERNAL MEDICINE

## 2020-03-04 PROCEDURE — 80048 BASIC METABOLIC PNL TOTAL CA: CPT

## 2020-03-04 PROCEDURE — 25000003 PHARM REV CODE 250: Performed by: PHYSICIAN ASSISTANT

## 2020-03-04 PROCEDURE — 63600175 PHARM REV CODE 636 W HCPCS: Performed by: INTERNAL MEDICINE

## 2020-03-04 RX ORDER — POTASSIUM CHLORIDE 20 MEQ/15ML
20 SOLUTION ORAL ONCE
Status: COMPLETED | OUTPATIENT
Start: 2020-03-04 | End: 2020-03-04

## 2020-03-04 RX ORDER — FUROSEMIDE 10 MG/ML
40 INJECTION INTRAMUSCULAR; INTRAVENOUS 2 TIMES DAILY WITH MEALS
Status: DISCONTINUED | OUTPATIENT
Start: 2020-03-04 | End: 2020-03-06

## 2020-03-04 RX ADMIN — BACLOFEN 20 MG: 10 TABLET ORAL at 07:03

## 2020-03-04 RX ADMIN — MAGNESIUM OXIDE TAB 400 MG (241.3 MG ELEMENTAL MG) 400 MG: 400 (241.3 MG) TAB at 12:03

## 2020-03-04 RX ADMIN — MAGNESIUM OXIDE TAB 400 MG (241.3 MG ELEMENTAL MG) 400 MG: 400 (241.3 MG) TAB at 05:03

## 2020-03-04 RX ADMIN — ASPIRIN 81 MG CHEWABLE TABLET 81 MG: 81 TABLET CHEWABLE at 07:03

## 2020-03-04 RX ADMIN — METOPROLOL SUCCINATE 25 MG: 25 TABLET, FILM COATED, EXTENDED RELEASE ORAL at 07:03

## 2020-03-04 RX ADMIN — ENOXAPARIN SODIUM 40 MG: 100 INJECTION SUBCUTANEOUS at 05:03

## 2020-03-04 RX ADMIN — POTASSIUM CHLORIDE 20 MEQ: 20 SOLUTION ORAL at 09:03

## 2020-03-04 RX ADMIN — FUROSEMIDE 40 MG: 10 INJECTION, SOLUTION INTRAMUSCULAR; INTRAVENOUS at 05:03

## 2020-03-04 RX ADMIN — GABAPENTIN 300 MG: 300 CAPSULE ORAL at 09:03

## 2020-03-04 RX ADMIN — BACLOFEN 20 MG: 10 TABLET ORAL at 09:03

## 2020-03-04 RX ADMIN — FUROSEMIDE 40 MG: 10 INJECTION, SOLUTION INTRAMUSCULAR; INTRAVENOUS at 09:03

## 2020-03-04 RX ADMIN — BENZONATATE 100 MG: 100 CAPSULE ORAL at 09:03

## 2020-03-04 RX ADMIN — CEFTRIAXONE SODIUM 1 G: 1 INJECTION, POWDER, FOR SOLUTION INTRAMUSCULAR; INTRAVENOUS at 06:03

## 2020-03-04 RX ADMIN — MAGNESIUM OXIDE TAB 400 MG (241.3 MG ELEMENTAL MG) 400 MG: 400 (241.3 MG) TAB at 07:03

## 2020-03-04 RX ADMIN — DULOXETINE HYDROCHLORIDE 30 MG: 30 CAPSULE, DELAYED RELEASE ORAL at 07:03

## 2020-03-04 RX ADMIN — MAGNESIUM OXIDE TAB 400 MG (241.3 MG ELEMENTAL MG) 400 MG: 400 (241.3 MG) TAB at 09:03

## 2020-03-04 RX ADMIN — LEVOTHYROXINE SODIUM 50 MCG: 50 TABLET ORAL at 05:03

## 2020-03-04 RX ADMIN — ATORVASTATIN CALCIUM 80 MG: 20 TABLET, FILM COATED ORAL at 07:03

## 2020-03-04 RX ADMIN — OXYBUTYNIN CHLORIDE 5 MG: 5 TABLET, EXTENDED RELEASE ORAL at 07:03

## 2020-03-04 NOTE — SUBJECTIVE & OBJECTIVE
Interval History:  no acute events overnight.  Long discussion by general cardiology and CTS about medical therapy as an option.    Review of Systems   Constitutional: Positive for unexpected weight change. Negative for appetite change, fatigue and fever.   HENT: Negative for trouble swallowing and voice change.    Eyes: Negative for photophobia and visual disturbance.   Respiratory: Positive for shortness of breath (improving). Negative for cough and wheezing.    Cardiovascular: Negative for chest pain, palpitations and leg swelling.   Gastrointestinal: Negative for constipation, diarrhea, nausea and vomiting.   Endocrine: Negative for polydipsia, polyphagia and polyuria.   Genitourinary: Positive for difficulty urinating (chronic) and dysuria (chronic burning; malodorous urine today). Negative for flank pain, frequency and hematuria.        Patient self caths for chronic urinary retention   Musculoskeletal: Negative for arthralgias and myalgias.   Skin: Negative for rash and wound.   Neurological: Negative for tremors and weakness (chronic b/l lower extremity weakness).   Hematological: Negative for adenopathy. Does not bruise/bleed easily.   Psychiatric/Behavioral: Negative for dysphoric mood. The patient is not nervous/anxious.      Objective:     Vital Signs (Most Recent):  Temp: 98.6 °F (37 °C) (03/04/20 0705)  Pulse: 90 (03/04/20 0705)  Resp: 18 (03/04/20 0705)  BP: 127/75 (03/04/20 0705)  SpO2: 98 % (03/04/20 0705) Vital Signs (24h Range):  Temp:  [97.3 °F (36.3 °C)-98.6 °F (37 °C)] 98.6 °F (37 °C)  Pulse:  [70-95] 90  Resp:  [18-20] 18  SpO2:  [98 %-100 %] 98 %  BP: ()/(56-90) 127/75     Weight: 73.6 kg (162 lb 4.1 oz)  Body mass index is 27.85 kg/m².    Intake/Output Summary (Last 24 hours) at 3/4/2020 1143  Last data filed at 3/4/2020 0245  Gross per 24 hour   Intake 1120 ml   Output 2050 ml   Net -930 ml      Physical Exam   Constitutional: She is oriented to person, place, and time. She appears  well-developed and well-nourished.   HENT:   Head: Normocephalic and atraumatic.   Eyes: Pupils are equal, round, and reactive to light. EOM are normal.   Neck: Normal range of motion. Neck supple. JVD present. No thyromegaly present.   Cardiovascular: Normal rate and regular rhythm.   No murmur heard.  Pulmonary/Chest: Effort normal and breath sounds normal. She has no wheezes.   Abdominal: Soft. Bowel sounds are normal. There is no tenderness.   Musculoskeletal: Normal range of motion. She exhibits edema (trace b/l dorsum of feet). She exhibits no tenderness.   Neurological: She is alert and oriented to person, place, and time. She has normal reflexes. No cranial nerve deficit.   Skin: Skin is warm and dry.   Psychiatric: She has a normal mood and affect. Her behavior is normal.   Nursing note and vitals reviewed.      Significant Labs: All pertinent labs within the past 24 hours have been reviewed.    Significant Imaging: I have reviewed all pertinent imaging results/findings within the past 24 hours.

## 2020-03-04 NOTE — ASSESSMENT & PLAN NOTE
"Pt self caths and chronic weakness following "spinal cord infection" in 1996  Pt reports completing 7 day course of bactrim approximately 2 weeks ago for UTI.  - Ordered repeat UA for malodorous urine.  Pt reports chronic dysuria.  No flank pain, afebrile.  UA 63 WBC, many bacteria, 2+ leukocytes.  3/3 ceftriaxone initiated, day 2/7  Referral to Dr. Yuen at discharge    "

## 2020-03-04 NOTE — PLAN OF CARE
Pt AAO x3, NAD, s/p LHC on 3/3/20 per Dr Friend, Rt radial site, dsg CDI, no active bleeding and no hematoma noted. Pt ambulates with walker and 1 person assistance and pt straight caths herself. Plan of care reviewed with pt. Droplet isolation precautions maintained. Call light within pt reach, pt instructed to call staff for any needed assistance, safety maintained. Pt denies needs/concerns at this time, will continue to monitor.

## 2020-03-04 NOTE — ASSESSMENT & PLAN NOTE
RAHUL, resolved  No known cardiac history. She does report upper respiratory infection ~1 week ago.  - BNP:  3286 CTA: Small bibasilar pleural effusions mild associated atelectasis, right greater than left.  - Echo: Severe systolic dysfunction with significant wall motion abnormality (EF 2-25%) as well as severe left ventricular diastolic dysfunction consistent with restrictive physiology.   - Left heart cath possibly today or tomorrow; NPO.   - O2 Sats 100% on RA   - Daily weights.  3/3 down 10 lbs.  - Strict I&O's   - Low salt cardiac diet; 1500mL fluid restriction  - Cardiac monitoring  - Cardiology consult as new onset CHF and elevated troponin. Troponin trended down.  No CP.  - Held spironolactone and lasix evening of 3/2 and 3/3.  Cr back down to 1.1 from 1.5 (baseline 1.0-1.1).   -Interventional Cardiology consulted and Left heart cath 3/3 revealing 3 vessel CAD.  Given her significant issues with ambulation (reliant on a walker), severe left ventricular dysfunction, and diffuse severe coronary artery disease which is non-bypassable medical management was recommended by CTS.  General Cardiology would like to pursue viability study to see if PCI for RCA and OM are an option.  -3/4 resume lasix IV    Follow up HTS clinic.

## 2020-03-04 NOTE — PROGRESS NOTES
[facility]  Cardiology  Progress Note    Patient Name: Niki Soriano  MRN: [id number]  Admission Date: 3/1/2020  Hospital Length of Stay: 2 days  Code Status: Full Code   Attending Physician: JANET Lake MD   Primary Care Physician: Moises Webster MD  Expected Discharge Date: 3/4/2020  Principal Problem:New onset of congestive heart failure    Subjective:     Hospital Course:   No notes on file    Interval History: s/p angiogram. Denies chest pain, has shortness of breath but improved from admission.    Review of Systems   All other systems reviewed and are negative.    Objective:     Vital Signs (Most Recent):  Temp: 98.6 °F (37 °C) (03/04/20 0705)  Pulse: 90 (03/04/20 0705)  Resp: 18 (03/04/20 0705)  BP: 127/75 (03/04/20 0705)  SpO2: 98 % (03/04/20 0705) Vital Signs (24h Range):  Temp:  [97.3 °F (36.3 °C)-98.6 °F (37 °C)] 98.6 °F (37 °C)  Pulse:  [70-95] 90  Resp:  [18-20] 18  SpO2:  [98 %-100 %] 98 %  BP: ()/(56-90) 127/75     Weight: 73.6 kg (162 lb 4.1 oz)  Body mass index is 27.85 kg/m².     SpO2: 98 %  O2 Device (Oxygen Therapy): room air      Intake/Output Summary (Last 24 hours) at 3/4/2020 0843  Last data filed at 3/4/2020 0245  Gross per 24 hour   Intake 1240 ml   Output 2650 ml   Net -1410 ml       Lines/Drains/Airways     Peripheral Intravenous Line                 Peripheral IV - Single Lumen 03/01/20 1149 20 G Left Forearm 2 days                Physical Exam   Constitutional: She appears well-developed and well-nourished.   Neck: Normal range of motion. + JVD present.   Cardiovascular: Normal rate and regular rhythm.   No murmur heard.  Pulmonary/Chest: Effort normal. No respiratory distress. She has rales.   Abdominal: Soft. There is no tenderness.   Musculoskeletal: Normal range of motion. She exhibits no edema.   Skin: Skin is warm and dry. No erythema. No pallor.       Assessment and Plan:       * New onset of congestive heart failure  69 F with  multiple comorbidities who presents with 5 weeks of increasing shortness of breath, weight gain, leg swelling, orthopnea and pnd. CXR with cardiomegaly, pulmonary vascular congestion. BNP > 3000, Trop 0.9, flat. Suspected ADHF  - Echo with EF 20-25%, Grade III diastolic dysfunction  - S/p [facility] 3/3/20:  · Multi-vessel coronary artery disease, with  of ostial LAD filled via faint collaterals from the OM1 and Ramus, 75% tubular stenisus if the mid LCx, 80% discrete stenosis of the proximal OM1, 99% discrete stenosis of the mid RCA, and  of the distal RCA filled via collaterals from the distal LCx and OM1.  · LV filling pressure is elevated, LVEDP 25 mmHg.    Plan:  - GDMT: Continue Toprol 25 mg po daily. Hold ACEi/ARB for now, likely start tomorrow  - LVEDP 25, +JVP - resume diuresis with lasix 40 IV BID  - CAD: continue ASA, continue HI statin. CTS consulted and deemed not a surgical candidate. Will obtain myocardial viability study to determine if patient would benefit from stenting.  - Will likely need a holter monitor on discharge with significant amount of PVCs on EKG; this could also be contributing to her heart failure.  - Heart failure clinic on discharge to see if patient would be a candidate for advanced options.  - Low Na diet  - Strict I/Os, daily weights      VTE Risk Mitigation (From admission, onward)         Ordered     heparin (porcine) injection  As needed (PRN)      03/03/20 1451     heparin (porcine) 750 Units, verapamil 1.25 mg, nitroGLYCERIN 1.25 mg in sodium chloride 0.9% 250 mL  As needed (PRN)      03/03/20 1415     heparin infusion 1,000 units/500 ml in 0.9% NaCl (pressure line flush)  Intra-op continuous PRN      03/03/20 1415     enoxaparin injection 40 mg  Daily      03/01/20 1622     Place RANJIT hose  Until discontinued      03/01/20 1622     Place sequential compression device  Until discontinued      03/01/20 1622     IP VTE HIGH RISK PATIENT  Once      03/01/20 1622                 Kenrick Miller MD  Cardiology  [facility]

## 2020-03-04 NOTE — HPI
"69 y.o. 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  Pt reports increasing SOB over the past several weeks.  Pt reports being prescribed lasix about 2 weeks ago, she took daily for a week with improvement in symptoms.  She cannot recall the dose she was prescribed.  In the past week she has gained 8 lbs.  She usually sleeps flat on her back with a robe rolled up instead of a pillow but recently has had to sleep on her side due to difficulty breathing.  She also reports burning abdominal pain, decreased appetite, b/l lower extremity edema, dry cough, constipation.  She currently denies recent fever, chills, diaphoresis, chest pain or pressure, sick contacts, syncope, dizziness, lightheadedness.  Pt does report having URI ~1 week ago. In the ED, BNP 3286, troponin 0.980, d dimer 1.5, CTA negative for PE, pleural effusion R>L, BUN 29, Cr 1.1, AST 41, ALT 69, normal t bili, normal lipase.  UA unremarkable.     Overview/Hospital Course:  Pt placed in observation for new-onset acute decompensated HF.  BNP>3000, troponin 0.9 trended down, CTA negative for PE.  CXR with cardiomegaly, pulmonary vascular congestion.  CHF pathway initiated.  Given lasix 40 mg IV bid. She is down 10 lbs since admission and euvolemic on exam.  Lasix held evening of 3/2 and 3/3.  Echo demonstrated severe systolic dysfunction with significant wall motion abnormality (EF 2-25%) as well as severe left ventricular diastolic dysfunction consistent with restrictive physiology.  [facility] with multivessel disease.   "

## 2020-03-04 NOTE — ASSESSMENT & PLAN NOTE
69 F with multiple comorbidities who presents with 5 weeks of increasing shortness of breath, weight gain, leg swelling, orthopnea and pnd. CXR with cardiomegaly, pulmonary vascular congestion. BNP > 3000, Trop 0.9, flat. Suspected ADHF  - Echo with EF 20-25%, Grade III diastolic dysfunction  - S/p [facility] 3/3/20:  · Multi-vessel coronary artery disease, with  of ostial LAD filled via faint collaterals from the OM1 and Ramus, 75% tubular stenisus if the mid LCx, 80% discrete stenosis of the proximal OM1, 99% discrete stenosis of the mid RCA, and  of the distal RCA filled via collaterals from the distal LCx and OM1.  · LV filling pressure is elevated, LVEDP 25 mmHg.    Plan:  - GDMT: Continue Toprol 25 mg po daily. Hold ACEi/ARB for now, likely start tomorrow  - LVEDP 25, +JVP - resume diuresis with lasix 40 IV BID  - CAD: continue ASA, continue HI statin. CTS consulted and deemed not a surgical candidate. Will obtain myocardial viability study to determine if patient would benefit from stenting.  - Will likely need a holter monitor on discharge with significant amount of PVCs on EKG; this could also be contributing to her heart failure.  - Heart failure clinic on discharge to see if patient would be a candidate for advanced options.  - Low Na diet  - Strict I/Os, daily weights

## 2020-03-04 NOTE — ASSESSMENT & PLAN NOTE
CTS consulted for patient with multivessel disease noted on LHC. Patient with poor targets and debility, so is therefore not a surgical candidate. Continue with medical management. Dr. Duke to review and staff.

## 2020-03-04 NOTE — PROGRESS NOTES
"[facility]  Hospital Medicine  Progress Note    Patient Name: Niki Soriano  MRN: [id number]  Patient Class: IP- Inpatient   Admission Date: 3/1/2020  Length of Stay: 2 days  Attending Physician: JANET Lake MD  Primary Care Provider: Moises Webster MD        Subjective:     Principal Problem:New onset of congestive heart failure        HPI:  69 y.o. 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  Pt reports increasing SOB over the past several weeks.  Pt reports being prescribed lasix about 2 weeks ago, she took daily for a week with improvement in symptoms.  She cannot recall the dose she was prescribed.  In the past week she has gained 8 lbs.  She usually sleeps flat on her back with a robe rolled up instead of a pillow but recently has had to sleep on her side due to difficulty breathing.  She also reports burning abdominal pain, decreased appetite, b/l lower extremity edema, dry cough, constipation.  She currently denies recent fever, chills, diaphoresis, chest pain or pressure, sick contacts, syncope, dizziness, lightheadedness.  Pt does report having URI ~1 week ago.    In the ED, BNP 3286, troponin 0.980, d dimer 1.5, CTA negative for PE, pleural effusion R>L, BUN 29, Cr 1.1, AST 41, ALT 69, normal t bili, normal lipase.  UA unremarkable.    Overview/Hospital Course:  Pt placed in observation for new-onset acute decompensated HF.  BNP>3000, troponin 0.9 trended down, CTA negative for PE.  CXR with cardiomegaly, pulmonary vascular congestion.  CHF pathway initiated.  Given lasix 40 mg IV bid. She is down 10 lbs since admission and euvolemic on exam.  Lasix held evening of 3/2 and 3/3.  Echo demonstrated severe systolic " dysfunction with significant wall motion abnormality (EF 2-25%) as well as severe left ventricular diastolic dysfunction consistent with restrictive physiology.  Interventional Cardiology consulted and Left heart cath 3/3 revealing 3 vessel CAD.  Given her significant issues with ambulation (reliant on a walker), severe left ventricular dysfunction, and diffuse severe coronary artery disease which is non-bypassable medical management was recommended by CTS.  General Cardiology would like to pursue viability study to see if PCI for RCA and OM are an option.    Follow up HTS clinic.    Interval History:  no acute events overnight.  Long discussion by general cardiology and CTS about medical therapy as an option.    Review of Systems   Constitutional: Positive for unexpected weight change. Negative for appetite change, fatigue and fever.   HENT: Negative for trouble swallowing and voice change.    Eyes: Negative for photophobia and visual disturbance.   Respiratory: Positive for shortness of breath (improving). Negative for cough and wheezing.    Cardiovascular: Negative for chest pain, palpitations and leg swelling.   Gastrointestinal: Negative for constipation, diarrhea, nausea and vomiting.   Endocrine: Negative for polydipsia, polyphagia and polyuria.   Genitourinary: Positive for difficulty urinating (chronic) and dysuria (chronic burning; malodorous urine today). Negative for flank pain, frequency and hematuria.        Patient self caths for chronic urinary retention   Musculoskeletal: Negative for arthralgias and myalgias.   Skin: Negative for rash and wound.   Neurological: Negative for tremors and weakness (chronic b/l lower extremity weakness).   Hematological: Negative for adenopathy. Does not bruise/bleed easily.   Psychiatric/Behavioral: Negative for dysphoric mood. The patient is not nervous/anxious.      Objective:     Vital Signs (Most Recent):  Temp: 98.6 °F (37 °C) (03/04/20 0705)  Pulse: 90 (03/04/20  0705)  Resp: 18 (03/04/20 0705)  BP: 127/75 (03/04/20 0705)  SpO2: 98 % (03/04/20 0705) Vital Signs (24h Range):  Temp:  [97.3 °F (36.3 °C)-98.6 °F (37 °C)] 98.6 °F (37 °C)  Pulse:  [70-95] 90  Resp:  [18-20] 18  SpO2:  [98 %-100 %] 98 %  BP: ()/(56-90) 127/75     Weight: 73.6 kg (162 lb 4.1 oz)  Body mass index is 27.85 kg/m².    Intake/Output Summary (Last 24 hours) at 3/4/2020 1143  Last data filed at 3/4/2020 0245  Gross per 24 hour   Intake 1120 ml   Output 2050 ml   Net -930 ml      Physical Exam   Constitutional: She is oriented to person, place, and time. She appears well-developed and well-nourished.   HENT:   Head: Normocephalic and atraumatic.   Eyes: Pupils are equal, round, and reactive to light. EOM are normal.   Neck: Normal range of motion. Neck supple. JVD present. No thyromegaly present.   Cardiovascular: Normal rate and regular rhythm.   No murmur heard.  Pulmonary/Chest: Effort normal and breath sounds normal. She has no wheezes.   Abdominal: Soft. Bowel sounds are normal. There is no tenderness.   Musculoskeletal: Normal range of motion. She exhibits edema (trace b/l dorsum of feet). She exhibits no tenderness.   Neurological: She is alert and oriented to person, place, and time. She has normal reflexes. No cranial nerve deficit.   Skin: Skin is warm and dry.   Psychiatric: She has a normal mood and affect. Her behavior is normal.   Nursing note and vitals reviewed.      Significant Labs: All pertinent labs within the past 24 hours have been reviewed.    Significant Imaging: I have reviewed all pertinent imaging results/findings within the past 24 hours.      Assessment/Plan:      * New onset of congestive heart failure  RAHUL, resolved  No known cardiac history. She does report upper respiratory infection ~1 week ago.  - BNP:  3286 CTA: Small bibasilar pleural effusions mild associated atelectasis, right greater than left.  - Echo: Severe systolic dysfunction with significant wall motion  "abnormality (EF 2-25%) as well as severe left ventricular diastolic dysfunction consistent with restrictive physiology.   - Left heart cath possibly today or tomorrow; NPO.   - O2 Sats 100% on RA   - Daily weights.  3/3 down 10 lbs.  - Strict I&O's   - Low salt cardiac diet; 1500mL fluid restriction  - Cardiac monitoring  - Cardiology consult as new onset CHF and elevated troponin. Troponin trended down.  No CP.  - Held spironolactone and lasix evening of 3/2 and 3/3.  Cr back down to 1.1 from 1.5 (baseline 1.0-1.1).   -Interventional Cardiology consulted and Left heart cath 3/3 revealing 3 vessel CAD.  Given her significant issues with ambulation (reliant on a walker), severe left ventricular dysfunction, and diffuse severe coronary artery disease which is non-bypassable medical management was recommended by CTS.  General Cardiology would like to pursue viability study to see if PCI for RCA and OM are an option.  -3/4 resume lasix IV    Follow up HTS clinic.                  Incomplete emptying of bladder  Pt self caths and chronic weakness following "spinal cord infection" in 1996  Pt reports completing 7 day course of bactrim approximately 2 weeks ago for UTI.  - Ordered repeat UA for malodorous urine.  Pt reports chronic dysuria.  No flank pain, afebrile.  UA 63 WBC, many bacteria, 2+ leukocytes.  3/3 ceftriaxone initiated, day 2/7  Referral to Dr. Yuen at discharge      Leg weakness, bilateral        Type 2 diabetes mellitus with neurologic complication  Home regimen: metformin  Recent HgbA1c 5.8%  Hospital regimen: low dose correction scale  Hyper/hypoglycemia protocols in place  Diabetic Diet  Controlled        Hypothyroidism  TSH slightly elevated  Continue lt4 50 mcg daily as free T4 WNL        VTE Risk Mitigation (From admission, onward)         Ordered     heparin (porcine) injection  As needed (PRN)      03/03/20 1451     heparin (porcine) 750 Units, verapamil 1.25 mg, nitroGLYCERIN 1.25 mg in sodium " chloride 0.9% 250 mL  As needed (PRN)      03/03/20 1415     heparin infusion 1,000 units/500 ml in 0.9% NaCl (pressure line flush)  Intra-op continuous PRN      03/03/20 1415     enoxaparin injection 40 mg  Daily      03/01/20 1622     Place RANJIT hose  Until discontinued      03/01/20 1622     Place sequential compression device  Until discontinued      03/01/20 1622     IP VTE HIGH RISK PATIENT  Once      03/01/20 1622                      Evelin Knight PA-C  Department of Hospital Medicine   [facility]

## 2020-03-04 NOTE — CONSULTS
"[facility]  Cardiothoracic Surgery  Consult Note    Patient Name: Niki Soriano  MRN: [id number]  Admission Date: 3/1/2020  Attending Physician: JANET Lake MD  Referring Provider: Self, Aaareferral    Patient information was obtained from past medical records.     Inpatient consult to Cardiothoracic Surgery  Consult performed by: Mary Palm PA-C  Consult ordered by: Evelin Knight PA-C        Subjective:     Principal Problem: New onset of congestive heart failure    History of Present Illness: 69 y.o. 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  Pt reports increasing SOB over the past several weeks.  Pt reports being prescribed lasix about 2 weeks ago, she took daily for a week with improvement in symptoms.  She cannot recall the dose she was prescribed.  In the past week she has gained 8 lbs.  She usually sleeps flat on her back with a robe rolled up instead of a pillow but recently has had to sleep on her side due to difficulty breathing.  She also reports burning abdominal pain, decreased appetite, b/l lower extremity edema, dry cough, constipation.  She currently denies recent fever, chills, diaphoresis, chest pain or pressure, sick contacts, syncope, dizziness, lightheadedness.  Pt does report having URI ~1 week ago. In the ED, BNP 3286, troponin 0.980, d dimer 1.5, CTA negative for PE, pleural effusion R>L, BUN 29, Cr 1.1, AST 41, ALT 69, normal t bili, normal lipase.  UA unremarkable.     Overview/Hospital Course:  Pt placed in observation for new-onset acute decompensated HF.  BNP>3000, troponin 0.9 trended down, CTA negative for PE.  CXR with cardiomegaly, pulmonary vascular congestion.  CHF pathway initiated. "  Given lasix 40 mg IV bid. She is down 10 lbs since admission and euvolemic on exam.  Lasix held evening of 3/2 and 3/3.  Echo demonstrated severe systolic dysfunction with significant wall motion abnormality (EF 2-25%) as well as severe left ventricular diastolic dysfunction consistent with restrictive physiology.   [facility] with multivessel disease.     No current facility-administered medications on file prior to encounter.      Current Outpatient Medications on File Prior to Encounter   Medication Sig    atorvastatin (LIPITOR) 40 MG tablet Take 40 mg by mouth once daily.    baclofen (LIORESAL) 20 MG tablet 2 (two) times daily.     duloxetine (CYMBALTA) 30 MG capsule Take 30 mg by mouth once daily.    gabapentin (NEURONTIN) 300 MG capsule Take 1 capsule (300 mg total) by mouth 3 (three) times daily.    LEVOTHYROXINE SODIUM (LEVOTHYROXINE ORAL) Take 50 mcg by mouth before breakfast.     lisinopril (PRINIVIL,ZESTRIL) 40 MG tablet every evening.     metformin (GLUCOPHAGE) 500 MG tablet Take 500 mg by mouth 2 (two) times daily with meals.    methocarbamol (ROBAXIN) 500 MG Tab Take 1 tablet (500 mg total) by mouth 2 (two) times daily as needed (muscle spasms).    oxybutynin (DITROPAN-XL) 5 MG TR24 Take 1 tablet (5 mg total) by mouth once daily.    triamterene-hydrochlorothiazide 37.5-25 mg (DYAZIDE) 37.5-25 mg per capsule every morning.     estradiol (ESTRACE) 0.01 % (0.1 mg/gram) vaginal cream PLEASE SEE ATTACHED FOR DETAILED DIRECTIONS       Review of patient's allergies indicates:   Allergen Reactions    Augmentin [amoxicillin-pot clavulanate] Other (See Comments)     Patient cannot recall what she had, only that she could not tolerate the Augmentin    Tramadol Itching    Tegretol [carbamazepine] Itching and Rash    Vicodin [hydrocodone-acetaminophen] Itching and Anxiety       Past Medical History:   Diagnosis Date    Diabetes mellitus     Hypercholesteremia     Hypertension     IC (interstitial  cystitis)     Numbness in both hands 2017    Spondylisthesis     Vulvodynia, unspecified      Past Surgical History:   Procedure Laterality Date    CARPAL TUNNEL RELEASE Right 2019    Procedure: RELEASE, CARPAL TUNNEL, REVISION;  Surgeon: Annabelle Cid MD;  Location: [facility] OR [address] [place] [address];  Service: Orthopedics;  Laterality: Right;  Axogen, Clarix     SECTION      x3    CYSTOSCOPY      HYSTERECTOMY      ischemic colitis      OOPHORECTOMY      VA REMOVAL OF OVARY/TUBE(S)       Family History     Problem Relation (Age of Onset)    Breast cancer Paternal Aunt        Tobacco Use    Smoking status: Never Smoker    Smokeless tobacco: Never Used   Substance and Sexual Activity    Alcohol use: No    Drug use: No    Sexual activity: Yes     Partners: Male       Objective:     Vital Signs (Most Recent):  Temp: 98.6 °F (37 °C) (20)  Pulse: 90 (20)  Resp: 18 (20)  BP: 127/75 (20)  SpO2: 98 % (20) Vital Signs (24h Range):  Temp:  [97.3 °F (36.3 °C)-98.6 °F (37 °C)] 98.6 °F (37 °C)  Pulse:  [70-95] 90  Resp:  [18-20] 18  SpO2:  [98 %-100 %] 98 %  BP: ()/(56-90) 127/75     Weight: 73.6 kg (162 lb 4.1 oz)  Body mass index is 27.85 kg/m².    SpO2: 98 %  O2 Device (Oxygen Therapy): room air     Intake/Output - Last 3 Shifts        07 -  0659  07 -  0659  07 -  0659    P.O.  940     I.V. (mL/kg)  300 (4.1)     Total Intake(mL/kg)  1240 (17.1)     Urine (mL/kg/hr) 1600 (0.9) 3650 (2.1)     Stool  0     Total Output 1600 3650     Net -1600 -2410            Stool Occurrence  0 x            Lines/Drains/Airways     Peripheral Intravenous Line                 Peripheral IV - Single Lumen 20 1149 20 G Left Forearm 2 days                 STS Risk Score: 1.5%    Significant Labs:  ABGs: No results for input(s): PH, PCO2, PO2, HCO3, POCSATURATED, BE in the last 48 hours.  Amylase: No results for input(s):  AMYLASE in the last 48 hours.  BMP:   Recent Labs   Lab 03/04/20  0330         K 3.9      CO2 23   BUN 30*   CREATININE 1.1   CALCIUM 9.6   MG 1.8     Cardiac markers: No results for input(s): CKMB, CPKMB, TROPONINT, TROPONINI, MYOGLOBIN in the last 48 hours.  CBC:   Recent Labs   Lab 03/04/20  0330   WBC 6.54   RBC 3.74*   HGB 10.5*   HCT 34.5*      MCV 92   MCH 28.1   MCHC 30.4*     CMP:   Recent Labs   Lab 03/04/20  0330      CALCIUM 9.6      K 3.9   CO2 23      BUN 30*   CREATININE 1.1     Coagulation: No results for input(s): PT, INR, APTT in the last 48 hours.  Lactic Acid: No results for input(s): LACTATE in the last 48 hours.  LFTs: No results for input(s): ALT, AST, ALKPHOS, BILITOT, PROT, ALBUMIN in the last 48 hours.    Significant Diagnostics: reviewed   [facility] 3/3/20  · Multi-vessel coronary artery disease, with  of ostial LAD filled via faint collaterals from the OM1 and Ramus, 75% tubular stenisus if the mid LCx, 80% discrete stenosis of the proximal OM1, 99% discrete stenosis of the mid RCA, and  of the distal RCA filled via collaterals from the distal LCx and OM1.  · LV filling pressure is elevated, LVEDP 25 mmHg.    TTE 3/2/2020  · Severely decreased left ventricular systolic function. The estimated ejection fraction is 20-25%. Significant wall motion abnormalities seen.  · Mildly reduced right ventricular systolic function.  · Grade III (severe) left ventricular diastolic dysfunction consistent with restrictive physiology.  · Mild left atrial enlargement.  · Mild-to-moderate mitral regurgitation.  · The estimated PA systolic pressure is 44 mmHg.  · Intermediate central venous pressure (8 mmHg).    Assessment/Plan:     NYHA Score: NYHA III: marked limitation of physical activity, comfortable at rest    CAD (coronary artery disease)  CTS consulted for patient with multivessel disease noted on C. Patient with poor targets and debility, so is therefore  not a surgical candidate. Continue with medical management. Dr. Duke to review and staff.         Thank you for your consult. I will sign off. Please contact us if you have any additional questions.    Mary Palm PA-C  Cardiothoracic Surgery  [facility]    I have seen the patient and reviewed the physician assistant's note above. I have personally interviewed and examined the patient at bedside and agree with the findings.     Ms. Soriano is a pleasant 69 year old female (nonsmoker) with a history of newly diagnosed heart failure reduced ejection fraction (20-25% ejection fraction) due to ischemic cardiomyopathy, diabetes (HbA1C of 5.8%), and prior temporary paraplegia in the 1990s in which she has been dependent on a walker ever since (cannot ambulate without walker), who presented with first time symptoms of heart failure (shortness of breath on exertion, cough, no angina; BNP of 3,286), found to have NSTEMI (troponin max 0.98), underwent coronary angiogram showing multivessel coronary artery disease.  Coronary angiogram details show totally occluded proximal LAD with a small mid and distal LAD which is diffusely diseased (fills via left to left collaterals), 99% stenosis in the proximal portion of a moderate to large OM1, and totally occluded RCA with a small PDA which is diffusely diseased (fills via left to right collaterals).   Transthoracic echocardiogram shows 20-25% ejection fraction with LVEDD of 5.2cm and mild to moderate mitral regurgitation.    Given her significant issues with ambulation (reliant on a walker), severe left ventricular dysfunction, and diffuse severe coronary artery disease which is non-bypassable, I recommend medical management.  I had a lengthy discussion with Ms. Soriano about these issues.  She understands her situation well.  Please contact me with any questions.       Hi Duke MD  Cardiothoracic Surgery  [facility]

## 2020-03-04 NOTE — SUBJECTIVE & OBJECTIVE
Interval History: s/p angiogram. Feels well. Denies chest pain, shortness of breath.    Review of Systems   All other systems reviewed and are negative.    Objective:     Vital Signs (Most Recent):  Temp: 98.6 °F (37 °C) (03/04/20 0705)  Pulse: 90 (03/04/20 0705)  Resp: 18 (03/04/20 0705)  BP: 127/75 (03/04/20 0705)  SpO2: 98 % (03/04/20 0705) Vital Signs (24h Range):  Temp:  [97.3 °F (36.3 °C)-98.6 °F (37 °C)] 98.6 °F (37 °C)  Pulse:  [70-95] 90  Resp:  [18-20] 18  SpO2:  [98 %-100 %] 98 %  BP: ()/(56-90) 127/75     Weight: 73.6 kg (162 lb 4.1 oz)  Body mass index is 27.85 kg/m².     SpO2: 98 %  O2 Device (Oxygen Therapy): room air      Intake/Output Summary (Last 24 hours) at 3/4/2020 0843  Last data filed at 3/4/2020 0245  Gross per 24 hour   Intake 1240 ml   Output 2650 ml   Net -1410 ml       Lines/Drains/Airways     Peripheral Intravenous Line                 Peripheral IV - Single Lumen 03/01/20 1149 20 G Left Forearm 2 days                Physical Exam   Constitutional: She appears well-developed and well-nourished.   Neck: Normal range of motion. JVD present.   Cardiovascular: Normal rate and regular rhythm.   No murmur heard.  Pulmonary/Chest: Effort normal. No respiratory distress. She has rales.   Abdominal: Soft. There is no tenderness.   Musculoskeletal: Normal range of motion. She exhibits no edema.   Skin: Skin is warm and dry. No erythema. No pallor.

## 2020-03-04 NOTE — SUBJECTIVE & OBJECTIVE
No current facility-administered medications on file prior to encounter.      Current Outpatient Medications on File Prior to Encounter   Medication Sig    atorvastatin (LIPITOR) 40 MG tablet Take 40 mg by mouth once daily.    baclofen (LIORESAL) 20 MG tablet 2 (two) times daily.     duloxetine (CYMBALTA) 30 MG capsule Take 30 mg by mouth once daily.    gabapentin (NEURONTIN) 300 MG capsule Take 1 capsule (300 mg total) by mouth 3 (three) times daily.    LEVOTHYROXINE SODIUM (LEVOTHYROXINE ORAL) Take 50 mcg by mouth before breakfast.     lisinopril (PRINIVIL,ZESTRIL) 40 MG tablet every evening.     metformin (GLUCOPHAGE) 500 MG tablet Take 500 mg by mouth 2 (two) times daily with meals.    methocarbamol (ROBAXIN) 500 MG Tab Take 1 tablet (500 mg total) by mouth 2 (two) times daily as needed (muscle spasms).    oxybutynin (DITROPAN-XL) 5 MG TR24 Take 1 tablet (5 mg total) by mouth once daily.    triamterene-hydrochlorothiazide 37.5-25 mg (DYAZIDE) 37.5-25 mg per capsule every morning.     estradiol (ESTRACE) 0.01 % (0.1 mg/gram) vaginal cream PLEASE SEE ATTACHED FOR DETAILED DIRECTIONS       Review of patient's allergies indicates:   Allergen Reactions    Augmentin [amoxicillin-pot clavulanate] Other (See Comments)     Patient cannot recall what she had, only that she could not tolerate the Augmentin    Tramadol Itching    Tegretol [carbamazepine] Itching and Rash    Vicodin [hydrocodone-acetaminophen] Itching and Anxiety       Past Medical History:   Diagnosis Date    Diabetes mellitus     Hypercholesteremia     Hypertension     IC (interstitial cystitis)     Numbness in both hands 11/1/2017    Spondylisthesis     Vulvodynia, unspecified      Past Surgical History:   Procedure Laterality Date    CARPAL TUNNEL RELEASE Right 8/21/2019    Procedure: RELEASE, CARPAL TUNNEL, REVISION;  Surgeon: Annabelle Cid MD;  Location: [facility] OR [address] [place] [address];  Service: Orthopedics;  Laterality: Right;  Axogen,  Clarix     SECTION      x3    CYSTOSCOPY      HYSTERECTOMY      ischemic colitis      OOPHORECTOMY      NE REMOVAL OF OVARY/TUBE(S)       Family History     Problem Relation (Age of Onset)    Breast cancer Paternal Aunt        Tobacco Use    Smoking status: Never Smoker    Smokeless tobacco: Never Used   Substance and Sexual Activity    Alcohol use: No    Drug use: No    Sexual activity: Yes     Partners: Male       Objective:     Vital Signs (Most Recent):  Temp: 98.6 °F (37 °C) (20)  Pulse: 90 (20)  Resp: 18 (20)  BP: 127/75 (20)  SpO2: 98 % (20) Vital Signs (24h Range):  Temp:  [97.3 °F (36.3 °C)-98.6 °F (37 °C)] 98.6 °F (37 °C)  Pulse:  [70-95] 90  Resp:  [18-20] 18  SpO2:  [98 %-100 %] 98 %  BP: ()/(56-90) 127/75     Weight: 73.6 kg (162 lb 4.1 oz)  Body mass index is 27.85 kg/m².    SpO2: 98 %  O2 Device (Oxygen Therapy): room air     Intake/Output - Last 3 Shifts       700 - 0659 700 -  0659  07 -  0659    P.O.  940     I.V. (mL/kg)  300 (4.1)     Total Intake(mL/kg)  1240 (17.1)     Urine (mL/kg/hr) 1600 (0.9) 3650 (2.1)     Stool  0     Total Output 1600 3650     Net -1600 -2410            Stool Occurrence  0 x            Lines/Drains/Airways     Peripheral Intravenous Line                 Peripheral IV - Single Lumen 20 1149 20 G Left Forearm 2 days                 STS Risk Score: 1.5%    Significant Labs:  ABGs: No results for input(s): PH, PCO2, PO2, HCO3, POCSATURATED, BE in the last 48 hours.  Amylase: No results for input(s): AMYLASE in the last 48 hours.  BMP:   Recent Labs   Lab 20  0330         K 3.9      CO2 23   BUN 30*   CREATININE 1.1   CALCIUM 9.6   MG 1.8     Cardiac markers: No results for input(s): CKMB, CPKMB, TROPONINT, TROPONINI, MYOGLOBIN in the last 48 hours.  CBC:   Recent Labs   Lab 20  0330   WBC 6.54   RBC 3.74*   HGB 10.5*   HCT  34.5*      MCV 92   MCH 28.1   MCHC 30.4*     CMP:   Recent Labs   Lab 03/04/20  0330      CALCIUM 9.6      K 3.9   CO2 23      BUN 30*   CREATININE 1.1     Coagulation: No results for input(s): PT, INR, APTT in the last 48 hours.  Lactic Acid: No results for input(s): LACTATE in the last 48 hours.  LFTs: No results for input(s): ALT, AST, ALKPHOS, BILITOT, PROT, ALBUMIN in the last 48 hours.    Significant Diagnostics: reviewed   [facility] 3/3/20  · Multi-vessel coronary artery disease, with  of ostial LAD filled via faint collaterals from the OM1 and Ramus, 75% tubular stenisus if the mid LCx, 80% discrete stenosis of the proximal OM1, 99% discrete stenosis of the mid RCA, and  of the distal RCA filled via collaterals from the distal LCx and OM1.  · LV filling pressure is elevated, LVEDP 25 mmHg.    TTE 3/2/2020  · Severely decreased left ventricular systolic function. The estimated ejection fraction is 20-25%. Significant wall motion abnormalities seen.  · Mildly reduced right ventricular systolic function.  · Grade III (severe) left ventricular diastolic dysfunction consistent with restrictive physiology.  · Mild left atrial enlargement.  · Mild-to-moderate mitral regurgitation.  · The estimated PA systolic pressure is 44 mmHg.  · Intermediate central venous pressure (8 mmHg).

## 2020-03-05 ENCOUNTER — CLINICAL SUPPORT (OUTPATIENT)
Dept: CARDIOLOGY | Facility: CLINIC | Age: 70
DRG: 286 | End: 2020-03-05
Attending: EMERGENCY MEDICINE
Payer: MEDICARE

## 2020-03-05 VITALS — HEIGHT: 64 IN | BODY MASS INDEX: 27.66 KG/M2 | WEIGHT: 162 LBS

## 2020-03-05 LAB
ANION GAP SERPL CALC-SCNC: 11 MMOL/L (ref 8–16)
BACTERIA UR CULT: ABNORMAL
BASOPHILS # BLD AUTO: 0.06 K/UL (ref 0–0.2)
BASOPHILS NFR BLD: 0.9 % (ref 0–1.9)
BUN SERPL-MCNC: 31 MG/DL (ref 8–23)
CALCIUM SERPL-MCNC: 10.3 MG/DL (ref 8.7–10.5)
CHLORIDE SERPL-SCNC: 103 MMOL/L (ref 95–110)
CO2 SERPL-SCNC: 25 MMOL/L (ref 23–29)
CREAT SERPL-MCNC: 1.1 MG/DL (ref 0.5–1.4)
DIFFERENTIAL METHOD: ABNORMAL
EOSINOPHIL # BLD AUTO: 0.4 K/UL (ref 0–0.5)
EOSINOPHIL NFR BLD: 6.3 % (ref 0–8)
ERYTHROCYTE [DISTWIDTH] IN BLOOD BY AUTOMATED COUNT: 14 % (ref 11.5–14.5)
EST. GFR  (AFRICAN AMERICAN): 59.6 ML/MIN/1.73 M^2
EST. GFR  (NON AFRICAN AMERICAN): 51.7 ML/MIN/1.73 M^2
GLUCOSE SERPL-MCNC: 123 MG/DL (ref 70–110)
HCT VFR BLD AUTO: 37.9 % (ref 37–48.5)
HGB BLD-MCNC: 11.8 G/DL (ref 12–16)
IMM GRANULOCYTES # BLD AUTO: 0.02 K/UL (ref 0–0.04)
IMM GRANULOCYTES NFR BLD AUTO: 0.3 % (ref 0–0.5)
LYMPHOCYTES # BLD AUTO: 2.7 K/UL (ref 1–4.8)
LYMPHOCYTES NFR BLD: 39 % (ref 18–48)
MAGNESIUM SERPL-MCNC: 1.8 MG/DL (ref 1.6–2.6)
MCH RBC QN AUTO: 28.6 PG (ref 27–31)
MCHC RBC AUTO-ENTMCNC: 31.1 G/DL (ref 32–36)
MCV RBC AUTO: 92 FL (ref 82–98)
MONOCYTES # BLD AUTO: 0.5 K/UL (ref 0.3–1)
MONOCYTES NFR BLD: 7.6 % (ref 4–15)
NEUTROPHILS # BLD AUTO: 3.2 K/UL (ref 1.8–7.7)
NEUTROPHILS NFR BLD: 45.9 % (ref 38–73)
NRBC BLD-RTO: 0 /100 WBC
NUC REST DIASTOLIC VOLUME INDEX: 129
NUC REST EJECTION FRACTION: 33
NUC REST SYSTOLIC VOLUME INDEX: 86
PERFUSION DEFECT 1 SIZE IN %: 30 %
PLATELET # BLD AUTO: 245 K/UL (ref 150–350)
PMV BLD AUTO: 10.7 FL (ref 9.2–12.9)
POCT GLUCOSE: 165 MG/DL (ref 70–110)
POCT GLUCOSE: 92 MG/DL (ref 70–110)
POTASSIUM SERPL-SCNC: 3.5 MMOL/L (ref 3.5–5.1)
RBC # BLD AUTO: 4.13 M/UL (ref 4–5.4)
SODIUM SERPL-SCNC: 139 MMOL/L (ref 136–145)
WBC # BLD AUTO: 7 K/UL (ref 3.9–12.7)

## 2020-03-05 PROCEDURE — 99999 PR PBB SHADOW E&M-EST. PATIENT-LVL I: CPT | Mod: PBBFAC,,,

## 2020-03-05 PROCEDURE — 25000003 PHARM REV CODE 250: Performed by: INTERNAL MEDICINE

## 2020-03-05 PROCEDURE — 83735 ASSAY OF MAGNESIUM: CPT

## 2020-03-05 PROCEDURE — 11000001 HC ACUTE MED/SURG PRIVATE ROOM

## 2020-03-05 PROCEDURE — 63600175 PHARM REV CODE 636 W HCPCS: Performed by: PHYSICIAN ASSISTANT

## 2020-03-05 PROCEDURE — 99233 SBSQ HOSP IP/OBS HIGH 50: CPT | Mod: ,,, | Performed by: PHYSICIAN ASSISTANT

## 2020-03-05 PROCEDURE — 99999 PR PBB SHADOW E&M-EST. PATIENT-LVL I: ICD-10-PCS | Mod: PBBFAC,,,

## 2020-03-05 PROCEDURE — 82962 GLUCOSE BLOOD TEST: CPT

## 2020-03-05 PROCEDURE — 36415 COLL VENOUS BLD VENIPUNCTURE: CPT

## 2020-03-05 PROCEDURE — 80048 BASIC METABOLIC PNL TOTAL CA: CPT

## 2020-03-05 PROCEDURE — A9555 CARDIAC PET SCAN VIABILITY (CUPID ONLY): ICD-10-PCS | Mod: ,,, | Performed by: INTERNAL MEDICINE

## 2020-03-05 PROCEDURE — A9555 RB82 RUBIDIUM: HCPCS | Mod: ,,, | Performed by: INTERNAL MEDICINE

## 2020-03-05 PROCEDURE — 78491 CARDIAC PET SCAN VIABILITY (CUPID ONLY): ICD-10-PCS | Mod: 26,,, | Performed by: INTERNAL MEDICINE

## 2020-03-05 PROCEDURE — 85025 COMPLETE CBC W/AUTO DIFF WBC: CPT

## 2020-03-05 PROCEDURE — 78491 MYOCRD IMG PET 1STD RST/STRS: CPT

## 2020-03-05 PROCEDURE — 25000003 PHARM REV CODE 250: Performed by: PHYSICIAN ASSISTANT

## 2020-03-05 PROCEDURE — 78491 MYOCRD IMG PET 1STD RST/STRS: CPT | Mod: 26,,, | Performed by: INTERNAL MEDICINE

## 2020-03-05 PROCEDURE — 63600175 PHARM REV CODE 636 W HCPCS: Performed by: INTERNAL MEDICINE

## 2020-03-05 PROCEDURE — 99233 PR SUBSEQUENT HOSPITAL CARE,LEVL III: ICD-10-PCS | Mod: ,,, | Performed by: PHYSICIAN ASSISTANT

## 2020-03-05 RX ORDER — LISINOPRIL 2.5 MG/1
2.5 TABLET ORAL DAILY
Status: DISCONTINUED | OUTPATIENT
Start: 2020-03-05 | End: 2020-03-06 | Stop reason: HOSPADM

## 2020-03-05 RX ADMIN — LEVOTHYROXINE SODIUM 50 MCG: 50 TABLET ORAL at 05:03

## 2020-03-05 RX ADMIN — ATORVASTATIN CALCIUM 80 MG: 20 TABLET, FILM COATED ORAL at 08:03

## 2020-03-05 RX ADMIN — FUROSEMIDE 40 MG: 10 INJECTION, SOLUTION INTRAMUSCULAR; INTRAVENOUS at 08:03

## 2020-03-05 RX ADMIN — DULOXETINE HYDROCHLORIDE 30 MG: 30 CAPSULE, DELAYED RELEASE ORAL at 08:03

## 2020-03-05 RX ADMIN — ASPIRIN 81 MG CHEWABLE TABLET 81 MG: 81 TABLET CHEWABLE at 08:03

## 2020-03-05 RX ADMIN — CEFTRIAXONE SODIUM 1 G: 1 INJECTION, POWDER, FOR SOLUTION INTRAMUSCULAR; INTRAVENOUS at 07:03

## 2020-03-05 RX ADMIN — MAGNESIUM OXIDE TAB 400 MG (241.3 MG ELEMENTAL MG) 400 MG: 400 (241.3 MG) TAB at 08:03

## 2020-03-05 RX ADMIN — ENOXAPARIN SODIUM 40 MG: 100 INJECTION SUBCUTANEOUS at 05:03

## 2020-03-05 RX ADMIN — FUROSEMIDE 40 MG: 10 INJECTION, SOLUTION INTRAMUSCULAR; INTRAVENOUS at 05:03

## 2020-03-05 RX ADMIN — BACLOFEN 20 MG: 10 TABLET ORAL at 08:03

## 2020-03-05 RX ADMIN — METOPROLOL SUCCINATE 25 MG: 25 TABLET, FILM COATED, EXTENDED RELEASE ORAL at 08:03

## 2020-03-05 RX ADMIN — GABAPENTIN 300 MG: 300 CAPSULE ORAL at 08:03

## 2020-03-05 RX ADMIN — OXYBUTYNIN CHLORIDE 5 MG: 5 TABLET, EXTENDED RELEASE ORAL at 08:03

## 2020-03-05 RX ADMIN — MAGNESIUM OXIDE TAB 400 MG (241.3 MG ELEMENTAL MG) 400 MG: 400 (241.3 MG) TAB at 05:03

## 2020-03-05 RX ADMIN — MAGNESIUM OXIDE TAB 400 MG (241.3 MG ELEMENTAL MG) 400 MG: 400 (241.3 MG) TAB at 11:03

## 2020-03-05 RX ADMIN — LISINOPRIL 2.5 MG: 2.5 TABLET ORAL at 03:03

## 2020-03-05 NOTE — PROGRESS NOTES
[facility]  Cardiology  Progress Note    Patient Name: Niki Soriano  MRN: [id number]  Admission Date: 3/1/2020  Hospital Length of Stay: 3 days  Code Status: Full Code   Attending Physician: Jasson Truong MD   Primary Care Physician: Moises Webster MD  Expected Discharge Date: 3/4/2020  Principal Problem:New onset of congestive heart failure    Subjective:     Hospital Course:   No notes on file    Interval History: naeon    Review of Systems   All other systems reviewed and are negative.    Objective:     Vital Signs (Most Recent):  Temp: 97.7 °F (36.5 °C) (03/05/20 1113)  Pulse: 76 (03/05/20 1113)  Resp: 18 (03/05/20 1113)  BP: (!) 110/57 (03/05/20 1113)  SpO2: (!) 94 % (03/05/20 1113) Vital Signs (24h Range):  Temp:  [97.2 °F (36.2 °C)-98.3 °F (36.8 °C)] 97.7 °F (36.5 °C)  Pulse:  [67-99] 76  Resp:  [18] 18  SpO2:  [94 %-98 %] 94 %  BP: ()/(54-76) 110/57     Weight: 71.6 kg (157 lb 13.6 oz)  Body mass index is 27.09 kg/m².     SpO2: (!) 94 %  O2 Device (Oxygen Therapy): room air      Intake/Output Summary (Last 24 hours) at 3/5/2020 1338  Last data filed at 3/5/2020 1125  Gross per 24 hour   Intake 591 ml   Output 2450 ml   Net -1859 ml       Lines/Drains/Airways     Peripheral Intravenous Line                 Peripheral IV - Single Lumen 03/01/20 1149 20 G Left Forearm 4 days                Physical Exam   Constitutional: She appears well-developed and well-nourished.   Neck: Normal range of motion. JVD present.   Cardiovascular: Normal rate and regular rhythm.   No murmur heard.  Pulmonary/Chest: Effort normal. No respiratory distress. She has rales.   Abdominal: Soft. There is no tenderness.   Musculoskeletal: Normal range of motion. She exhibits no edema.   Skin: Skin is warm and dry. No erythema. No pallor.       Assessment and Plan:       * New onset of congestive heart failure  69 F with multiple comorbidities who presents with 5 weeks of increasing shortness  of breath, weight gain, leg swelling, orthopnea and pnd. CXR with cardiomegaly, pulmonary vascular congestion. BNP > 3000, Trop 0.9, flat. Suspected ADHF  - Echo with EF 20-25%, Grade III diastolic dysfunction  - S/p [facility] 3/3/20:  · Multi-vessel coronary artery disease, with  of ostial LAD filled via faint collaterals from the OM1 and Ramus, 75% tubular stenisus if the mid LCx, 80% discrete stenosis of the proximal OM1, 99% discrete stenosis of the mid RCA, and  of the distal RCA filled via collaterals from the distal LCx and OM1.  · LV filling pressure is elevated, LVEDP 25 mmHg.    Plan:  - GDMT: Continue Toprol 25 mg po daily. Start lisinopril 2.5 mg daily  - LVEDP 25, +JVP - continue diuresis with lasix 40 IV BID  - CAD: continue ASA, continue HI statin. CTS consulted and deemed not a surgical candidate.   - Will likely need a holter monitor on discharge with significant amount of PVCs on EKG; this could also be contributing to her heart failure.  - Heart failure clinic on discharge to see if patient would be a candidate for advanced options.  - Low Na diet  - Strict I/Os, daily weights      VTE Risk Mitigation (From admission, onward)         Ordered     heparin (porcine) injection  As needed (PRN)      03/03/20 1451     heparin (porcine) 750 Units, verapamil 1.25 mg, nitroGLYCERIN 1.25 mg in sodium chloride 0.9% 250 mL  As needed (PRN)      03/03/20 1415     heparin infusion 1,000 units/500 ml in 0.9% NaCl (pressure line flush)  Intra-op continuous PRN      03/03/20 1415     enoxaparin injection 40 mg  Daily      03/01/20 1622     Place RANJIT hose  Until discontinued      03/01/20 1622     Place sequential compression device  Until discontinued      03/01/20 1622     IP VTE HIGH RISK PATIENT  Once      03/01/20 1622                Kenrick Miller MD  Cardiology  [facility]

## 2020-03-05 NOTE — SUBJECTIVE & OBJECTIVE
Interval History: ***    Review of Systems   All other systems reviewed and are negative.    Objective:     Vital Signs (Most Recent):  Temp: 97.7 °F (36.5 °C) (20 1113)  Pulse: 76 (20 1113)  Resp: 18 (20 1113)  BP: (!) 110/57 (20 1113)  SpO2: (!) 94 % (20 1113) Vital Signs (24h Range):  Temp:  [97.2 °F (36.2 °C)-98.3 °F (36.8 °C)] 97.7 °F (36.5 °C)  Pulse:  [67-99] 76  Resp:  [18] 18  SpO2:  [94 %-98 %] 94 %  BP: ()/(54-76) 110/57     Weight: 71.6 kg (157 lb 13.6 oz)  Body mass index is 27.09 kg/m².     SpO2: (!) 94 %  O2 Device (Oxygen Therapy): room air      Intake/Output Summary (Last 24 hours) at 3/5/2020 1336  Last data filed at 3/5/2020 1125  Gross per 24 hour   Intake 591 ml   Output 2450 ml   Net -1859 ml       Lines/Drains/Airways     Peripheral Intravenous Line                 Peripheral IV - Single Lumen 20 1149 20 G Left Forearm 4 days                Physical Exam   Constitutional: She appears well-developed and well-nourished.   Neck: Normal range of motion. JVD present.   Cardiovascular: Normal rate and regular rhythm.   No murmur heard.  Pulmonary/Chest: Effort normal. No respiratory distress. She has rales.   Abdominal: Soft. There is no tenderness.   Musculoskeletal: Normal range of motion. She exhibits no edema.   Skin: Skin is warm and dry. No erythema. No pallor.       Significant Labs: {Labs:71846}    Significant Imaging: {Imagin}

## 2020-03-05 NOTE — SUBJECTIVE & OBJECTIVE
Interval History: naeon    Review of Systems   All other systems reviewed and are negative.    Objective:     Vital Signs (Most Recent):  Temp: 97.7 °F (36.5 °C) (03/05/20 1113)  Pulse: 76 (03/05/20 1113)  Resp: 18 (03/05/20 1113)  BP: (!) 110/57 (03/05/20 1113)  SpO2: (!) 94 % (03/05/20 1113) Vital Signs (24h Range):  Temp:  [97.2 °F (36.2 °C)-98.3 °F (36.8 °C)] 97.7 °F (36.5 °C)  Pulse:  [67-99] 76  Resp:  [18] 18  SpO2:  [94 %-98 %] 94 %  BP: ()/(54-76) 110/57     Weight: 71.6 kg (157 lb 13.6 oz)  Body mass index is 27.09 kg/m².     SpO2: (!) 94 %  O2 Device (Oxygen Therapy): room air      Intake/Output Summary (Last 24 hours) at 3/5/2020 1338  Last data filed at 3/5/2020 1125  Gross per 24 hour   Intake 591 ml   Output 2450 ml   Net -1859 ml       Lines/Drains/Airways     Peripheral Intravenous Line                 Peripheral IV - Single Lumen 03/01/20 1149 20 G Left Forearm 4 days                Physical Exam   Constitutional: She appears well-developed and well-nourished.   Neck: Normal range of motion. JVD present.   Cardiovascular: Normal rate and regular rhythm.   No murmur heard.  Pulmonary/Chest: Effort normal. No respiratory distress. She has rales.   Abdominal: Soft. There is no tenderness.   Musculoskeletal: Normal range of motion. She exhibits no edema.   Skin: Skin is warm and dry. No erythema. No pallor.

## 2020-03-05 NOTE — PLAN OF CARE
03/05/20 1122   Discharge Reassessment   Assessment Type Discharge Planning Reassessment   Discharge Plan A Home with family   DME Needed Upon Discharge  none

## 2020-03-05 NOTE — ASSESSMENT & PLAN NOTE
RAHUL, resolved  No known cardiac history. She does report upper respiratory infection ~1 week ago.  - BNP:  3286 CTA: Small bibasilar pleural effusions mild associated atelectasis, right greater than left.  - Echo: Severe systolic dysfunction with significant wall motion abnormality (EF 2-25%) as well as severe left ventricular diastolic dysfunction consistent with restrictive physiology.   - Left heart cath possibly today or tomorrow; NPO.   - O2 Sats 100% on RA   - Daily weights  - Strict I&O's   - Low salt cardiac diet; 1500mL fluid restriction  - Cardiac monitoring  - Cardiology consult as new onset CHF and elevated troponin. Troponin trended down.  No CP.  - Held spironolactone and lasix evening of 3/2 and 3/3.  Cr back down to 1.1 from 1.5 (baseline 1.0-1.1).   -Interventional Cardiology consulted and Left heart cath 3/3 revealing 3 vessel CAD.  Given her significant issues with ambulation (reliant on a walker), severe left ventricular dysfunction, and diffuse severe coronary artery disease which is non-bypassable medical management was recommended by CTS.  General Cardiology would like to pursue viability study to see if PCI for RCA and OM are an option.  -3/4 resume lasix IV, 3/5 anticipate transition to po lasix this evening.  Will follow cardiology recs after viability study.    Follow up HTS clinic.

## 2020-03-05 NOTE — PROGRESS NOTES
"[facility] - Short Stay Cardiac Unit  Hospital Medicine  Progress Note    Patient Name: Niki Soriano  MRN: [id number]  Patient Class: IP- Inpatient   Admission Date: 3/1/2020  Length of Stay: 3 days  Attending Physician: Jasson Truong MD  Primary Care Provider: Moises Webster MD        Subjective:     Principal Problem:New onset of congestive heart failure        HPI:  69 y.o. 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  Pt reports increasing SOB over the past several weeks.  Pt reports being prescribed lasix about 2 weeks ago, she took daily for a week with improvement in symptoms.  She cannot recall the dose she was prescribed.  In the past week she has gained 8 lbs.  She usually sleeps flat on her back with a robe rolled up instead of a pillow but recently has had to sleep on her side due to difficulty breathing.  She also reports burning abdominal pain, decreased appetite, b/l lower extremity edema, dry cough, constipation.  She currently denies recent fever, chills, diaphoresis, chest pain or pressure, sick contacts, syncope, dizziness, lightheadedness.  Pt does report having URI ~1 week ago.    In the ED, BNP 3286, troponin 0.980, d dimer 1.5, CTA negative for PE, pleural effusion R>L, BUN 29, Cr 1.1, AST 41, ALT 69, normal t bili, normal lipase.  UA unremarkable.    Overview/Hospital Course:  Pt placed in observation for new-onset acute decompensated HF.  BNP>3000, troponin 0.9 trended down, CTA negative for PE.  CXR with cardiomegaly, pulmonary vascular congestion.  CHF pathway initiated.  Given lasix 40 mg IV bid. She is down 10 lbs since admission and euvolemic on exam.  Lasix held evening of 3/2 and 3/3.  Echo demonstrated severe systolic " dysfunction with significant wall motion abnormality (EF 2-25%) as well as severe left ventricular diastolic dysfunction consistent with restrictive physiology.  Interventional Cardiology consulted and Left heart cath 3/3 revealing 3 vessel CAD.  Given her significant issues with ambulation (reliant on a walker), severe left ventricular dysfunction, and diffuse severe coronary artery disease which is non-bypassable medical management was recommended by CTS.  General Cardiology would like to pursue viability study to see if PCI for RCA and OM are an option.    Follow up [facility] clinic.    Interval History:  no acute events overnight, no new complaints.  Pt completed viability study, waiting on results.      Review of Systems   Constitutional: Positive for unexpected weight change. Negative for appetite change, fatigue and fever.   HENT: Negative for trouble swallowing and voice change.    Eyes: Negative for photophobia and visual disturbance.   Respiratory: Positive for shortness of breath (improving). Negative for cough and wheezing.    Cardiovascular: Negative for chest pain, palpitations and leg swelling.   Gastrointestinal: Negative for constipation, diarrhea, nausea and vomiting.   Endocrine: Negative for polydipsia, polyphagia and polyuria.   Genitourinary: Positive for difficulty urinating (chronic) and dysuria (chronic burning; malodorous urine today). Negative for flank pain, frequency and hematuria.        Patient self caths for chronic urinary retention   Musculoskeletal: Negative for arthralgias and myalgias.   Skin: Negative for rash and wound.   Neurological: Negative for tremors and weakness (chronic b/l lower extremity weakness).   Hematological: Negative for adenopathy. Does not bruise/bleed easily.   Psychiatric/Behavioral: Negative for dysphoric mood. The patient is not nervous/anxious.      Objective:     Vital Signs (Most Recent):  Temp: 97.7 °F (36.5 °C) (03/05/20 1113)  Pulse: 76 (03/05/20  1113)  Resp: 18 (03/05/20 1113)  BP: (!) 110/57 (03/05/20 1113)  SpO2: (!) 94 % (03/05/20 1113) Vital Signs (24h Range):  Temp:  [97.2 °F (36.2 °C)-98.3 °F (36.8 °C)] 97.7 °F (36.5 °C)  Pulse:  [67-99] 76  Resp:  [18] 18  SpO2:  [94 %-98 %] 94 %  BP: ()/(54-76) 110/57     Weight: 71.6 kg (157 lb 13.6 oz)  Body mass index is 27.09 kg/m².    Intake/Output Summary (Last 24 hours) at 3/5/2020 1431  Last data filed at 3/5/2020 1125  Gross per 24 hour   Intake 591 ml   Output 2450 ml   Net -1859 ml      Down 5 lbs    Physical Exam   Constitutional: She is oriented to person, place, and time. She appears well-developed and well-nourished.   HENT:   Head: Normocephalic and atraumatic.   Eyes: Pupils are equal, round, and reactive to light. EOM are normal.   Neck: Normal range of motion. Neck supple. No JVD present. No thyromegaly present.   Cardiovascular: Normal rate and regular rhythm.   No murmur heard.  Pulmonary/Chest: Effort normal and breath sounds normal. She has no wheezes.   Abdominal: Soft. Bowel sounds are normal. There is no tenderness.   Musculoskeletal: Normal range of motion. She exhibits edema (trace b/l dorsum of feet). She exhibits no tenderness.   Neurological: She is alert and oriented to person, place, and time. She has normal reflexes. No cranial nerve deficit.   Skin: Skin is warm and dry.   Psychiatric: She has a normal mood and affect. Her behavior is normal.   Nursing note and vitals reviewed.      Significant Labs: All pertinent labs within the past 24 hours have been reviewed.    Significant Imaging: I have reviewed all pertinent imaging results/findings within the past 24 hours.      Assessment/Plan:      * New onset of congestive heart failure  RAHUL, resolved  No known cardiac history. She does report upper respiratory infection ~1 week ago.  - BNP:  3286 CTA: Small bibasilar pleural effusions mild associated atelectasis, right greater than left.  - Echo: Severe systolic dysfunction with  "significant wall motion abnormality (EF 2-25%) as well as severe left ventricular diastolic dysfunction consistent with restrictive physiology.   - Left heart cath possibly today or tomorrow; NPO.   - O2 Sats 100% on RA   - Daily weights  - Strict I&O's   - Low salt cardiac diet; 1500mL fluid restriction  - Cardiac monitoring  - Cardiology consult as new onset CHF and elevated troponin. Troponin trended down.  No CP.  - Held spironolactone and lasix evening of 3/2 and 3/3.  Cr back down to 1.1 from 1.5 (baseline 1.0-1.1).   -Interventional Cardiology consulted and Left heart cath 3/3 revealing 3 vessel CAD.  Given her significant issues with ambulation (reliant on a walker), severe left ventricular dysfunction, and diffuse severe coronary artery disease which is non-bypassable medical management was recommended by CTS.  General Cardiology would like to pursue viability study to see if PCI for RCA and OM are an option.  -3/4 resume lasix IV, 3/5 anticipate transition to po lasix this evening.  Will follow cardiology recs after viability study.    Follow up HTS clinic.                  Incomplete emptying of bladder  Pt self caths and chronic weakness following "spinal cord infection" in 1996  Pt reports completing 7 day course of bactrim approximately 2 weeks ago for UTI.  - Ordered repeat UA for malodorous urine.  Pt reports chronic dysuria.  No flank pain, afebrile.  UA 63 WBC, many bacteria, 2+ leukocytes.  3/3 ceftriaxone initiated, day 3/7  Referral to Dr. Yuen urogysantiago at discharge      Leg weakness, bilateral        Type 2 diabetes mellitus with neurologic complication  Home regimen: metformin  Recent HgbA1c 5.8%  Hospital regimen: low dose correction scale  Hyper/hypoglycemia protocols in place  Diabetic Diet  Controlled        Hypothyroidism  TSH slightly elevated  Continue lt4 50 mcg daily as free T4 WNL        VTE Risk Mitigation (From admission, onward)         Ordered     heparin (porcine) injection  As " needed (PRN)      03/03/20 1451     heparin (porcine) 750 Units, verapamil 1.25 mg, nitroGLYCERIN 1.25 mg in sodium chloride 0.9% 250 mL  As needed (PRN)      03/03/20 1415     heparin infusion 1,000 units/500 ml in 0.9% NaCl (pressure line flush)  Intra-op continuous PRN      03/03/20 1415     enoxaparin injection 40 mg  Daily      03/01/20 1622     Place RANJIT hose  Until discontinued      03/01/20 1622     Place sequential compression device  Until discontinued      03/01/20 1622     IP VTE HIGH RISK PATIENT  Once      03/01/20 1622                      Evelin Knight PA-C  Department of Hospital Medicine   [facility] Stay Cardiac [facility]

## 2020-03-05 NOTE — ASSESSMENT & PLAN NOTE
69 F with multiple comorbidities who presents with 5 weeks of increasing shortness of breath, weight gain, leg swelling, orthopnea and pnd. CXR with cardiomegaly, pulmonary vascular congestion. BNP > 3000, Trop 0.9, flat. Suspected ADHF  - Echo with EF 20-25%, Grade III diastolic dysfunction  - S/p [facility] 3/3/20:  · Multi-vessel coronary artery disease, with  of ostial LAD filled via faint collaterals from the OM1 and Ramus, 75% tubular stenisus if the mid LCx, 80% discrete stenosis of the proximal OM1, 99% discrete stenosis of the mid RCA, and  of the distal RCA filled via collaterals from the distal LCx and OM1.  · LV filling pressure is elevated, LVEDP 25 mmHg.    Plan:  - GDMT: Continue Toprol 25 mg po daily. Start lisinopril 2.5 mg daily  - LVEDP 25, +JVP - continue diuresis with lasix 40 IV BID  - CAD: continue ASA, continue HI statin. CTS consulted and deemed not a surgical candidate.   - Will likely need a holter monitor on discharge with significant amount of PVCs on EKG; this could also be contributing to her heart failure.  - Heart failure clinic on discharge to see if patient would be a candidate for advanced options.  - Low Na diet  - Strict I/Os, daily weights

## 2020-03-05 NOTE — PLAN OF CARE
Pt AAO x3, NAD, pt is NPO for pet scan viability on 3/4/20. Pt ambulates with walker and standby assist, pt straight caths self. Droplet precautions maintained. Call light within pt reach, pt instructed to call staff for any needed assistance, safety maintained. Pt denies needs/concerns at this time, will continue to monitor.

## 2020-03-05 NOTE — ASSESSMENT & PLAN NOTE
"Pt self caths and chronic weakness following "spinal cord infection" in 1996  Pt reports completing 7 day course of bactrim approximately 2 weeks ago for UTI.  - Ordered repeat UA for malodorous urine.  Pt reports chronic dysuria.  No flank pain, afebrile.  UA 63 WBC, many bacteria, 2+ leukocytes.  3/3 ceftriaxone initiated, day 3/7  Referral to Dr. Yuen urogyn at discharge    "

## 2020-03-05 NOTE — SUBJECTIVE & OBJECTIVE
Interval History:  no acute events overnight, no new complaints.  Pt completed viability study, waiting on results.      Review of Systems   Constitutional: Positive for unexpected weight change. Negative for appetite change, fatigue and fever.   HENT: Negative for trouble swallowing and voice change.    Eyes: Negative for photophobia and visual disturbance.   Respiratory: Positive for shortness of breath (improving). Negative for cough and wheezing.    Cardiovascular: Negative for chest pain, palpitations and leg swelling.   Gastrointestinal: Negative for constipation, diarrhea, nausea and vomiting.   Endocrine: Negative for polydipsia, polyphagia and polyuria.   Genitourinary: Positive for difficulty urinating (chronic) and dysuria (chronic burning; malodorous urine today). Negative for flank pain, frequency and hematuria.        Patient self caths for chronic urinary retention   Musculoskeletal: Negative for arthralgias and myalgias.   Skin: Negative for rash and wound.   Neurological: Negative for tremors and weakness (chronic b/l lower extremity weakness).   Hematological: Negative for adenopathy. Does not bruise/bleed easily.   Psychiatric/Behavioral: Negative for dysphoric mood. The patient is not nervous/anxious.      Objective:     Vital Signs (Most Recent):  Temp: 97.7 °F (36.5 °C) (03/05/20 1113)  Pulse: 76 (03/05/20 1113)  Resp: 18 (03/05/20 1113)  BP: (!) 110/57 (03/05/20 1113)  SpO2: (!) 94 % (03/05/20 1113) Vital Signs (24h Range):  Temp:  [97.2 °F (36.2 °C)-98.3 °F (36.8 °C)] 97.7 °F (36.5 °C)  Pulse:  [67-99] 76  Resp:  [18] 18  SpO2:  [94 %-98 %] 94 %  BP: ()/(54-76) 110/57     Weight: 71.6 kg (157 lb 13.6 oz)  Body mass index is 27.09 kg/m².    Intake/Output Summary (Last 24 hours) at 3/5/2020 1431  Last data filed at 3/5/2020 1125  Gross per 24 hour   Intake 591 ml   Output 2450 ml   Net -1859 ml      Down 5 lbs    Physical Exam   Constitutional: She is oriented to person, place, and time.  She appears well-developed and well-nourished.   HENT:   Head: Normocephalic and atraumatic.   Eyes: Pupils are equal, round, and reactive to light. EOM are normal.   Neck: Normal range of motion. Neck supple. No JVD present. No thyromegaly present.   Cardiovascular: Normal rate and regular rhythm.   No murmur heard.  Pulmonary/Chest: Effort normal and breath sounds normal. She has no wheezes.   Abdominal: Soft. Bowel sounds are normal. There is no tenderness.   Musculoskeletal: Normal range of motion. She exhibits edema (trace b/l dorsum of feet). She exhibits no tenderness.   Neurological: She is alert and oriented to person, place, and time. She has normal reflexes. No cranial nerve deficit.   Skin: Skin is warm and dry.   Psychiatric: She has a normal mood and affect. Her behavior is normal.   Nursing note and vitals reviewed.      Significant Labs: All pertinent labs within the past 24 hours have been reviewed.    Significant Imaging: I have reviewed all pertinent imaging results/findings within the past 24 hours.

## 2020-03-06 VITALS
DIASTOLIC BLOOD PRESSURE: 58 MMHG | RESPIRATION RATE: 20 BRPM | BODY MASS INDEX: 26.95 KG/M2 | WEIGHT: 157.88 LBS | HEART RATE: 71 BPM | SYSTOLIC BLOOD PRESSURE: 100 MMHG | TEMPERATURE: 99 F | HEIGHT: 64 IN | OXYGEN SATURATION: 96 %

## 2020-03-06 PROBLEM — A49.8 INFECTION DUE TO ESBL-PRODUCING KLEBSIELLA PNEUMONIAE: Status: ACTIVE | Noted: 2020-03-06

## 2020-03-06 PROBLEM — Z16.12 INFECTION DUE TO ESBL-PRODUCING KLEBSIELLA PNEUMONIAE: Status: ACTIVE | Noted: 2020-03-06

## 2020-03-06 LAB
ANION GAP SERPL CALC-SCNC: 10 MMOL/L (ref 8–16)
BASOPHILS # BLD AUTO: 0.05 K/UL (ref 0–0.2)
BASOPHILS NFR BLD: 0.8 % (ref 0–1.9)
BUN SERPL-MCNC: 41 MG/DL (ref 8–23)
CALCIUM SERPL-MCNC: 10.2 MG/DL (ref 8.7–10.5)
CHLORIDE SERPL-SCNC: 102 MMOL/L (ref 95–110)
CO2 SERPL-SCNC: 27 MMOL/L (ref 23–29)
CREAT SERPL-MCNC: 1.2 MG/DL (ref 0.5–1.4)
DIFFERENTIAL METHOD: ABNORMAL
EOSINOPHIL # BLD AUTO: 0.4 K/UL (ref 0–0.5)
EOSINOPHIL NFR BLD: 6.5 % (ref 0–8)
ERYTHROCYTE [DISTWIDTH] IN BLOOD BY AUTOMATED COUNT: 14 % (ref 11.5–14.5)
EST. GFR  (AFRICAN AMERICAN): 53.7 ML/MIN/1.73 M^2
EST. GFR  (NON AFRICAN AMERICAN): 46.5 ML/MIN/1.73 M^2
GLUCOSE SERPL-MCNC: 126 MG/DL (ref 70–110)
HCT VFR BLD AUTO: 37 % (ref 37–48.5)
HGB BLD-MCNC: 11.5 G/DL (ref 12–16)
IMM GRANULOCYTES # BLD AUTO: 0.01 K/UL (ref 0–0.04)
IMM GRANULOCYTES NFR BLD AUTO: 0.2 % (ref 0–0.5)
LYMPHOCYTES # BLD AUTO: 2.4 K/UL (ref 1–4.8)
LYMPHOCYTES NFR BLD: 38.1 % (ref 18–48)
MAGNESIUM SERPL-MCNC: 2 MG/DL (ref 1.6–2.6)
MCH RBC QN AUTO: 28.4 PG (ref 27–31)
MCHC RBC AUTO-ENTMCNC: 31.1 G/DL (ref 32–36)
MCV RBC AUTO: 91 FL (ref 82–98)
MONOCYTES # BLD AUTO: 0.5 K/UL (ref 0.3–1)
MONOCYTES NFR BLD: 7.7 % (ref 4–15)
NEUTROPHILS # BLD AUTO: 3 K/UL (ref 1.8–7.7)
NEUTROPHILS NFR BLD: 46.7 % (ref 38–73)
NRBC BLD-RTO: 0 /100 WBC
PLATELET # BLD AUTO: 230 K/UL (ref 150–350)
PMV BLD AUTO: 10.5 FL (ref 9.2–12.9)
POCT GLUCOSE: 106 MG/DL (ref 70–110)
POCT GLUCOSE: 107 MG/DL (ref 70–110)
POCT GLUCOSE: 83 MG/DL (ref 70–110)
POTASSIUM SERPL-SCNC: 3.7 MMOL/L (ref 3.5–5.1)
RBC # BLD AUTO: 4.05 M/UL (ref 4–5.4)
SODIUM SERPL-SCNC: 139 MMOL/L (ref 136–145)
WBC # BLD AUTO: 6.33 K/UL (ref 3.9–12.7)

## 2020-03-06 PROCEDURE — 99239 HOSP IP/OBS DSCHRG MGMT >30: CPT | Mod: ,,, | Performed by: NURSE PRACTITIONER

## 2020-03-06 PROCEDURE — 99239 PR HOSPITAL DISCHARGE DAY,>30 MIN: ICD-10-PCS | Mod: ,,, | Performed by: NURSE PRACTITIONER

## 2020-03-06 PROCEDURE — 25000003 PHARM REV CODE 250: Performed by: INTERNAL MEDICINE

## 2020-03-06 PROCEDURE — 80048 BASIC METABOLIC PNL TOTAL CA: CPT

## 2020-03-06 PROCEDURE — 99231 SBSQ HOSP IP/OBS SF/LOW 25: CPT | Mod: GC,,, | Performed by: INTERNAL MEDICINE

## 2020-03-06 PROCEDURE — 36415 COLL VENOUS BLD VENIPUNCTURE: CPT

## 2020-03-06 PROCEDURE — 25000003 PHARM REV CODE 250: Performed by: PHYSICIAN ASSISTANT

## 2020-03-06 PROCEDURE — 83735 ASSAY OF MAGNESIUM: CPT

## 2020-03-06 PROCEDURE — 85025 COMPLETE CBC W/AUTO DIFF WBC: CPT

## 2020-03-06 PROCEDURE — 94761 N-INVAS EAR/PLS OXIMETRY MLT: CPT

## 2020-03-06 PROCEDURE — 99231 PR SUBSEQUENT HOSPITAL CARE,LEVL I: ICD-10-PCS | Mod: GC,,, | Performed by: INTERNAL MEDICINE

## 2020-03-06 PROCEDURE — 82962 GLUCOSE BLOOD TEST: CPT

## 2020-03-06 PROCEDURE — 99233 PR SUBSEQUENT HOSPITAL CARE,LEVL III: ICD-10-PCS | Mod: ,,, | Performed by: PHYSICIAN ASSISTANT

## 2020-03-06 PROCEDURE — 25000003 PHARM REV CODE 250: Performed by: NURSE PRACTITIONER

## 2020-03-06 PROCEDURE — 99233 SBSQ HOSP IP/OBS HIGH 50: CPT | Mod: ,,, | Performed by: PHYSICIAN ASSISTANT

## 2020-03-06 RX ORDER — LISINOPRIL 2.5 MG/1
2.5 TABLET ORAL NIGHTLY
Qty: 30 TABLET | Refills: 1 | Status: SHIPPED | OUTPATIENT
Start: 2020-03-06 | End: 2020-05-01

## 2020-03-06 RX ORDER — FUROSEMIDE 40 MG/1
40 TABLET ORAL DAILY
Status: DISCONTINUED | OUTPATIENT
Start: 2020-03-06 | End: 2020-03-06 | Stop reason: HOSPADM

## 2020-03-06 RX ORDER — NAPROXEN SODIUM 220 MG/1
81 TABLET, FILM COATED ORAL DAILY
Refills: 0
Start: 2020-03-07 | End: 2023-12-13

## 2020-03-06 RX ORDER — METOPROLOL SUCCINATE 25 MG/1
25 TABLET, EXTENDED RELEASE ORAL DAILY
Qty: 30 TABLET | Refills: 1 | Status: SHIPPED | OUTPATIENT
Start: 2020-03-07 | End: 2020-05-05 | Stop reason: SDUPTHER

## 2020-03-06 RX ORDER — FUROSEMIDE 40 MG/1
40 TABLET ORAL DAILY
Qty: 30 TABLET | Refills: 1 | Status: SHIPPED | OUTPATIENT
Start: 2020-03-07 | End: 2020-05-01

## 2020-03-06 RX ADMIN — LEVOTHYROXINE SODIUM 50 MCG: 50 TABLET ORAL at 06:03

## 2020-03-06 RX ADMIN — BACLOFEN 20 MG: 10 TABLET ORAL at 09:03

## 2020-03-06 RX ADMIN — OXYBUTYNIN CHLORIDE 5 MG: 5 TABLET, EXTENDED RELEASE ORAL at 09:03

## 2020-03-06 RX ADMIN — ATORVASTATIN CALCIUM 80 MG: 20 TABLET, FILM COATED ORAL at 09:03

## 2020-03-06 RX ADMIN — FUROSEMIDE 40 MG: 40 TABLET ORAL at 09:03

## 2020-03-06 RX ADMIN — ASPIRIN 81 MG CHEWABLE TABLET 81 MG: 81 TABLET CHEWABLE at 09:03

## 2020-03-06 RX ADMIN — MAGNESIUM OXIDE TAB 400 MG (241.3 MG ELEMENTAL MG) 400 MG: 400 (241.3 MG) TAB at 09:03

## 2020-03-06 RX ADMIN — DULOXETINE HYDROCHLORIDE 30 MG: 30 CAPSULE, DELAYED RELEASE ORAL at 09:03

## 2020-03-06 RX ADMIN — METOPROLOL SUCCINATE 25 MG: 25 TABLET, FILM COATED, EXTENDED RELEASE ORAL at 10:03

## 2020-03-06 RX ADMIN — MAGNESIUM OXIDE TAB 400 MG (241.3 MG ELEMENTAL MG) 400 MG: 400 (241.3 MG) TAB at 01:03

## 2020-03-06 NOTE — ASSESSMENT & PLAN NOTE
- suspect patient has intermittent chronic bacteruria due to self cathing.  She has no systemic signs of infection in the recent past or currently.  Results of UA and urine culture not unexpected given self cath.  Rec patient be actively treated when/if she has systemic signs of infection not malodor as that not indicative of infection unless patient has systemic signs of infection.  She has been afebrile without leukocytosis since admission.  She has chronic pain below the waist and chronic vulvodynia which are at baseline currently which would expected to potentially be worse in the setting of active infection.  - would hold off on abx for now as repeated treatment of essentially asymptomatic bacteruria will lead to resistance.  Monitor for now.  - discussed with ID staff and recs communicated to primary team  - patient can follow up with Dr. Garcia and Dr. Putnam (ID who she has seen in past) for further management.

## 2020-03-06 NOTE — ASSESSMENT & PLAN NOTE
With recurrent UTI due to intermittent self caths since 1996. Patient without systemic symptoms  Consulted ID: recommends not to treat until systemic symptoms should arise, patient to follow-up urology

## 2020-03-06 NOTE — DISCHARGE INSTRUCTIONS
Discharge Instructions and Care of Your Wrist After a Cardiac Catheterization Procedure Performed on 3/3/20 via the Radial Artery    For 1 week following the procedure:  Do not subject your affected hand/arm to any forceful movements (i.e. supporting weight when rising from a chair or bed)  Avoid excessive (extension/flexion) wrist movement (i.e. supporting weight when rising from a chair or bed, push-ups, lifting garage doors, etc.)  Do not take a tub bath or submerge the puncture site in water for 3 days following the procedure  Do not lift anything heavier than 5 pounds with the affected hand/arm  Do not operate a lawnmower, motorcycle, chainsaw, or all-terrain vehicle   Do not engage in vigorous exercise (i.e. Tennis, Golf, Bowling) using the affected arm    If bleeding should occur following discharge:  Sit down and apply firm pressure to the puncture site with your fingers for 10 minutes   If the bleeding stops, continue to sit quietly, keeping your wrist straight for 2 hours. Notify your physician as soon as possible   If bleeding does not stop after 10 minutes or if there is a large amount of bleeding or spurting, call 911 immediately. Do not drive yourself to the hospital.     You should expect mild tingling in your hand and tenderness at the puncture site for up to 3 days.     Notify your physician if these symptoms persist or if you experience:  Change in color or temperature of the hand or arm  Redness, heat, or pus at the puncture site  Chills or fever greater than 101.0 F

## 2020-03-06 NOTE — HPI
69 female with Hx of DMII, Interstitial Cystitis, peripheral neuropathy, HTN, HLD, ALIE, Spinal cord injury secondary to infection with LE extremity weakness, urinary retention and self cath PW SOB for couple of weeks.  RBP showed Human Rhinovirus/Enterovirus.  She did have a URI but that has improved.  She was given lasix with some improvement.  She was admitted and underwent Echo which showed decreased EF and there was cf CHF.  She underwent RHC and multivessel disease identified.  CT Sx rec conservative management.  She reported she malodorous urine to the primary team and UA with Cx ordered.  UA with WBC 63, RBC 0, Many bacteria, Squam epi cells 1, leuk 2+. UCX here shows ESBL Kleb.  She reports being treated a couple of weeks ago by her urologist, Dr. Garcia for a UTI with bactrim.  UCX 2/3 with pan sensitve E coli.  She reports her hallmark sign of UTI is malodorous urine.  After her last treatment it improved.  She currently reports mild malodorous urine but thinks it may be due to multiple medications she is on. She denies fever, chills, sweats prior to admit or currently.  She has pain in her legs and from below her waist which is stable.    SOB improved.

## 2020-03-06 NOTE — ASSESSMENT & PLAN NOTE
69 F with multiple comorbidities who presents with 5 weeks of increasing shortness of breath, weight gain, leg swelling, orthopnea and pnd. CXR with cardiomegaly, pulmonary vascular congestion. BNP > 3000, Trop 0.9, flat. Suspected ADHF  - Echo with EF 20-25%, Grade III diastolic dysfunction  - S/p [facility] 3/3/20:  · Multi-vessel coronary artery disease, with  of ostial LAD filled via faint collaterals from the OM1 and Ramus, 75% tubular stenisus if the mid LCx, 80% discrete stenosis of the proximal OM1, 99% discrete stenosis of the mid RCA, and  of the distal RCA filled via collaterals from the distal LCx and OM1.  · LV filling pressure is elevated, LVEDP 25 mmHg.    Plan:  - GDMT: Continue Toprol 25 mg po daily. Continue Lisinopril 2.5 mg daily  - LVEDP 25, +JVP - transition to po lasix 40 mg daily. Will plan to continue on discharge  - CAD: continue ASA, continue HI statin. CTS consulted and deemed not a surgical candidate.   - Low Na diet  - Strict I/Os, daily weights  - Resting PET flows showed apex is likely scar but inferior and lateral walls demonstrate adequate uptake at rest so there may be utility in revascularization of significant RCA and OM lesions after trial of medical therapy. Rec f/u in general cardiology clinic in 2 weeks and interventional cardiology clinic after d/c  - Cardiology will sign off. Please call for any questions / concerns

## 2020-03-06 NOTE — SUBJECTIVE & OBJECTIVE
Past Medical History:   Diagnosis Date    Diabetes mellitus     Hypercholesteremia     Hypertension     IC (interstitial cystitis)     Numbness in both hands 2017    Spondylisthesis     Vulvodynia, unspecified        Past Surgical History:   Procedure Laterality Date    CARPAL TUNNEL RELEASE Right 2019    Procedure: RELEASE, CARPAL TUNNEL, REVISION;  Surgeon: Annabelle Cid MD;  Location: [facility] OR [address] [place] [address];  Service: Orthopedics;  Laterality: Right;  Axogen, Clarix     SECTION      x3    CORONARY ANGIOGRAPHY N/A 3/3/2020    Procedure: ANGIOGRAM, CORONARY ARTERY;  Surgeon: Markie Friend III, MD;  Location: [facility] CATH LAB;  Service: Cardiology;  Laterality: N/A;    CYSTOSCOPY      HYSTERECTOMY      ischemic colitis      LEFT HEART CATHETERIZATION Left 3/3/2020    Procedure: Left heart cath;  Surgeon: Markie Friend III, MD;  Location: [facility] CATH LAB;  Service: Cardiology;  Laterality: Left;    OOPHORECTOMY      AK REMOVAL OF OVARY/TUBE(S)         Review of patient's allergies indicates:   Allergen Reactions    Augmentin [amoxicillin-pot clavulanate] Other (See Comments)     Patient cannot recall what she had, only that she could not tolerate the Augmentin    Tramadol Itching    Tegretol [carbamazepine] Itching and Rash    Vicodin [hydrocodone-acetaminophen] Itching and Anxiety       Medications:  Medications Prior to Admission   Medication Sig    atorvastatin (LIPITOR) 40 MG tablet Take 40 mg by mouth once daily.    baclofen (LIORESAL) 20 MG tablet 2 (two) times daily.     duloxetine (CYMBALTA) 30 MG capsule Take 30 mg by mouth once daily.    gabapentin (NEURONTIN) 300 MG capsule Take 1 capsule (300 mg total) by mouth 3 (three) times daily.    LEVOTHYROXINE SODIUM (LEVOTHYROXINE ORAL) Take 50 mcg by mouth before breakfast.     lisinopril (PRINIVIL,ZESTRIL) 40 MG tablet every evening.     metformin (GLUCOPHAGE) 500 MG tablet Take 500 mg by mouth 2 (two) times  daily with meals.    methocarbamol (ROBAXIN) 500 MG Tab Take 1 tablet (500 mg total) by mouth 2 (two) times daily as needed (muscle spasms).    oxybutynin (DITROPAN-XL) 5 MG TR24 Take 1 tablet (5 mg total) by mouth once daily.    triamterene-hydrochlorothiazide 37.5-25 mg (DYAZIDE) 37.5-25 mg per capsule every morning.     estradiol (ESTRACE) 0.01 % (0.1 mg/gram) vaginal cream PLEASE SEE ATTACHED FOR DETAILED DIRECTIONS     Antibiotics (From admission, onward)    None        Antifungals (From admission, onward)    None        Antivirals (From admission, onward)    None             There is no immunization history on file for this patient.    Family History     Problem Relation (Age of Onset)    Breast cancer Paternal Aunt        Social History     Socioeconomic History    Marital status:      Spouse name: Not on file    Number of children: Not on file    Years of education: Not on file    Highest education level: Not on file   Occupational History    Not on file   Social Needs    Financial resource strain: Not on file    Food insecurity:     Worry: Not on file     Inability: Not on file    Transportation needs:     Medical: Not on file     Non-medical: Not on file   Tobacco Use    Smoking status: Never Smoker    Smokeless tobacco: Never Used   Substance and Sexual Activity    Alcohol use: No    Drug use: No    Sexual activity: Yes     Partners: Male   Lifestyle    Physical activity:     Days per week: Not on file     Minutes per session: Not on file    Stress: Not on file   Relationships    Social connections:     Talks on phone: Not on file     Gets together: Not on file     Attends Confucianist service: Not on file     Active member of club or organization: Not on file     Attends meetings of clubs or organizations: Not on file     Relationship status: Not on file   Other Topics Concern    Not on file   Social History Narrative    Not on file     Review of Systems   Constitutional:  Negative for appetite change, chills, diaphoresis, fatigue, fever and unexpected weight change.   HENT: Negative for congestion, ear pain, hearing loss, sore throat and tinnitus.    Eyes: Negative for pain, redness and visual disturbance.   Respiratory: Positive for cough and shortness of breath. Negative for chest tightness and wheezing.    Cardiovascular: Negative for chest pain.   Gastrointestinal: Negative for abdominal pain, constipation, diarrhea, nausea and vomiting.   Endocrine: Negative for cold intolerance and heat intolerance.   Genitourinary: Negative for decreased urine volume, difficulty urinating, dysuria, flank pain, frequency, hematuria and urgency.   Musculoskeletal: Negative for arthralgias, back pain, myalgias and neck pain.   Skin: Negative for rash and wound.   Allergic/Immunologic: Negative for environmental allergies, food allergies and immunocompromised state.   Neurological: Positive for weakness. Negative for dizziness, facial asymmetry, light-headedness, numbness and headaches.   Hematological: Negative for adenopathy. Does not bruise/bleed easily.   Psychiatric/Behavioral: Negative for agitation, behavioral problems and confusion.     Objective:     Vital Signs (Most Recent):  Temp: 98.5 °F (36.9 °C) (03/06/20 1219)  Pulse: 74 (03/06/20 1219)  Resp: 16 (03/06/20 1219)  BP: 98/63 (03/06/20 1219)  SpO2: 98 % (03/06/20 1219) Vital Signs (24h Range):  Temp:  [97 °F (36.1 °C)-98.7 °F (37.1 °C)] 98.5 °F (36.9 °C)  Pulse:  [65-97] 74  Resp:  [16-18] 16  SpO2:  [94 %-100 %] 98 %  BP: ()/(51-63) 98/63     Weight: 71.6 kg (157 lb 13.6 oz)  Body mass index is 27.09 kg/m².    Estimated Creatinine Clearance: 43.6 mL/min (based on SCr of 1.2 mg/dL).    Physical Exam   Constitutional: She is oriented to person, place, and time. She appears well-developed and well-nourished. No distress.   HENT:   Head: Normocephalic and atraumatic.   Cardiovascular: Normal rate, regular rhythm and normal heart  sounds. Exam reveals no gallop and no friction rub.   No murmur heard.  Distant heart sounds     Pulmonary/Chest: Effort normal. No stridor. No respiratory distress. She has no wheezes. She has rales (mild at bases R>L).   Abdominal: Soft. Bowel sounds are normal. She exhibits no distension and no mass. There is no tenderness. There is no rebound and no guarding.   Musculoskeletal: She exhibits no edema.   Neurological: She is alert and oriented to person, place, and time.   Skin: Skin is warm and dry. She is not diaphoretic.   Psychiatric: She has a normal mood and affect. Her behavior is normal.       Significant Labs:   Blood Culture: No results for input(s): LABBLOO in the last 4320 hours.  CBC:   Recent Labs   Lab 03/05/20  0506 03/06/20  0419   WBC 7.00 6.33   HGB 11.8* 11.5*   HCT 37.9 37.0    230     CMP:   Recent Labs   Lab 03/05/20  0506 03/06/20  0419    139   K 3.5 3.7    102   CO2 25 27   * 126*   BUN 31* 41*   CREATININE 1.1 1.2   CALCIUM 10.3 10.2   ANIONGAP 11 10   EGFRNONAA 51.7* 46.5*     Urine Culture:   Recent Labs   Lab 10/16/19  1348 02/03/20  1206 03/03/20  1314   LABURIN ESCHERICHIA COLI  >100,000 cfu/ml  * ESCHERICHIA COLI  >100,000 cfu/ml  * KLEBSIELLA PNEUMONIAE ESBL  >100,000 cfu/ml  *     Urine Studies:   Recent Labs   Lab 03/01/20  1455 03/03/20  1314   COLORU Yellow Yellow   APPEARANCEUA Hazy* Hazy*   PHUR 7.0 6.0   SPECGRAV 1.010 1.015   PROTEINUA Negative Negative   GLUCUA Negative Negative   KETONESU Negative Negative   BILIRUBINUA Negative Negative   OCCULTUA Negative Negative   NITRITE Negative Negative   LEUKOCYTESUR Trace* 2+*   RBCUA 0  --    WBCUA 7* 63*   BACTERIA Occasional Many*   SQUAMEPITHEL 1 1     All pertinent labs within the past 24 hours have been reviewed.    Significant Imaging: I have reviewed all pertinent imaging results/findings within the past 24 hours.   CTA Chest Non-Coronary (PE Study) [[id number]] Resulted: 03/01/20 1531   Order  Status: Completed Updated: 03/01/20 1533   Narrative:     EXAMINATION:  CTA CHEST NON CORONARY    CLINICAL HISTORY:  Chest pain, acute, PE suspected, intermed prob, positive D-dimer;    TECHNIQUE:  Low dose axial images, sagittal and coronal reformations were obtained from the thoracic inlet to the lung bases following the IV administration of 100 mL of Omnipaque 350.  Contrast timing was optimized to evaluate the pulmonary arteries.  MIP images were performed.    COMPARISON:  None    FINDINGS:  Pulmonary arteries are adequately enhanced.  No filling defects are identified to indicate pulmonary embolism.    Aorta is normal in caliber with no evidence of aneurysm or dissection.    The heart is mildly.  Minimal ground-glass changes may be associated with minimal pulmonary edema.    Small bibasilar pleural effusions, right greater than left.  No pericardial effusion.    No evidence of pneumothorax.    No mass or lobar consolidation.    Mild bibasilar atelectasis.    No acute osseous abnormality.   Impression:       1. No evidence of pulmonary embolism.  2. Small bibasilar pleural effusions mild associated atelectasis, right greater than left.  3. Cardiomegaly with minimal ground-glass changes.  Minimal pulmonary edema is a consideration.      Electronically signed by: Elan Rudd  Date: 03/01/2020  Time: 15:31   X-Ray Chest PA And Lateral [[id number]] Resulted: 03/01/20 1309   Order Status: Completed Updated: 03/01/20 1311   Narrative:     EXAMINATION:  XR CHEST PA AND LATERAL    CLINICAL HISTORY:  Shortness of breath    TECHNIQUE:  PA and lateral views of the chest were performed.    COMPARISON:  None    FINDINGS:  Cardiac silhouette is enlarged.    There is diffuse prominence of the bronchovascular markings and pulmonary interstitium suggesting mild vascular congestive changes.  No focal parenchymal consolidation or definite pleural effusion.  No pneumothorax visualized.  Multilevel degenerative findings noted  throughout the visualized spine.   Impression:       As above      Electronically signed by: Servando Lofton MD  Date: 03/01/2020  Time: 13:09   Imaging History     2020  Date Procedure Name PACS Link Status Accession Number Location   03/01/20 03:09 PM CTA Chest Non-Coronary (PE Study)  Images Final [id number] [facility]   03/01/20 12:31 PM X-Ray Chest PA And Lateral  Images Final [id number] [facility]   02/21/20 09:57 AM MRI Cervical Spine Without Contrast  Images Final [id number] [facility]   03/05/20 09:21 AM Cardiac PET Scan Viability  Final [id number] [facility]   03/02/20 02:14 PM Echo Color Flow Doppler? Yes  Final [id number] [facility]   03/03/20 03:04 PM Cardiac catheterization  Images Final [id number] [facility]

## 2020-03-06 NOTE — CONSULTS
[facility]  Infectious Disease  Consult Note    Patient Name: Niki Soriano  MRN: [id number]  Admission Date: 3/1/2020  Hospital Length of Stay: 4 days  Attending Physician: Jasson Truong MD  Primary Care Provider: Moises Webster MD         Inpatient consult to Infectious Diseases  Consult performed by: AURORA Ashby Jr.  Consult ordered by: Poly Murdock NP      Consult received. Full consult to follow.      AURORA Alfaro  Infectious Disease  [facility]

## 2020-03-06 NOTE — DISCHARGE SUMMARY
"[facility]  Hospital Medicine  Discharge Summary      Patient Name: Niki Soriano  MRN: [id number]  Admission Date: 3/1/2020  Hospital Length of Stay: 4 days  Discharge Date and Time:  03/06/2020 5:12 PM  Attending Physician: Jasson Truong MD   Discharging Provider: Poly Murdock NP  Primary Care Provider: Moises Webster MD      HPI:   69 y.o. 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  Pt reports increasing SOB over the past several weeks.  Pt reports being prescribed lasix about 2 weeks ago, she took daily for a week with improvement in symptoms.  She cannot recall the dose she was prescribed.  In the past week she has gained 8 lbs.  She usually sleeps flat on her back with a robe rolled up instead of a pillow but recently has had to sleep on her side due to difficulty breathing.  She also reports burning abdominal pain, decreased appetite, b/l lower extremity edema, dry cough, constipation.  She currently denies recent fever, chills, diaphoresis, chest pain or pressure, sick contacts, syncope, dizziness, lightheadedness.  Pt does report having URI ~1 week ago.    In the ED, BNP 3286, troponin 0.980, d dimer 1.5, CTA negative for PE, pleural effusion R>L, BUN 29, Cr 1.1, AST 41, ALT 69, normal t bili, normal lipase.  UA unremarkable.    Procedure(s) (LRB):  Left heart cath (Left)  ANGIOGRAM, CORONARY ARTERY (N/A)      Hospital Course:   Pt placed in observation for new-onset acute decompensated HF.  BNP>3000, troponin 0.9 trended down, CTA negative for PE.  CXR with cardiomegaly, pulmonary vascular congestion.  CHF pathway initiated.  Given lasix 40 mg IV bid. She is down 10 lbs since admission and euvolemic on exam.  Lasix " held evening of 3/2 and 3/3.  Echo demonstrated severe systolic dysfunction with significant wall motion abnormality (EF 2-25%) as well as severe left ventricular diastolic dysfunction consistent with restrictive physiology.  Interventional Cardiology consulted and Left heart cath 3/3 revealing 3 vessel CAD.  Given her significant issues with ambulation (reliant on a walker), severe left ventricular dysfunction, and diffuse severe coronary artery disease which is non-bypassable medical management was recommended by CTS.  General Cardiology would like to pursue viability study to see if PCI for RCA and OM are an option.- Resting PET flows showed apex is likely scar but inferior and lateral walls demonstrate adequate uptake at rest so there may be utility in revascularization of significant RCA and OM lesions after trial of medical therapy. Patient with Klebsiella ESBL (self caths since 1996). ID consulted and recommended not to treat and follow-up urology     Follow up general cards and interventional cards clinic.     Consults:   Consults (From admission, onward)        Status Ordering Provider     Inpatient consult to Cardiology  Once     Provider:  (Not yet assigned)    Completed JANET CAN     Inpatient consult to Cardiothoracic Surgery  Once     Provider:  (Not yet assigned)    Completed ORIN SCHAFER     Inpatient consult to Infectious Diseases  Once     Provider:  (Not yet assigned)    Completed ORLANDO ABDI     Inpatient consult to Interventional Cardiology  Once     Provider:  (Not yet assigned)    Completed ORIN SCHAFER     Inpatient consult to Registered Dietitian/Nutritionist  Once     Provider:  (Not yet assigned)    Completed JANET CAN     Inpatient consult to Registered Dietitian/Nutritionist  Once     Provider:  (Not yet assigned)    Completed RADHA LOW     Inpatient consult to Social Work/Case Management  Once     Provider:  (Not yet assigned)     Acknowledged JANET CAN          * New onset of congestive heart failure  RAHUL, resolved  No known cardiac history. She does report upper respiratory infection ~1 week ago.  - BNP:  3286 CTA: Small bibasilar pleural effusions mild associated atelectasis, right greater than left.  - Echo: Severe systolic dysfunction with significant wall motion abnormality (EF 2-25%) as well as severe left ventricular diastolic dysfunction consistent with restrictive physiology.   - Left heart cath possibly today or tomorrow; NPO.   - O2 Sats 100% on RA   - Daily weights  - Strict I&O's   - Low salt cardiac diet; 1500mL fluid restriction  - Cardiac monitoring  - Cardiology consult as new onset CHF and elevated troponin. Troponin trended down.  No CP.  - Held spironolactone and lasix evening of 3/2 and 3/3.  Cr back down to 1.1 from 1.5 (baseline 1.0-1.1).   -Interventional Cardiology consulted and Left heart cath 3/3 revealing 3 vessel CAD.  Given her significant issues with ambulation (reliant on a walker), severe left ventricular dysfunction, and diffuse severe coronary artery disease which is non-bypassable medical management was recommended by CTS.  General Cardiology would like to pursue viability study to see if PCI for RCA and OM are an option.  -3/4 resume lasix IV, 3/5 anticipate transition to po lasix this evening.  Will follow cardiology recs after viability study.    - Resting PET flows showed apex is likely scar but inferior and lateral walls demonstrate adequate uptake at rest so there may be utility in revascularization of significant RCA and OM lesions after trial of medical therapy. Rec f/u in general cardiology clinic in 2 weeks and interventional cardiology clinic after d/c  Follow up [facility] clinic.    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 Patient without systemic symptoms  Consulted ID: recommends not to treat until systemic symptoms  "should arise, patient to follow-up urology     Incomplete emptying of bladder  Pt self caths and chronic weakness following "spinal cord infection" in 1996  Pt reports completing 7 day course of bactrim approximately 2 weeks ago for UTI.  - Ordered repeat UA for malodorous urine.  Pt reports chronic dysuria.  No flank pain, afebrile.  UA 63 WBC, many bacteria, 2+ leukocytes.  3/3 ceftriaxone initiated, day 3/7  Referral to Dr. 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 [A49.8, Z16.12] 03/06/2020 Yes    CAD (coronary artery disease) [I25.10] 03/04/2020 Yes    Acute UTI (urinary tract infection) [N39.0] 03/04/2020 Unknown    RAHUL (acute kidney injury) [N17.9] 03/02/2020 Unknown    Shortness of breath [R06.02] 03/01/2020 Yes    Incomplete emptying of bladder [R33.9] 10/16/2019 Yes    Type 2 diabetes mellitus with neurologic complication [E11.49] 11/01/2017 Yes    Hypothyroidism [E03.9] 11/01/2017 Yes    Leg weakness, bilateral [R29.898] 11/01/2017 Yes      Problems Resolved During this Admission:       Discharged Condition: stable    Disposition: Home or Self Care    Follow Up:    Patient Instructions:      Ambulatory referral/consult to Cardiology   Standing Status: Future   Referral Priority: Routine Referral Type: Consultation   Referral Reason: Specialty Services Required   Requested Specialty: Cardiology   Number of Visits Requested: 1     Ambulatory referral/consult to Interventional Cardiology   Standing Status: Future   Referral " Priority: Routine Referral Type: Consultation   Referral Reason: Specialty Services Required   Requested Specialty: INTERVENTIONAL CARDIOLOGY   Number of Visits Requested: 1     Diet Cardiac     Notify your health care provider if you experience any of the following:  temperature >100.4     Notify your health care provider if you experience any of the following:  persistent nausea and vomiting or diarrhea     Notify your health care provider if you experience any of the following:  severe uncontrolled pain     Notify your health care provider if you experience any of the following:  difficulty breathing or increased cough     Notify your health care provider if you experience any of the following:  severe persistent headache     Notify your health care provider if you experience any of the following:  worsening rash     Notify your health care provider if you experience any of the following:  increased confusion or weakness     Notify your health care provider if you experience any of the following:  persistent dizziness, light-headedness, or visual disturbances     Activity as tolerated       Significant Diagnostic Studies: Labs:   BMP:   Recent Labs   Lab 03/05/20  0506 03/06/20  0419   * 126*    139   K 3.5 3.7    102   CO2 25 27   BUN 31* 41*   CREATININE 1.1 1.2   CALCIUM 10.3 10.2   MG 1.8 2.0   , CBC   Recent Labs   Lab 03/05/20  0506 03/06/20  0419   WBC 7.00 6.33   HGB 11.8* 11.5*   HCT 37.9 37.0    230    and All labs within the past 24 hours have been reviewed    Pending Diagnostic Studies:     None         Medications:  Reconciled Home Medications:      Medication List      START taking these medications    aspirin 81 MG Chew  Take 1 tablet (81 mg total) by mouth once daily.  Start taking on:  March 7, 2020     furosemide 40 MG tablet  Commonly known as:  LASIX  Take 1 tablet (40 mg total) by mouth once daily.  Start taking on:  March 7, 2020     metoprolol succinate 25 MG 24 hr  tablet  Commonly known as:  TOPROL-XL  Take 1 tablet (25 mg total) by mouth once daily.  Start taking on:  March 7, 2020        CHANGE how you take these medications    lisinopril 2.5 MG tablet  Commonly known as:  PRINIVIL,ZESTRIL  Take 1 tablet (2.5 mg total) by mouth every evening.  What changed:    · medication strength  · how much to take  · how to take this        CONTINUE taking these medications    atorvastatin 40 MG tablet  Commonly known as:  LIPITOR  Take 40 mg by mouth once daily.     baclofen 20 MG tablet  Commonly known as:  LIORESAL  2 (two) times daily.     DULoxetine 30 MG capsule  Commonly known as:  CYMBALTA  Take 30 mg by mouth once daily.     estradiol 0.01 % (0.1 mg/gram) vaginal cream  Commonly known as:  ESTRACE  PLEASE SEE ATTACHED FOR DETAILED DIRECTIONS     gabapentin 300 MG capsule  Commonly known as:  NEURONTIN  Take 1 capsule (300 mg total) by mouth 3 (three) times daily.     LEVOTHYROXINE ORAL  Take 50 mcg by mouth before breakfast.     metFORMIN 500 MG tablet  Commonly known as:  GLUCOPHAGE  Take 500 mg by mouth 2 (two) times daily with meals.     methocarbamol 500 MG Tab  Commonly known as:  ROBAXIN  Take 1 tablet (500 mg total) by mouth 2 (two) times daily as needed (muscle spasms).     oxybutynin 5 MG Tr24  Commonly known as:  DITROPAN-XL  Take 1 tablet (5 mg total) by mouth once daily.        STOP taking these medications    triamterene-hydrochlorothiazide 37.5-25 mg 37.5-25 mg per capsule  Commonly known as:  DYAZIDE            Indwelling Lines/Drains at time of discharge:   Lines/Drains/Airways     None                 Time spent on the discharge of patient: 40 minutes  Patient was seen and examined on the date of discharge and determined to be suitable for discharge.         Poly Murdock NP  Department of Hospital Medicine  [facility]

## 2020-03-06 NOTE — PLAN OF CARE
Plan of care reviewed. Isolation precautions maintained. Bedside commode in place. Instructed pt to call for assistance when needed. Safety protocol reinforced. Right radial angio site with dry gauze dressing removed. No bleeding noted. No complaints. Tele monitoring in progress. Call light within reach. Will monitor.

## 2020-03-06 NOTE — PROGRESS NOTES
[facility]  Cardiology  Progress Note    Patient Name: Niki Soriano  MRN: [id number]  Admission Date: 3/1/2020  Hospital Length of Stay: 4 days  Code Status: Full Code   Attending Physician: Jasson Truong MD   Primary Care Physician: Moises Webster MD  Expected Discharge Date: 3/7/2020  Principal Problem:New onset of congestive heart failure    Subjective:     Hospital Course:   No notes on file    Interval History: NAEON. Pt reports overall improvement in shortness of breath from admission. Denies chest pain    Review of Systems   All other systems reviewed and are negative.    Objective:     Vital Signs (Most Recent):  Temp: 97 °F (36.1 °C) (03/06/20 0742)  Pulse: 65 (03/06/20 0744)  Resp: 18 (03/06/20 0742)  BP: 96/63 (03/06/20 0742)  SpO2: 100 % (03/06/20 0742) Vital Signs (24h Range):  Temp:  [97 °F (36.1 °C)-98.7 °F (37.1 °C)] 97 °F (36.1 °C)  Pulse:  [65-97] 65  Resp:  [18] 18  SpO2:  [94 %-100 %] 100 %  BP: ()/(51-63) 96/63     Weight: 71.6 kg (157 lb 13.6 oz)  Body mass index is 27.09 kg/m².     SpO2: 100 %  O2 Device (Oxygen Therapy): room air      Intake/Output Summary (Last 24 hours) at 3/6/2020 1122  Last data filed at 3/6/2020 0916  Gross per 24 hour   Intake 1540 ml   Output 2725 ml   Net -1185 ml       Lines/Drains/Airways     Peripheral Intravenous Line                 Peripheral IV - Single Lumen 03/01/20 1149 20 G Left Forearm 4 days                Physical Exam   Constitutional: She appears well-developed and well-nourished.   Neck: Normal range of motion. No JVD present.   Cardiovascular: Normal rate and regular rhythm.   No murmur heard.  Pulmonary/Chest: Effort normal. No respiratory distress. She has no rales.   Abdominal: Soft. There is no tenderness.   Musculoskeletal: Normal range of motion. She exhibits no edema.   Skin: Skin is warm and dry. No erythema. No pallor.       Assessment and Plan:       * New onset of congestive heart failure  69  F with multiple comorbidities who presents with 5 weeks of increasing shortness of breath, weight gain, leg swelling, orthopnea and pnd. CXR with cardiomegaly, pulmonary vascular congestion. BNP > 3000, Trop 0.9, flat. Suspected ADHF  - Echo with EF 20-25%, Grade III diastolic dysfunction  - S/p [facility] 3/3/20:  · Multi-vessel coronary artery disease, with  of ostial LAD filled via faint collaterals from the OM1 and Ramus, 75% tubular stenisus if the mid LCx, 80% discrete stenosis of the proximal OM1, 99% discrete stenosis of the mid RCA, and  of the distal RCA filled via collaterals from the distal LCx and OM1.  · LV filling pressure is elevated, LVEDP 25 mmHg.    Plan:  - GDMT: Continue Toprol 25 mg po daily. Continue Lisinopril 2.5 mg daily  - LVEDP 25, +JVP - transition to po lasix 40 mg daily. Will plan to continue on discharge  - CAD: continue ASA, continue HI statin. CTS consulted and deemed not a surgical candidate.   - Low Na diet  - Strict I/Os, daily weights  - Resting PET flows showed apex is likely scar but inferior and lateral walls demonstrate adequate uptake at rest so there may be utility in revascularization of significant RCA and OM lesions after trial of medical therapy. Rec f/u in general cardiology clinic in 2 weeks and interventional cardiology clinic after d/c  - Cardiology will sign off. Please call for any questions / concerns      VTE Risk Mitigation (From admission, onward)         Ordered     heparin (porcine) injection  As needed (PRN)      03/03/20 1451     heparin (porcine) 750 Units, verapamil 1.25 mg, nitroGLYCERIN 1.25 mg in sodium chloride 0.9% 250 mL  As needed (PRN)      03/03/20 1415     heparin infusion 1,000 units/500 ml in 0.9% NaCl (pressure line flush)  Intra-op continuous PRN      03/03/20 1415     enoxaparin injection 40 mg  Daily      03/01/20 1622     Place RANJIT hose  Until discontinued      03/01/20 1622     Place sequential compression device  Until discontinued       03/01/20 1622     IP VTE HIGH RISK PATIENT  Once      03/01/20 1622                Kenrick Miller MD  Cardiology  [facility]

## 2020-03-06 NOTE — NURSING
Poly Murdock NP notified of BP 96/63 HR 70s. Instructed to hold lisinopril, ok to give metoprolol and lasix this AM.

## 2020-03-06 NOTE — CONSULTS
[facility]  Infectious Disease  Consult Note    Patient Name: Niki Soriano  MRN: [id number]  Admission Date: 3/1/2020  Hospital Length of Stay: 4 days  Attending Physician: Jasson Truong MD  Primary Care Provider: Moises Webster MD     Isolation Status: Contact and Droplet    Patient information was obtained from patient and ER records.      Consults  Assessment/Plan:     Acute UTI (urinary tract infection)  - suspect patient has intermittent chronic bacteruria due to self cathing.  She has no systemic signs of infection in the recent past or currently.  Results of UA and urine culture not unexpected given self cath.  Rec patient be actively treated when/if she has systemic signs of infection not malodor as that not indicative of infection unless patient has systemic signs of infection.  She has been afebrile without leukocytosis since admission.  She has chronic pain below the waist and chronic vulvodynia which are at baseline currently which would expected to potentially be worse in the setting of active infection.  - would hold off on abx for now as repeated treatment of essentially asymptomatic bacteruria will lead to resistance.  Monitor for now.  - discussed with ID staff and recs communicated to primary team  - patient can follow up with Dr. Garcia and Dr. Putnam (ID who she has seen in past) for further management.         Thank you for your consult. I will sign off. Please contact us if you have any additional questions.    AURORA Alfaro  Infectious Disease  [facility]    Subjective:     Principal Problem: New onset of congestive heart failure    HPI: 69 female with Hx of DMII, Interstitial Cystitis, peripheral neuropathy, HTN, HLD, ALIE, Spinal cord injury secondary to infection with LE extremity weakness, urinary retention and self cath PW SOB for couple of weeks.  RBP showed Human Rhinovirus/Enterovirus.  She did  have a URI but that has improved.  She was given lasix with some improvement.  She was admitted and underwent Echo which showed decreased EF and there was cf CHF.  She underwent RHC and multivessel disease identified.  CT Sx rec conservative management.  She reported she malodorous urine to the primary team and UA with Cx ordered.  UA with WBC 63, RBC 0, Many bacteria, Squam epi cells 1, leuk 2+. UCX here shows ESBL Kleb.  She reports being treated a couple of weeks ago by her urologist, Dr. Garcia for a UTI with bactrim.  UCX 2/3 with pan sensitve E coli.  She reports her hallmark sign of UTI is malodorous urine.  After her last treatment it improved.  She currently reports mild malodorous urine but thinks it may be due to multiple medications she is on. She denies fever, chills, sweats prior to admit or currently.  She has pain in her legs and from below her waist which is stable.    SOB improved.      Past Medical History:   Diagnosis Date    Diabetes mellitus     Hypercholesteremia     Hypertension     IC (interstitial cystitis)     Numbness in both hands 2017    Spondylisthesis     Vulvodynia, unspecified        Past Surgical History:   Procedure Laterality Date    CARPAL TUNNEL RELEASE Right 2019    Procedure: RELEASE, CARPAL TUNNEL, REVISION;  Surgeon: Annabelle Cid MD;  Location: [address];  Service: Orthopedics;  Laterality: Right;  Axogen, Clarix     SECTION      x3    CORONARY ANGIOGRAPHY N/A 3/3/2020    Procedure: ANGIOGRAM, CORONARY ARTERY;  Surgeon: Markie Friend III, MD;  Location: [facility] CATH LAB;  Service: Cardiology;  Laterality: N/A;    CYSTOSCOPY      HYSTERECTOMY      ischemic colitis      LEFT HEART CATHETERIZATION Left 3/3/2020    Procedure: Left heart cath;  Surgeon: Markie Friend III, MD;  Location: [facility] CATH LAB;  Service: Cardiology;  Laterality: Left;    OOPHORECTOMY      OK REMOVAL OF OVARY/TUBE(S)         Review of patient's  allergies indicates:   Allergen Reactions    Augmentin [amoxicillin-pot clavulanate] Other (See Comments)     Patient cannot recall what she had, only that she could not tolerate the Augmentin    Tramadol Itching    Tegretol [carbamazepine] Itching and Rash    Vicodin [hydrocodone-acetaminophen] Itching and Anxiety       Medications:  Medications Prior to Admission   Medication Sig    atorvastatin (LIPITOR) 40 MG tablet Take 40 mg by mouth once daily.    baclofen (LIORESAL) 20 MG tablet 2 (two) times daily.     duloxetine (CYMBALTA) 30 MG capsule Take 30 mg by mouth once daily.    gabapentin (NEURONTIN) 300 MG capsule Take 1 capsule (300 mg total) by mouth 3 (three) times daily.    LEVOTHYROXINE SODIUM (LEVOTHYROXINE ORAL) Take 50 mcg by mouth before breakfast.     lisinopril (PRINIVIL,ZESTRIL) 40 MG tablet every evening.     metformin (GLUCOPHAGE) 500 MG tablet Take 500 mg by mouth 2 (two) times daily with meals.    methocarbamol (ROBAXIN) 500 MG Tab Take 1 tablet (500 mg total) by mouth 2 (two) times daily as needed (muscle spasms).    oxybutynin (DITROPAN-XL) 5 MG TR24 Take 1 tablet (5 mg total) by mouth once daily.    triamterene-hydrochlorothiazide 37.5-25 mg (DYAZIDE) 37.5-25 mg per capsule every morning.     estradiol (ESTRACE) 0.01 % (0.1 mg/gram) vaginal cream PLEASE SEE ATTACHED FOR DETAILED DIRECTIONS     Antibiotics (From admission, onward)    None        Antifungals (From admission, onward)    None        Antivirals (From admission, onward)    None             There is no immunization history on file for this patient.    Family History     Problem Relation (Age of Onset)    Breast cancer Paternal Aunt        Social History     Socioeconomic History    Marital status:      Spouse name: Not on file    Number of children: Not on file    Years of education: Not on file    Highest education level: Not on file   Occupational History    Not on file   Social Needs    Financial  resource strain: Not on file    Food insecurity:     Worry: Not on file     Inability: Not on file    Transportation needs:     Medical: Not on file     Non-medical: Not on file   Tobacco Use    Smoking status: Never Smoker    Smokeless tobacco: Never Used   Substance and Sexual Activity    Alcohol use: No    Drug use: No    Sexual activity: Yes     Partners: Male   Lifestyle    Physical activity:     Days per week: Not on file     Minutes per session: Not on file    Stress: Not on file   Relationships    Social connections:     Talks on phone: Not on file     Gets together: Not on file     Attends [employer] service: Not on file     Active member of club or organization: Not on file     Attends meetings of clubs or organizations: Not on file     Relationship status: Not on file   Other Topics Concern    Not on file   Social History Narrative    Not on file     Review of Systems   Constitutional: Negative for appetite change, chills, diaphoresis, fatigue, fever and unexpected weight change.   HENT: Negative for congestion, ear pain, hearing loss, sore throat and tinnitus.    Eyes: Negative for pain, redness and visual disturbance.   Respiratory: Positive for cough and shortness of breath. Negative for chest tightness and wheezing.    Cardiovascular: Negative for chest pain.   Gastrointestinal: Negative for abdominal pain, constipation, diarrhea, nausea and vomiting.   Endocrine: Negative for cold intolerance and heat intolerance.   Genitourinary: Negative for decreased urine volume, difficulty urinating, dysuria, flank pain, frequency, hematuria and urgency.   Musculoskeletal: Negative for arthralgias, back pain, myalgias and neck pain.   Skin: Negative for rash and wound.   Allergic/Immunologic: Negative for environmental allergies, food allergies and immunocompromised state.   Neurological: Positive for weakness. Negative for dizziness, facial asymmetry, light-headedness, numbness and headaches.    Hematological: Negative for adenopathy. Does not bruise/bleed easily.   Psychiatric/Behavioral: Negative for agitation, behavioral problems and confusion.     Objective:     Vital Signs (Most Recent):  Temp: 98.5 °F (36.9 °C) (03/06/20 1219)  Pulse: 74 (03/06/20 1219)  Resp: 16 (03/06/20 1219)  BP: 98/63 (03/06/20 1219)  SpO2: 98 % (03/06/20 1219) Vital Signs (24h Range):  Temp:  [97 °F (36.1 °C)-98.7 °F (37.1 °C)] 98.5 °F (36.9 °C)  Pulse:  [65-97] 74  Resp:  [16-18] 16  SpO2:  [94 %-100 %] 98 %  BP: ()/(51-63) 98/63     Weight: 71.6 kg (157 lb 13.6 oz)  Body mass index is 27.09 kg/m².    Estimated Creatinine Clearance: 43.6 mL/min (based on SCr of 1.2 mg/dL).    Physical Exam   Constitutional: She is oriented to person, place, and time. She appears well-developed and well-nourished. No distress.   HENT:   Head: Normocephalic and atraumatic.   Cardiovascular: Normal rate, regular rhythm and normal heart sounds. Exam reveals no gallop and no friction rub.   No murmur heard.  Distant heart sounds     Pulmonary/Chest: Effort normal. No stridor. No respiratory distress. She has no wheezes. She has rales (mild at bases R>L).   Abdominal: Soft. Bowel sounds are normal. She exhibits no distension and no mass. There is no tenderness. There is no rebound and no guarding.   Musculoskeletal: She exhibits no edema.   Neurological: She is alert and oriented to person, place, and time.   Skin: Skin is warm and dry. She is not diaphoretic.   Psychiatric: She has a normal mood and affect. Her behavior is normal.       Significant Labs:   Blood Culture: No results for input(s): LABBLOO in the last 4320 hours.  CBC:   Recent Labs   Lab 03/05/20  0506 03/06/20  0419   WBC 7.00 6.33   HGB 11.8* 11.5*   HCT 37.9 37.0    230     CMP:   Recent Labs   Lab 03/05/20  0506 03/06/20  0419    139   K 3.5 3.7    102   CO2 25 27   * 126*   BUN 31* 41*   CREATININE 1.1 1.2   CALCIUM 10.3 10.2   ANIONGAP 11 10    EGFRNONAA 51.7* 46.5*     Urine Culture:   Recent Labs   Lab 10/16/19  1348 02/03/20  1206 03/03/20  1314   LABURIN ESCHERICHIA COLI  >100,000 cfu/ml  * ESCHERICHIA COLI  >100,000 cfu/ml  * KLEBSIELLA PNEUMONIAE ESBL  >100,000 cfu/ml  *     Urine Studies:   Recent Labs   Lab 03/01/20  1455 03/03/20  1314   COLORU Yellow Yellow   APPEARANCEUA Hazy* Hazy*   PHUR 7.0 6.0   SPECGRAV 1.010 1.015   PROTEINUA Negative Negative   GLUCUA Negative Negative   KETONESU Negative Negative   BILIRUBINUA Negative Negative   OCCULTUA Negative Negative   NITRITE Negative Negative   LEUKOCYTESUR Trace* 2+*   RBCUA 0  --    WBCUA 7* 63*   BACTERIA Occasional Many*   SQUAMEPITHEL 1 1     All pertinent labs within the past 24 hours have been reviewed.    Significant Imaging: I have reviewed all pertinent imaging results/findings within the past 24 hours.   CTA Chest Non-Coronary (PE Study) [[id number]] Resulted: 03/01/20 1531   Order Status: Completed Updated: 03/01/20 1533   Narrative:     EXAMINATION:  CTA CHEST NON CORONARY    CLINICAL HISTORY:  Chest pain, acute, PE suspected, intermed prob, positive D-dimer;    TECHNIQUE:  Low dose axial images, sagittal and coronal reformations were obtained from the thoracic inlet to the lung bases following the IV administration of 100 mL of Omnipaque 350.  Contrast timing was optimized to evaluate the pulmonary arteries.  MIP images were performed.    COMPARISON:  None    FINDINGS:  Pulmonary arteries are adequately enhanced.  No filling defects are identified to indicate pulmonary embolism.    Aorta is normal in caliber with no evidence of aneurysm or dissection.    The heart is mildly.  Minimal ground-glass changes may be associated with minimal pulmonary edema.    Small bibasilar pleural effusions, right greater than left.  No pericardial effusion.    No evidence of pneumothorax.    No mass or lobar consolidation.    Mild bibasilar atelectasis.    No acute osseous abnormality.   Impression:        1. No evidence of pulmonary embolism.  2. Small bibasilar pleural effusions mild associated atelectasis, right greater than left.  3. Cardiomegaly with minimal ground-glass changes.  Minimal pulmonary edema is a consideration.      Electronically signed by: Elan Rudd  Date: 03/01/2020  Time: 15:31   X-Ray Chest PA And Lateral [[id number]] Resulted: 03/01/20 1309   Order Status: Completed Updated: 03/01/20 1311   Narrative:     EXAMINATION:  XR CHEST PA AND LATERAL    CLINICAL HISTORY:  Shortness of breath    TECHNIQUE:  PA and lateral views of the chest were performed.    COMPARISON:  None    FINDINGS:  Cardiac silhouette is enlarged.    There is diffuse prominence of the bronchovascular markings and pulmonary interstitium suggesting mild vascular congestive changes.  No focal parenchymal consolidation or definite pleural effusion.  No pneumothorax visualized.  Multilevel degenerative findings noted throughout the visualized spine.   Impression:       As above      Electronically signed by: Servando Lofton MD  Date: 03/01/2020  Time: 13:09   Imaging History     2020  Date Procedure Name PACS Link Status Accession Number Location   03/01/20 03:09 PM CTA Chest Non-Coronary (PE Study)  Images Final [id number] [facility]   03/01/20 12:31 PM X-Ray Chest PA And Lateral  Images Final [id number] [facility]   02/21/20 09:57 AM MRI Cervical Spine Without Contrast  Images Final [id number] [facility]   03/05/20 09:21 AM Cardiac PET Scan Viability  Final [id number] [facility]   03/02/20 02:14 PM Echo Color Flow Doppler? Yes  Final [id number] [facility]   03/03/20 03:04 PM Cardiac catheterization  Images Final [id number] [facility]

## 2020-03-06 NOTE — SUBJECTIVE & OBJECTIVE
Interval History: NAEON. Pt reports overall improvement in shortness of breath from admission. Denies chest pain    Review of Systems   All other systems reviewed and are negative.    Objective:     Vital Signs (Most Recent):  Temp: 97 °F (36.1 °C) (03/06/20 0742)  Pulse: 65 (03/06/20 0744)  Resp: 18 (03/06/20 0742)  BP: 96/63 (03/06/20 0742)  SpO2: 100 % (03/06/20 0742) Vital Signs (24h Range):  Temp:  [97 °F (36.1 °C)-98.7 °F (37.1 °C)] 97 °F (36.1 °C)  Pulse:  [65-97] 65  Resp:  [18] 18  SpO2:  [94 %-100 %] 100 %  BP: ()/(51-63) 96/63     Weight: 71.6 kg (157 lb 13.6 oz)  Body mass index is 27.09 kg/m².     SpO2: 100 %  O2 Device (Oxygen Therapy): room air      Intake/Output Summary (Last 24 hours) at 3/6/2020 1122  Last data filed at 3/6/2020 0916  Gross per 24 hour   Intake 1540 ml   Output 2725 ml   Net -1185 ml       Lines/Drains/Airways     Peripheral Intravenous Line                 Peripheral IV - Single Lumen 03/01/20 1149 20 G Left Forearm 4 days                Physical Exam   Constitutional: She appears well-developed and well-nourished.   Neck: Normal range of motion. No JVD present.   Cardiovascular: Normal rate and regular rhythm.   No murmur heard.  Pulmonary/Chest: Effort normal. No respiratory distress. She has no rales.   Abdominal: Soft. There is no tenderness.   Musculoskeletal: Normal range of motion. She exhibits no edema.   Skin: Skin is warm and dry. No erythema. No pallor.

## 2020-03-06 NOTE — ASSESSMENT & PLAN NOTE
RAHUL, resolved  No known cardiac history. She does report upper respiratory infection ~1 week ago.  - BNP:  3286 CTA: Small bibasilar pleural effusions mild associated atelectasis, right greater than left.  - Echo: Severe systolic dysfunction with significant wall motion abnormality (EF 2-25%) as well as severe left ventricular diastolic dysfunction consistent with restrictive physiology.   - Left heart cath possibly today or tomorrow; NPO.   - O2 Sats 100% on RA   - Daily weights  - Strict I&O's   - Low salt cardiac diet; 1500mL fluid restriction  - Cardiac monitoring  - Cardiology consult as new onset CHF and elevated troponin. Troponin trended down.  No CP.  - Held spironolactone and lasix evening of 3/2 and 3/3.  Cr back down to 1.1 from 1.5 (baseline 1.0-1.1).   -Interventional Cardiology consulted and Left heart cath 3/3 revealing 3 vessel CAD.  Given her significant issues with ambulation (reliant on a walker), severe left ventricular dysfunction, and diffuse severe coronary artery disease which is non-bypassable medical management was recommended by CTS.  General Cardiology would like to pursue viability study to see if PCI for RCA and OM are an option.  -3/4 resume lasix IV, 3/5 anticipate transition to po lasix this evening.  Will follow cardiology recs after viability study.    - Resting PET flows showed apex is likely scar but inferior and lateral walls demonstrate adequate uptake at rest so there may be utility in revascularization of significant RCA and OM lesions after trial of medical therapy. Rec f/u in general cardiology clinic in 2 weeks and interventional cardiology clinic after d/c  Follow up [facility] clinic.

## 2020-03-08 NOTE — PLAN OF CARE
03/08/20 1544   Final Note   Assessment Type Final Discharge Note   Anticipated Discharge Disposition Home   Hospital Follow Up  Appt(s) scheduled? No

## 2020-03-10 ENCOUNTER — PATIENT OUTREACH (OUTPATIENT)
Dept: ADMINISTRATIVE | Facility: CLINIC | Age: 70
End: 2020-03-10

## 2020-03-10 NOTE — PATIENT INSTRUCTIONS
Heart Failure: Making Changes to Your Diet  You have a condition called heart failure. It is also known as congestive heart failure, or CHF. When you have heart failure, excess fluid is more likely to build up in your body. This makes the heart work harder to pump blood. Fluid buildup causes symptoms such as shortness of breath and edema (swelling). Controlling the amount of salt (sodium) you eat may help stop fluid from building up. Your doctor may also tell you to reduce the amount of fluid you drink.  Reading Food Labels  Your healthcare provider will tell you how much sodium you can eat each day. Read food labels to keep track. Keep in mind that certain foods are high in salt. These include canned, frozen, and processed foods. Check the amount of sodium in each serving. Watch out for high-sodium ingredients. These include MSG (monosodium glutamate), baking soda, and sodium phosphate.   Eating Less Salt  Give yourself time to get used to eating less salt. It may take a little while. Here are some tips to help:   Take the saltshaker off the table. Replace it with salt-free herb mixes and spices.   Eat fresh or plain frozen vegetables. These have much less salt than canned vegetables.   Choose low-sodium snacks like sodium-free pretzels, crackers, or air-popped popcorn.   Dont add salt to your food when youre cooking. Instead, season your foods with pepper, lemon, garlic, or onion.   When you eat out, ask that your food be cooked without added salt.   If Youre Told to Limit Fluid  You may need to limit fluid intake to help prevent edema. This includes anything that is liquid at room temperature, such as ice cream and soup. If your doctor tells you to limit fluid, try these tips:   Measure drinks in a measuring cup before you drink them. This will help you meet daily goals.   Chill drinks to make them more refreshing.   Suck on frozen lemon wedges to quench thirst.   Only drink when youre  thirsty.   Chew sugarless gum or suck on hard candy to keep your mouth moist.   When to Call Your Doctor  Call your doctor right away if you have any symptoms of worsening heart failure. These can include:   Sudden weight gain   Increased swelling of your legs or ankles   Trouble breathing when youre resting or at night  © 8690-7121 [employer], [address] [place] [address], [place] [address]. All rights reserved. This information is not intended as a substitute for professional medical care. Always follow your healthcare professional's instructions.

## 2020-03-10 NOTE — PROGRESS NOTES
Patient is taking supplements:    multivitamin  fish oil  D3  B -12  probiotic at night.  Has question about taking ibuprophen,

## 2020-03-10 NOTE — TELEPHONE ENCOUNTER
C3 nurse attempted to contact patient. No answer. The following message was left for the patient to return the call:  Good morning, my name is Carmelina and I am a registered nurse calling on behalf of [facility]s post discharge unit.  This is a Transitional Care call for Niki Soriano.  When you have a moment please contact me at [phone] between 8 and 4, Monday through Friday. If you have questions or issues, a nurse is available 24 hours every day at our ON CALL # thats [phone]. On behalf of [facility], thank you, and have a nice day.    The patient does not have a scheduled HOSFU appointment within 7 days post hospital discharge date 3/6/2020. Patient PCP not within [facility].

## 2020-03-18 ENCOUNTER — TELEPHONE (OUTPATIENT)
Dept: CARDIOLOGY | Facility: CLINIC | Age: 70
End: 2020-03-18

## 2020-03-18 NOTE — TELEPHONE ENCOUNTER
Post hospital dc. Reports low BP at home, itinally when first home had symptoms of fatigue and heavy feeling. BP as of recent  This morning before meds her pressure was 72/49 heart rate 77 (9am) 86/57 heart rate 78    yesterday 5pm 76/49, 82/65.   1230p 99/66, 91/61  8am 81/56, 78/55.   Asymptomatic at this time. Routed to Dr. Rosas for review.

## 2020-03-18 NOTE — TELEPHONE ENCOUNTER
----- Message from Taina Walker sent at 3/18/2020 10:03 AM CDT -----  Contact: pt  Pt says she was released from the hospital on 3/6/20. She has concerns about her pressure. This morning before meds her pressure was 72/49 heart rate 77 (9am) 86/57 heart rate 78       Thanks

## 2020-03-18 NOTE — TELEPHONE ENCOUNTER
Advised by Dr. Rosas to hold Lisinopril and Lasix at this time and to address at appt in clinic Monday.

## 2020-03-20 ENCOUNTER — HOSPITAL ENCOUNTER (EMERGENCY)
Facility: HOSPITAL | Age: 70
Discharge: HOME | End: 2020-03-20
Attending: EMERGENCY MEDICINE
Payer: MEDICARE

## 2020-03-20 VITALS
RESPIRATION RATE: 16 BRPM | SYSTOLIC BLOOD PRESSURE: 130 MMHG | OXYGEN SATURATION: 98 % | TEMPERATURE: 99 F | HEART RATE: 70 BPM | DIASTOLIC BLOOD PRESSURE: 66 MMHG

## 2020-03-20 DIAGNOSIS — R42 DIZZINESS: Primary | ICD-10-CM

## 2020-03-20 DIAGNOSIS — R07.9 CHEST PAIN: ICD-10-CM

## 2020-03-20 LAB
ALBUMIN SERPL BCP-MCNC: 4 G/DL (ref 3.5–5.2)
ALP SERPL-CCNC: 63 U/L (ref 55–135)
ALT SERPL W/O P-5'-P-CCNC: 30 U/L (ref 10–44)
ANION GAP SERPL CALC-SCNC: 10 MMOL/L (ref 8–16)
AST SERPL-CCNC: 31 U/L (ref 10–40)
BASOPHILS # BLD AUTO: 0.07 K/UL (ref 0–0.2)
BASOPHILS NFR BLD: 1.1 % (ref 0–1.9)
BILIRUB SERPL-MCNC: 0.5 MG/DL (ref 0.1–1)
BNP SERPL-MCNC: 1319 PG/ML (ref 0–99)
BUN SERPL-MCNC: 32 MG/DL (ref 8–23)
CALCIUM SERPL-MCNC: 10.5 MG/DL (ref 8.7–10.5)
CHLORIDE SERPL-SCNC: 103 MMOL/L (ref 95–110)
CO2 SERPL-SCNC: 25 MMOL/L (ref 23–29)
CREAT SERPL-MCNC: 1.2 MG/DL (ref 0.5–1.4)
DIFFERENTIAL METHOD: ABNORMAL
EOSINOPHIL # BLD AUTO: 0.3 K/UL (ref 0–0.5)
EOSINOPHIL NFR BLD: 5.3 % (ref 0–8)
ERYTHROCYTE [DISTWIDTH] IN BLOOD BY AUTOMATED COUNT: 13.2 % (ref 11.5–14.5)
EST. GFR  (AFRICAN AMERICAN): 53.7 ML/MIN/1.73 M^2
EST. GFR  (NON AFRICAN AMERICAN): 46.5 ML/MIN/1.73 M^2
GLUCOSE SERPL-MCNC: 112 MG/DL (ref 70–110)
HCT VFR BLD AUTO: 38.3 % (ref 37–48.5)
HGB BLD-MCNC: 12 G/DL (ref 12–16)
IMM GRANULOCYTES # BLD AUTO: 0.01 K/UL (ref 0–0.04)
IMM GRANULOCYTES NFR BLD AUTO: 0.2 % (ref 0–0.5)
LYMPHOCYTES # BLD AUTO: 1.7 K/UL (ref 1–4.8)
LYMPHOCYTES NFR BLD: 26.9 % (ref 18–48)
MCH RBC QN AUTO: 28.4 PG (ref 27–31)
MCHC RBC AUTO-ENTMCNC: 31.3 G/DL (ref 32–36)
MCV RBC AUTO: 91 FL (ref 82–98)
MONOCYTES # BLD AUTO: 0.4 K/UL (ref 0.3–1)
MONOCYTES NFR BLD: 6.5 % (ref 4–15)
NEUTROPHILS # BLD AUTO: 3.7 K/UL (ref 1.8–7.7)
NEUTROPHILS NFR BLD: 60 % (ref 38–73)
NRBC BLD-RTO: 0 /100 WBC
PLATELET # BLD AUTO: 154 K/UL (ref 150–350)
PMV BLD AUTO: 11.4 FL (ref 9.2–12.9)
POTASSIUM SERPL-SCNC: 3.9 MMOL/L (ref 3.5–5.1)
PROT SERPL-MCNC: 6.8 G/DL (ref 6–8.4)
RBC # BLD AUTO: 4.23 M/UL (ref 4–5.4)
SODIUM SERPL-SCNC: 138 MMOL/L (ref 136–145)
TROPONIN I SERPL DL<=0.01 NG/ML-MCNC: 0.43 NG/ML (ref 0–0.03)
TROPONIN I SERPL DL<=0.01 NG/ML-MCNC: 0.47 NG/ML (ref 0–0.03)
WBC # BLD AUTO: 6.18 K/UL (ref 3.9–12.7)

## 2020-03-20 PROCEDURE — 93005 ELECTROCARDIOGRAM TRACING: CPT

## 2020-03-20 PROCEDURE — 83880 ASSAY OF NATRIURETIC PEPTIDE: CPT

## 2020-03-20 PROCEDURE — 93010 EKG 12-LEAD: ICD-10-PCS | Mod: ,,, | Performed by: INTERNAL MEDICINE

## 2020-03-20 PROCEDURE — 99285 EMERGENCY DEPT VISIT HI MDM: CPT | Mod: ,,, | Performed by: EMERGENCY MEDICINE

## 2020-03-20 PROCEDURE — 99285 EMERGENCY DEPT VISIT HI MDM: CPT | Mod: 25

## 2020-03-20 PROCEDURE — 25000003 PHARM REV CODE 250: Performed by: EMERGENCY MEDICINE

## 2020-03-20 PROCEDURE — 99285 PR EMERGENCY DEPT VISIT,LEVEL V: ICD-10-PCS | Mod: ,,, | Performed by: EMERGENCY MEDICINE

## 2020-03-20 PROCEDURE — 80053 COMPREHEN METABOLIC PANEL: CPT

## 2020-03-20 PROCEDURE — 93010 ELECTROCARDIOGRAM REPORT: CPT | Mod: ,,, | Performed by: INTERNAL MEDICINE

## 2020-03-20 PROCEDURE — 85025 COMPLETE CBC W/AUTO DIFF WBC: CPT

## 2020-03-20 PROCEDURE — 84484 ASSAY OF TROPONIN QUANT: CPT

## 2020-03-20 RX ORDER — ASPIRIN 325 MG
325 TABLET ORAL
Status: COMPLETED | OUTPATIENT
Start: 2020-03-20 | End: 2020-03-20

## 2020-03-20 RX ADMIN — ASPIRIN 325 MG ORAL TABLET 325 MG: 325 PILL ORAL at 08:03

## 2020-03-20 NOTE — ED PROVIDER NOTES
"Encounter Date: 3/20/2020    SCRIBE #1 NOTE: I, Monserrat Dickey, am scribing for, and in the presence of,  Dr. Katty Ramirez . I have scribed the following portions of the note - the EKG reading. Other sections scribed: HPI, ROS, PE, and Chart Review.       History     Chief Complaint   Patient presents with    Dizziness     symptoms have resolved     The patient was seen by the provider at 8:10 AM.    The patient is a 69 year old female with a past medical history of CHF, hypertension, hyperlipidemia, type 2 diabetes with neuropathy, and CAD with complaints of lightheadedness and palpitations. The patient reports she woke up this morning at 7:00 AM feeling palpitations with associated "hyperventilating". She also endorses dizziness that lasted for a few seconds. The patient reports her symptoms have resolved at this time. She states she was recently hospitalized for CHF and was discharged two weeks ago. She also reports she was recently instructed by her cardiologist to discontinue lasix and lisinopril due to hypotension. The patient denies chest pain, nausea, vomiting, diaphoresis, or abdominal pain, fevers/chills. No known COVID exposures.    Pt states she has sleep apnea, has used cpap in past but had problems with dry nose in the past.     The history is provided by the patient and medical records.     Review of patient's allergies indicates:   Allergen Reactions    Augmentin [amoxicillin-pot clavulanate] Other (See Comments)     Patient cannot recall what she had, only that she could not tolerate the Augmentin    Tramadol Itching    Tegretol [carbamazepine] Itching and Rash    Vicodin [hydrocodone-acetaminophen] Itching and Anxiety     Past Medical History:   Diagnosis Date    Diabetes mellitus     Hypercholesteremia     Hypertension     IC (interstitial cystitis)     Numbness in both hands 11/1/2017    Spondylisthesis     Vulvodynia, unspecified      Past Surgical History:   Procedure Laterality Date "    CARPAL TUNNEL RELEASE Right 2019    Procedure: RELEASE, CARPAL TUNNEL, REVISION;  Surgeon: Annabelle Cid MD;  Location: [facility] OR [address] [place] [address];  Service: Orthopedics;  Laterality: Right;  Axogen, Clarix     SECTION      x3    CORONARY ANGIOGRAPHY N/A 3/3/2020    Procedure: ANGIOGRAM, CORONARY ARTERY;  Surgeon: Markie Friend III, MD;  Location: [facility] CATH LAB;  Service: Cardiology;  Laterality: N/A;    CYSTOSCOPY      HYSTERECTOMY      ischemic colitis      LEFT HEART CATHETERIZATION Left 3/3/2020    Procedure: Left heart cath;  Surgeon: Markie Friend III, MD;  Location: [facility] CATH LAB;  Service: Cardiology;  Laterality: Left;    OOPHORECTOMY      AR REMOVAL OF OVARY/TUBE(S)       Family History   Problem Relation Age of Onset    Breast cancer Paternal Aunt     Colon cancer Neg Hx     Ovarian cancer Neg Hx      Social History     Tobacco Use    Smoking status: Never Smoker    Smokeless tobacco: Never Used   Substance Use Topics    Alcohol use: No    Drug use: No     Review of Systems   Constitutional: Negative for chills and fever.   HENT: Negative for rhinorrhea and sore throat.    Eyes: Negative for visual disturbance.   Respiratory: Negative for shortness of breath.    Cardiovascular: Positive for palpitations. Negative for chest pain.   Gastrointestinal: Negative for abdominal pain, nausea and vomiting.   Genitourinary: Negative for dysuria and hematuria.   Musculoskeletal: Negative for back pain and neck pain.   Skin: Negative for rash.   Neurological: Positive for light-headedness. Negative for weakness.   Hematological: Does not bruise/bleed easily.       Physical Exam     Initial Vitals   BP Pulse Resp Temp SpO2   20 0805 20 0805 20 0805 20 0813 20 1004   (!) 128/92 104 15 98.8 °F (37.1 °C) 98 %      MAP       --                Physical Exam    Nursing note and vitals reviewed.  Constitutional: She appears well-nourished. No distress.    HENT:   Head: Normocephalic and atraumatic.   Neck: Neck supple. No JVD present.   Cardiovascular: Normal rate, regular rhythm, normal heart sounds and intact distal pulses.   No murmur heard.  Pulmonary/Chest: Breath sounds normal. No respiratory distress.   Abdominal: Soft. Bowel sounds are normal. She exhibits no distension. There is no tenderness. There is no rebound and no guarding.   Musculoskeletal: She exhibits edema (trace in bilateral lower extremities ). She exhibits no tenderness.   Neurological: She is alert and oriented to person, place, and time.   Skin: Skin is warm and dry.         ED Course   Procedures  Labs Reviewed   CBC W/ AUTO DIFFERENTIAL - Abnormal; Notable for the following components:       Result Value    Mean Corpuscular Hemoglobin Conc 31.3 (*)     All other components within normal limits   COMPREHENSIVE METABOLIC PANEL - Abnormal; Notable for the following components:    Glucose 112 (*)     BUN, Bld 32 (*)     eGFR if  53.7 (*)     eGFR if non  46.5 (*)     All other components within normal limits   TROPONIN I - Abnormal; Notable for the following components:    Troponin I 0.468 (*)     All other components within normal limits   B-TYPE NATRIURETIC PEPTIDE - Abnormal; Notable for the following components:    BNP 1,319 (*)     All other components within normal limits   TROPONIN I - Abnormal; Notable for the following components:    Troponin I 0.431 (*)     All other components within normal limits     EKG Readings: (Independently Interpreted)   Sinus rhythm at 74 bpm with PVCs; ST t wave abnormalities; no acute ischemic changes; no significant changes from EKG earlier this month      ECG Results          EKG 12-lead (Final result)  Result time 03/20/20 14:05:24    Final result by Interface, Lab In [facility] (03/20/20 14:05:24)                 Narrative:    Test Reason : R07.9,    Vent. Rate : 074 BPM     Atrial Rate : 074 BPM     P-R Int : 220 ms           QRS Dur : 104 ms      QT Int : 412 ms       P-R-T Axes : 040 091 -28 degrees     QTc Int : 457 ms    Sinus rhythm with 1st degree A-V block with occasional Premature  ventricular complexes  Rightward axis  Septal infarct (cited on or before 26-JAN-2020)  T wave abnormality, consider inferior ischemia  T wave abnormality, consider anterolateral ischemia  Abnormal ECG  When compared with ECG of 01-MAR-2020 12:09,  Premature supraventricular complexes are no longer Present  Questionable change in initial forces of Anterior leads  T wave inversion less evident in Lateral leads  Confirmed by HEATHER CHAMPION MD ([id number]) on 3/20/2020 2:05:16 PM    Referred By: JOSE DANIEL   SELF           Confirmed By:HEATHER CHAMPION MD                            Imaging Results          X-Ray Chest PA And Lateral (Final result)  Result time 03/20/20 08:52:04    Final result by Moises Yates MD (03/20/20 08:52:04)                 Impression:      Continued demonstration of mild cardiomegaly, but no significant detrimental interval change in the appearance of the chest since 03/01/2020 is appreciated.      Electronically signed by: Moises Yates MD  Date:    03/20/2020  Time:    08:52             Narrative:    EXAMINATION:  XR CHEST PA AND LATERAL    CLINICAL HISTORY:  Chest Pain;    TECHNIQUE:  Two views of the chest were obtained, with PA and lateral projections submitted.    COMPARISON:  Comparison is made to 03/01/2020.    FINDINGS:  Heart appears mildly enlarged, but the cardiac size and the appearance of the cardiomediastinal silhouette demonstrate no significant detrimental change since the examination referenced above.  Pulmonary vascularity is normal, and there are no findings indicating current cardiac decompensation.  Lung zones are clear, and are free of significant airspace consolidation or volume loss.  No pleural fluid.  No hilar or mediastinal mass lesion.  No pneumothorax.  Modest scoliosis convex to the right in the lower  thoracic region and multilevel marginal spurring in the thoracic spine are again identified, as is slight loss of height of one mid thoracic vertebral body.                              X-Rays:   Independently Interpreted Readings:   Chest X-Ray: No acute abnormalities. Cardiomegaly present.     Medical Decision Making:   History:   Old Medical Records: I decided to obtain old medical records.  Old Records Summarized: records from clinic visits and records from previous admission(s).       <> Summary of Records: The patient has an appointment with cardiology on 3/23/2020. She has an EF of 20-25%. Echo on 3/2/20 showed a grade 3 diastolic dysfunction. Patient has a history of hypertension, hyperlipidemia, type 2 diabetes with neuropathy, and CAD.   Initial Assessment:   68 y/o F with transient episode of dizziness, shortness of breath, no definite chest pain.  Unlikely ACS, CHF ex more likely pt with reported history of sleep apnea- states she has woken up in the past when sleeping on her back with sob which quickly resolved and this morning she awoke from sleeping on her back  Hemodynamically stable and asymptomatic on arrival to ED  Recently told to hold her lasix given recent hypotension and lightheadedness  Likely related to sleep apnea/anxiety but given pts history will r/o ACS, dehydration, electrolyte abnlty  ecg no signs of acute ischemia  Routine blood work, r/o with delta trop  Independently Interpreted Test(s):   I have ordered and independently interpreted X-rays - see prior notes.  I have ordered and independently interpreted EKG Reading(s) - see prior notes  Clinical Tests:   Lab Tests: Ordered and Reviewed  Radiological Study: Ordered and Reviewed  Medical Tests: Ordered and Reviewed  ED Management:  9:43 AM  Initial troponin is at 0.468, but is trending down from discharge. BNP is slightly elevated at 1300, but patient is not symptomatic at this time. Pt reassessed. Asymptomatic. Denies sob. Awaiting  repeat troponin.     12:02 PM  Second troponin is 0.431 stable/decreasing. Will discharge home with follow up with cardiology on Monday.             Scribe Attestation:   Scribe #1: I performed the above scribed service and the documentation accurately describes the services I performed. I attest to the accuracy of the note.                          Clinical Impression:       ICD-10-CM ICD-9-CM   1. Dizziness R42 780.4   2. Chest pain R07.9 786.50         Disposition:   Disposition: Discharged  Condition: Stable     ED Disposition Condition    Discharge Stable        ED Prescriptions     None        Follow-up Information     Follow up With Specialties Details Why Contact Info    Carter Rosas MD Internal Medicine On 3/23/2020 your scheduled appointment on monday [address] [place] [id number]  [phone]                                       Katty Ramirez MD  03/20/20 0776

## 2020-03-20 NOTE — DISCHARGE INSTRUCTIONS
Take your medicines as directed by your doctor/cardiologist.  Follow up with your primary care doctor.  Follow up with your cardiologist as scheduled on Monday  Return to ED for chest pain, shortness of breath, nausea, lightheadedness, dizziness or any other concerns.

## 2020-03-20 NOTE — ED TRIAGE NOTES
Awakened today at 7am w/ sob and dizziness.  Anxious, no chest pain , VS stable. C/o abdominal bloating. DX'd w/ a cold recently.Has a moist non productive cough.Deneis fever.  Dc'd from hospital  2 wks ago. Denies chest pain . Pt is anxious about her newly dx'd heart problems.  Dizziness and SOB resolved in ambulance en route. Holding lasix and lisinopril  3 days ago b/c  Low bp.

## 2020-03-20 NOTE — ED NOTES
LOC: The patient is awake and alert; oriented x 3 and speaking appropriately.aNXIOUS.   APPEARANCE: Patient resting comfortably, patient is clean and well groomed  SKIN: warm and dry, normal skin turgor & moist mucus membranes, skin intact, no breakdown noted.  MUSCULOSKELETAL: Patient moving all extremities well, no obvious swelling or deformities noted  RESPIRATORY: Airway is open and patent,respirations are spontaneous, normal effort and rate, moist non productive cough.  CARDIAC: Patient has a normal rate, no peripheral edema noted, capillary refill < 3 seconds; No complaints of chest pain   ABDOMEN: Soft and non tender to palpation, no distention noted.  States she feels distended

## 2020-03-23 ENCOUNTER — TELEPHONE (OUTPATIENT)
Dept: CARDIOLOGY | Facility: CLINIC | Age: 70
End: 2020-03-23

## 2020-03-26 ENCOUNTER — TELEPHONE (OUTPATIENT)
Dept: CARDIOLOGY | Facility: CLINIC | Age: 70
End: 2020-03-26

## 2020-03-26 NOTE — TELEPHONE ENCOUNTER
----- Message from April Garrett sent at 3/26/2020  2:15 PM CDT -----  Contact: Pt called   Pt would like to speak with someone regarding an appt.Advised the pt she had an appt on 3/23/20 an it was canceled. Offer the pt a virtual visit and phone call but she would like to see the doctor in person.Please call pt [phone]. Thank you.

## 2020-03-30 ENCOUNTER — NURSE TRIAGE (OUTPATIENT)
Dept: ADMINISTRATIVE | Facility: CLINIC | Age: 70
End: 2020-03-30

## 2020-04-02 RX ORDER — METOPROLOL SUCCINATE 25 MG/1
TABLET, EXTENDED RELEASE ORAL
Qty: 30 TABLET | Refills: 1 | OUTPATIENT
Start: 2020-04-02

## 2020-04-02 RX ORDER — FUROSEMIDE 40 MG/1
TABLET ORAL
Qty: 30 TABLET | Refills: 1 | OUTPATIENT
Start: 2020-04-02

## 2020-04-02 RX ORDER — LISINOPRIL 2.5 MG/1
2.5 TABLET ORAL NIGHTLY
Qty: 30 TABLET | Refills: 1 | OUTPATIENT
Start: 2020-04-02

## 2020-04-09 ENCOUNTER — LAB VISIT (OUTPATIENT)
Dept: LAB | Facility: HOSPITAL | Age: 70
End: 2020-04-09
Attending: INTERNAL MEDICINE
Payer: MEDICARE

## 2020-04-09 ENCOUNTER — OFFICE VISIT (OUTPATIENT)
Dept: INFECTIOUS DISEASES | Facility: CLINIC | Age: 70
End: 2020-04-09
Payer: MEDICARE

## 2020-04-09 DIAGNOSIS — N39.0 ACUTE UTI (URINARY TRACT INFECTION): ICD-10-CM

## 2020-04-09 DIAGNOSIS — N39.0 RECURRENT UTI: ICD-10-CM

## 2020-04-09 DIAGNOSIS — Z16.12 INFECTION DUE TO ESBL-PRODUCING KLEBSIELLA PNEUMONIAE: ICD-10-CM

## 2020-04-09 DIAGNOSIS — A49.8 INFECTION DUE TO ESBL-PRODUCING KLEBSIELLA PNEUMONIAE: ICD-10-CM

## 2020-04-09 DIAGNOSIS — N39.0 ACUTE UTI (URINARY TRACT INFECTION): Primary | ICD-10-CM

## 2020-04-09 LAB
BACTERIA #/AREA URNS AUTO: ABNORMAL /HPF
BILIRUB UR QL STRIP: NEGATIVE
CLARITY UR REFRACT.AUTO: ABNORMAL
COLOR UR AUTO: ABNORMAL
GLUCOSE UR QL STRIP: NEGATIVE
HGB UR QL STRIP: NEGATIVE
KETONES UR QL STRIP: NEGATIVE
LEUKOCYTE ESTERASE UR QL STRIP: ABNORMAL
MICROSCOPIC COMMENT: ABNORMAL
NITRITE UR QL STRIP: POSITIVE
PH UR STRIP: 5 [PH] (ref 5–8)
PROT UR QL STRIP: NEGATIVE
RBC #/AREA URNS AUTO: 7 /HPF (ref 0–4)
SP GR UR STRIP: 1.02 (ref 1–1.03)
SQUAMOUS #/AREA URNS AUTO: 1 /HPF
URN SPEC COLLECT METH UR: ABNORMAL
WBC #/AREA URNS AUTO: 16 /HPF (ref 0–5)

## 2020-04-09 PROCEDURE — 81001 URINALYSIS AUTO W/SCOPE: CPT

## 2020-04-09 PROCEDURE — 99214 PR OFFICE/OUTPT VISIT, EST, LEVL IV, 30-39 MIN: ICD-10-PCS | Mod: 95,,, | Performed by: PHYSICIAN ASSISTANT

## 2020-04-09 PROCEDURE — 87077 CULTURE AEROBIC IDENTIFY: CPT

## 2020-04-09 PROCEDURE — 99214 OFFICE O/P EST MOD 30 MIN: CPT | Mod: 95,,, | Performed by: PHYSICIAN ASSISTANT

## 2020-04-09 PROCEDURE — 87088 URINE BACTERIA CULTURE: CPT

## 2020-04-09 PROCEDURE — 87186 SC STD MICRODIL/AGAR DIL: CPT

## 2020-04-09 PROCEDURE — 87086 URINE CULTURE/COLONY COUNT: CPT

## 2020-04-09 RX ORDER — GRANULES FOR ORAL 3 G/1
3 POWDER ORAL ONCE
Qty: 3 G | Refills: 0 | Status: SHIPPED | OUTPATIENT
Start: 2020-04-09 | End: 2020-04-09 | Stop reason: SDUPTHER

## 2020-04-09 RX ORDER — GRANULES FOR ORAL 3 G/1
3 POWDER ORAL ONCE
Qty: 3 G | Refills: 0 | Status: SHIPPED | OUTPATIENT
Start: 2020-04-09 | End: 2020-04-09

## 2020-04-09 NOTE — Clinical Note
OSWALDO 2 weeks kassi Moncada patient to arrange for her to drop off urine sample at [facility]

## 2020-04-09 NOTE — PROGRESS NOTES
Subjective:    The patient location is: Home  The chief complaint leading to consultation is: UTI  Visit type: Virtual visit with synchronous audio and video  Total time spent with patient: 35  Each patient to whom he or she provides medical services by telemedicine is:  (1) informed of the relationship between the physician and patient and the respective role of any other health care provider with respect to management of the patient; and (2) notified that he or she may decline to receive medical services by telemedicine and may withdraw from such care at any time.    Notes: below    Patient ID: Niki Soriano is a 69 y.o. female.    Chief Complaint:Urinary Tract Infection      History of Present Illness  69 female with Hx of DMII, Interstitial Cystitis, peripheral neuropathy, HTN, HLD, ALIE, Spinal cord injury secondary to infection with LE extremity weakness, urinary retention and self cath seen as inpt approx 1 months ago after pw SOB.  She was diagnosed with CAD and CHF.  ID was consulted for UTI.  She had very mild symptoms and minimal odor to urine.  She feels urine odor is the major sx of uti and frequency.  She says she has sightly increased frequency and  pain. She says her urine is so bad she can hardly smell it smells rotten. The patient denies any recent fever, chills, or sweats.        Review of Systems   Constitution: Positive for decreased appetite. Negative for chills, fever, malaise/fatigue and night sweats.   Cardiovascular: Negative for chest pain and leg swelling.   Respiratory: Positive for cough. Negative for hemoptysis, shortness of breath, sputum production and wheezing.    Skin: Negative for rash and suspicious lesions.   Gastrointestinal: Positive for constipation. Negative for abdominal pain, diarrhea, heartburn, nausea and vomiting.   Genitourinary: Negative for dysuria and hematuria.     Objective:   Physical Exam unable to assess as was a tele call visit  Assessment:       1. Acute UTI  (urinary tract infection)    2. Recurrent UTI    3. Infection due to ESBL-producing Klebsiella pneumoniae        Recurrent UTI/Bacteruria secondary to urinary retention - hard to tell when she has UTI  Recent increase in odor but minimal sx   She denies systemic signs of infection  Plan:   1. UA with UCX ASAP - she has urine cup to do.  Will have MA arrange to be brought to [facility] facility today  2. Start her packet of monurol once urine culture done  3. FU UA and UCx results. - further abx based on cultures as needed  4. FU with Dr. Putnam for long term management .

## 2020-04-11 LAB — BACTERIA UR CULT: ABNORMAL

## 2020-04-17 ENCOUNTER — TELEPHONE (OUTPATIENT)
Dept: INFECTIOUS DISEASES | Facility: CLINIC | Age: 70
End: 2020-04-17

## 2020-04-17 NOTE — TELEPHONE ENCOUNTER
Spoke with patient and informed was E coli on urine culture.  She took one packet of Fosfomycin and her urine odor resolved and has not recurred.  She has a fu with Dr. Putnam next week for further management as she had seen him in past.    Keven John PA-C

## 2020-05-01 ENCOUNTER — OFFICE VISIT (OUTPATIENT)
Dept: CARDIOLOGY | Facility: CLINIC | Age: 70
End: 2020-05-01
Payer: MEDICARE

## 2020-05-01 DIAGNOSIS — R06.02 SHORTNESS OF BREATH: ICD-10-CM

## 2020-05-01 DIAGNOSIS — R33.9 INCOMPLETE EMPTYING OF BLADDER: ICD-10-CM

## 2020-05-01 DIAGNOSIS — R29.898 LEG WEAKNESS, BILATERAL: ICD-10-CM

## 2020-05-01 DIAGNOSIS — I25.10 CORONARY ARTERY DISEASE INVOLVING NATIVE CORONARY ARTERY OF NATIVE HEART WITHOUT ANGINA PECTORIS: ICD-10-CM

## 2020-05-01 DIAGNOSIS — E11.42 TYPE 2 DIABETES MELLITUS WITH DIABETIC POLYNEUROPATHY, WITHOUT LONG-TERM CURRENT USE OF INSULIN: ICD-10-CM

## 2020-05-01 DIAGNOSIS — I25.5 ISCHEMIC CARDIOMYOPATHY: Primary | ICD-10-CM

## 2020-05-01 PROCEDURE — 3044F PR MOST RECENT HEMOGLOBIN A1C LEVEL <7.0%: ICD-10-PCS | Mod: 95,CPTII,, | Performed by: INTERNAL MEDICINE

## 2020-05-01 PROCEDURE — 1101F PT FALLS ASSESS-DOCD LE1/YR: CPT | Mod: 95,CPTII,, | Performed by: INTERNAL MEDICINE

## 2020-05-01 PROCEDURE — 1159F MED LIST DOCD IN RCRD: CPT | Mod: 95,,, | Performed by: INTERNAL MEDICINE

## 2020-05-01 PROCEDURE — 1101F PR PT FALLS ASSESS DOC 0-1 FALLS W/OUT INJ PAST YR: ICD-10-PCS | Mod: 95,CPTII,, | Performed by: INTERNAL MEDICINE

## 2020-05-01 PROCEDURE — 99443 PR PHYSICIAN TELEPHONE EVALUATION 21-30 MIN: CPT | Mod: 95,,, | Performed by: INTERNAL MEDICINE

## 2020-05-01 PROCEDURE — 3044F HG A1C LEVEL LT 7.0%: CPT | Mod: 95,CPTII,, | Performed by: INTERNAL MEDICINE

## 2020-05-01 PROCEDURE — 99443 PR PHYSICIAN TELEPHONE EVALUATION 21-30 MIN: ICD-10-PCS | Mod: 95,,, | Performed by: INTERNAL MEDICINE

## 2020-05-01 PROCEDURE — 1159F PR MEDICATION LIST DOCUMENTED IN MEDICAL RECORD: ICD-10-PCS | Mod: 95,,, | Performed by: INTERNAL MEDICINE

## 2020-05-01 RX ORDER — FUROSEMIDE 40 MG/1
TABLET ORAL
Qty: 30 TABLET | Refills: 1 | Status: SHIPPED | OUTPATIENT
Start: 2020-05-01 | End: 2020-07-27

## 2020-05-01 NOTE — LETTER
May 1, 2020      Poly Murdock NP  [address] [facility] [address]           [facility] - Cardiology  [address]  [place] [address]  Phone: [phone]          Patient: Niki Soriano   MR Number: [id number]   YOB: 1950   Date of Visit: 5/1/2020       Dear Poly Murdock:    Thank you for referring Niki Soriano to me for evaluation. Attached you will find relevant portions of my assessment and plan of care.    If you have questions, please do not hesitate to call me. I look forward to following Niki Soriano along with you.    Sincerely,    Amandeep Lynn MD    Enclosure  CC:  No Recipients    If you would like to receive this communication electronically, please contact [email or web address] or [phone] to request more information on [employer] Customer Support Link access.    For providers and/or their staff who would like to refer a patient to [facility], please contact us through our one-stop-shop provider referral line, Ariela De Dios, at [phone].    If you feel you have received this communication in error or would no longer like to receive these types of communications, please e-mail [email or web address]

## 2020-05-08 DIAGNOSIS — I25.5 ISCHEMIC CARDIOMYOPATHY: ICD-10-CM

## 2020-05-11 RX ORDER — METOPROLOL SUCCINATE 25 MG/1
TABLET, EXTENDED RELEASE ORAL
Qty: 30 TABLET | Refills: 1 | OUTPATIENT
Start: 2020-05-11

## 2020-05-11 RX ORDER — FUROSEMIDE 40 MG/1
TABLET ORAL
Qty: 30 TABLET | Refills: 1 | OUTPATIENT
Start: 2020-05-11

## 2020-05-12 ENCOUNTER — TELEPHONE (OUTPATIENT)
Dept: UROLOGY | Facility: CLINIC | Age: 70
End: 2020-05-12

## 2020-05-12 DIAGNOSIS — N31.9 NEUROGENIC BLADDER: ICD-10-CM

## 2020-05-12 DIAGNOSIS — N39.41 URGE INCONTINENCE: Primary | ICD-10-CM

## 2020-05-12 RX ORDER — DARIFENACIN 7.5 MG/1
7.5 TABLET, EXTENDED RELEASE ORAL DAILY
Qty: 30 TABLET | Refills: 11 | Status: SHIPPED | OUTPATIENT
Start: 2020-05-12 | End: 2020-06-02 | Stop reason: CLARIF

## 2020-05-12 NOTE — TELEPHONE ENCOUNTER
----- Message from Hilaria Romero MA sent at 5/11/2020  3:35 PM CDT -----  Contact: [phone]   Pt is needing a new Rx for a medication that Dr Garcia gave her in place of the oxybutynin but she can't recall the name of it. I did not see another medication similar to that in her med list.  Please advise.    [phone]

## 2020-05-15 ENCOUNTER — OFFICE VISIT (OUTPATIENT)
Dept: INFECTIOUS DISEASES | Facility: CLINIC | Age: 70
End: 2020-05-15
Payer: MEDICARE

## 2020-05-15 ENCOUNTER — OFFICE VISIT (OUTPATIENT)
Dept: CARDIOLOGY | Facility: CLINIC | Age: 70
End: 2020-05-15
Payer: MEDICARE

## 2020-05-15 VITALS
OXYGEN SATURATION: 99 % | HEIGHT: 64 IN | DIASTOLIC BLOOD PRESSURE: 60 MMHG | SYSTOLIC BLOOD PRESSURE: 106 MMHG | WEIGHT: 152.38 LBS | HEART RATE: 66 BPM | BODY MASS INDEX: 26.02 KG/M2

## 2020-05-15 VITALS
SYSTOLIC BLOOD PRESSURE: 77 MMHG | HEIGHT: 64 IN | BODY MASS INDEX: 26.16 KG/M2 | DIASTOLIC BLOOD PRESSURE: 54 MMHG | HEART RATE: 59 BPM | TEMPERATURE: 99 F

## 2020-05-15 DIAGNOSIS — A49.8 INFECTION DUE TO ESBL-PRODUCING KLEBSIELLA PNEUMONIAE: ICD-10-CM

## 2020-05-15 DIAGNOSIS — Z16.12 INFECTION DUE TO ESBL-PRODUCING KLEBSIELLA PNEUMONIAE: ICD-10-CM

## 2020-05-15 DIAGNOSIS — I25.5 ISCHEMIC CARDIOMYOPATHY: ICD-10-CM

## 2020-05-15 DIAGNOSIS — I50.9 NEW ONSET OF CONGESTIVE HEART FAILURE: ICD-10-CM

## 2020-05-15 DIAGNOSIS — N39.0 ACUTE UTI (URINARY TRACT INFECTION): ICD-10-CM

## 2020-05-15 DIAGNOSIS — E11.42 TYPE 2 DIABETES MELLITUS WITH DIABETIC POLYNEUROPATHY, WITHOUT LONG-TERM CURRENT USE OF INSULIN: ICD-10-CM

## 2020-05-15 DIAGNOSIS — E78.00 PURE HYPERCHOLESTEROLEMIA: ICD-10-CM

## 2020-05-15 DIAGNOSIS — G06.1 SPINAL CORD ABSCESS: ICD-10-CM

## 2020-05-15 DIAGNOSIS — R33.9 INCOMPLETE EMPTYING OF BLADDER: ICD-10-CM

## 2020-05-15 DIAGNOSIS — I50.40 COMBINED SYSTOLIC AND DIASTOLIC CONGESTIVE HEART FAILURE, UNSPECIFIED HF CHRONICITY: ICD-10-CM

## 2020-05-15 DIAGNOSIS — N39.0 RECURRENT UTI: Primary | ICD-10-CM

## 2020-05-15 DIAGNOSIS — I25.10 CORONARY ARTERY DISEASE INVOLVING NATIVE CORONARY ARTERY OF NATIVE HEART WITHOUT ANGINA PECTORIS: Primary | ICD-10-CM

## 2020-05-15 DIAGNOSIS — I51.89 DIASTOLIC DYSFUNCTION: ICD-10-CM

## 2020-05-15 PROCEDURE — 1101F PT FALLS ASSESS-DOCD LE1/YR: CPT | Mod: CPTII,S$GLB,, | Performed by: INTERNAL MEDICINE

## 2020-05-15 PROCEDURE — 1100F PTFALLS ASSESS-DOCD GE2>/YR: CPT | Mod: CPTII,S$GLB,, | Performed by: INTERNAL MEDICINE

## 2020-05-15 PROCEDURE — 99214 OFFICE O/P EST MOD 30 MIN: CPT | Mod: S$GLB,,, | Performed by: INTERNAL MEDICINE

## 2020-05-15 PROCEDURE — 1126F AMNT PAIN NOTED NONE PRSNT: CPT | Mod: S$GLB,,, | Performed by: INTERNAL MEDICINE

## 2020-05-15 PROCEDURE — 99999 PR PBB SHADOW E&M-EST. PATIENT-LVL IV: ICD-10-PCS | Mod: PBBFAC,,, | Performed by: INTERNAL MEDICINE

## 2020-05-15 PROCEDURE — 99214 PR OFFICE/OUTPT VISIT, EST, LEVL IV, 30-39 MIN: ICD-10-PCS | Mod: S$GLB,,, | Performed by: INTERNAL MEDICINE

## 2020-05-15 PROCEDURE — 1159F PR MEDICATION LIST DOCUMENTED IN MEDICAL RECORD: ICD-10-PCS | Mod: S$GLB,,, | Performed by: INTERNAL MEDICINE

## 2020-05-15 PROCEDURE — 1159F MED LIST DOCD IN RCRD: CPT | Mod: S$GLB,,, | Performed by: INTERNAL MEDICINE

## 2020-05-15 PROCEDURE — 99999 PR PBB SHADOW E&M-EST. PATIENT-LVL IV: CPT | Mod: PBBFAC,,, | Performed by: INTERNAL MEDICINE

## 2020-05-15 PROCEDURE — 1100F PR PT FALLS ASSESS DOC 2+ FALLS/FALL W/INJURY/YR: ICD-10-PCS | Mod: CPTII,S$GLB,, | Performed by: INTERNAL MEDICINE

## 2020-05-15 PROCEDURE — 3044F HG A1C LEVEL LT 7.0%: CPT | Mod: CPTII,S$GLB,, | Performed by: INTERNAL MEDICINE

## 2020-05-15 PROCEDURE — 3288F PR FALLS RISK ASSESSMENT DOCUMENTED: ICD-10-PCS | Mod: CPTII,S$GLB,, | Performed by: INTERNAL MEDICINE

## 2020-05-15 PROCEDURE — 3044F PR MOST RECENT HEMOGLOBIN A1C LEVEL <7.0%: ICD-10-PCS | Mod: CPTII,S$GLB,, | Performed by: INTERNAL MEDICINE

## 2020-05-15 PROCEDURE — 1101F PR PT FALLS ASSESS DOC 0-1 FALLS W/OUT INJ PAST YR: ICD-10-PCS | Mod: CPTII,S$GLB,, | Performed by: INTERNAL MEDICINE

## 2020-05-15 PROCEDURE — 3288F FALL RISK ASSESSMENT DOCD: CPT | Mod: CPTII,S$GLB,, | Performed by: INTERNAL MEDICINE

## 2020-05-15 PROCEDURE — 1126F PR PAIN SEVERITY QUANTIFIED, NO PAIN PRESENT: ICD-10-PCS | Mod: S$GLB,,, | Performed by: INTERNAL MEDICINE

## 2020-05-15 RX ORDER — MULTIVITAMIN
1 TABLET ORAL DAILY
COMMUNITY

## 2020-05-15 RX ORDER — GRANULES FOR ORAL 3 G/1
3 POWDER ORAL WEEKLY
Qty: 9 G | Refills: 3 | Status: SHIPPED | OUTPATIENT
Start: 2020-05-15 | End: 2020-05-30

## 2020-05-15 RX ORDER — AMOXICILLIN 500 MG
CAPSULE ORAL DAILY
COMMUNITY

## 2020-05-15 RX ORDER — METHENAMINE HIPPURATE 1000 MG/1
1 TABLET ORAL 2 TIMES DAILY
Qty: 60 TABLET | Refills: 2 | Status: SHIPPED | OUTPATIENT
Start: 2020-05-15 | End: 2021-02-25

## 2020-05-15 RX ORDER — VITAMIN B COMPLEX
1 CAPSULE ORAL DAILY
COMMUNITY

## 2020-05-15 NOTE — PROGRESS NOTES
Subjective:    Patient ID:  Niki Soriano is a 69 y.o. female who presents for follow-up of [facility]    HPI   The patient is a 69 year old female had an Audio encounter 5/1/20 She was admitted 3/1/20 with acute systolic heart failure. She was seen by her primary 3 weeks before that admit with SOB with a 7# and was given lasix with improvement. There is no history of chest pain.  Her Echo demonstrated severe systolic dysfunction with significant wall motion abnormality (EF 20-25%) as well as severe left ventricular diastolic dysfunction consistent with restrictive physiology.  Interventional Cardiology consulted and Left heart cath 3/3 revealing 3 vessel CAD.  Given her significant issues with ambulation (reliant on a walker), severe left ventricular dysfunction, and diffuse severe coronary artery disease which is non-bypassable medical management was recommended by CTS.  General Cardiology would like to pursue viability study to see if PCI for RCA and OM are an option.- Resting PET flows showed apex is likely scar but inferior and lateral walls demonstrate adequate uptake at rest so there may be utility in revascularization of significant RCA and OM lesions after trial of medical therapy. She is paraplegic due toa spinal card infectkion in 1996. She has diabetes and hypertension, no smoking since early adult years and not family history of CAD. She was started on entresto 24-26 5/1/20. She has mild HE and no edema or chest pain.    Nuclear Findings 3/5/20    Perfusion Defect There is a large sized, mild to moderate intensity, apical to basal inferoseptal and septal resting perfusion abnormality involving 30 % of the LV myocardium LAD territory.   Function Volumes Software used: Victor Manuel- 8pt gating  EF: 33%.   LVEDV: 129 ml/m2.   LVESV: 86 ml/m2.       Conclusion 3/2/20    · Severely decreased left ventricular systolic function. The estimated ejection fraction is 20-25%. Significant wall motion abnormalities  seen.  · Mildly reduced right ventricular systolic function.  · Grade III (severe) left ventricular diastolic dysfunction consistent with restrictive physiology.  · Mild left atrial enlargement.  · Mild-to-moderate mitral regurgitation.  · The estimated PA systolic pressure is 44 mmHg.  · Intermediate central venous pressure (8 mmHg).      Conclusion        · Multi-vessel coronary artery disease, with  of ostial LAD filled via faint collaterals from the OM1 and Ramus, 75% tubular stenisus if the mid LCx, 80% discrete stenosis of the proximal OM1, 99% discrete stenosis of the mid RCA, and  of the distal RCA filled via collaterals from the distal LCx and OM1.  · LV filling pressure is elevated, LVEDP 25 mmHg.     I certify that I was present for catheter insertion, catheter manipulation, angiography, and angiographic interpretation of this patient.     Procedure Log documented by Documenter: Cong Mccoy RT; Elena Morton and verified by Markie Friend III, MD.     Date: 3/3/2020  Time: 3:37 PM            Lab Results   Component Value Date     03/20/2020    K 3.9 03/20/2020     03/20/2020    CO2 25 03/20/2020    BUN 32 (H) 03/20/2020    CREATININE 1.2 03/20/2020     (H) 03/20/2020    HGBA1C 5.8 (H) 03/02/2020    MG 2.0 03/06/2020    AST 31 03/20/2020    ALT 30 03/20/2020    ALBUMIN 4.0 03/20/2020    PROT 6.8 03/20/2020    BILITOT 0.5 03/20/2020    WBC 6.18 03/20/2020    HGB 12.0 03/20/2020    HCT 38.3 03/20/2020    MCV 91 03/20/2020     03/20/2020    INR 1.1 03/01/2020    TSH 4.072 (H) 03/01/2020         Lab Results   Component Value Date    CHOL 170 03/01/2020    HDL 52 03/01/2020    TRIG 124 03/01/2020       Lab Results   Component Value Date    LDLCALC 93.2 03/01/2020       Past Medical History:   Diagnosis Date    Diabetes mellitus     Hypercholesteremia     Hypertension     IC (interstitial cystitis)     Numbness in both hands 11/1/2017    Spondylisthesis      Vulvodynia, unspecified        Current Outpatient Medications:     aspirin 81 MG Chew, Take 1 tablet (81 mg total) by mouth once daily., Disp: , Rfl: 0    atorvastatin (LIPITOR) 40 MG tablet, Take 80 mg by mouth once daily., Disp: , Rfl: 3    b complex vitamins capsule, Take 1 capsule by mouth once daily., Disp: , Rfl:     baclofen (LIORESAL) 20 MG tablet, 2 (two) times daily. , Disp: , Rfl:     BIOTIN ORAL, Take by mouth., Disp: , Rfl:     darifenacin (ENABLEX) 7.5 MG Tb24, Take 1 tablet (7.5 mg total) by mouth once daily., Disp: 30 tablet, Rfl: 11    docusate sodium (STOOL SOFTENER ORAL), Take by mouth., Disp: , Rfl:     duloxetine (CYMBALTA) 30 MG capsule, Take 30 mg by mouth once daily., Disp: , Rfl:     estradiol (ESTRACE) 0.01 % (0.1 mg/gram) vaginal cream, PLEASE SEE ATTACHED FOR DETAILED DIRECTIONS, Disp: 42.5 g, Rfl: 3    furosemide (LASIX) 40 MG tablet, 1/2 tab as need for swelling or weight gain, Disp: 30 tablet, Rfl: 1    gabapentin (NEURONTIN) 300 MG capsule, Take 1 capsule (300 mg total) by mouth 3 (three) times daily., Disp: 270 capsule, Rfl: 0    Lactobac no.51/Bifidobact no.4 (UP4 PROBIOTICS ULTRA ORAL), Take by mouth., Disp: , Rfl:     LEVOTHYROXINE SODIUM (LEVOTHYROXINE ORAL), Take 50 mcg by mouth before breakfast. , Disp: , Rfl:     metformin (GLUCOPHAGE) 500 MG tablet, Take 500 mg by mouth 2 (two) times daily with meals., Disp: , Rfl:     metoprolol succinate (TOPROL-XL) 25 MG 24 hr tablet, Take 1 tablet (25 mg total) by mouth once daily., Disp: 90 tablet, Rfl: 3    multivitamin (ONE DAILY MULTIVITAMIN) per tablet, Take 1 tablet by mouth once daily., Disp: , Rfl:     omega-3 fatty acids/fish oil (FISH OIL-OMEGA-3 FATTY ACIDS) 300-1,000 mg capsule, Take by mouth once daily., Disp: , Rfl:           Review of Systems   Constitution: Negative for decreased appetite, diaphoresis, fever, malaise/fatigue, weight gain and weight loss.   HENT: Negative for congestion, ear discharge, ear  "pain and nosebleeds.    Eyes: Negative for blurred vision, double vision and visual disturbance.   Cardiovascular: Positive for dyspnea on exertion. Negative for chest pain, claudication, cyanosis, irregular heartbeat, leg swelling, near-syncope, orthopnea, palpitations, paroxysmal nocturnal dyspnea and syncope.   Respiratory: Positive for shortness of breath. Negative for cough, hemoptysis, sleep disturbances due to breathing, snoring, sputum production and wheezing.    Endocrine: Negative for polydipsia, polyphagia and polyuria.   Hematologic/Lymphatic: Negative for adenopathy and bleeding problem. Does not bruise/bleed easily.   Skin: Negative for color change, nail changes, poor wound healing and rash.   Musculoskeletal: Negative for muscle cramps and muscle weakness.   Gastrointestinal: Negative for abdominal pain, anorexia, change in bowel habit, hematochezia, nausea and vomiting.   Genitourinary: Negative for dysuria, frequency and hematuria.   Neurological: Positive for paresthesias. Negative for brief paralysis, difficulty with concentration, excessive daytime sleepiness, dizziness, focal weakness, headaches, light-headedness, seizures, vertigo and weakness.   Psychiatric/Behavioral: Negative for altered mental status and depression.   Allergic/Immunologic: Negative for persistent infections.        Objective:/60 (BP Location: Left arm, Patient Position: Sitting, BP Method: Medium (Manual))   Pulse 66   Ht 5' 4" (1.626 m)   Wt 69.1 kg (152 lb 6.4 oz)   SpO2 99%   BMI 26.16 kg/m²             Physical Exam   Constitutional: She is oriented to person, place, and time. She appears well-developed and well-nourished.   HENT:   Head: Normocephalic.   Right Ear: External ear normal.   Left Ear: External ear normal.   Nose: Nose normal.   Inspection of lips, teeth and gums normal   Eyes: Pupils are equal, round, and reactive to light. Conjunctivae and EOM are normal. No scleral icterus.   Neck: Normal " range of motion. No JVD present. No tracheal deviation present. No thyromegaly present.   Cardiovascular: Normal rate, regular rhythm, normal heart sounds and intact distal pulses. Exam reveals no gallop and no friction rub.   No murmur heard.  Pulses:       Carotid pulses are 2+ on the right side, and 2+ on the left side.       Dorsalis pedis pulses are 0 on the right side, and 0 on the left side.        Posterior tibial pulses are 0 on the right side, and 0 on the left side.   Pulmonary/Chest: Effort normal and breath sounds normal. No respiratory distress. She has no wheezes. She has no rales. She exhibits no tenderness.   Abdominal: Soft. Bowel sounds are normal. She exhibits no distension. There is no hepatosplenomegaly. There is no tenderness. There is no guarding.   Musculoskeletal: Normal range of motion. She exhibits no edema or tenderness.   Lymphadenopathy:   Palpation of lymph nodes of neck and groin normal   Neurological: She is oriented to person, place, and time. No cranial nerve deficit. She exhibits normal muscle tone. Coordination normal.   Skin: Skin is warm and dry. No rash noted. No erythema. No pallor.   Palpation of skin normal   Psychiatric: She has a normal mood and affect. Her behavior is normal. Judgment and thought content normal.         Assessment:       1. Coronary artery disease involving native coronary artery of native heart without angina pectoris    2. Diastolic dysfunction    3. Ischemic cardiomyopathy    4. Type 2 diabetes mellitus with diabetic polyneuropathy, without long-term current use of insulin    5. Incomplete emptying of bladder    6. Spinal cord abscess    7. Combined systolic and diastolic congestive heart failure, unspecified HF chronicity         Plan:       Niki was seen today for cardiomyopathy.    Diagnoses and all orders for this visit:    Coronary artery disease involving native coronary artery of native heart without angina pectoris    Diastolic  dysfunction    Ischemic cardiomyopathy    Type 2 diabetes mellitus with diabetic polyneuropathy, without long-term current use of insulin    Incomplete emptying of bladder    Spinal cord abscess    Combined systolic and diastolic congestive heart failure, unspecified HF chronicity    Other orders  -     omega-3 fatty acids/fish oil (FISH OIL-OMEGA-3 FATTY ACIDS) 300-1,000 mg capsule; Take by mouth once daily.  -     Lactobac no.51/Bifidobact no.4 (UP4 PROBIOTICS ULTRA ORAL); Take by mouth.  -     b complex vitamins capsule; Take 1 capsule by mouth once daily.  -     BIOTIN ORAL; Take by mouth.  -     docusate sodium (STOOL SOFTENER ORAL); Take by mouth.  -     multivitamin (ONE DAILY MULTIVITAMIN) per tablet; Take 1 tablet by mouth once daily.

## 2020-05-15 NOTE — LETTER
May 15, 2020      Poly Murdock NP  [address] [facility] [address]           [facility] - Cardiology  [address]  [place] [address]  Phone: [phone]          Patient: Niki Soriano   MR Number: [id number]   YOB: 1950   Date of Visit: 5/15/2020       Dear Poly Murdock:    Thank you for referring Niki Soriano to me for evaluation. Attached you will find relevant portions of my assessment and plan of care.    If you have questions, please do not hesitate to call me. I look forward to following Niki Soriano along with you.    Sincerely,    Amandeep Lynn MD    Enclosure  CC:  No Recipients    If you would like to receive this communication electronically, please contact [email or web address] or [phone] to request more information on [employer] Link access.    For providers and/or their staff who would like to refer a patient to [facility], please contact us through our one-stop-shop provider referral line, Ariela De Dios, at [phone].    If you feel you have received this communication in error or would no longer like to receive these types of communications, please e-mail [email or web address]

## 2020-05-15 NOTE — PROGRESS NOTES
Subjective:      Patient ID: Niki Soriano is a 69 y.o. female.    Chief Complaint:Urinary Tract Infection      History of Present Illness    Patient states that she has small amount of dysuria from her interstitial cystitis and her urine continues to have a foul odor.  She states that the monurol she took from the previous visit that the odor improved.  She has not been able to get the prescription from Dr. Garcia because there is still a problem with authorization.  She states that 3 days after she took the antibiotics that were prescribed her odor returned.    When I discussed with her taking methenamine in 2018, she was unclear if it actually worked.    She states that she saw cardiology today, but did not understand her condition.  I explained that she had coronary artery disease with a possible infarction and heart failure.  I also explained the concept of her ejection fraction and that it was weaker than normal.    Review of Systems   Constitution: Negative for chills and fever.   Genitourinary: Positive for dysuria and incomplete emptying.   All other systems reviewed and are negative.    Objective:   Physical Exam   Constitutional: She is oriented to person, place, and time. She appears well-developed and well-nourished.   HENT:   Head: Normocephalic and atraumatic.   Eyes: Pupils are equal, round, and reactive to light. Conjunctivae and EOM are normal.   Pulmonary/Chest: Effort normal.   Abdominal: Soft. Bowel sounds are normal.   Musculoskeletal: Normal range of motion. She exhibits no edema.   Neurological: She is alert and oriented to person, place, and time.   Nursing note and vitals reviewed.    Assessment:       1. Recurrent UTI          Plan:       I discussed possible strategies with her to improve her symptoms.  She agreed to take methenamine b.i.d. and weekly monurol. She will return in one month to re-evaluate.  I will contact Urology regarding her prescription.  She will send a urine culture  from home.    She also states that she understands that she has coronary disease and may require angioplasty with stents.  I spent over 50% of a 30 min encounter counseling the patient regarding her cardiovascular disease as well as her urinary tract infections.

## 2020-05-21 ENCOUNTER — OFFICE VISIT (OUTPATIENT)
Dept: CARDIOLOGY | Facility: CLINIC | Age: 70
End: 2020-05-21
Payer: MEDICARE

## 2020-05-21 ENCOUNTER — DOCUMENTATION ONLY (OUTPATIENT)
Dept: CARDIOLOGY | Facility: CLINIC | Age: 70
End: 2020-05-21

## 2020-05-21 DIAGNOSIS — I25.5 CARDIOMYOPATHY, ISCHEMIC: Primary | ICD-10-CM

## 2020-05-21 DIAGNOSIS — E11.42 TYPE 2 DIABETES MELLITUS WITH DIABETIC POLYNEUROPATHY, WITHOUT LONG-TERM CURRENT USE OF INSULIN: ICD-10-CM

## 2020-05-21 DIAGNOSIS — I25.5 ISCHEMIC CARDIOMYOPATHY: ICD-10-CM

## 2020-05-21 DIAGNOSIS — I25.118 CORONARY ARTERY DISEASE WITH STABLE ANGINA PECTORIS, UNSPECIFIED VESSEL OR LESION TYPE, UNSPECIFIED WHETHER NATIVE OR TRANSPLANTED HEART: ICD-10-CM

## 2020-05-21 DIAGNOSIS — Z01.810 PREOP CARDIOVASCULAR EXAM: Primary | ICD-10-CM

## 2020-05-21 PROCEDURE — 3288F FALL RISK ASSESSMENT DOCD: CPT | Mod: CPTII,95,, | Performed by: INTERNAL MEDICINE

## 2020-05-21 PROCEDURE — 3288F PR FALLS RISK ASSESSMENT DOCUMENTED: ICD-10-PCS | Mod: CPTII,95,, | Performed by: INTERNAL MEDICINE

## 2020-05-21 PROCEDURE — 99443 PR PHYSICIAN TELEPHONE EVALUATION 21-30 MIN: CPT | Mod: 95,,, | Performed by: INTERNAL MEDICINE

## 2020-05-21 PROCEDURE — 1100F PTFALLS ASSESS-DOCD GE2>/YR: CPT | Mod: CPTII,95,, | Performed by: INTERNAL MEDICINE

## 2020-05-21 PROCEDURE — 3044F HG A1C LEVEL LT 7.0%: CPT | Mod: CPTII,95,, | Performed by: INTERNAL MEDICINE

## 2020-05-21 PROCEDURE — 1100F PR PT FALLS ASSESS DOC 2+ FALLS/FALL W/INJURY/YR: ICD-10-PCS | Mod: CPTII,95,, | Performed by: INTERNAL MEDICINE

## 2020-05-21 PROCEDURE — 1159F PR MEDICATION LIST DOCUMENTED IN MEDICAL RECORD: ICD-10-PCS | Mod: 95,,, | Performed by: INTERNAL MEDICINE

## 2020-05-21 PROCEDURE — 3044F PR MOST RECENT HEMOGLOBIN A1C LEVEL <7.0%: ICD-10-PCS | Mod: CPTII,95,, | Performed by: INTERNAL MEDICINE

## 2020-05-21 PROCEDURE — 1159F MED LIST DOCD IN RCRD: CPT | Mod: 95,,, | Performed by: INTERNAL MEDICINE

## 2020-05-21 PROCEDURE — 99443 PR PHYSICIAN TELEPHONE EVALUATION 21-30 MIN: ICD-10-PCS | Mod: 95,,, | Performed by: INTERNAL MEDICINE

## 2020-05-21 RX ORDER — DIPHENHYDRAMINE HCL 25 MG
50 CAPSULE ORAL ONCE
Status: CANCELLED | OUTPATIENT
Start: 2020-05-21 | End: 2020-05-21

## 2020-05-21 RX ORDER — SODIUM CHLORIDE 9 MG/ML
INJECTION, SOLUTION INTRAVENOUS CONTINUOUS
Status: CANCELLED | OUTPATIENT
Start: 2020-05-21

## 2020-05-21 NOTE — LETTER
May 23, 2020      Amandeep Lynn MD  [address]  [facility] [address]           [facility]  [address]  [facility] [address]  Phone: [phone]          Patient: Niki Soriano   MR Number: [id number]   YOB: 1950   Date of Visit: 5/21/2020       Dear Dr. Amandeep Lynn:    Thank you for referring Niki Soriano to me for evaluation. Attached you will find relevant portions of my assessment and plan of care.    If you have questions, please do not hesitate to call me. I look forward to following Niki Soriano along with you.    Sincerely,    Rodney Gamble MD    Enclosure  CC:  No Recipients    If you would like to receive this communication electronically, please contact [email or web address] or [phone] to request more information on [employer] Link access.    For providers and/or their staff who would like to refer a patient to [facility], please contact us through our one-stop-shop provider referral line, [facility] , at [phone].    If you feel you have received this communication in error or would no longer like to receive these types of communications, please e-mail [email or web address]

## 2020-05-21 NOTE — PROGRESS NOTES
OUTPATIENT CATHETERIZATION INSTRUCTIONS    You have been scheduled for a procedure in the catheterization lab on Friday, May 29, 2020.     Please report to the Cardiology Waiting Area on the Third floor of the hospital and check in at 7;30 AM.   You will then be taken to the SSCU (Short Stay Cardiac Unit) and prepared for your procedure. Please be aware that this is not the time of your procedure but the time you are to arrive. The procedures are scheduled on an hourly basis; however, emergency cases take precedence over all other cases.       You may not have anything to eat or drink after midnight the night before your test. You may take your regular morning medications with water. If there are any medications that you should not take you will be instructed to hold them that morning. If you are diabetic and on Metformin (Glucophage) do not take it the day before, the day of, and for 2 days after your procedure.      The procedure will take 1-2 hours to perform. After the procedure, you will return to SSCU on the third floor of the hospital. You will need to lie still (or keep your arm still) for the next 4 to 6 hours to minimize bleeding from the puncture site. Your family may remain in the room with you during this time.       You may be able to be discharged home that same afternoon if there is someone to drive you home and there were no complications. If you have one of the balloon, stent, or device procedures you may spend the night in the hospital. Your doctor will determine, based on your progress, the date and time of your discharge. The results of your procedure will be discussed with you before you are discharged. Any further testing or procedures will be scheduled for you either before you leave or you will be called with these appointments.       If you should have any questions, concerns, or need to change the date of your procedure, please call  BELL Francisco @ [phone]    Special  Instructions:  hOLD METFORMIN Thursday, Friday, Saturday AND Sunday   Drink plenty of water the day before and after your procedure.           THE ABOVE INSTRUCTIONS WERE GIVEN TO THE PATIENT VERBALLY AND THEY VERBALIZED UNDERSTANDING.  THEY DO NOT REQUIRE ANY SPECIAL NEEDS AND DO NOT HAVE ANY LEARNING BARRIERS.          Directions for Reporting to Cardiology Waiting Area in the Hospital  If you park in the Parking Garage:  Take elevators to the1st floor of the parking garage.  Continue past the gift shop, coffee shop, and piano.  Take a right and go to the gold elevators. (Elevator B)  Take the elevator to the 3rd floor.  Follow the arrow on the sign on the wall that says Cath Lab Registration/EP Lab Registration.  Follow the long hallway all the way around until you come to a big open area.  This is the registration area.  Check in at Reception Desk.    OR    If family is dropping you off:  Have them drop you off at the front of the Hospital under the green overhang.  Enter through the doors and take a right.  Take the E elevators to the 3rd floor Cardiology Waiting Area.  Check in at the Reception Desk in the waiting room.

## 2020-05-21 NOTE — PROGRESS NOTES
Established Patient - Audio Only Telehealth Visit     The patient location is: home  The chief complaint leading to consultation is: CAD  Visit type: Virtual visit with audio only (telephone)  Total time spent with patient: 45 minutes       The reason for the audio only service rather than synchronous audio and video virtual visit was related to technical difficulties or patient preference/necessity.     Each patient to whom I provide medical services by telemedicine is:  (1) informed of the relationship between the physician and patient and the respective role of any other health care provider with respect to management of the patient; and (2) notified that they may decline to receive medical services by telemedicine and may withdraw from such care at any time. Patient verbally consented to receive this service via voice-only telephone call.    Referring cardiologist: Dr. Lynn     HPI:   Mrs. Soriano is a 68 y/o woman with a h/o CAD.  She was admitted on 3/1/19 with acute decompensated heart failure. TTE on 3/2/20 demonstrated an LVEF=20-25%. Cardiac cath demonstrated a  of the LAD, high grade LCx stenoses, and a  of the RCA. The PDA fills ia left to right collaterals and the LAD fills via left to left collaterals. Since hospital discharge the patient denies angina.  She denies shortness of breath.  The patient reports that she ia able to mop the floor in her home and does so while sitting in a chair.  She had a spinal chord infection in 1996 and has lower exremtiy weakness since that time.  She uses a walker to ambulate.  The patient denies LE edema.  She sleeps with a folded robe under her head due to neck pain.  She denies PND.  She denies palpitations, syncope, or near syncope.  SHe reports a burning sensation in her chest when eating crawfish, but not with physical activity.  She reports good medication compliance.    Past Medical History:   Diagnosis Date    Diabetes mellitus     Hypercholesteremia      Hypertension     IC (interstitial cystitis)     Numbness in both hands 2017    Spondylisthesis     Vulvodynia, unspecified      Past Surgical History:   Procedure Laterality Date    CARPAL TUNNEL RELEASE Right 2019    Procedure: RELEASE, CARPAL TUNNEL, REVISION;  Surgeon: Annabelle Cid MD;  Location: [facility] OR [address] [place] [address];  Service: Orthopedics;  Laterality: Right;  Axogen, Clarix     SECTION      x3    CORONARY ANGIOGRAPHY N/A 3/3/2020    Procedure: ANGIOGRAM, CORONARY ARTERY;  Surgeon: Markie Friend III, MD;  Location: [facility] CATH LAB;  Service: Cardiology;  Laterality: N/A;    CYSTOSCOPY      HYSTERECTOMY      ischemic colitis      LEFT HEART CATHETERIZATION Left 3/3/2020    Procedure: Left heart cath;  Surgeon: Markie Friend III, MD;  Location: [facility] CATH LAB;  Service: Cardiology;  Laterality: Left;    OOPHORECTOMY      IA REMOVAL OF OVARY/TUBE(S)       Current Outpatient Medications on File Prior to Visit   Medication Sig Dispense Refill    aspirin 81 MG Chew Take 1 tablet (81 mg total) by mouth once daily.  0    atorvastatin (LIPITOR) 40 MG tablet Take 80 mg by mouth once daily.  3    b complex vitamins capsule Take 1 capsule by mouth once daily.      baclofen (LIORESAL) 20 MG tablet 2 (two) times daily.       BIOTIN ORAL Take by mouth.      darifenacin (ENABLEX) 7.5 MG Tb24 Take 1 tablet (7.5 mg total) by mouth once daily. 30 tablet 11    docusate sodium (STOOL SOFTENER ORAL) Take by mouth.      duloxetine (CYMBALTA) 30 MG capsule Take 30 mg by mouth once daily.      estradiol (ESTRACE) 0.01 % (0.1 mg/gram) vaginal cream PLEASE SEE ATTACHED FOR DETAILED DIRECTIONS 42.5 g 3    fosfomycin (MONUROL) 3 gram Pack Take 3 g by mouth once a week. for 3 doses 9 g 3    furosemide (LASIX) 40 MG tablet 1/2 tab as need for swelling or weight gain 30 tablet 1    gabapentin (NEURONTIN) 300 MG capsule Take 1 capsule (300 mg total) by mouth 3 (three) times daily. 270  capsule 0    Lactobac no.51/Bifidobact no.4 (UP4 PROBIOTICS ULTRA ORAL) Take by mouth.      LEVOTHYROXINE SODIUM (LEVOTHYROXINE ORAL) Take 50 mcg by mouth before breakfast.       metformin (GLUCOPHAGE) 500 MG tablet Take 500 mg by mouth 2 (two) times daily with meals.      methenamine (HIPREX) 1 gram Tab Take 1 tablet (1 g total) by mouth 2 (two) times daily. 60 tablet 2    metoprolol succinate (TOPROL-XL) 25 MG 24 hr tablet Take 1 tablet (25 mg total) by mouth once daily. 90 tablet 3    multivitamin (ONE DAILY MULTIVITAMIN) per tablet Take 1 tablet by mouth once daily.      omega-3 fatty acids/fish oil (FISH OIL-OMEGA-3 FATTY ACIDS) 300-1,000 mg capsule Take by mouth once daily.      sacubitriL-valsartan (ENTRESTO) 49-51 mg per tablet Take 1 tablet by mouth 2 (two) times daily. 60 tablet 11     No current facility-administered medications on file prior to visit.      Review of patient's allergies indicates:   Allergen Reactions    Augmentin [amoxicillin-pot clavulanate] Other (See Comments)     Patient cannot recall what she had, only that she could not tolerate the Augmentin    Tramadol Itching    Tegretol [carbamazepine] Itching and Rash    Vicodin [hydrocodone-acetaminophen] Itching and Anxiety     Social History     Tobacco Use    Smoking status: Never Smoker    Smokeless tobacco: Never Used   Substance Use Topics    Alcohol use: No    Drug use: No     Family History   Problem Relation Age of Onset    Breast cancer Paternal Aunt     Colon cancer Neg Hx     Ovarian cancer Neg Hx         Assessment and plan:     1) Ischemic cardiomyopathy.  The patient reports she is euvolemic and has NYHA 2-3 symptoms.  She denies angina.  I reviewed her PET stress test from 3/5/20 and her coronary angiogram from 3/5/20.I discussed the possibility of coronary revascularization improving her LVEF on the basisof the PET stress test results. The patient was turned down for CABG by CTS.  I discussed LCx/OM PCI and  possible RCA  PCI. I discussed the possibility of aborting   PCI if certain safety threaholds are met. We discussed the risks of PCI and  PCI in detail.  -8Fr bilateral CFA access  -continue EC ASA 81mg po qday  -start Plavix 600mg po X1 and then 75mg po qday  -continue Torpol XL 25mg po qday  -continue Entresto 49-51 po qday  -continue lasix 40mg po qday  The risks, benefits, and alternatives of cardiac catheterization and possible intervention were discussed with the patient.  All questions were answered and informed consent was obtained.    2) CAD. As above    3) Dyslipidemia. 3/1/30 lipid panel reviewed  Continue Lipitor 40mg po qday    4) CKD3. Will assess creatinien the morning of PCI; rec oral hydration the day prior; will provide IV hydration the day of procedure    5) DM2. Agree with tight glycemic control    6) H/O recurrent UTI. The patient reports follow-up with her PCP; at the request of the paitet will schedule PCI after she has completed her ongoing treatment for an active UTI    All of the patient's questions were answered.                           This service was not originating from a related E/M service provided within the previous 7 days nor will  to an E/M service or procedure within the next 24 hours or my soonest available appointment.  Prevailing standard of care was able to be met in this audio-only visit.

## 2020-05-21 NOTE — H&P (VIEW-ONLY)
Established Patient - Audio Only Telehealth Visit     The patient location is: home  The chief complaint leading to consultation is: CAD  Visit type: Virtual visit with audio only (telephone)  Total time spent with patient: 45 minutes       The reason for the audio only service rather than synchronous audio and video virtual visit was related to technical difficulties or patient preference/necessity.     Each patient to whom I provide medical services by telemedicine is:  (1) informed of the relationship between the physician and patient and the respective role of any other health care provider with respect to management of the patient; and (2) notified that they may decline to receive medical services by telemedicine and may withdraw from such care at any time. Patient verbally consented to receive this service via voice-only telephone call.    Referring cardiologist: Dr. Lynn     HPI:   Mrs. Soriano is a 70 y/o woman with a h/o CAD.  She was admitted on 3/1/19 with acute decompensated heart failure. TTE on 3/2/20 demonstrated an LVEF=20-25%. Cardiac cath demonstrated a  of the LAD, high grade LCx stenoses, and a  of the RCA. The PDA fills ia left to right collaterals and the LAD fills via left to left collaterals. Since hospital discharge the patient denies angina.  She denies shortness of breath.  The patient reports that she ia able to mop the floor in her home and does so while sitting in a chair.  She had a spinal chord infection in 1996 and has lower exremtiy weakness since that time.  She uses a walker to ambulate.  The patient denies LE edema.  She sleeps with a folded robe under her head due to neck pain.  She denies PND.  She denies palpitations, syncope, or near syncope.  SHe reports a burning sensation in her chest when eating crawfish, but not with physical activity.  She reports good medication compliance.    Past Medical History:   Diagnosis Date    Diabetes mellitus     Hypercholesteremia      Hypertension     IC (interstitial cystitis)     Numbness in both hands 2017    Spondylisthesis     Vulvodynia, unspecified      Past Surgical History:   Procedure Laterality Date    CARPAL TUNNEL RELEASE Right 2019    Procedure: RELEASE, CARPAL TUNNEL, REVISION;  Surgeon: Annabelle Cid MD;  Location: [facility] OR [address] [place] [address];  Service: Orthopedics;  Laterality: Right;  Axogen, Clarix     SECTION      x3    CORONARY ANGIOGRAPHY N/A 3/3/2020    Procedure: ANGIOGRAM, CORONARY ARTERY;  Surgeon: Markie Friend III, MD;  Location: [facility] CATH LAB;  Service: Cardiology;  Laterality: N/A;    CYSTOSCOPY      HYSTERECTOMY      ischemic colitis      LEFT HEART CATHETERIZATION Left 3/3/2020    Procedure: Left heart cath;  Surgeon: Markie Friend III, MD;  Location: [facility] CATH LAB;  Service: Cardiology;  Laterality: Left;    OOPHORECTOMY      UT REMOVAL OF OVARY/TUBE(S)       Current Outpatient Medications on File Prior to Visit   Medication Sig Dispense Refill    aspirin 81 MG Chew Take 1 tablet (81 mg total) by mouth once daily.  0    atorvastatin (LIPITOR) 40 MG tablet Take 80 mg by mouth once daily.  3    b complex vitamins capsule Take 1 capsule by mouth once daily.      baclofen (LIORESAL) 20 MG tablet 2 (two) times daily.       BIOTIN ORAL Take by mouth.      darifenacin (ENABLEX) 7.5 MG Tb24 Take 1 tablet (7.5 mg total) by mouth once daily. 30 tablet 11    docusate sodium (STOOL SOFTENER ORAL) Take by mouth.      duloxetine (CYMBALTA) 30 MG capsule Take 30 mg by mouth once daily.      estradiol (ESTRACE) 0.01 % (0.1 mg/gram) vaginal cream PLEASE SEE ATTACHED FOR DETAILED DIRECTIONS 42.5 g 3    fosfomycin (MONUROL) 3 gram Pack Take 3 g by mouth once a week. for 3 doses 9 g 3    furosemide (LASIX) 40 MG tablet 1/2 tab as need for swelling or weight gain 30 tablet 1    gabapentin (NEURONTIN) 300 MG capsule Take 1 capsule (300 mg total) by mouth 3 (three) times daily. 270  capsule 0    Lactobac no.51/Bifidobact no.4 (UP4 PROBIOTICS ULTRA ORAL) Take by mouth.      LEVOTHYROXINE SODIUM (LEVOTHYROXINE ORAL) Take 50 mcg by mouth before breakfast.       metformin (GLUCOPHAGE) 500 MG tablet Take 500 mg by mouth 2 (two) times daily with meals.      methenamine (HIPREX) 1 gram Tab Take 1 tablet (1 g total) by mouth 2 (two) times daily. 60 tablet 2    metoprolol succinate (TOPROL-XL) 25 MG 24 hr tablet Take 1 tablet (25 mg total) by mouth once daily. 90 tablet 3    multivitamin (ONE DAILY MULTIVITAMIN) per tablet Take 1 tablet by mouth once daily.      omega-3 fatty acids/fish oil (FISH OIL-OMEGA-3 FATTY ACIDS) 300-1,000 mg capsule Take by mouth once daily.      sacubitriL-valsartan (ENTRESTO) 49-51 mg per tablet Take 1 tablet by mouth 2 (two) times daily. 60 tablet 11     No current facility-administered medications on file prior to visit.      Review of patient's allergies indicates:   Allergen Reactions    Augmentin [amoxicillin-pot clavulanate] Other (See Comments)     Patient cannot recall what she had, only that she could not tolerate the Augmentin    Tramadol Itching    Tegretol [carbamazepine] Itching and Rash    Vicodin [hydrocodone-acetaminophen] Itching and Anxiety     Social History     Tobacco Use    Smoking status: Never Smoker    Smokeless tobacco: Never Used   Substance Use Topics    Alcohol use: No    Drug use: No     Family History   Problem Relation Age of Onset    Breast cancer Paternal Aunt     Colon cancer Neg Hx     Ovarian cancer Neg Hx         Assessment and plan:     1) Ischemic cardiomyopathy.  The patient reports she is euvolemic and has NYHA 2-3 symptoms.  She denies angina.  I reviewed her PET stress test from 3/5/20 and her coronary angiogram from 3/5/20.I discussed the possibility of coronary revascularization improving her LVEF on the basisof the PET stress test results. The patient was turned down for CABG by CTS.  I discussed LCx/OM PCI and  possible RCA  PCI. I discussed the possibility of aborting   PCI if certain safety threaholds are met. We discussed the risks of PCI and  PCI in detail.  -8Fr bilateral CFA access  -continue EC ASA 81mg po qday  -start Plavix 600mg po X1 and then 75mg po qday  -continue Torpol XL 25mg po qday  -continue Entresto 49-51 po qday  -continue lasix 40mg po qday  The risks, benefits, and alternatives of cardiac catheterization and possible intervention were discussed with the patient.  All questions were answered and informed consent was obtained.    2) CAD. As above    3) Dyslipidemia. 3/1/30 lipid panel reviewed  Continue Lipitor 40mg po qday    4) CKD3. Will assess creatinien the morning of PCI; rec oral hydration the day prior; will provide IV hydration the day of procedure    5) DM2. Agree with tight glycemic control    6) H/O recurrent UTI. The patient reports follow-up with her PCP; at the request of the paitet will schedule PCI after she has completed her ongoing treatment for an active UTI    All of the patient's questions were answered.                           This service was not originating from a related E/M service provided within the previous 7 days nor will  to an E/M service or procedure within the next 24 hours or my soonest available appointment.  Prevailing standard of care was able to be met in this audio-only visit.

## 2020-05-22 ENCOUNTER — LAB VISIT (OUTPATIENT)
Dept: LAB | Facility: HOSPITAL | Age: 70
End: 2020-05-22
Attending: INTERNAL MEDICINE
Payer: MEDICARE

## 2020-05-22 DIAGNOSIS — N39.0 RECURRENT UTI: ICD-10-CM

## 2020-05-22 LAB
BACTERIA #/AREA URNS AUTO: ABNORMAL /HPF
BILIRUB UR QL STRIP: NEGATIVE
CLARITY UR REFRACT.AUTO: CLEAR
COLOR UR AUTO: YELLOW
GLUCOSE UR QL STRIP: NEGATIVE
HGB UR QL STRIP: NEGATIVE
KETONES UR QL STRIP: NEGATIVE
LEUKOCYTE ESTERASE UR QL STRIP: ABNORMAL
MICROSCOPIC COMMENT: ABNORMAL
NITRITE UR QL STRIP: POSITIVE
PH UR STRIP: 6 [PH] (ref 5–8)
PROT UR QL STRIP: NEGATIVE
RBC #/AREA URNS AUTO: 0 /HPF (ref 0–4)
SP GR UR STRIP: 1.01 (ref 1–1.03)
SQUAMOUS #/AREA URNS AUTO: 0 /HPF
URN SPEC COLLECT METH UR: ABNORMAL
WBC #/AREA URNS AUTO: 14 /HPF (ref 0–5)

## 2020-05-22 PROCEDURE — 87088 URINE BACTERIA CULTURE: CPT

## 2020-05-22 PROCEDURE — 87077 CULTURE AEROBIC IDENTIFY: CPT

## 2020-05-22 PROCEDURE — 87086 URINE CULTURE/COLONY COUNT: CPT

## 2020-05-22 PROCEDURE — 87186 SC STD MICRODIL/AGAR DIL: CPT

## 2020-05-22 PROCEDURE — 81001 URINALYSIS AUTO W/SCOPE: CPT

## 2020-05-25 DIAGNOSIS — I25.10 CORONARY ARTERY DISEASE INVOLVING NATIVE CORONARY ARTERY OF NATIVE HEART WITHOUT ANGINA PECTORIS: Primary | ICD-10-CM

## 2020-05-25 LAB — BACTERIA UR CULT: ABNORMAL

## 2020-05-25 RX ORDER — CLOPIDOGREL BISULFATE 75 MG/1
75 TABLET ORAL DAILY
COMMUNITY
End: 2020-05-25 | Stop reason: SDUPTHER

## 2020-05-25 RX ORDER — CLOPIDOGREL BISULFATE 75 MG/1
75 TABLET ORAL DAILY
Qty: 30 TABLET | Refills: 11 | Status: SHIPPED | OUTPATIENT
Start: 2020-05-25 | End: 2021-05-11

## 2020-05-25 NOTE — TELEPHONE ENCOUNTER
Instructed patient to start plavix 75mg, take 8 pills today, then one pill daily including the morning of procedure.All questions answered.

## 2020-05-26 ENCOUNTER — PATIENT MESSAGE (OUTPATIENT)
Dept: UROLOGY | Facility: CLINIC | Age: 70
End: 2020-05-26

## 2020-05-26 DIAGNOSIS — N39.41 URGE INCONTINENCE: Primary | ICD-10-CM

## 2020-05-26 DIAGNOSIS — N31.9 NEUROGENIC BLADDER: ICD-10-CM

## 2020-05-27 ENCOUNTER — LAB VISIT (OUTPATIENT)
Dept: FAMILY MEDICINE | Facility: CLINIC | Age: 70
End: 2020-05-27
Payer: MEDICARE

## 2020-05-27 ENCOUNTER — LAB VISIT (OUTPATIENT)
Dept: LAB | Facility: HOSPITAL | Age: 70
End: 2020-05-27
Attending: INTERNAL MEDICINE
Payer: MEDICARE

## 2020-05-27 ENCOUNTER — PATIENT MESSAGE (OUTPATIENT)
Dept: INFECTIOUS DISEASES | Facility: CLINIC | Age: 70
End: 2020-05-27

## 2020-05-27 DIAGNOSIS — Z01.810 PREOP CARDIOVASCULAR EXAM: ICD-10-CM

## 2020-05-27 DIAGNOSIS — Z01.818 PRE-OP TESTING: Primary | ICD-10-CM

## 2020-05-27 DIAGNOSIS — Z01.818 PRE-OP TESTING: ICD-10-CM

## 2020-05-27 LAB
ANION GAP SERPL CALC-SCNC: 12 MMOL/L (ref 8–16)
BASOPHILS # BLD AUTO: 0.06 K/UL (ref 0–0.2)
BASOPHILS NFR BLD: 1 % (ref 0–1.9)
BUN SERPL-MCNC: 21 MG/DL (ref 8–23)
CALCIUM SERPL-MCNC: 10.3 MG/DL (ref 8.7–10.5)
CHLORIDE SERPL-SCNC: 106 MMOL/L (ref 95–110)
CO2 SERPL-SCNC: 22 MMOL/L (ref 23–29)
CREAT SERPL-MCNC: 1 MG/DL (ref 0.5–1.4)
DIFFERENTIAL METHOD: ABNORMAL
EOSINOPHIL # BLD AUTO: 0.3 K/UL (ref 0–0.5)
EOSINOPHIL NFR BLD: 4.1 % (ref 0–8)
ERYTHROCYTE [DISTWIDTH] IN BLOOD BY AUTOMATED COUNT: 14.4 % (ref 11.5–14.5)
EST. GFR  (AFRICAN AMERICAN): >60 ML/MIN/1.73 M^2
EST. GFR  (NON AFRICAN AMERICAN): 58 ML/MIN/1.73 M^2
GLUCOSE SERPL-MCNC: 114 MG/DL (ref 70–110)
HCT VFR BLD AUTO: 42 % (ref 37–48.5)
HGB BLD-MCNC: 13 G/DL (ref 12–16)
IMM GRANULOCYTES # BLD AUTO: 0.01 K/UL (ref 0–0.04)
IMM GRANULOCYTES NFR BLD AUTO: 0.2 % (ref 0–0.5)
LYMPHOCYTES # BLD AUTO: 2.2 K/UL (ref 1–4.8)
LYMPHOCYTES NFR BLD: 35.7 % (ref 18–48)
MCH RBC QN AUTO: 27.3 PG (ref 27–31)
MCHC RBC AUTO-ENTMCNC: 31 G/DL (ref 32–36)
MCV RBC AUTO: 88 FL (ref 82–98)
MONOCYTES # BLD AUTO: 0.4 K/UL (ref 0.3–1)
MONOCYTES NFR BLD: 5.9 % (ref 4–15)
NEUTROPHILS # BLD AUTO: 3.3 K/UL (ref 1.8–7.7)
NEUTROPHILS NFR BLD: 53.1 % (ref 38–73)
NRBC BLD-RTO: 0 /100 WBC
PLATELET # BLD AUTO: 184 K/UL (ref 150–350)
PMV BLD AUTO: 11.8 FL (ref 9.2–12.9)
POTASSIUM SERPL-SCNC: 3.8 MMOL/L (ref 3.5–5.1)
RBC # BLD AUTO: 4.77 M/UL (ref 4–5.4)
SODIUM SERPL-SCNC: 140 MMOL/L (ref 136–145)
WBC # BLD AUTO: 6.13 K/UL (ref 3.9–12.7)

## 2020-05-27 PROCEDURE — 36415 COLL VENOUS BLD VENIPUNCTURE: CPT | Mod: PO

## 2020-05-27 PROCEDURE — 85025 COMPLETE CBC W/AUTO DIFF WBC: CPT

## 2020-05-27 PROCEDURE — 80048 BASIC METABOLIC PNL TOTAL CA: CPT

## 2020-05-27 PROCEDURE — U0003 INFECTIOUS AGENT DETECTION BY NUCLEIC ACID (DNA OR RNA); SEVERE ACUTE RESPIRATORY SYNDROME CORONAVIRUS 2 (SARS-COV-2) (CORONAVIRUS DISEASE [COVID-19]), AMPLIFIED PROBE TECHNIQUE, MAKING USE OF HIGH THROUGHPUT TECHNOLOGIES AS DESCRIBED BY CMS-2020-01-R: HCPCS

## 2020-05-28 LAB — SARS-COV-2 RNA RESP QL NAA+PROBE: NOT DETECTED

## 2020-05-29 ENCOUNTER — HOSPITAL ENCOUNTER (OUTPATIENT)
Facility: HOSPITAL | Age: 70
Discharge: HOME | End: 2020-05-30
Attending: INTERNAL MEDICINE | Admitting: INTERNAL MEDICINE
Payer: MEDICARE

## 2020-05-29 DIAGNOSIS — I25.5 CARDIOMYOPATHY, ISCHEMIC: ICD-10-CM

## 2020-05-29 DIAGNOSIS — I25.10 CAD (CORONARY ARTERY DISEASE): ICD-10-CM

## 2020-05-29 DIAGNOSIS — E11.40 TYPE 2 DIABETES MELLITUS WITH DIABETIC NEUROPATHY, WITHOUT LONG-TERM CURRENT USE OF INSULIN: Primary | ICD-10-CM

## 2020-05-29 LAB
ABO + RH BLD: NORMAL
ANION GAP SERPL CALC-SCNC: 9 MMOL/L (ref 8–16)
BASOPHILS # BLD AUTO: 0.04 K/UL (ref 0–0.2)
BASOPHILS NFR BLD: 0.7 % (ref 0–1.9)
BLD GP AB SCN CELLS X3 SERPL QL: NORMAL
BUN SERPL-MCNC: 26 MG/DL (ref 8–23)
CALCIUM SERPL-MCNC: 10 MG/DL (ref 8.7–10.5)
CHLORIDE SERPL-SCNC: 108 MMOL/L (ref 95–110)
CO2 SERPL-SCNC: 23 MMOL/L (ref 23–29)
CREAT SERPL-MCNC: 0.9 MG/DL (ref 0.5–1.4)
DIFFERENTIAL METHOD: ABNORMAL
EOSINOPHIL # BLD AUTO: 0.3 K/UL (ref 0–0.5)
EOSINOPHIL NFR BLD: 5 % (ref 0–8)
ERYTHROCYTE [DISTWIDTH] IN BLOOD BY AUTOMATED COUNT: 14.2 % (ref 11.5–14.5)
EST. GFR  (AFRICAN AMERICAN): >60 ML/MIN/1.73 M^2
EST. GFR  (NON AFRICAN AMERICAN): >60 ML/MIN/1.73 M^2
GLUCOSE SERPL-MCNC: 94 MG/DL (ref 70–110)
HCT VFR BLD AUTO: 40.2 % (ref 37–48.5)
HGB BLD-MCNC: 12.4 G/DL (ref 12–16)
IMM GRANULOCYTES # BLD AUTO: 0.01 K/UL (ref 0–0.04)
IMM GRANULOCYTES NFR BLD AUTO: 0.2 % (ref 0–0.5)
LYMPHOCYTES # BLD AUTO: 1.9 K/UL (ref 1–4.8)
LYMPHOCYTES NFR BLD: 32 % (ref 18–48)
MCH RBC QN AUTO: 26.8 PG (ref 27–31)
MCHC RBC AUTO-ENTMCNC: 30.8 G/DL (ref 32–36)
MCV RBC AUTO: 87 FL (ref 82–98)
MONOCYTES # BLD AUTO: 0.4 K/UL (ref 0.3–1)
MONOCYTES NFR BLD: 6.1 % (ref 4–15)
NEUTROPHILS # BLD AUTO: 3.4 K/UL (ref 1.8–7.7)
NEUTROPHILS NFR BLD: 56 % (ref 38–73)
NRBC BLD-RTO: 0 /100 WBC
PLATELET # BLD AUTO: 141 K/UL (ref 150–350)
PMV BLD AUTO: 11.1 FL (ref 9.2–12.9)
POCT GLUCOSE: 102 MG/DL (ref 70–110)
POTASSIUM SERPL-SCNC: 3.8 MMOL/L (ref 3.5–5.1)
RBC # BLD AUTO: 4.62 M/UL (ref 4–5.4)
SODIUM SERPL-SCNC: 140 MMOL/L (ref 136–145)
WBC # BLD AUTO: 6.03 K/UL (ref 3.9–12.7)

## 2020-05-29 PROCEDURE — 92929 PR STENT, ADD'L VESSEL: ICD-10-PCS | Mod: LC,,, | Performed by: INTERNAL MEDICINE

## 2020-05-29 PROCEDURE — 92944 PR CTO, ADD'L VESSEL: CPT | Mod: RC,,, | Performed by: INTERNAL MEDICINE

## 2020-05-29 PROCEDURE — 92928 PR STENT: ICD-10-PCS | Mod: 59,LC,, | Performed by: INTERNAL MEDICINE

## 2020-05-29 PROCEDURE — 93458 L HRT ARTERY/VENTRICLE ANGIO: CPT | Mod: 26,59,51, | Performed by: INTERNAL MEDICINE

## 2020-05-29 PROCEDURE — C1769 GUIDE WIRE: HCPCS | Performed by: INTERNAL MEDICINE

## 2020-05-29 PROCEDURE — 92929 PR STENT, ADD'L VESSEL: CPT | Mod: LC,,, | Performed by: INTERNAL MEDICINE

## 2020-05-29 PROCEDURE — 92978 ENDOLUMINL IVUS OCT C 1ST: CPT | Mod: 26,LC,, | Performed by: INTERNAL MEDICINE

## 2020-05-29 PROCEDURE — C9600 PERC DRUG-EL COR STENT SING: HCPCS | Mod: LC | Performed by: INTERNAL MEDICINE

## 2020-05-29 PROCEDURE — C1894 INTRO/SHEATH, NON-LASER: HCPCS | Performed by: INTERNAL MEDICINE

## 2020-05-29 PROCEDURE — 92928 PRQ TCAT PLMT NTRAC ST 1 LES: CPT | Mod: 59,LC,, | Performed by: INTERNAL MEDICINE

## 2020-05-29 PROCEDURE — 63600175 PHARM REV CODE 636 W HCPCS: Performed by: INTERNAL MEDICINE

## 2020-05-29 PROCEDURE — 82962 GLUCOSE BLOOD TEST: CPT | Performed by: INTERNAL MEDICINE

## 2020-05-29 PROCEDURE — C1874 STENT, COATED/COV W/DEL SYS: HCPCS | Performed by: INTERNAL MEDICINE

## 2020-05-29 PROCEDURE — 93010 ELECTROCARDIOGRAM REPORT: CPT | Mod: ,,, | Performed by: INTERNAL MEDICINE

## 2020-05-29 PROCEDURE — C9608 PERC D-E COR REVASC CHRO ADD: HCPCS | Mod: RC | Performed by: INTERNAL MEDICINE

## 2020-05-29 PROCEDURE — 93458 PR CATH PLACE/CORON ANGIO, IMG SUPER/INTERP,W LEFT HEART VENTRICULOGRAPHY: ICD-10-PCS | Mod: 26,59,51, | Performed by: INTERNAL MEDICINE

## 2020-05-29 PROCEDURE — 93010 EKG 12-LEAD: ICD-10-PCS | Mod: ,,, | Performed by: INTERNAL MEDICINE

## 2020-05-29 PROCEDURE — 86850 RBC ANTIBODY SCREEN: CPT

## 2020-05-29 PROCEDURE — 99153 MOD SED SAME PHYS/QHP EA: CPT | Performed by: INTERNAL MEDICINE

## 2020-05-29 PROCEDURE — 25500020 PHARM REV CODE 255: Performed by: INTERNAL MEDICINE

## 2020-05-29 PROCEDURE — 80048 BASIC METABOLIC PNL TOTAL CA: CPT

## 2020-05-29 PROCEDURE — 92943 PR CTO: ICD-10-PCS | Mod: RC,,, | Performed by: INTERNAL MEDICINE

## 2020-05-29 PROCEDURE — 99152 MOD SED SAME PHYS/QHP 5/>YRS: CPT | Performed by: INTERNAL MEDICINE

## 2020-05-29 PROCEDURE — 99152 PR MOD CONSCIOUS SEDATION, SAME PHYS, 5+ YRS, FIRST 15 MIN: ICD-10-PCS | Mod: ,,, | Performed by: INTERNAL MEDICINE

## 2020-05-29 PROCEDURE — 93005 ELECTROCARDIOGRAM TRACING: CPT

## 2020-05-29 PROCEDURE — 85025 COMPLETE CBC W/AUTO DIFF WBC: CPT

## 2020-05-29 PROCEDURE — 27201423 OPTIME MED/SURG SUP & DEVICES STERILE SUPPLY: Performed by: INTERNAL MEDICINE

## 2020-05-29 PROCEDURE — 92978 ENDOLUMINL IVUS OCT C 1ST: CPT | Mod: LC | Performed by: INTERNAL MEDICINE

## 2020-05-29 PROCEDURE — 92943 PRQ TRLUML REVSC CH OCC ANT: CPT | Mod: RC,,, | Performed by: INTERNAL MEDICINE

## 2020-05-29 PROCEDURE — C9601 PERC DRUG-EL COR STENT BRAN: HCPCS | Mod: LC | Performed by: INTERNAL MEDICINE

## 2020-05-29 PROCEDURE — 25000003 PHARM REV CODE 250: Performed by: INTERNAL MEDICINE

## 2020-05-29 PROCEDURE — 25000003 PHARM REV CODE 250: Performed by: STUDENT IN AN ORGANIZED HEALTH CARE EDUCATION/TRAINING PROGRAM

## 2020-05-29 PROCEDURE — 85347 COAGULATION TIME ACTIVATED: CPT | Performed by: INTERNAL MEDICINE

## 2020-05-29 PROCEDURE — C1887 CATHETER, GUIDING: HCPCS | Performed by: INTERNAL MEDICINE

## 2020-05-29 PROCEDURE — 92978 PR IVUS, CORONARY, 1ST VESSEL: ICD-10-PCS | Mod: 26,LC,, | Performed by: INTERNAL MEDICINE

## 2020-05-29 PROCEDURE — C1753 CATH, INTRAVAS ULTRASOUND: HCPCS | Performed by: INTERNAL MEDICINE

## 2020-05-29 PROCEDURE — 92944 PR CTO, ADD'L VESSEL: ICD-10-PCS | Mod: RC,,, | Performed by: INTERNAL MEDICINE

## 2020-05-29 PROCEDURE — 99152 MOD SED SAME PHYS/QHP 5/>YRS: CPT | Mod: ,,, | Performed by: INTERNAL MEDICINE

## 2020-05-29 PROCEDURE — C9607 PERC D-E COR REVASC CHRO SIN: HCPCS | Mod: RC | Performed by: INTERNAL MEDICINE

## 2020-05-29 PROCEDURE — 93458 L HRT ARTERY/VENTRICLE ANGIO: CPT | Mod: 59 | Performed by: INTERNAL MEDICINE

## 2020-05-29 PROCEDURE — C1760 CLOSURE DEV, VASC: HCPCS | Performed by: INTERNAL MEDICINE

## 2020-05-29 PROCEDURE — C1725 CATH, TRANSLUMIN NON-LASER: HCPCS | Performed by: INTERNAL MEDICINE

## 2020-05-29 PROCEDURE — 36415 COLL VENOUS BLD VENIPUNCTURE: CPT

## 2020-05-29 DEVICE — STENT RONYX45022UX RESOLUTE ONYX 4.50X22
Type: IMPLANTABLE DEVICE | Site: CORONARY | Status: FUNCTIONAL
Brand: RESOLUTE ONYX™

## 2020-05-29 DEVICE — STENT RONYX30015UX RESOLUTE ONYX 3.00X15
Type: IMPLANTABLE DEVICE | Site: CORONARY | Status: FUNCTIONAL
Brand: RESOLUTE ONYX™

## 2020-05-29 DEVICE — STENT RONYX40026UX RESOLUTE ONYX 4.00X26
Type: IMPLANTABLE DEVICE | Site: CORONARY | Status: FUNCTIONAL
Brand: RESOLUTE ONYX™

## 2020-05-29 DEVICE — STENT RONYX30022UX RESOLUTE ONYX 3.00X22
Type: IMPLANTABLE DEVICE | Site: CORONARY | Status: FUNCTIONAL
Brand: RESOLUTE ONYX™

## 2020-05-29 DEVICE — STENT RONYX25012UX RESOLUTE ONYX 2.50X12
Type: IMPLANTABLE DEVICE | Site: CORONARY | Status: FUNCTIONAL
Brand: RESOLUTE ONYX™

## 2020-05-29 DEVICE — ANGIO-SEAL VIP VASCULAR CLOSURE DEVICE
Type: IMPLANTABLE DEVICE | Site: LEG | Status: FUNCTIONAL
Brand: ANGIO-SEAL

## 2020-05-29 RX ORDER — MIDAZOLAM HYDROCHLORIDE 1 MG/ML
INJECTION, SOLUTION INTRAMUSCULAR; INTRAVENOUS
Status: DISCONTINUED | OUTPATIENT
Start: 2020-05-29 | End: 2020-05-29 | Stop reason: HOSPADM

## 2020-05-29 RX ORDER — SODIUM CHLORIDE 9 MG/ML
INJECTION, SOLUTION INTRAVENOUS CONTINUOUS
Status: DISCONTINUED | OUTPATIENT
Start: 2020-05-29 | End: 2020-05-30 | Stop reason: HOSPADM

## 2020-05-29 RX ORDER — GABAPENTIN 300 MG/1
300 CAPSULE ORAL NIGHTLY
Status: DISCONTINUED | OUTPATIENT
Start: 2020-05-29 | End: 2020-05-30 | Stop reason: HOSPADM

## 2020-05-29 RX ORDER — CLOPIDOGREL BISULFATE 75 MG/1
75 TABLET ORAL DAILY
Status: DISCONTINUED | OUTPATIENT
Start: 2020-05-30 | End: 2020-05-30 | Stop reason: HOSPADM

## 2020-05-29 RX ORDER — DIPHENHYDRAMINE HCL 50 MG
50 CAPSULE ORAL ONCE
Status: COMPLETED | OUTPATIENT
Start: 2020-05-29 | End: 2020-05-29

## 2020-05-29 RX ORDER — HEPARIN SODIUM 200 [USP'U]/100ML
INJECTION, SOLUTION INTRAVENOUS
Status: DISCONTINUED | OUTPATIENT
Start: 2020-05-29 | End: 2020-05-30 | Stop reason: HOSPADM

## 2020-05-29 RX ORDER — NAPROXEN SODIUM 220 MG/1
81 TABLET, FILM COATED ORAL DAILY
Status: DISCONTINUED | OUTPATIENT
Start: 2020-05-30 | End: 2020-05-30 | Stop reason: HOSPADM

## 2020-05-29 RX ORDER — FENTANYL CITRATE 50 UG/ML
INJECTION, SOLUTION INTRAMUSCULAR; INTRAVENOUS
Status: DISCONTINUED | OUTPATIENT
Start: 2020-05-29 | End: 2020-05-29 | Stop reason: HOSPADM

## 2020-05-29 RX ORDER — NITROGLYCERIN 5 MG/ML
INJECTION, SOLUTION INTRAVENOUS
Status: DISCONTINUED | OUTPATIENT
Start: 2020-05-29 | End: 2020-05-29 | Stop reason: HOSPADM

## 2020-05-29 RX ORDER — NAPROXEN SODIUM 220 MG/1
81 TABLET, FILM COATED ORAL ONCE
Status: COMPLETED | OUTPATIENT
Start: 2020-05-29 | End: 2020-05-29

## 2020-05-29 RX ORDER — GLUCAGON 1 MG
1 KIT INJECTION
Status: DISCONTINUED | OUTPATIENT
Start: 2020-05-29 | End: 2020-05-30 | Stop reason: HOSPADM

## 2020-05-29 RX ORDER — HEPARIN SODIUM 1000 [USP'U]/ML
INJECTION, SOLUTION INTRAVENOUS; SUBCUTANEOUS
Status: DISCONTINUED | OUTPATIENT
Start: 2020-05-29 | End: 2020-05-29 | Stop reason: HOSPADM

## 2020-05-29 RX ORDER — LIDOCAINE HYDROCHLORIDE 20 MG/ML
INJECTION, SOLUTION INFILTRATION; PERINEURAL
Status: DISCONTINUED | OUTPATIENT
Start: 2020-05-29 | End: 2020-05-29 | Stop reason: HOSPADM

## 2020-05-29 RX ORDER — CLINDAMYCIN PHOSPHATE 900 MG/50ML
INJECTION, SOLUTION INTRAVENOUS
Status: DISCONTINUED | OUTPATIENT
Start: 2020-05-29 | End: 2020-05-30 | Stop reason: HOSPADM

## 2020-05-29 RX ORDER — SODIUM CHLORIDE 9 MG/ML
INJECTION, SOLUTION INTRAVENOUS CONTINUOUS
Status: ACTIVE | OUTPATIENT
Start: 2020-05-29 | End: 2020-05-29

## 2020-05-29 RX ORDER — METOPROLOL SUCCINATE 25 MG/1
25 TABLET, EXTENDED RELEASE ORAL DAILY
Status: DISCONTINUED | OUTPATIENT
Start: 2020-05-30 | End: 2020-05-30 | Stop reason: HOSPADM

## 2020-05-29 RX ORDER — LEVOTHYROXINE SODIUM 50 UG/1
50 TABLET ORAL
Status: DISCONTINUED | OUTPATIENT
Start: 2020-05-30 | End: 2020-05-30 | Stop reason: HOSPADM

## 2020-05-29 RX ORDER — OXYBUTYNIN CHLORIDE 5 MG/1
5 TABLET, EXTENDED RELEASE ORAL DAILY
COMMUNITY
End: 2020-06-02 | Stop reason: DRUGHIGH

## 2020-05-29 RX ORDER — OXYBUTYNIN CHLORIDE 5 MG/1
5 TABLET ORAL 2 TIMES DAILY
Status: DISCONTINUED | OUTPATIENT
Start: 2020-05-29 | End: 2020-05-30 | Stop reason: HOSPADM

## 2020-05-29 RX ORDER — IBUPROFEN 200 MG
16 TABLET ORAL
Status: DISCONTINUED | OUTPATIENT
Start: 2020-05-29 | End: 2020-05-30 | Stop reason: HOSPADM

## 2020-05-29 RX ORDER — IBUPROFEN 200 MG
24 TABLET ORAL
Status: DISCONTINUED | OUTPATIENT
Start: 2020-05-29 | End: 2020-05-30 | Stop reason: HOSPADM

## 2020-05-29 RX ORDER — INSULIN ASPART 100 [IU]/ML
0-5 INJECTION, SOLUTION INTRAVENOUS; SUBCUTANEOUS
Status: DISCONTINUED | OUTPATIENT
Start: 2020-05-29 | End: 2020-05-30 | Stop reason: HOSPADM

## 2020-05-29 RX ORDER — ATORVASTATIN CALCIUM 20 MG/1
80 TABLET, FILM COATED ORAL NIGHTLY
Status: DISCONTINUED | OUTPATIENT
Start: 2020-05-29 | End: 2020-05-30 | Stop reason: HOSPADM

## 2020-05-29 RX ADMIN — ATORVASTATIN CALCIUM 80 MG: 20 TABLET, FILM COATED ORAL at 08:05

## 2020-05-29 RX ADMIN — BACLOFEN 20 MG: 10 TABLET ORAL at 08:05

## 2020-05-29 RX ADMIN — GABAPENTIN 300 MG: 300 CAPSULE ORAL at 08:05

## 2020-05-29 RX ADMIN — OXYBUTYNIN CHLORIDE 5 MG: 5 TABLET ORAL at 08:05

## 2020-05-29 RX ADMIN — SACUBITRIL AND VALSARTAN 1 TABLET: 49; 51 TABLET, FILM COATED ORAL at 08:05

## 2020-05-29 RX ADMIN — DIPHENHYDRAMINE HYDROCHLORIDE 50 MG: 50 CAPSULE ORAL at 07:05

## 2020-05-29 RX ADMIN — SODIUM CHLORIDE: 0.9 INJECTION, SOLUTION INTRAVENOUS at 07:05

## 2020-05-29 RX ADMIN — ASPIRIN 81 MG CHEWABLE TABLET 81 MG: 81 TABLET CHEWABLE at 10:05

## 2020-05-29 NOTE — NURSING TRANSFER
Nursing Transfer Note      5/29/2020     Transfer From: cath lab    Transfer via bed    Transfer with none    Transported by transporter    Medicines sent: none    Chart send with patient: Yes    Notified: family    Patient reassessed at: arrival    Upon arrival to floor: cardiac monitor applied, patient oriented to room, call bell in reach and bed in lowest position

## 2020-05-29 NOTE — NURSING
MD Andres notified patient self-caths. Patient has own supplies. RN and staff will assist patient where needed. Next shift given in report

## 2020-05-29 NOTE — PLAN OF CARE
Plan of care reviewed with pt, verbalized understanding  Answered questions  Free from falls and injury  Afebrile  SR on tele  Will continue to monitor

## 2020-05-29 NOTE — PROCEDURES
"            Interventional Cardiology   Post Cath Note      Referring Physician: Rodney Gamble MD  Procedure:  Left heart catheterization with PCI of left circumflex, om and PCI of RCA   Indication:  HE, low ejection fraction and positive stress test    SUBJECTIVE:   Patient tolerated procedure well with no complications. S/p PCI of left circumflex, om and PCI of RCA  with MANDEEP. Please see full cath report for more details.   Cath Results:   Access:  Bilateral CFA    Left ventriculogram: LVEDP 15 mmHg  See full cath report for further details    Intervention:     Closure device used:  Angio-Seal  Patient tolerated the procedure well, no complications    Post Cath Exam:   /70 (BP Location: Right arm, Patient Position: Lying)   Pulse 69   Temp 97.8 °F (36.6 °C) (Oral)   Resp 16   Ht 5' 4" (1.626 m)   Wt 68.9 kg (152 lb)   SpO2 99%   Breastfeeding? No   BMI 26.09 kg/m²     No unusual pain, hematoma, thrill or bruit at vascular access site.  Distal pulse present without signs of ischemia.    Assessment / Plan:     S/p PCI of left circumflex, om and PCI of RCA  with MANDEEP. Please see full cath report for more details.   Procedure performed via bilateral CFA access, procedure went without complications.  Continue aspirin and Plavix for 1 year and then aspirin for life.  Cardiac rehab.  EKG on arrival to floor  Routine post cath protocol.  IVFs for CONOR prevention.  Will observe patient in hospital overnight and will discharge in am.  CBC and CMP in a.m.    Attending addendum to follow     Emi Paulino MD  Interventional Cardiovascular Fellow  Pager: [phone]          "

## 2020-05-29 NOTE — NURSING
Paged MD Andres to inform about patient being on blood thinner, awaiting call back.  Updated next shift RN

## 2020-05-29 NOTE — Clinical Note
210 ml injected throughout the case. 190 mL total wasted during the case. 400 mL total used in the case.

## 2020-05-29 NOTE — NURSING
MD Porter and JONATHAN PEREZ notified at bedside, patient bilateral groin sites slow bleed. Area feels soft, no hematoma noted.  Patient laying as flat as possible pressure applied x2 for >20minutes. Fluids continued.  Charge RN Uma notified and RN will continue to monitor

## 2020-05-29 NOTE — Clinical Note
ostium   left main. Angiography performed post intervention of the left coronary arteries in multiple views.

## 2020-05-29 NOTE — INTERVAL H&P NOTE
The patient has been examined and the H&P has been reviewed:    I concur with the findings and no changes have occurred since H&P was written.    Anesthesia/Surgery risks, benefits and alternative options discussed and understood by patient/family.          Active Hospital Problems    Diagnosis  POA    Cardiomyopathy, ischemic [I25.5]  Coronary artery disease  Yes      Resolved Hospital Problems   No resolved problems to display.

## 2020-05-30 VITALS
OXYGEN SATURATION: 96 % | DIASTOLIC BLOOD PRESSURE: 70 MMHG | RESPIRATION RATE: 13 BRPM | BODY MASS INDEX: 25.29 KG/M2 | HEIGHT: 64 IN | WEIGHT: 148.13 LBS | HEART RATE: 69 BPM | SYSTOLIC BLOOD PRESSURE: 114 MMHG | TEMPERATURE: 98 F

## 2020-05-30 LAB
ANION GAP SERPL CALC-SCNC: 6 MMOL/L (ref 8–16)
BASOPHILS # BLD AUTO: 0.04 K/UL (ref 0–0.2)
BASOPHILS NFR BLD: 0.7 % (ref 0–1.9)
BUN SERPL-MCNC: 24 MG/DL (ref 8–23)
CALCIUM SERPL-MCNC: 9.4 MG/DL (ref 8.7–10.5)
CHLORIDE SERPL-SCNC: 110 MMOL/L (ref 95–110)
CO2 SERPL-SCNC: 24 MMOL/L (ref 23–29)
CREAT SERPL-MCNC: 0.9 MG/DL (ref 0.5–1.4)
DIFFERENTIAL METHOD: ABNORMAL
EOSINOPHIL # BLD AUTO: 0.2 K/UL (ref 0–0.5)
EOSINOPHIL NFR BLD: 3.6 % (ref 0–8)
ERYTHROCYTE [DISTWIDTH] IN BLOOD BY AUTOMATED COUNT: 14.3 % (ref 11.5–14.5)
EST. GFR  (AFRICAN AMERICAN): >60 ML/MIN/1.73 M^2
EST. GFR  (NON AFRICAN AMERICAN): >60 ML/MIN/1.73 M^2
GLUCOSE SERPL-MCNC: 87 MG/DL (ref 70–110)
HCT VFR BLD AUTO: 35.2 % (ref 37–48.5)
HGB BLD-MCNC: 10.9 G/DL (ref 12–16)
IMM GRANULOCYTES # BLD AUTO: 0.02 K/UL (ref 0–0.04)
IMM GRANULOCYTES NFR BLD AUTO: 0.3 % (ref 0–0.5)
LYMPHOCYTES # BLD AUTO: 2.1 K/UL (ref 1–4.8)
LYMPHOCYTES NFR BLD: 33.8 % (ref 18–48)
MCH RBC QN AUTO: 27.4 PG (ref 27–31)
MCHC RBC AUTO-ENTMCNC: 31 G/DL (ref 32–36)
MCV RBC AUTO: 88 FL (ref 82–98)
MONOCYTES # BLD AUTO: 0.5 K/UL (ref 0.3–1)
MONOCYTES NFR BLD: 7.7 % (ref 4–15)
NEUTROPHILS # BLD AUTO: 3.3 K/UL (ref 1.8–7.7)
NEUTROPHILS NFR BLD: 53.9 % (ref 38–73)
NRBC BLD-RTO: 0 /100 WBC
PLATELET # BLD AUTO: 137 K/UL (ref 150–350)
PMV BLD AUTO: 11.4 FL (ref 9.2–12.9)
POCT GLUCOSE: 124 MG/DL (ref 70–110)
POTASSIUM SERPL-SCNC: 3.8 MMOL/L (ref 3.5–5.1)
RBC # BLD AUTO: 3.98 M/UL (ref 4–5.4)
SODIUM SERPL-SCNC: 140 MMOL/L (ref 136–145)
WBC # BLD AUTO: 6.13 K/UL (ref 3.9–12.7)

## 2020-05-30 PROCEDURE — 36415 COLL VENOUS BLD VENIPUNCTURE: CPT

## 2020-05-30 PROCEDURE — 82962 GLUCOSE BLOOD TEST: CPT

## 2020-05-30 PROCEDURE — 25000003 PHARM REV CODE 250: Performed by: INTERNAL MEDICINE

## 2020-05-30 PROCEDURE — 80048 BASIC METABOLIC PNL TOTAL CA: CPT

## 2020-05-30 RX ADMIN — METOPROLOL SUCCINATE 25 MG: 25 TABLET, EXTENDED RELEASE ORAL at 08:05

## 2020-05-30 RX ADMIN — ASPIRIN 81 MG 81 MG: 81 TABLET ORAL at 08:05

## 2020-05-30 RX ADMIN — BACLOFEN 20 MG: 10 TABLET ORAL at 09:05

## 2020-05-30 RX ADMIN — CLOPIDOGREL BISULFATE 75 MG: 75 TABLET ORAL at 08:05

## 2020-05-30 RX ADMIN — OXYBUTYNIN CHLORIDE 5 MG: 5 TABLET ORAL at 08:05

## 2020-05-30 RX ADMIN — LEVOTHYROXINE SODIUM 50 MCG: 50 TABLET ORAL at 06:05

## 2020-05-30 RX ADMIN — SACUBITRIL AND VALSARTAN 1 TABLET: 49; 51 TABLET, FILM COATED ORAL at 08:05

## 2020-05-30 NOTE — PLAN OF CARE
Pt free of falls and injury during shift. POC reviewed with pt VS stable and AAox4. SR On telemetry. BG monitored. Left groin site dressing have scant amount drainage, area marked on dressing and monitored throughout night. R groin dressing CDI. Pt denies numbness/ tingling to bilateral lower extremities. Bilateral groin site soft, pt denies pain. Pt self cath.  No acute events noted at this time. No complaints. Educated pt why she is at risk for falls and to use call light for assistance ambulating. Yellow non-slip socks on pt. Bed low and locked, call light with in reach. Will continue to monitor.

## 2020-05-30 NOTE — DISCHARGE SUMMARY
Discharge Summary  Interventional Cardiology      Admit Date: 5/29/2020    Discharge Date:  5/30/2020    Attending Physician: Rodney Gamble MD    Discharge Physician: Emi Paulino MD    Principal Diagnoses: <principal problem not specified>  Indication for Admission: Repair, Chronic Total Occlusion, Coronary (Bilateral), IVUS, Coronary, Stent, Drug Eluting, Multi Vessel, Coronary, ANGIOGRAM, CORONARY ARTERY (N/A), Left heart cath (Left)    Discharged Condition: Good    Hospital Course:   Patient presented for outpatient [facility] status post successful PCI of mRCA , PLB , mLCX 75% stenosis, and OM2 80% stenosis.  Patient tolerated procedure well with no complications.  Patient feels well today, denied shortness of breath and chest pain. Bilateral groin intact with no signs of hematoma and no bleeding, she has palpable bilateral PT and DP.  She reported that she has enough supply of her medications at home.    Outpatient Plan:  Continue aspirin and Plavix.  No changes in medications made  Patient was advised about cardiac rehab.  Follow-up with Dr. Gamble as scheduled.    Diet: Cardiac diet    Activity: Ad frank    Disposition: Home or Self Care    Discharge Medications:      Medication List      CHANGE how you take these medications    aspirin 81 MG Chew  Take 1 tablet (81 mg total) by mouth once daily.  What changed:  when to take this     gabapentin 300 MG capsule  Commonly known as:  NEURONTIN  Take 1 capsule (300 mg total) by mouth 3 (three) times daily.  What changed:  when to take this        CONTINUE taking these medications    atorvastatin 40 MG tablet  Commonly known as:  LIPITOR     b complex vitamins capsule     baclofen 20 MG tablet  Commonly known as:  LIORESAL     BIOTIN ORAL     clopidogreL 75 mg tablet  Commonly known as:  PLAVIX  Take 1 tablet (75 mg total) by mouth once daily. Take 8 pills today, then one pill daily     darifenacin 7.5 MG Tb24  Commonly known as:  ENABLEX  Take 1 tablet (7.5  mg total) by mouth once daily.     DULoxetine 30 MG capsule  Commonly known as:  CYMBALTA     estradioL 0.01 % (0.1 mg/gram) vaginal cream  Commonly known as:  ESTRACE  PLEASE SEE ATTACHED FOR DETAILED DIRECTIONS     fish oil-omega-3 fatty acids 300-1,000 mg capsule     fosfomycin 3 gram Pack  Commonly known as:  MONUROL  Take 3 g by mouth once a week. for 3 doses     furosemide 40 MG tablet  Commonly known as:  LASIX  1/2 tab as need for swelling or weight gain     LEVOTHYROXINE ORAL     metFORMIN 500 MG tablet  Commonly known as:  GLUCOPHAGE     methenamine 1 gram Tab  Commonly known as:  HIPREX  Take 1 tablet (1 g total) by mouth 2 (two) times daily.     metoprolol succinate 25 MG 24 hr tablet  Commonly known as:  TOPROL-XL  Take 1 tablet (25 mg total) by mouth once daily.     ONE DAILY MULTIVITAMIN per tablet  Generic drug:  multivitamin     oxybutynin 5 MG Tr24  Commonly known as:  DITROPAN-XL     sacubitriL-valsartan 49-51 mg per tablet  Commonly known as:  ENTRESTO  Take 1 tablet by mouth 2 (two) times daily.     STOOL SOFTENER ORAL     UP4 PROBIOTICS ULTRA ORAL          Follow Up:     Follow-up with Dr. Gamble as scheduled.      Emi Paulino MD  Interventional Cardiovascular Fellow  Pager: [phone]

## 2020-05-30 NOTE — NURSING
Patient is ready for discharge. Patient stable alert and oriented. IVs removed. tele monitor removed, VISI monitor removed. No complaints of pain. Discussed discharge plan. Reviewed medications and side effects, appointments, and answered questions with patient.  Patient was sent off the floor on wheelchair via transport.

## 2020-05-30 NOTE — NURSING
"Patient had some questions regarding her medications and her condition. She stated "she is a high school graduate and does not understand the vocabulary the doctor uses".  MD Emi Paulino was paged.  MD and RN at bedside explained the process to the patient using diagrams. All questions were answered.   Patient wrote her questions and answers to those on a sheet of paper.  The diagram and questions were given to the patient with her discharge instructions.  Patient had no further questions.    "

## 2020-05-30 NOTE — PROGRESS NOTES
Changed dressing on L groin site, site soft, pt denies pain, old dressing had small amount of serosanguineous drainage, site was not actively oozing/bleeding. WCTM

## 2020-06-02 LAB
POC ACTIVATED CLOTTING TIME K: 235 SEC (ref 74–137)
POC ACTIVATED CLOTTING TIME K: 257 SEC (ref 74–137)
POC ACTIVATED CLOTTING TIME K: 285 SEC (ref 74–137)
SAMPLE: ABNORMAL

## 2020-06-02 RX ORDER — OXYBUTYNIN CHLORIDE 10 MG/1
10 TABLET, EXTENDED RELEASE ORAL DAILY
Qty: 30 TABLET | Refills: 11 | Status: SHIPPED | OUTPATIENT
Start: 2020-06-02 | End: 2021-04-13

## 2020-06-10 ENCOUNTER — OFFICE VISIT (OUTPATIENT)
Dept: UROLOGY | Facility: CLINIC | Age: 70
End: 2020-06-10
Payer: MEDICARE

## 2020-06-10 DIAGNOSIS — N31.9 NEUROGENIC BLADDER: Primary | ICD-10-CM

## 2020-06-10 DIAGNOSIS — N30.10 IC (INTERSTITIAL CYSTITIS): ICD-10-CM

## 2020-06-10 DIAGNOSIS — R33.9 INCOMPLETE EMPTYING OF BLADDER: ICD-10-CM

## 2020-06-10 PROCEDURE — 99442 PR PHYSICIAN TELEPHONE EVALUATION 11-20 MIN: ICD-10-PCS | Mod: 95,,, | Performed by: UROLOGY

## 2020-06-10 PROCEDURE — 99442 PR PHYSICIAN TELEPHONE EVALUATION 11-20 MIN: CPT | Mod: 95,,, | Performed by: UROLOGY

## 2020-06-10 RX ORDER — DOXYCYCLINE HYCLATE 100 MG
100 TABLET ORAL ONCE
Status: CANCELLED | OUTPATIENT
Start: 2020-06-10 | End: 2020-06-10

## 2020-06-10 RX ORDER — LIDOCAINE HYDROCHLORIDE 20 MG/ML
JELLY TOPICAL ONCE
Status: CANCELLED | OUTPATIENT
Start: 2020-06-10 | End: 2020-06-10

## 2020-06-10 NOTE — PROGRESS NOTES
The patient location is:  home  The chief complaint leading to consultation is: urge incontinence, neurogenic bladder    Visit type: audio only    Time with patient:  20 minutes of total time spent on the encounter, which includes face to face time and non-face to face time preparing to see the patient (eg, review of tests), obtaining and/or reviewing separately obtained history, documenting clinical information in the electronic or other health record, independently interpreting results (not separately reported) and communicating results to the patient/family/caregiver, or care coordination (not separately reported).     Each patient to whom he or she provides medical services by telemedicine is:  (1) informed of the relationship between the physician and patient and the respective role of any other health care provider with respect to management of the patient; and (2) notified that he or she may decline to receive medical services by telemedicine and may withdraw from such care at any time.      CHIEF COMPLAINT:    Mrs. Soriano is a 69 y.o. female presenting for a follow up in neurogenic bladder, history of IC in a patient with paraplegia.      PRESENTING ILLNESS:    Niki Soriano is a 69 y.o. female who scheduled a phone visit to address the neurogenic bladder, performs CIC for incomplete bladder emptying.    She states that she uses a Hislip catheter, 10 Fr. From [employer].      Catheterizes 7 times in 24 hours  Indication is:  Incomplete bladder emptying, from neurogenic bladder  She has paralysis, from spinal inflammation  Duration of therapy:  Ongoing (lifetime)    Leaks in between oxybutynin keeps her from leaking in between  Has continuous urgency  If out several hours, leaks  Gets up out of the chair, leaks  Pull up use 1 day (maximum absorb)  Leaks out the back of a pad  Nocturia caths midnight and at 5 am starts day at 9 am caths 1 x a night    She has not had urodynamics as they were cancelled  secondary to UTI.  At the moment, she is dealing with cardiac issues.  She has CHF and CAD, is undergoing several cardiac catheterization with Dr. Gamble, with stent placement.  She is to check with him to see if she needs another. States, she is not ready to address the bladder at this time.      REVIEW OF SYSTEMS:    Review of Systems   Constitutional: Negative.    HENT: Negative.    Eyes: Negative.    Respiratory: Negative.    Cardiovascular: Positive for leg swelling.   Gastrointestinal: Negative.    Genitourinary: Positive for frequency and urgency.   Musculoskeletal: Positive for myalgias.        Paralyzed, uses a electronic wheel chair   Skin: Negative.    Neurological: Negative.    Endo/Heme/Allergies: Negative.    Psychiatric/Behavioral: Negative.        PATIENT HISTORY:    Past Medical History:   Diagnosis Date    Coronary artery disease     Diabetes mellitus     Hypercholesteremia     Hypertension     IC (interstitial cystitis)     Numbness in both hands 2017    Spondylisthesis     Vulvodynia, unspecified        Past Surgical History:   Procedure Laterality Date    CARPAL TUNNEL RELEASE Right 2019    Procedure: RELEASE, CARPAL TUNNEL, REVISION;  Surgeon: Annabelle Cid MD;  Location: [facility] OR [address] [place] [address];  Service: Orthopedics;  Laterality: Right;  Axogen, Clarix     SECTION      x3    CORONARY ANGIOGRAPHY N/A 3/3/2020    Procedure: ANGIOGRAM, CORONARY ARTERY;  Surgeon: Markie Friend III, MD;  Location: [facility] CATH LAB;  Service: Cardiology;  Laterality: N/A;    CORONARY ANGIOGRAPHY N/A 2020    Procedure: ANGIOGRAM, CORONARY ARTERY;  Surgeon: Rodney Gamble MD;  Location: [facility] CATH LAB;  Service: Cardiology;  Laterality: N/A;    CYSTOSCOPY      HYSTERECTOMY      ischemic colitis      LEFT HEART CATHETERIZATION Left 3/3/2020    Procedure: Left heart cath;  Surgeon: Markie Friend III, MD;  Location: [facility] CATH LAB;  Service: Cardiology;  Laterality: Left;     LEFT HEART CATHETERIZATION Left 5/29/2020    Procedure: Left heart cath;  Surgeon: Rodney Gamble MD;  Location: [facility] CATH LAB;  Service: Cardiology;  Laterality: Left;    OOPHORECTOMY      DE REMOVAL OF OVARY/TUBE(S)         Family History   Problem Relation Age of Onset    Breast cancer Paternal Aunt      Socioeconomic History    Marital status:    Tobacco Use    Smoking status: Never Smoker    Smokeless tobacco: Never Used   Substance and Sexual Activity    Alcohol use: No    Drug use: No    Sexual activity: Yes     Partners: Male       Allergies:  Augmentin [amoxicillin-pot clavulanate]; Tramadol; Tegretol [carbamazepine]; and Vicodin [hydrocodone-acetaminophen]    Medications:  Outpatient Encounter Medications as of 6/10/2020   Medication Sig Dispense Refill    aspirin 81 MG Chew Take 1 tablet (81 mg total) by mouth once daily. (Patient taking differently: Take 81 mg by mouth every evening. )  0    atorvastatin (LIPITOR) 40 MG tablet Take 80 mg by mouth every evening.   3    b complex vitamins capsule Take 1 capsule by mouth once daily.      baclofen (LIORESAL) 20 MG tablet Take 20 mg by mouth 3 (three) times daily as needed.       BIOTIN ORAL Take by mouth.      clopidogreL (PLAVIX) 75 mg tablet Take 1 tablet (75 mg total) by mouth once daily. Take 8 pills today, then one pill daily 30 tablet 11    docusate sodium (STOOL SOFTENER ORAL) Take by mouth.      duloxetine (CYMBALTA) 30 MG capsule Take 30 mg by mouth once daily.      estradiol (ESTRACE) 0.01 % (0.1 mg/gram) vaginal cream PLEASE SEE ATTACHED FOR DETAILED DIRECTIONS 42.5 g 3    furosemide (LASIX) 40 MG tablet 1/2 tab as need for swelling or weight gain 30 tablet 1    gabapentin (NEURONTIN) 300 MG capsule Take 1 capsule (300 mg total) by mouth 3 (three) times daily. (Patient taking differently: Take 300 mg by mouth every evening. ) 270 capsule 0    Lactobac no.51/Bifidobact no.4 (UP4 PROBIOTICS ULTRA ORAL) Take by  mouth.      LEVOTHYROXINE SODIUM (LEVOTHYROXINE ORAL) Take 50 mcg by mouth before breakfast.       metformin (GLUCOPHAGE) 500 MG tablet Take 500 mg by mouth 2 (two) times daily with meals.      methenamine (HIPREX) 1 gram Tab Take 1 tablet (1 g total) by mouth 2 (two) times daily. 60 tablet 2    metoprolol succinate (TOPROL-XL) 25 MG 24 hr tablet Take 1 tablet (25 mg total) by mouth once daily. 90 tablet 3    multivitamin (ONE DAILY MULTIVITAMIN) per tablet Take 1 tablet by mouth once daily.      omega-3 fatty acids/fish oil (FISH OIL-OMEGA-3 FATTY ACIDS) 300-1,000 mg capsule Take by mouth once daily.      oxybutynin (DITROPAN-XL) 10 MG 24 hr tablet Take 1 tablet (10 mg total) by mouth once daily. 30 tablet 11    sacubitriL-valsartan (ENTRESTO) 49-51 mg per tablet Take 1 tablet by mouth 2 (two) times daily. 60 tablet 11     No facility-administered encounter medications on file as of 6/10/2020.          PHYSICAL EXAMINATION:    The patient generally appears in good health, is appropriately interactive, and is in no apparent distress.    Mental: Cooperative with normal affect.    Chest:  normal inspiratory effort.    LABS:    Lab Results   Component Value Date    BUN 24 (H) 05/30/2020    CREATININE 0.9 05/30/2020         IMPRESSION:    Encounter Diagnoses   Name Primary?    Neurogenic bladder Yes    IC (interstitial cystitis)     Incomplete emptying of bladder        PLAN:    1.  She will let me know when she is ready to do UDS   2.  Discussed Botox of the bladder will likely be able to not have to take oxybutynin.  I am concerned about cognitive function but the copay, even with approval, is too much with all of her other meds

## 2020-06-10 NOTE — PATIENT INSTRUCTIONS
Urodynamics Studies     The bladder holds urine until it leaves the body through the urethra.     Urodynamics studies are a series of tests that give your doctor a close look at the working of your bladder and urethra. The tests can help your doctor learn about any problems storing urine or voiding (eliminating) urine from your body.  Understanding the lower urinary tract  The lower part of the urinary tract has several parts.  · The bladder stores urine until youre ready to release it.  · The urethra is the tube that carries urine from the bladder out of the body.  · The sphincter is made up of muscles around the opening of the bladder. The sphincter muscles tighten to hold urine in the bladder. They relax to let urine flow. Signals from the brain tell the sphincter when to tighten and relax. These signals also tell the bladder when to contract to let urine flow out of the body.  Why you need a urodynamics study  This test may be ordered if you:  · Are incontinent (leak urine)  · Have a bladder that does not empty all the way.  · Have symptoms such as the need to urinate often or a constant strong need to urinate  · Have intermittent or weak urine stream  · Have persistent urinary tract infections  Preparing for the study  · Tell your doctor about any medicine youre taking. Ask if you should stop them before the study.  · Keep a diary of your bathroom habits. Do this for a few days before the study. This diary can be a helpful part of the evaluation.  · Ask if you need to arrive for the study with a full bladder.  Date Last Reviewed: 1/1/2017  © 7935-7181 [employer]. [address] [place] [address], [place] [address]. All rights reserved. This information is not intended as a substitute for professional medical care. Always follow your healthcare professional's instructions.          Urodynamic Studies     The equipment used for the study varies depending upon the facility and what tests are done.      Urodynamic studies may be done in your doctors office, a clinic, or a hospital. The studies may take up to an hour or more. This depends on which tests your doctor does. The tests are generally painless. You wont need sedating medicine.  Tests that may be done  Uroflowmetry. This measures the amount and speed of urine you void from your bladder. You urinate into a funnel. Its attached to a computer that records your urine flow over time. The amount of urine left in your bladder after you void may also be measured right after this test.  Cystometry. This test evaluates how much your bladder can hold. It also measures how strong your bladder muscle is and how well the signals work that tell you when your bladder is full. Your healthcare provider fills your bladder with sterile water or saline solution, through a catheter. Your doctor will instruct you to report any sensations you feel. Mention if theyre similar to symptoms youve felt at home. Your doctor may ask you to cough, stand and walk, or bear down during this test.  Electromyogram. This helps evaluate the muscle contractions that control urination, such as sphincter muscle contractions. Your healthcare provider may place electrode patches or wires near your rectum or urethra to make the recording. He or she may ask you to try to tighten or relax your sphincter muscles during this test.  Pressure flow study. This test measures your detrusor, urethral, and abdominal pressures. Detrusor is the muscle surrounding the bladder walls that relaxes to allow your bladder to fill, and and contracts to squeeze out urine. A pressure flow study is often done after cystometry. Youre asked to urinate while a probe in your urethra measures pressures.  Video cystourethrography. This takes video pictures of urine flow through your urinary tract. It can help identify blockages or other problems. The bladder is filled with an X-ray contrast fluid. Then X-ray video  pictures are taken as the fluid is urinated out. Ultrasound imaging may also be combined with routine urodynamic studies.  Ambulatory urodynamics. This test can be used to evaluate you while doing usual activities.  Getting your results  After the study, youll get dressed and return to the consultation room. Test results may be ready soon after the study is finished. Or, you may return to your doctors office in a few days for your results. Your doctor can talk with you about the study report and your options.   Date Last Reviewed: 1/1/2017 © 2000-2017 [employer]. [address] [place] [address]. All rights reserved. This information is not intended as a substitute for professional medical care. Always follow your healthcare professional's instructions.          Cystoscopy    Cystoscopy is a procedure that lets your doctor look directly inside your urethra and bladder. It can be used to:  · Help diagnose a problem with your urethra, bladder, or kidneys.  · Take a sample (biopsy) of bladder or urethral tissue.  · Treat certain problems (such as removing kidney stones).  · Place a stent to bypass an obstruction.  · Take special X-rays of the kidneys.  Based on the findings, your doctor may recommend other tests or treatments.  What is a cystoscope?  A cystoscope is a telescope-like instrument that contains lenses and fiberoptics (small glass wires that make bright light). The cystoscope may be straight and rigid, or flexible to bend around curves in the urethra. The doctor may look directly into the cystoscope, or project the image onto a monitor.  Getting ready  · Ask your doctor if you should stop taking any medicines before the procedure.  · Ask whether you should avoid eating or drinking anything after midnight before the procedure.  · Follow any other instructions your doctor gives you.  Tell your doctor before the exam if you:  · Take any medicines, such as aspirin or blood  thinners  · Have allergies to any medicines  · Are pregnant   The procedure  Cystoscopy is done in the doctors office, surgery center, or hospital. The doctor and a nurse are present during the procedure. It takes only a few minutes, longer if a biopsy, X-ray, or treatment needs to be done.  During the procedure:  · You lie on an exam table on your back, knees bent and legs apart. You are covered with a drape.  · Your urethra and the area around it are washed. Anesthetic jelly may be applied to numb the urethra. Other pain medicine is usually not needed. In some cases, you may be offered a mild sedative to help you relax. If a more extensive procedure is to be done, such as a biopsy or kidney stone removal, general anesthesia may be needed.  · The cystoscope is inserted. A sterile fluid is put into the bladder to expand it. You may feel pressure from this fluid.  · When the procedure is done, the cystoscope is removed.  After the procedure  If you had a sedative, general anesthesia, or spinal anesthesia, you must have someone drive you home. Once youre home:  · Drink plenty of fluids.  · You may have burning or light bleeding when you urinate--this is normal.  · Medicines may be prescribed to ease any discomfort or prevent infection. Take these as directed.  · Call your doctor if you have heavy bleeding or blood clots, burning that lasts more than a day, a fever over 100°F  (38° C), or trouble urinating.  Date Last Reviewed: 1/1/2017  © 8961-9919 [employer]. [address] [place] [address], [place] [address]. All rights reserved. This information is not intended as a substitute for professional medical care. Always follow your healthcare professional's instructions.

## 2020-06-11 ENCOUNTER — NURSE TRIAGE (OUTPATIENT)
Dept: ADMINISTRATIVE | Facility: CLINIC | Age: 70
End: 2020-06-11

## 2020-06-11 DIAGNOSIS — N31.9 NEUROGENIC BLADDER: ICD-10-CM

## 2020-06-11 DIAGNOSIS — R82.79 POSITIVE URINE CULTURE: ICD-10-CM

## 2020-06-11 RX ORDER — GRANULES FOR ORAL 3 G/1
3 POWDER ORAL ONCE
Qty: 3 G | Refills: 6 | Status: SHIPPED | OUTPATIENT
Start: 2020-06-11 | End: 2020-06-11

## 2020-06-11 NOTE — TELEPHONE ENCOUNTER
Pt is asymptomatic and doing well.  Pt only asked that a message be sent to her ID physician to call her re: a new script.  Routing message left for MD's office.

## 2020-06-11 NOTE — TELEPHONE ENCOUNTER
Reason for Disposition   Health Information question, no triage required and triager able to answer question    Additional Information   Negative: [1] Caller is not with the adult (patient) AND [2] reporting urgent symptoms   Negative: Lab result questions   Negative: Medication questions   Negative: Caller can't be reached by phone   Negative: Caller has already spoken to PCP or another triager   Negative: Requesting regular office appointment   Negative: [1] Caller requesting NON-URGENT health information AND [2] PCP's office is the best resource   Negative: RN needs further essential information from caller in order to complete triage   Negative: General information question, no triage required and triager able to answer question   Negative: Question about upcoming scheduled test, no triage required and triager able to answer question   Negative: [1] Caller is not with the adult (patient) AND [2] probable NON-URGENT symptoms   Negative: [1] Follow-up call to recent contact AND [2] information only call, no triage required    Protocols used: INFORMATION ONLY CALL-A-

## 2020-06-18 ENCOUNTER — OFFICE VISIT (OUTPATIENT)
Dept: CARDIOLOGY | Facility: CLINIC | Age: 70
End: 2020-06-18
Payer: MEDICARE

## 2020-06-18 VITALS
WEIGHT: 152 LBS | HEART RATE: 62 BPM | BODY MASS INDEX: 25.95 KG/M2 | OXYGEN SATURATION: 99 % | HEIGHT: 64 IN | SYSTOLIC BLOOD PRESSURE: 121 MMHG | DIASTOLIC BLOOD PRESSURE: 64 MMHG

## 2020-06-18 DIAGNOSIS — I25.5 ISCHEMIC CARDIOMYOPATHY: Primary | ICD-10-CM

## 2020-06-18 DIAGNOSIS — I25.10 CORONARY ARTERY DISEASE INVOLVING NATIVE CORONARY ARTERY OF NATIVE HEART WITHOUT ANGINA PECTORIS: ICD-10-CM

## 2020-06-18 DIAGNOSIS — E11.40 TYPE 2 DIABETES MELLITUS WITH DIABETIC NEUROPATHY, WITHOUT LONG-TERM CURRENT USE OF INSULIN: ICD-10-CM

## 2020-06-18 DIAGNOSIS — I25.5 CARDIOMYOPATHY, ISCHEMIC: ICD-10-CM

## 2020-06-18 DIAGNOSIS — E78.5 DYSLIPIDEMIA: ICD-10-CM

## 2020-06-18 DIAGNOSIS — Z95.5 S/P DRUG ELUTING CORONARY STENT PLACEMENT: ICD-10-CM

## 2020-06-18 PROCEDURE — 99999 PR PBB SHADOW E&M-EST. PATIENT-LVL V: ICD-10-PCS | Mod: PBBFAC,,, | Performed by: INTERNAL MEDICINE

## 2020-06-18 PROCEDURE — 99213 OFFICE O/P EST LOW 20 MIN: CPT | Mod: S$GLB,,, | Performed by: INTERNAL MEDICINE

## 2020-06-18 PROCEDURE — 99999 PR PBB SHADOW E&M-EST. PATIENT-LVL V: CPT | Mod: PBBFAC,,, | Performed by: INTERNAL MEDICINE

## 2020-06-18 PROCEDURE — 99213 PR OFFICE/OUTPT VISIT, EST, LEVL III, 20-29 MIN: ICD-10-PCS | Mod: S$GLB,,, | Performed by: INTERNAL MEDICINE

## 2020-06-22 PROBLEM — E78.5 DYSLIPIDEMIA: Status: ACTIVE | Noted: 2020-06-22

## 2020-06-22 NOTE — PROGRESS NOTES
Subjective:    Patient ID:  Niki Soriano is a 69 y.o. female who presents for follow-up of Coronary Artery Disease    Referring cardiologist: Dr. Shane GUEVARA  Mrs. Soriano is a 68 y/o woman with a h/o CAD.  She was admitted on 3/1/19 with acute decompensated heart failure. TTE on 3/2/20 demonstrated an LVEF=20-25%. Cardiac cath demonstrated a  of the LAD, high grade LCx stenoses, and a  of the RCA. The PDA fills ia left to right collaterals and the LAD fills via left to left collaterals. On 20 she underwnt successful PCI of the RCA  and LCx high grade stneosis.  The LAD  was not attempted due to uncertainty regarding viability of the anterior wall. Since hospital discharge the patient denies angina.  She denies shortness of breath.  The patient reports that she ia able to mop the floor in her home and does so while sitting in a chair.  She had a spinal chord infection in  and has lower exremtiy weakness since that time.  She uses a walker to ambulate.  The patient denies LE edema.  She sleeps with a folded robe under her head due to neck pain.  She denies PND.  She denies palpitations, syncope, or near syncope.  She reports a burning sensation in her chest when eating crawfish, but not with physical activity.  She reports good medication compliance.    Past Medical History:   Diagnosis Date    Coronary artery disease     Diabetes mellitus     Hypercholesteremia     Hypertension     IC (interstitial cystitis)     Numbness in both hands 2017    Spondylisthesis     Vulvodynia, unspecified      Past Surgical History:   Procedure Laterality Date    CARPAL TUNNEL RELEASE Right 2019    Procedure: RELEASE, CARPAL TUNNEL, REVISION;  Surgeon: Annabelle Cid MD;  Location: [facility] OR [address] [place] [address];  Service: Orthopedics;  Laterality: Right;  Axogen, Clarix     SECTION      x3    CORONARY ANGIOGRAPHY N/A 3/3/2020    Procedure: ANGIOGRAM, CORONARY ARTERY;  Surgeon: Markie HUSAIN  Phuc PETERSON MD;  Location: [facility] CATH LAB;  Service: Cardiology;  Laterality: N/A;    CORONARY ANGIOGRAPHY N/A 5/29/2020    Procedure: ANGIOGRAM, CORONARY ARTERY;  Surgeon: Rodney Gamble MD;  Location: [facility] CATH LAB;  Service: Cardiology;  Laterality: N/A;    CYSTOSCOPY      HYSTERECTOMY      ischemic colitis      LEFT HEART CATHETERIZATION Left 3/3/2020    Procedure: Left heart cath;  Surgeon: Markie Friend III, MD;  Location: [facility] CATH LAB;  Service: Cardiology;  Laterality: Left;    LEFT HEART CATHETERIZATION Left 5/29/2020    Procedure: Left heart cath;  Surgeon: Rodney Gamble MD;  Location: [facility] CATH LAB;  Service: Cardiology;  Laterality: Left;    OOPHORECTOMY      IN REMOVAL OF OVARY/TUBE(S)       Current Outpatient Medications on File Prior to Visit   Medication Sig Dispense Refill    aspirin 81 MG Chew Take 1 tablet (81 mg total) by mouth once daily. (Patient taking differently: Take 81 mg by mouth every evening. )  0    atorvastatin (LIPITOR) 40 MG tablet Take 80 mg by mouth every evening.   3    b complex vitamins capsule Take 1 capsule by mouth once daily.      baclofen (LIORESAL) 20 MG tablet Take 20 mg by mouth 3 (three) times daily as needed.       BIOTIN ORAL Take by mouth.      clopidogreL (PLAVIX) 75 mg tablet Take 1 tablet (75 mg total) by mouth once daily. Take 8 pills today, then one pill daily 30 tablet 11    duloxetine (CYMBALTA) 30 MG capsule Take 30 mg by mouth once daily.      furosemide (LASIX) 40 MG tablet 1/2 tab as need for swelling or weight gain 30 tablet 1    gabapentin (NEURONTIN) 300 MG capsule Take 1 capsule (300 mg total) by mouth 3 (three) times daily. (Patient taking differently: Take 300 mg by mouth every evening. ) 270 capsule 0    Lactobac no.51/Bifidobact no.4 (UP4 PROBIOTICS ULTRA ORAL) Take by mouth.      LEVOTHYROXINE SODIUM (LEVOTHYROXINE ORAL) Take 50 mcg by mouth before breakfast.       metformin (GLUCOPHAGE) 500 MG tablet Take  500 mg by mouth once daily.       metoprolol succinate (TOPROL-XL) 25 MG 24 hr tablet Take 1 tablet (25 mg total) by mouth once daily. 90 tablet 3    multivitamin (ONE DAILY MULTIVITAMIN) per tablet Take 1 tablet by mouth once daily.      omega-3 fatty acids/fish oil (FISH OIL-OMEGA-3 FATTY ACIDS) 300-1,000 mg capsule Take by mouth once daily.      oxybutynin (DITROPAN-XL) 10 MG 24 hr tablet Take 1 tablet (10 mg total) by mouth once daily. 30 tablet 11    sacubitriL-valsartan (ENTRESTO) 49-51 mg per tablet Take 1 tablet by mouth 2 (two) times daily. 60 tablet 11    docusate sodium (STOOL SOFTENER ORAL) Take by mouth.      estradiol (ESTRACE) 0.01 % (0.1 mg/gram) vaginal cream PLEASE SEE ATTACHED FOR DETAILED DIRECTIONS (Patient not taking: Reported on 6/18/2020) 42.5 g 3    methenamine (HIPREX) 1 gram Tab Take 1 tablet (1 g total) by mouth 2 (two) times daily. (Patient not taking: Reported on 6/18/2020) 60 tablet 2     No current facility-administered medications on file prior to visit.      Review of patient's allergies indicates:   Allergen Reactions    Augmentin [amoxicillin-pot clavulanate] Other (See Comments)     Patient cannot recall what she had, only that she could not tolerate the Augmentin    Tramadol Itching    Tegretol [carbamazepine] Itching and Rash    Vicodin [hydrocodone-acetaminophen] Itching and Anxiety     Social History     Tobacco Use    Smoking status: Never Smoker    Smokeless tobacco: Never Used   Substance Use Topics    Alcohol use: No    Drug use: No     Family History   Problem Relation Age of Onset    Breast cancer Paternal Aunt     Colon cancer Neg Hx     Ovarian cancer Neg Hx        Review of Systems   Constitution: Negative for diaphoresis, fever, malaise/fatigue and weight gain.   HENT: Negative for congestion, hearing loss, nosebleeds and sore throat.    Eyes: Negative for visual disturbance.   Cardiovascular: Negative for chest pain, claudication, dyspnea on  "exertion, irregular heartbeat, leg swelling, near-syncope, orthopnea, palpitations, paroxysmal nocturnal dyspnea and syncope.   Respiratory: Negative for cough, hemoptysis, shortness of breath and wheezing.    Endocrine: Negative for polyuria.   Hematologic/Lymphatic: Negative for bleeding problem. Does not bruise/bleed easily.   Skin: Negative for poor wound healing and rash.   Musculoskeletal: Negative for myalgias.   Gastrointestinal: Negative for abdominal pain, change in bowel habit, constipation, diarrhea, dysphagia, heartburn, hematemesis, melena, nausea and vomiting.   Genitourinary: Negative for bladder incontinence and dysuria.   Neurological: Negative for focal weakness, headaches and weakness.   Psychiatric/Behavioral: Negative for depression.   Allergic/Immunologic: Negative for hives and persistent infections.        Objective:  Vitals:    06/18/20 1447 06/18/20 1451   BP: 111/62 121/64   BP Location: Left arm Right arm   Patient Position: Sitting Sitting   BP Method: Large (Automatic) Large (Automatic)   Pulse: 99 62   SpO2: 99%    Weight: 68.9 kg (152 lb)    Height: 5' 4" (1.626 m)          Physical Exam   Constitutional: She appears well-developed and well-nourished.   HENT:   Head: Normocephalic and atraumatic.   Eyes: Pupils are equal, round, and reactive to light. EOM are normal. Right eye exhibits no discharge. No scleral icterus.   Neck: No JVD present. No thyromegaly present.   Cardiovascular: Normal rate and regular rhythm. PMI is not displaced. Exam reveals no gallop.   No murmur heard.  Pulses:       Carotid pulses are 2+ on the right side and 2+ on the left side.       Radial pulses are 2+ on the right side and 2+ on the left side.        Femoral pulses are 2+ on the right side and 2+ on the left side.       Dorsalis pedis pulses are 2+ on the right side and 2+ on the left side.        Posterior tibial pulses are 2+ on the right side and 2+ on the left side.   Pulmonary/Chest: Effort " normal and breath sounds normal.   Abdominal: Soft. Bowel sounds are normal. There is no hepatosplenomegaly. There is no abdominal tenderness.   Musculoskeletal:         General: No edema.   Lymphadenopathy:     She has no cervical adenopathy.   Neurological: She is alert. Gait normal.   Skin: Skin is warm, dry and intact. She is not diaphoretic.   Psychiatric: She has a normal mood and affect.         Assessment:       1. Ischemic cardiomyopathy    2. S/P drug eluting coronary stent placement    3. Coronary artery disease involving native coronary artery of native heart without angina pectoris    4. Cardiomyopathy, ischemic    5. Type 2 diabetes mellitus with diabetic neuropathy, without long-term current use of insulin    6. Dyslipidemia         Plan:       1) Ischemic cardiomyopathy. The patient underwent PCI of her RCA  and high grade stenosis of her LCx.  She also has an untreated LAD .  The patient reports that she is currently free of anginal symptoms and appears euvolemic by exam with NYHA class 2 symptoms.  She reports that she is interested in doing everything possible to improve her LVEF. We discussed viability testing prior to proceeding with LAD  PCI given the results of her 3/5/20 Rb PET scan  -rec FDG PET scan  -continue EC ASA 81mg po qday  -continue Plavix 75mg po qday X 6 months minimum, ideally 1 year  -continue Toprol XL 25mg po qday  -continue Entresto 49-51 po BID  -start phase 2 cardiac rehab    2) CAD. As above    3) Dyslipidemia. 3/1/20 lipid panel reviewed; continue Lipitor 40mg po qday and Omega 3 supplement    4) DM2. 3/2/20 [id number]=5.8; agree with tight glycemic control    5) CKD3. Avoid nephrotoxic medications    6) ALIE  The patient reports a h/o daytime somnolence; she states her CPAP mask does not fit well; I offered to refer the patient to [facility]; she stated that she wants to contact [facility] first; if she does not get a response, then she will call for the  referral;    All of the patient's questions were answered.

## 2020-07-02 ENCOUNTER — TELEPHONE (OUTPATIENT)
Dept: CARDIAC REHAB | Facility: CLINIC | Age: 70
End: 2020-07-02

## 2020-07-24 ENCOUNTER — TELEPHONE (OUTPATIENT)
Dept: CARDIOLOGY | Facility: CLINIC | Age: 70
End: 2020-07-24

## 2020-07-25 NOTE — ED TRIAGE NOTES
Pt arrives with complaint of shortness of breath and abd pain. Pt reports SOB started 5 weeks ago, gotten worse last night. Pt denies cardiac hx. Pt reports 20 years ago pt had episode of asthma. Pt reports abd pain started 2 weeks ago, gotten worse last night. Pt reports last bowel movement this morning. Denies n/v/d. Denies chest pain.   Simple: Patient demonstrates quick and easy understanding

## 2020-07-28 ENCOUNTER — HOSPITAL ENCOUNTER (OUTPATIENT)
Dept: CARDIOLOGY | Facility: HOSPITAL | Age: 70
Discharge: HOME | End: 2020-07-28
Attending: INTERNAL MEDICINE
Payer: MEDICARE

## 2020-07-28 VITALS — HEART RATE: 58 BPM | DIASTOLIC BLOOD PRESSURE: 66 MMHG | SYSTOLIC BLOOD PRESSURE: 130 MMHG

## 2020-07-28 DIAGNOSIS — I25.5 ISCHEMIC CARDIOMYOPATHY: ICD-10-CM

## 2020-07-28 LAB
NUC REST DIASTOLIC VOLUME INDEX: 118
NUC REST EJECTION FRACTION: 46
NUC REST SYSTOLIC VOLUME INDEX: 64
PERFUSION DEFECT 1 SIZE IN %: 18 %

## 2020-07-28 PROCEDURE — 78459 CARDIAC PET SCAN VIABILITY (CUPID ONLY): ICD-10-PCS | Mod: 26,,, | Performed by: INTERNAL MEDICINE

## 2020-07-28 PROCEDURE — 78459 MYOCRD IMG PET SINGLE STUDY: CPT

## 2020-07-28 PROCEDURE — 78459 MYOCRD IMG PET SINGLE STUDY: CPT | Mod: 26,,, | Performed by: INTERNAL MEDICINE

## 2020-07-28 PROCEDURE — 25000003 PHARM REV CODE 250: Performed by: INTERNAL MEDICINE

## 2020-07-28 RX ADMIN — DEXTROSE MONOHYDRATE 25 G: 25 INJECTION, SOLUTION INTRAVENOUS at 12:07

## 2020-07-30 ENCOUNTER — TELEPHONE (OUTPATIENT)
Dept: CARDIOLOGY | Facility: CLINIC | Age: 70
End: 2020-07-30

## 2020-07-30 NOTE — TELEPHONE ENCOUNTER
I called the patient with the results of her PET viability scan. There was no viability detected in the LAD territory. Therefore I explained to the patient that there was no efficacy in LAD  PCI on the basis of her PET viability test.. The patient reports she is free of anginal and CHF symptoms at this time. Rec continued medical management of ischemic cardiomyopathy.  She intends to start phase 2 cardiac rehab at [facility]. I encouraged her to participate in and complete cardiac rehab.  All of the patient's questions were answered.

## 2020-08-13 ENCOUNTER — NURSE TRIAGE (OUTPATIENT)
Dept: ADMINISTRATIVE | Facility: CLINIC | Age: 70
End: 2020-08-13

## 2020-08-13 NOTE — TELEPHONE ENCOUNTER
Coughing with sinus drip and congestion. S/s started yesterday. Temp 99.4F. took 2 tylenol.   Encouraged hydration. Glucose 100 today.     Protocol advice given abd pt verbalizes understanding.     Reason for Disposition   [1] Symptoms of COVID-19 (e.g., cough, fever, SOB, or others) AND [2] within 14 days of EXPOSURE (close contact) with diagnosed or suspected COVID-19 patient   HIGH RISK patient (e.g., age > 64 years, diabetes, heart or lung disease, weak immune system)    Additional Information   Negative: SEVERE difficulty breathing (e.g., struggling for each breath, speaks in single words)   Negative: Difficult to awaken or acting confused (e.g., disoriented, slurred speech)   Negative: Bluish (or gray) lips or face now   Negative: Shock suspected (e.g., cold/pale/clammy skin, too weak to stand, low BP, rapid pulse)   Negative: Sounds like a life-threatening emergency to the triager   Negative: SEVERE or constant chest pain or pressure (Exception: mild central chest pain, present only when coughing)   Negative: MODERATE difficulty breathing (e.g., speaks in phrases, SOB even at rest, pulse 100-120)   Negative: MILD difficulty breathing (e.g., minimal/no SOB at rest, SOB with walking, pulse <100)   Negative: Chest pain   Negative: Patient sounds very sick or weak to the triager   Negative: Fever > 103 F (39.4 C)   Negative: [1] Fever > 101 F (38.3 C) AND [2] age > 60   Negative: [1] Fever > 100.0 F (37.8 C) AND [2] bedridden (e.g., nursing home patient, CVA, chronic illness, recovering from surgery)    Protocols used: CORONAVIRUS (COVID-19) EXPOSURE-A-OH, CORONAVIRUS (COVID-19) DIAGNOSED OR [id number]

## 2020-09-20 ENCOUNTER — HOSPITAL ENCOUNTER (OUTPATIENT)
Facility: HOSPITAL | Age: 70
Discharge: HOME | End: 2020-09-21
Attending: EMERGENCY MEDICINE | Admitting: INTERNAL MEDICINE
Payer: MEDICARE

## 2020-09-20 DIAGNOSIS — R06.02 SOB (SHORTNESS OF BREATH): ICD-10-CM

## 2020-09-20 DIAGNOSIS — R79.89 ELEVATED TROPONIN: Primary | ICD-10-CM

## 2020-09-20 DIAGNOSIS — R06.02 SHORTNESS OF BREATH: ICD-10-CM

## 2020-09-20 LAB
ALBUMIN SERPL BCP-MCNC: 4.2 G/DL (ref 3.5–5.2)
ALP SERPL-CCNC: 62 U/L (ref 55–135)
ALT SERPL W/O P-5'-P-CCNC: 24 U/L (ref 10–44)
ANION GAP SERPL CALC-SCNC: 12 MMOL/L (ref 8–16)
AST SERPL-CCNC: 29 U/L (ref 10–40)
BASOPHILS # BLD AUTO: 0.04 K/UL (ref 0–0.2)
BASOPHILS NFR BLD: 0.6 % (ref 0–1.9)
BILIRUB SERPL-MCNC: 0.7 MG/DL (ref 0.1–1)
BUN SERPL-MCNC: 19 MG/DL (ref 8–23)
CALCIUM SERPL-MCNC: 10.1 MG/DL (ref 8.7–10.5)
CHLORIDE SERPL-SCNC: 105 MMOL/L (ref 95–110)
CO2 SERPL-SCNC: 24 MMOL/L (ref 23–29)
CREAT SERPL-MCNC: 0.8 MG/DL (ref 0.5–1.4)
DIFFERENTIAL METHOD: NORMAL
EOSINOPHIL # BLD AUTO: 0.2 K/UL (ref 0–0.5)
EOSINOPHIL NFR BLD: 2.9 % (ref 0–8)
ERYTHROCYTE [DISTWIDTH] IN BLOOD BY AUTOMATED COUNT: 14.4 % (ref 11.5–14.5)
EST. GFR  (AFRICAN AMERICAN): >60 ML/MIN/1.73 M^2
EST. GFR  (NON AFRICAN AMERICAN): >60 ML/MIN/1.73 M^2
GLUCOSE SERPL-MCNC: 125 MG/DL (ref 70–110)
HCT VFR BLD AUTO: 39.3 % (ref 37–48.5)
HGB BLD-MCNC: 13 G/DL (ref 12–16)
IMM GRANULOCYTES # BLD AUTO: 0.01 K/UL (ref 0–0.04)
IMM GRANULOCYTES NFR BLD AUTO: 0.2 % (ref 0–0.5)
LYMPHOCYTES # BLD AUTO: 1.3 K/UL (ref 1–4.8)
LYMPHOCYTES NFR BLD: 20.5 % (ref 18–48)
MCH RBC QN AUTO: 28.7 PG (ref 27–31)
MCHC RBC AUTO-ENTMCNC: 33.1 G/DL (ref 32–36)
MCV RBC AUTO: 87 FL (ref 82–98)
MONOCYTES # BLD AUTO: 0.3 K/UL (ref 0.3–1)
MONOCYTES NFR BLD: 5.1 % (ref 4–15)
NEUTROPHILS # BLD AUTO: 4.4 K/UL (ref 1.8–7.7)
NEUTROPHILS NFR BLD: 70.7 % (ref 38–73)
NRBC BLD-RTO: 0 /100 WBC
PLATELET # BLD AUTO: 152 K/UL (ref 150–350)
PMV BLD AUTO: 10.1 FL (ref 9.2–12.9)
POCT GLUCOSE: 97 MG/DL (ref 70–110)
POTASSIUM SERPL-SCNC: 3.6 MMOL/L (ref 3.5–5.1)
PROT SERPL-MCNC: 6.7 G/DL (ref 6–8.4)
RBC # BLD AUTO: 4.53 M/UL (ref 4–5.4)
SARS-COV-2 RDRP RESP QL NAA+PROBE: NEGATIVE
SODIUM SERPL-SCNC: 141 MMOL/L (ref 136–145)
TROPONIN I SERPL DL<=0.01 NG/ML-MCNC: 0.12 NG/ML (ref 0–0.03)
TROPONIN I SERPL DL<=0.01 NG/ML-MCNC: 0.15 NG/ML (ref 0–0.03)
WBC # BLD AUTO: 6.24 K/UL (ref 3.9–12.7)

## 2020-09-20 PROCEDURE — 99284 PR EMERGENCY DEPT VISIT,LEVEL IV: ICD-10-PCS | Mod: ,,, | Performed by: EMERGENCY MEDICINE

## 2020-09-20 PROCEDURE — 93010 ELECTROCARDIOGRAM REPORT: CPT | Mod: ,,, | Performed by: INTERNAL MEDICINE

## 2020-09-20 PROCEDURE — G0378 HOSPITAL OBSERVATION PER HR: HCPCS

## 2020-09-20 PROCEDURE — 99284 EMERGENCY DEPT VISIT MOD MDM: CPT | Mod: ,,, | Performed by: EMERGENCY MEDICINE

## 2020-09-20 PROCEDURE — U0002 COVID-19 LAB TEST NON-CDC: HCPCS

## 2020-09-20 PROCEDURE — 99220 PR INITIAL OBSERVATION CARE,LEVL III: CPT | Mod: ,,, | Performed by: PHYSICIAN ASSISTANT

## 2020-09-20 PROCEDURE — 93005 ELECTROCARDIOGRAM TRACING: CPT

## 2020-09-20 PROCEDURE — 93010 EKG 12-LEAD: ICD-10-PCS | Mod: ,,, | Performed by: INTERNAL MEDICINE

## 2020-09-20 PROCEDURE — 99285 EMERGENCY DEPT VISIT HI MDM: CPT | Mod: 25

## 2020-09-20 PROCEDURE — 85025 COMPLETE CBC W/AUTO DIFF WBC: CPT

## 2020-09-20 PROCEDURE — 80053 COMPREHEN METABOLIC PANEL: CPT

## 2020-09-20 PROCEDURE — 84484 ASSAY OF TROPONIN QUANT: CPT | Mod: 91

## 2020-09-20 PROCEDURE — 25000003 PHARM REV CODE 250: Performed by: PHYSICIAN ASSISTANT

## 2020-09-20 PROCEDURE — 99220 PR INITIAL OBSERVATION CARE,LEVL III: ICD-10-PCS | Mod: ,,, | Performed by: PHYSICIAN ASSISTANT

## 2020-09-20 PROCEDURE — 84484 ASSAY OF TROPONIN QUANT: CPT

## 2020-09-20 RX ORDER — LEVOTHYROXINE SODIUM 50 UG/1
50 TABLET ORAL
Status: DISCONTINUED | OUTPATIENT
Start: 2020-09-21 | End: 2020-09-21 | Stop reason: HOSPADM

## 2020-09-20 RX ORDER — BACLOFEN 10 MG/1
20 TABLET ORAL 3 TIMES DAILY
Status: DISCONTINUED | OUTPATIENT
Start: 2020-09-20 | End: 2020-09-21 | Stop reason: HOSPADM

## 2020-09-20 RX ORDER — NAPROXEN SODIUM 220 MG/1
81 TABLET, FILM COATED ORAL DAILY
Status: DISCONTINUED | OUTPATIENT
Start: 2020-09-21 | End: 2020-09-21 | Stop reason: HOSPADM

## 2020-09-20 RX ORDER — POLYETHYLENE GLYCOL 3350 17 G/17G
17 POWDER, FOR SOLUTION ORAL DAILY
Status: DISCONTINUED | OUTPATIENT
Start: 2020-09-21 | End: 2020-09-20

## 2020-09-20 RX ORDER — FUROSEMIDE 20 MG/1
20 TABLET ORAL DAILY PRN
Status: DISCONTINUED | OUTPATIENT
Start: 2020-09-20 | End: 2020-09-21 | Stop reason: HOSPADM

## 2020-09-20 RX ORDER — INSULIN ASPART 100 [IU]/ML
0-5 INJECTION, SOLUTION INTRAVENOUS; SUBCUTANEOUS
Status: DISCONTINUED | OUTPATIENT
Start: 2020-09-20 | End: 2020-09-21 | Stop reason: HOSPADM

## 2020-09-20 RX ORDER — ONDANSETRON 8 MG/1
8 TABLET, ORALLY DISINTEGRATING ORAL EVERY 8 HOURS PRN
Status: DISCONTINUED | OUTPATIENT
Start: 2020-09-20 | End: 2020-09-21 | Stop reason: HOSPADM

## 2020-09-20 RX ORDER — IPRATROPIUM BROMIDE AND ALBUTEROL SULFATE 2.5; .5 MG/3ML; MG/3ML
3 SOLUTION RESPIRATORY (INHALATION) EVERY 4 HOURS PRN
Status: DISCONTINUED | OUTPATIENT
Start: 2020-09-20 | End: 2020-09-21 | Stop reason: HOSPADM

## 2020-09-20 RX ORDER — SODIUM CHLORIDE 0.9 % (FLUSH) 0.9 %
10 SYRINGE (ML) INJECTION
Status: DISCONTINUED | OUTPATIENT
Start: 2020-09-20 | End: 2020-09-21 | Stop reason: HOSPADM

## 2020-09-20 RX ORDER — IBUPROFEN 200 MG
16 TABLET ORAL
Status: DISCONTINUED | OUTPATIENT
Start: 2020-09-20 | End: 2020-09-21 | Stop reason: HOSPADM

## 2020-09-20 RX ORDER — OXYBUTYNIN CHLORIDE 10 MG/1
10 TABLET, EXTENDED RELEASE ORAL DAILY
Status: DISCONTINUED | OUTPATIENT
Start: 2020-09-21 | End: 2020-09-21 | Stop reason: HOSPADM

## 2020-09-20 RX ORDER — TALC
9 POWDER (GRAM) TOPICAL NIGHTLY PRN
Status: DISCONTINUED | OUTPATIENT
Start: 2020-09-20 | End: 2020-09-21 | Stop reason: HOSPADM

## 2020-09-20 RX ORDER — HYDRALAZINE HYDROCHLORIDE 25 MG/1
25 TABLET, FILM COATED ORAL EVERY 8 HOURS PRN
Status: DISCONTINUED | OUTPATIENT
Start: 2020-09-20 | End: 2020-09-21 | Stop reason: HOSPADM

## 2020-09-20 RX ORDER — GLUCAGON 1 MG
1 KIT INJECTION
Status: DISCONTINUED | OUTPATIENT
Start: 2020-09-20 | End: 2020-09-21 | Stop reason: HOSPADM

## 2020-09-20 RX ORDER — DULOXETIN HYDROCHLORIDE 30 MG/1
30 CAPSULE, DELAYED RELEASE ORAL DAILY
Status: DISCONTINUED | OUTPATIENT
Start: 2020-09-21 | End: 2020-09-21 | Stop reason: HOSPADM

## 2020-09-20 RX ORDER — ACETAMINOPHEN 325 MG/1
650 TABLET ORAL EVERY 4 HOURS PRN
Status: DISCONTINUED | OUTPATIENT
Start: 2020-09-20 | End: 2020-09-21 | Stop reason: HOSPADM

## 2020-09-20 RX ORDER — IBUPROFEN 200 MG
24 TABLET ORAL
Status: DISCONTINUED | OUTPATIENT
Start: 2020-09-20 | End: 2020-09-21 | Stop reason: HOSPADM

## 2020-09-20 RX ORDER — ATORVASTATIN CALCIUM 40 MG/1
80 TABLET, FILM COATED ORAL NIGHTLY
Status: DISCONTINUED | OUTPATIENT
Start: 2020-09-20 | End: 2020-09-21 | Stop reason: HOSPADM

## 2020-09-20 RX ORDER — CLOPIDOGREL BISULFATE 75 MG/1
75 TABLET ORAL DAILY
Status: DISCONTINUED | OUTPATIENT
Start: 2020-09-21 | End: 2020-09-21 | Stop reason: HOSPADM

## 2020-09-20 RX ORDER — GABAPENTIN 300 MG/1
300 CAPSULE ORAL NIGHTLY
Status: DISCONTINUED | OUTPATIENT
Start: 2020-09-20 | End: 2020-09-21 | Stop reason: HOSPADM

## 2020-09-20 RX ORDER — METOPROLOL SUCCINATE 25 MG/1
25 TABLET, EXTENDED RELEASE ORAL DAILY
Status: DISCONTINUED | OUTPATIENT
Start: 2020-09-21 | End: 2020-09-21 | Stop reason: HOSPADM

## 2020-09-20 RX ADMIN — ATORVASTATIN CALCIUM 80 MG: 40 TABLET, FILM COATED ORAL at 09:09

## 2020-09-20 RX ADMIN — SACUBITRIL AND VALSARTAN 1 TABLET: 49; 51 TABLET, FILM COATED ORAL at 09:09

## 2020-09-20 RX ADMIN — GABAPENTIN 300 MG: 300 CAPSULE ORAL at 09:09

## 2020-09-20 RX ADMIN — BACLOFEN 20 MG: 10 TABLET ORAL at 09:09

## 2020-09-20 RX ADMIN — ACETAMINOPHEN 650 MG: 325 TABLET ORAL at 06:09

## 2020-09-20 RX ADMIN — MELATONIN TAB 3 MG 9 MG: 3 TAB at 10:09

## 2020-09-20 NOTE — ED TRIAGE NOTES
Pt arrived with  from home with c/o HTN and SOB since this morning. Denies CP. Pt reports getting up, taking her BP and it was higher than her normal. Pt has intermittent SOB as well. Denies fever, chills, cough, headache, or abd pain.

## 2020-09-20 NOTE — ED NOTES
Pt back to bed. Pt on cardiac monitor, bp cuff and continuous pulse ox.  at bedside. Call light within reach. Will continue to monitor.

## 2020-09-20 NOTE — SUBJECTIVE & OBJECTIVE
Past Medical History:   Diagnosis Date    Coronary artery disease     Diabetes mellitus     Hypercholesteremia     Hypertension     IC (interstitial cystitis)     Numbness in both hands 2017    Spondylisthesis     Vulvodynia, unspecified        Past Surgical History:   Procedure Laterality Date    CARPAL TUNNEL RELEASE Right 2019    Procedure: RELEASE, CARPAL TUNNEL, REVISION;  Surgeon: Annabelle Cid MD;  Location: [facility] OR [address] [place] [address];  Service: Orthopedics;  Laterality: Right;  Axogen, Clarix     SECTION      x3    CORONARY ANGIOGRAPHY N/A 3/3/2020    Procedure: ANGIOGRAM, CORONARY ARTERY;  Surgeon: Markie Friend III, MD;  Location: [facility] CATH LAB;  Service: Cardiology;  Laterality: N/A;    CORONARY ANGIOGRAPHY N/A 2020    Procedure: ANGIOGRAM, CORONARY ARTERY;  Surgeon: Rodney Gamble MD;  Location: [facility] CATH LAB;  Service: Cardiology;  Laterality: N/A;    CYSTOSCOPY      HYSTERECTOMY      ischemic colitis      LEFT HEART CATHETERIZATION Left 3/3/2020    Procedure: Left heart cath;  Surgeon: Markie Friend III, MD;  Location: [facility] CATH LAB;  Service: Cardiology;  Laterality: Left;    LEFT HEART CATHETERIZATION Left 2020    Procedure: Left heart cath;  Surgeon: Rodney Gamble MD;  Location: [facility] CATH LAB;  Service: Cardiology;  Laterality: Left;    OOPHORECTOMY      WA REMOVAL OF OVARY/TUBE(S)         Review of patient's allergies indicates:   Allergen Reactions    Augmentin [amoxicillin-pot clavulanate] Other (See Comments)     Patient cannot recall what she had, only that she could not tolerate the Augmentin    Tramadol Itching    Tegretol [carbamazepine] Itching and Rash    Vicodin [hydrocodone-acetaminophen] Itching and Anxiety       No current facility-administered medications on file prior to encounter.      Current Outpatient Medications on File Prior to Encounter   Medication Sig    aspirin 81 MG Chew Take 1 tablet (81 mg total) by  mouth once daily. (Patient taking differently: Take 81 mg by mouth every evening. )    atorvastatin (LIPITOR) 40 MG tablet Take 80 mg by mouth every evening.     b complex vitamins capsule Take 1 capsule by mouth once daily.    BIOTIN ORAL Take by mouth.    clopidogreL (PLAVIX) 75 mg tablet Take 1 tablet (75 mg total) by mouth once daily. Take 8 pills today, then one pill daily    duloxetine (CYMBALTA) 30 MG capsule Take 30 mg by mouth once daily.    gabapentin (NEURONTIN) 300 MG capsule Take 1 capsule (300 mg total) by mouth 3 (three) times daily. (Patient taking differently: Take 300 mg by mouth every evening. )    Lactobac no.51/Bifidobact no.4 (UP4 PROBIOTICS ULTRA ORAL) Take by mouth.    LEVOTHYROXINE SODIUM (LEVOTHYROXINE ORAL) Take 50 mcg by mouth before breakfast.     metformin (GLUCOPHAGE) 500 MG tablet Take 500 mg by mouth once daily.     metoprolol succinate (TOPROL-XL) 25 MG 24 hr tablet Take 1 tablet (25 mg total) by mouth once daily.    multivitamin (ONE DAILY MULTIVITAMIN) per tablet Take 1 tablet by mouth once daily.    omega-3 fatty acids/fish oil (FISH OIL-OMEGA-3 FATTY ACIDS) 300-1,000 mg capsule Take by mouth once daily.    oxybutynin (DITROPAN-XL) 10 MG 24 hr tablet Take 1 tablet (10 mg total) by mouth once daily.    sacubitriL-valsartan (ENTRESTO) 49-51 mg per tablet Take 1 tablet by mouth 2 (two) times daily.    baclofen (LIORESAL) 20 MG tablet Take 20 mg by mouth 3 (three) times daily as needed.     docusate sodium (STOOL SOFTENER ORAL) Take by mouth.    estradiol (ESTRACE) 0.01 % (0.1 mg/gram) vaginal cream PLEASE SEE ATTACHED FOR DETAILED DIRECTIONS (Patient not taking: Reported on 6/18/2020)    furosemide (LASIX) 40 MG tablet TAKE 1/2 TABLET BY MOUTH DAILY AS NEED FOR SWELLING OR WEIGHT GAIN    methenamine (HIPREX) 1 gram Tab Take 1 tablet (1 g total) by mouth 2 (two) times daily. (Patient not taking: Reported on 6/18/2020)     Family History     Problem Relation (Age of  Onset)    Breast cancer Paternal Aunt        Tobacco Use    Smoking status: Never Smoker    Smokeless tobacco: Never Used   Substance and Sexual Activity    Alcohol use: No    Drug use: No    Sexual activity: Yes     Partners: Male     Review of Systems   Constitutional: Negative for activity change, chills and fever.   HENT: Negative for trouble swallowing.    Eyes: Negative for photophobia and visual disturbance.   Respiratory: Positive for shortness of breath. Negative for cough and chest tightness.    Cardiovascular: Negative for chest pain, palpitations and leg swelling.   Gastrointestinal: Positive for nausea. Negative for abdominal pain, constipation, diarrhea and vomiting.   Genitourinary: Negative for dysuria, frequency and hematuria.   Musculoskeletal: Negative for back pain, gait problem and neck pain.   Skin: Negative for rash and wound.   Neurological: Negative for dizziness, syncope, speech difficulty and light-headedness.   Psychiatric/Behavioral: Negative for agitation and confusion. The patient is nervous/anxious.      Objective:     Vital Signs (Most Recent):  Temp: 98.4 °F (36.9 °C) (09/20/20 1329)  Pulse: 64 (09/20/20 1445)  Resp: 20 (09/20/20 1329)  BP: (!) 154/82 (09/20/20 1445)  SpO2: 95 % (09/20/20 1445) Vital Signs (24h Range):  Temp:  [98.4 °F (36.9 °C)] 98.4 °F (36.9 °C)  Pulse:  [] 64  Resp:  [20] 20  SpO2:  [95 %-100 %] 95 %  BP: (154-181)/(82-95) 154/82     Weight: 68.5 kg (151 lb)  Body mass index is 25.92 kg/m².    Physical Exam  Vitals signs and nursing note reviewed.   Constitutional:       General: She is not in acute distress.     Appearance: She is well-developed.   HENT:      Head: Normocephalic and atraumatic.      Mouth/Throat:      Pharynx: No oropharyngeal exudate.   Eyes:      Conjunctiva/sclera: Conjunctivae normal.      Pupils: Pupils are equal, round, and reactive to light.   Neck:      Musculoskeletal: Normal range of motion and neck supple.   Cardiovascular:       Rate and Rhythm: Normal rate and regular rhythm.      Heart sounds: Normal heart sounds.   Pulmonary:      Effort: Pulmonary effort is normal. No respiratory distress.      Breath sounds: Normal breath sounds. No wheezing.   Abdominal:      General: Bowel sounds are normal. There is no distension.      Palpations: Abdomen is soft.      Tenderness: There is no abdominal tenderness.   Musculoskeletal: Normal range of motion.         General: No tenderness.   Lymphadenopathy:      Cervical: No cervical adenopathy.   Skin:     General: Skin is warm and dry.      Capillary Refill: Capillary refill takes less than 2 seconds.      Findings: No rash.   Neurological:      Mental Status: She is alert and oriented to person, place, and time.      Cranial Nerves: No cranial nerve deficit.      Sensory: No sensory deficit.      Coordination: Coordination normal.   Psychiatric:         Mood and Affect: Mood is anxious.         Behavior: Behavior normal.         Thought Content: Thought content normal.         Judgment: Judgment normal.           CRANIAL NERVES     CN III, IV, VI   Pupils are equal, round, and reactive to light.       Significant Labs:   BMP:   Recent Labs   Lab 09/20/20  1413   *      K 3.6      CO2 24   BUN 19   CREATININE 0.8   CALCIUM 10.1     CBC:   Recent Labs   Lab 09/20/20  1413   WBC 6.24   HGB 13.0   HCT 39.3        Troponin:   Recent Labs   Lab 09/20/20  1413 09/20/20  1823   TROPONINI 0.152* 0.119*       Significant Imaging: I have reviewed all pertinent imaging results/findings within the past 24 hours.

## 2020-09-20 NOTE — ED PROVIDER NOTES
"Encounter Date: 2020       History     Chief Complaint   Patient presents with    Hypertension     reports high blood pressure at home, 144-95, sob, "up all night with neck issues", no chest pain     Shortness of Breath     81-year-old past medical history of CAD s/p stents, CHF, type 2 diabetes, hypertension presenting with episode of shortness of breath this afternoon.  Patient mentions being unable to sleep throughout the evening due to chronic neck pain.  Patient awoke this morning to for an BP 140s/95, patient became concerned mentions having palpitations and shortness of breath during the time period.  Associated nausea.  Patient denies any chest pain, lightheadedness, headache, change in vision, new onset numbness tingling or weakness.            Review of patient's allergies indicates:   Allergen Reactions    Augmentin [amoxicillin-pot clavulanate] Other (See Comments)     Patient cannot recall what she had, only that she could not tolerate the Augmentin    Tramadol Itching    Tegretol [carbamazepine] Itching and Rash    Vicodin [hydrocodone-acetaminophen] Itching and Anxiety     Past Medical History:   Diagnosis Date    Coronary artery disease     Diabetes mellitus     Hypercholesteremia     Hypertension     IC (interstitial cystitis)     Numbness in both hands 2017    Spondylisthesis     Vulvodynia, unspecified      Past Surgical History:   Procedure Laterality Date    CARPAL TUNNEL RELEASE Right 2019    Procedure: RELEASE, CARPAL TUNNEL, REVISION;  Surgeon: Annabelle Cid MD;  Location: [facility] OR [address] [place] [address];  Service: Orthopedics;  Laterality: Right;  Axogen, Clarix     SECTION      x3    CORONARY ANGIOGRAPHY N/A 3/3/2020    Procedure: ANGIOGRAM, CORONARY ARTERY;  Surgeon: Markie Friend III, MD;  Location: [facility] CATH LAB;  Service: Cardiology;  Laterality: N/A;    CORONARY ANGIOGRAPHY N/A 2020    Procedure: ANGIOGRAM, CORONARY ARTERY;  Surgeon: Rodney" Gino Gamlbe MD;  Location: [facility] CATH LAB;  Service: Cardiology;  Laterality: N/A;    CYSTOSCOPY      HYSTERECTOMY      ischemic colitis      LEFT HEART CATHETERIZATION Left 3/3/2020    Procedure: Left heart cath;  Surgeon: Markie Friend III, MD;  Location: [facility] CATH LAB;  Service: Cardiology;  Laterality: Left;    LEFT HEART CATHETERIZATION Left 5/29/2020    Procedure: Left heart cath;  Surgeon: Rodney Gamble MD;  Location: [facility] CATH LAB;  Service: Cardiology;  Laterality: Left;    OOPHORECTOMY      AR REMOVAL OF OVARY/TUBE(S)       Family History   Problem Relation Age of Onset    Breast cancer Paternal Aunt     Colon cancer Neg Hx     Ovarian cancer Neg Hx      Social History     Tobacco Use    Smoking status: Never Smoker    Smokeless tobacco: Never Used   Substance Use Topics    Alcohol use: No    Drug use: No     Review of Systems   Constitutional: Negative for fever.   HENT: Negative for congestion.    Respiratory: Positive for shortness of breath. Negative for cough.    Cardiovascular: Negative for chest pain.   Gastrointestinal: Positive for nausea. Negative for abdominal pain.   Genitourinary: Negative for dysuria.   Skin: Negative for color change.   Neurological: Negative for dizziness.       Physical Exam     Initial Vitals [09/20/20 1329]   BP Pulse Resp Temp SpO2   (!) 167/95 106 20 98.4 °F (36.9 °C) 100 %      MAP       --         Physical Exam    Constitutional: She appears well-developed and well-nourished. She is not diaphoretic. No distress.   HENT:   Head: Normocephalic and atraumatic.   Eyes: Conjunctivae and EOM are normal. Pupils are equal, round, and reactive to light. Right eye exhibits no discharge. Left eye exhibits no discharge. No scleral icterus.   Neck: Normal range of motion. Neck supple.   Cardiovascular: Normal rate and regular rhythm. Exam reveals no friction rub.    No murmur heard.  Pulmonary/Chest: Breath sounds normal. No respiratory distress. She  has no wheezes. She has no rales.   Abdominal: Soft. Bowel sounds are normal. She exhibits no distension. There is no abdominal tenderness. There is no rebound and no guarding.   Musculoskeletal: Normal range of motion.   Neurological: She is alert and oriented to person, place, and time. No cranial nerve deficit or sensory deficit. GCS score is 15. GCS eye subscore is 4. GCS verbal subscore is 5. GCS motor subscore is 6.   Skin: Skin is warm and dry. No erythema. No pallor.         ED Course   Procedures  Labs Reviewed   COMPREHENSIVE METABOLIC PANEL - Abnormal; Notable for the following components:       Result Value    Glucose 125 (*)     All other components within normal limits   TROPONIN I - Abnormal; Notable for the following components:    Troponin I 0.152 (*)     All other components within normal limits   TROPONIN I - Abnormal; Notable for the following components:    Troponin I 0.119 (*)     All other components within normal limits   CBC W/ AUTO DIFFERENTIAL   SARS-COV-2 RNA AMPLIFICATION, QUAL   POCT GLUCOSE   POCT GLUCOSE MONITORING CONTINUOUS          Imaging Results          X-Ray Chest AP Portable (Final result)  Result time 09/20/20 14:35:14    Final result by Pedro Gilmore MD (09/20/20 14:35:14)                 Impression:      1. No acute cardiopulmonary process, hypoventilatory exam.      Electronically signed by: Pedro Gilmore MD  Date:    09/20/2020  Time:    14:35             Narrative:    EXAMINATION:  XR CHEST AP PORTABLE    CLINICAL HISTORY:  Shortness of breath    TECHNIQUE:  Single frontal view of the chest was performed.    COMPARISON:  03/20/2020    FINDINGS:  The cardiomediastinal silhouette is not enlarged.  There is no pleural effusion.  The trachea is midline.  The lungs are symmetrically expanded bilaterally without evidence of acute parenchymal process. No large focal consolidation seen.  There is no pneumothorax.  The osseous structures are remarkable for degenerative  changes..                                 Medical Decision Making:   History:   Old Medical Records: I decided to obtain old medical records.  Initial Assessment:   70-year-old past medical history of hypertension, CAD, type 2 diabetes presenting with acute episode of shortness of breath at 11:00 p.m. today associated with nausea.  /94.  PE unremarkable  Differential Diagnosis:   Ddx includes but is not limited to:   ACS, anxiety, pneumonia, pneumothorax, asthma , COPD. Considered PE however much lower likelihood at this time considering patient's history and presentation.    Clinical Tests:   Lab Tests: Ordered and Reviewed  Radiological Study: Ordered and Reviewed  Medical Tests: Ordered and Reviewed  ED Management:  Plan:  Obtain CBC, CMP troponin, chest x-ray.  Patient's presentations atypical for ACS however considering patient past medical history will obtain 2 troponins and reassess.                   ED Course as of Sep 20 2128   Sun Sep 20, 2020   1522 Spoke with cardiology, patient has history of scar.  Elect not to treat this as unstable angina.  Plan for continuation and DAPT therapy, BP contol and hospital observation for CP r/o.    [DC]      ED Course User Index  [DC] Prince Petty Jr., MD            Clinical Impression:       ICD-10-CM ICD-9-CM   1. Shortness of breath  R06.02 786.05   2. SOB (shortness of breath)  R06.02 786.05   3. Elevated troponin  R79.89 790.6                          ED Disposition Condition    Observation                             Prince Petty Jr., MD  09/20/20 2129

## 2020-09-20 NOTE — HPI
"Patient is a 69yo female with a PMHx of Covid 19 (diagnosed 8/14), HTN, severe CAD, HLD, and DM2 being admitted to observation for elevated troponin. Patient reports onset of shortness of breath this morning while she was talking to her daughter and checking her blood pressure and it was 145/95 which is "high" for her. She states that she was up all night with neck pain that is chronic for her. While talking to her daughter, she noted associated nausea, palpitations, and anxiety that worried her. She denies chest pain. She took all of her morning blood pressure medications on an empty stomach afterwards and she came to the ED for evaluation of her symptoms. Of note, patient was diagnosed with Covid-19 on 8/14 and has since been asymptomatic.    In the ED, /94. EKG with inverted T waves in inferior leads. Intake labs remarkable for troponin 0.152->0.119 (baseline higher previously). The ED discussed her case with Cardiology and felt that it was not ACS. She was admitted for troponin trending and overnight monitoring.  "

## 2020-09-21 VITALS
TEMPERATURE: 98 F | OXYGEN SATURATION: 98 % | HEIGHT: 64 IN | BODY MASS INDEX: 25.44 KG/M2 | HEART RATE: 56 BPM | WEIGHT: 149 LBS | SYSTOLIC BLOOD PRESSURE: 109 MMHG | DIASTOLIC BLOOD PRESSURE: 62 MMHG | RESPIRATION RATE: 20 BRPM

## 2020-09-21 PROBLEM — I50.42 CHRONIC COMBINED SYSTOLIC AND DIASTOLIC CONGESTIVE HEART FAILURE: Status: ACTIVE | Noted: 2020-05-15

## 2020-09-21 PROBLEM — Z86.16 HISTORY OF 2019 NOVEL CORONAVIRUS DISEASE (COVID-19): Status: ACTIVE | Noted: 2020-09-21

## 2020-09-21 LAB
ANION GAP SERPL CALC-SCNC: 12 MMOL/L (ref 8–16)
ASCENDING AORTA: 2.76 CM
AV INDEX (PROSTH): 0.91
AV MEAN GRADIENT: 3 MMHG
AV PEAK GRADIENT: 5 MMHG
AV VALVE AREA: 2.83 CM2
AV VELOCITY RATIO: 0.77
BASOPHILS # BLD AUTO: 0.04 K/UL (ref 0–0.2)
BASOPHILS NFR BLD: 0.7 % (ref 0–1.9)
BSA FOR ECHO PROCEDURE: 1.75 M2
BUN SERPL-MCNC: 23 MG/DL (ref 8–23)
CALCIUM SERPL-MCNC: 9.9 MG/DL (ref 8.7–10.5)
CHLORIDE SERPL-SCNC: 106 MMOL/L (ref 95–110)
CO2 SERPL-SCNC: 22 MMOL/L (ref 23–29)
CREAT SERPL-MCNC: 0.8 MG/DL (ref 0.5–1.4)
CV ECHO LV RWT: 0.25 CM
DIFFERENTIAL METHOD: ABNORMAL
DOP CALC AO PEAK VEL: 1.16 M/S
DOP CALC AO VTI: 19.05 CM
DOP CALC LVOT AREA: 3.1 CM2
DOP CALC LVOT DIAMETER: 1.99 CM
DOP CALC LVOT PEAK VEL: 0.89 M/S
DOP CALC LVOT STROKE VOLUME: 53.84 CM3
DOP CALCLVOT PEAK VEL VTI: 17.32 CM
E WAVE DECELERATION TIME: 212.93 MSEC
E/A RATIO: 0.6
E/E' RATIO: 8.83 M/S
ECHO LV POSTERIOR WALL: 0.69 CM (ref 0.6–1.1)
EOSINOPHIL # BLD AUTO: 0.2 K/UL (ref 0–0.5)
EOSINOPHIL NFR BLD: 3.4 % (ref 0–8)
ERYTHROCYTE [DISTWIDTH] IN BLOOD BY AUTOMATED COUNT: 14.4 % (ref 11.5–14.5)
EST. GFR  (AFRICAN AMERICAN): >60 ML/MIN/1.73 M^2
EST. GFR  (NON AFRICAN AMERICAN): >60 ML/MIN/1.73 M^2
FRACTIONAL SHORTENING: 22 % (ref 28–44)
GLUCOSE SERPL-MCNC: 76 MG/DL (ref 70–110)
HCT VFR BLD AUTO: 37.9 % (ref 37–48.5)
HGB BLD-MCNC: 12.2 G/DL (ref 12–16)
IMM GRANULOCYTES # BLD AUTO: 0.01 K/UL (ref 0–0.04)
IMM GRANULOCYTES NFR BLD AUTO: 0.2 % (ref 0–0.5)
INTERVENTRICULAR SEPTUM: 0.71 CM (ref 0.6–1.1)
IVRT: 119.89 MSEC
LA MAJOR: 4.67 CM
LA MINOR: 4.46 CM
LA WIDTH: 3.69 CM
LEFT ATRIUM SIZE: 3.6 CM
LEFT ATRIUM VOLUME INDEX: 29.8 ML/M2
LEFT ATRIUM VOLUME: 51.52 CM3
LEFT INTERNAL DIMENSION IN SYSTOLE: 4.37 CM (ref 2.1–4)
LEFT VENTRICLE DIASTOLIC VOLUME INDEX: 80.34 ML/M2
LEFT VENTRICLE DIASTOLIC VOLUME: 138.66 ML
LEFT VENTRICLE MASS INDEX: 81 G/M2
LEFT VENTRICLE SYSTOLIC VOLUME INDEX: 50 ML/M2
LEFT VENTRICLE SYSTOLIC VOLUME: 86.36 ML
LEFT VENTRICULAR INTERNAL DIMENSION IN DIASTOLE: 5.6 CM (ref 3.5–6)
LEFT VENTRICULAR MASS: 139.86 G
LV LATERAL E/E' RATIO: 7.57 M/S
LV SEPTAL E/E' RATIO: 10.6 M/S
LYMPHOCYTES # BLD AUTO: 2 K/UL (ref 1–4.8)
LYMPHOCYTES NFR BLD: 36.3 % (ref 18–48)
MAGNESIUM SERPL-MCNC: 1.4 MG/DL (ref 1.6–2.6)
MCH RBC QN AUTO: 28 PG (ref 27–31)
MCHC RBC AUTO-ENTMCNC: 32.2 G/DL (ref 32–36)
MCV RBC AUTO: 87 FL (ref 82–98)
MONOCYTES # BLD AUTO: 0.5 K/UL (ref 0.3–1)
MONOCYTES NFR BLD: 8.9 % (ref 4–15)
MV PEAK A VEL: 0.88 M/S
MV PEAK E VEL: 0.53 M/S
MV STENOSIS PRESSURE HALF TIME: 61.75 MS
MV VALVE AREA P 1/2 METHOD: 3.56 CM2
NEUTROPHILS # BLD AUTO: 2.8 K/UL (ref 1.8–7.7)
NEUTROPHILS NFR BLD: 50.5 % (ref 38–73)
NRBC BLD-RTO: 0 /100 WBC
PHOSPHATE SERPL-MCNC: 3.3 MG/DL (ref 2.7–4.5)
PLATELET # BLD AUTO: 142 K/UL (ref 150–350)
PMV BLD AUTO: 11.3 FL (ref 9.2–12.9)
POCT GLUCOSE: 129 MG/DL (ref 70–110)
POCT GLUCOSE: 81 MG/DL (ref 70–110)
POCT GLUCOSE: 96 MG/DL (ref 70–110)
POTASSIUM SERPL-SCNC: 3.5 MMOL/L (ref 3.5–5.1)
PULM VEIN S/D RATIO: 1.63
PV PEAK D VEL: 0.3 M/S
PV PEAK S VEL: 0.49 M/S
RA MAJOR: 4.36 CM
RA PRESSURE: 3 MMHG
RA WIDTH: 2.76 CM
RBC # BLD AUTO: 4.36 M/UL (ref 4–5.4)
RIGHT VENTRICULAR END-DIASTOLIC DIMENSION: 3.5 CM
RV TISSUE DOPPLER FREE WALL SYSTOLIC VELOCITY 1 (APICAL 4 CHAMBER VIEW): 13.68 CM/S
SINUS: 2.66 CM
SODIUM SERPL-SCNC: 140 MMOL/L (ref 136–145)
STJ: 2.13 CM
TDI LATERAL: 0.07 M/S
TDI SEPTAL: 0.05 M/S
TDI: 0.06 M/S
TRICUSPID ANNULAR PLANE SYSTOLIC EXCURSION: 2.4 CM
TROPONIN I SERPL DL<=0.01 NG/ML-MCNC: 0.14 NG/ML (ref 0–0.03)
TROPONIN I SERPL DL<=0.01 NG/ML-MCNC: 0.14 NG/ML (ref 0–0.03)
WBC # BLD AUTO: 5.59 K/UL (ref 3.9–12.7)

## 2020-09-21 PROCEDURE — 84100 ASSAY OF PHOSPHORUS: CPT

## 2020-09-21 PROCEDURE — 85025 COMPLETE CBC W/AUTO DIFF WBC: CPT

## 2020-09-21 PROCEDURE — 80048 BASIC METABOLIC PNL TOTAL CA: CPT

## 2020-09-21 PROCEDURE — 36415 COLL VENOUS BLD VENIPUNCTURE: CPT

## 2020-09-21 PROCEDURE — 84484 ASSAY OF TROPONIN QUANT: CPT

## 2020-09-21 PROCEDURE — 25000003 PHARM REV CODE 250: Performed by: PHYSICIAN ASSISTANT

## 2020-09-21 PROCEDURE — 83735 ASSAY OF MAGNESIUM: CPT

## 2020-09-21 PROCEDURE — 99217 PR OBSERVATION CARE DISCHARGE: ICD-10-PCS | Mod: ,,, | Performed by: PHYSICIAN ASSISTANT

## 2020-09-21 PROCEDURE — 99217 PR OBSERVATION CARE DISCHARGE: CPT | Mod: ,,, | Performed by: PHYSICIAN ASSISTANT

## 2020-09-21 PROCEDURE — G0378 HOSPITAL OBSERVATION PER HR: HCPCS

## 2020-09-21 PROCEDURE — 96374 THER/PROPH/DIAG INJ IV PUSH: CPT | Mod: 59

## 2020-09-21 PROCEDURE — 63600175 PHARM REV CODE 636 W HCPCS: Performed by: PHYSICIAN ASSISTANT

## 2020-09-21 RX ORDER — POTASSIUM CHLORIDE 20 MEQ/1
40 TABLET, EXTENDED RELEASE ORAL
Status: COMPLETED | OUTPATIENT
Start: 2020-09-21 | End: 2020-09-21

## 2020-09-21 RX ADMIN — ASPIRIN 81 MG CHEWABLE TABLET 81 MG: 81 TABLET CHEWABLE at 08:09

## 2020-09-21 RX ADMIN — SACUBITRIL AND VALSARTAN 1 TABLET: 49; 51 TABLET, FILM COATED ORAL at 08:09

## 2020-09-21 RX ADMIN — POTASSIUM CHLORIDE 40 MEQ: 1500 TABLET, EXTENDED RELEASE ORAL at 11:09

## 2020-09-21 RX ADMIN — MAGNESIUM SULFATE HEPTAHYDRATE 3 G: 500 INJECTION, SOLUTION INTRAMUSCULAR; INTRAVENOUS at 11:09

## 2020-09-21 RX ADMIN — DULOXETINE HYDROCHLORIDE 30 MG: 30 CAPSULE, DELAYED RELEASE ORAL at 08:09

## 2020-09-21 RX ADMIN — LEVOTHYROXINE SODIUM 50 MCG: 50 TABLET ORAL at 06:09

## 2020-09-21 RX ADMIN — BACLOFEN 20 MG: 10 TABLET ORAL at 08:09

## 2020-09-21 RX ADMIN — OXYBUTYNIN CHLORIDE 10 MG: 10 TABLET, EXTENDED RELEASE ORAL at 08:09

## 2020-09-21 RX ADMIN — ACETAMINOPHEN 650 MG: 325 TABLET ORAL at 12:09

## 2020-09-21 RX ADMIN — POTASSIUM CHLORIDE 40 MEQ: 1500 TABLET, EXTENDED RELEASE ORAL at 02:09

## 2020-09-21 RX ADMIN — CLOPIDOGREL 75 MG: 75 TABLET, FILM COATED ORAL at 08:09

## 2020-09-21 RX ADMIN — METOPROLOL SUCCINATE 25 MG: 25 TABLET, EXTENDED RELEASE ORAL at 08:09

## 2020-09-21 NOTE — NURSING
D/C instructions reviewed with the pt and significant other, verbalized understanding, IV taken out, belongings are with the pt.

## 2020-09-21 NOTE — H&P
"[facility] Hospital Medicine  History & Physical    Patient Name: Niki Soriano  MRN: [id number]  Admission Date: 9/20/2020  Attending Physician: Jasson Truong MD   Primary Care Provider: Moises Webster MD    [facility] Medicine Team: [facility] HOSP MED ОЛЬГА Staton PA-C     Patient information was obtained from patient, past medical records and ER records.     Subjective:     Principal Problem:Elevated troponin    Chief Complaint:   Chief Complaint   Patient presents with    Hypertension     reports high blood pressure at home, 144-95, sob, "up all night with neck issues", no chest pain     Shortness of Breath        HPI: Patient is a 71yo female with a PMHx of Covid 19 (diagnosed 8/14), HTN, severe CAD, HLD, and DM2 being admitted to observation for elevated troponin. Patient reports onset of shortness of breath this morning while she was talking to her daughter and checking her blood pressure and it was 145/95 which is "high" for her. She states that she was up all night with neck pain that is chronic for her. While talking to her daughter, she noted associated nausea, palpitations, and anxiety that worried her. She denies chest pain. She took all of her morning blood pressure medications on an empty stomach afterwards and she came to the ED for evaluation of her symptoms. Of note, patient was diagnosed with Covid-19 on 8/14 and has since been asymptomatic.    In the ED, /94. EKG with inverted T waves in inferior leads. Intake labs remarkable for troponin 0.152->0.119 (baseline higher previously). The ED discussed her case with Cardiology and felt that it was not ACS. She was admitted for troponin trending and overnight monitoring.    Past Medical History:   Diagnosis Date    Coronary artery disease     Diabetes mellitus     Hypercholesteremia     Hypertension     IC (interstitial cystitis)     Numbness in both hands 11/1/2017    Spondylisthesis     Vulvodynia, unspecified  "       Past Surgical History:   Procedure Laterality Date    CARPAL TUNNEL RELEASE Right 2019    Procedure: RELEASE, CARPAL TUNNEL, REVISION;  Surgeon: Annabelle Cid MD;  Location: [facility] OR [address] [place] [address];  Service: Orthopedics;  Laterality: Right;  Axogen, Clarix     SECTION      x3    CORONARY ANGIOGRAPHY N/A 3/3/2020    Procedure: ANGIOGRAM, CORONARY ARTERY;  Surgeon: Markie Friend III, MD;  Location: [facility] CATH LAB;  Service: Cardiology;  Laterality: N/A;    CORONARY ANGIOGRAPHY N/A 2020    Procedure: ANGIOGRAM, CORONARY ARTERY;  Surgeon: Rodney Gamble MD;  Location: [facility] CATH LAB;  Service: Cardiology;  Laterality: N/A;    CYSTOSCOPY      HYSTERECTOMY      ischemic colitis      LEFT HEART CATHETERIZATION Left 3/3/2020    Procedure: Left heart cath;  Surgeon: Markie Friend III, MD;  Location: [facility] CATH LAB;  Service: Cardiology;  Laterality: Left;    LEFT HEART CATHETERIZATION Left 2020    Procedure: Left heart cath;  Surgeon: Rodney Gamble MD;  Location: [facility] CATH LAB;  Service: Cardiology;  Laterality: Left;    OOPHORECTOMY      DE REMOVAL OF OVARY/TUBE(S)         Review of patient's allergies indicates:   Allergen Reactions    Augmentin [amoxicillin-pot clavulanate] Other (See Comments)     Patient cannot recall what she had, only that she could not tolerate the Augmentin    Tramadol Itching    Tegretol [carbamazepine] Itching and Rash    Vicodin [hydrocodone-acetaminophen] Itching and Anxiety       No current facility-administered medications on file prior to encounter.      Current Outpatient Medications on File Prior to Encounter   Medication Sig    aspirin 81 MG Chew Take 1 tablet (81 mg total) by mouth once daily. (Patient taking differently: Take 81 mg by mouth every evening. )    atorvastatin (LIPITOR) 40 MG tablet Take 80 mg by mouth every evening.     b complex vitamins capsule Take 1 capsule by mouth once daily.    BIOTIN ORAL Take by  mouth.    clopidogreL (PLAVIX) 75 mg tablet Take 1 tablet (75 mg total) by mouth once daily. Take 8 pills today, then one pill daily    duloxetine (CYMBALTA) 30 MG capsule Take 30 mg by mouth once daily.    gabapentin (NEURONTIN) 300 MG capsule Take 1 capsule (300 mg total) by mouth 3 (three) times daily. (Patient taking differently: Take 300 mg by mouth every evening. )    Lactobac no.51/Bifidobact no.4 (UP4 PROBIOTICS ULTRA ORAL) Take by mouth.    LEVOTHYROXINE SODIUM (LEVOTHYROXINE ORAL) Take 50 mcg by mouth before breakfast.     metformin (GLUCOPHAGE) 500 MG tablet Take 500 mg by mouth once daily.     metoprolol succinate (TOPROL-XL) 25 MG 24 hr tablet Take 1 tablet (25 mg total) by mouth once daily.    multivitamin (ONE DAILY MULTIVITAMIN) per tablet Take 1 tablet by mouth once daily.    omega-3 fatty acids/fish oil (FISH OIL-OMEGA-3 FATTY ACIDS) 300-1,000 mg capsule Take by mouth once daily.    oxybutynin (DITROPAN-XL) 10 MG 24 hr tablet Take 1 tablet (10 mg total) by mouth once daily.    sacubitriL-valsartan (ENTRESTO) 49-51 mg per tablet Take 1 tablet by mouth 2 (two) times daily.    baclofen (LIORESAL) 20 MG tablet Take 20 mg by mouth 3 (three) times daily as needed.     docusate sodium (STOOL SOFTENER ORAL) Take by mouth.    estradiol (ESTRACE) 0.01 % (0.1 mg/gram) vaginal cream PLEASE SEE ATTACHED FOR DETAILED DIRECTIONS (Patient not taking: Reported on 6/18/2020)    furosemide (LASIX) 40 MG tablet TAKE 1/2 TABLET BY MOUTH DAILY AS NEED FOR SWELLING OR WEIGHT GAIN    methenamine (HIPREX) 1 gram Tab Take 1 tablet (1 g total) by mouth 2 (two) times daily. (Patient not taking: Reported on 6/18/2020)     Family History     Problem Relation (Age of Onset)    Breast cancer Paternal Aunt        Tobacco Use    Smoking status: Never Smoker    Smokeless tobacco: Never Used   Substance and Sexual Activity    Alcohol use: No    Drug use: No    Sexual activity: Yes     Partners: Male     Review of  Systems   Constitutional: Negative for activity change, chills and fever.   HENT: Negative for trouble swallowing.    Eyes: Negative for photophobia and visual disturbance.   Respiratory: Positive for shortness of breath. Negative for cough and chest tightness.    Cardiovascular: Negative for chest pain, palpitations and leg swelling.   Gastrointestinal: Positive for nausea. Negative for abdominal pain, constipation, diarrhea and vomiting.   Genitourinary: Negative for dysuria, frequency and hematuria.   Musculoskeletal: Negative for back pain, gait problem and neck pain.   Skin: Negative for rash and wound.   Neurological: Negative for dizziness, syncope, speech difficulty and light-headedness.   Psychiatric/Behavioral: Negative for agitation and confusion. The patient is nervous/anxious.      Objective:     Vital Signs (Most Recent):  Temp: 98.4 °F (36.9 °C) (09/20/20 1329)  Pulse: 64 (09/20/20 1445)  Resp: 20 (09/20/20 1329)  BP: (!) 154/82 (09/20/20 1445)  SpO2: 95 % (09/20/20 1445) Vital Signs (24h Range):  Temp:  [98.4 °F (36.9 °C)] 98.4 °F (36.9 °C)  Pulse:  [] 64  Resp:  [20] 20  SpO2:  [95 %-100 %] 95 %  BP: (154-181)/(82-95) 154/82     Weight: 68.5 kg (151 lb)  Body mass index is 25.92 kg/m².    Physical Exam  Vitals signs and nursing note reviewed.   Constitutional:       General: She is not in acute distress.     Appearance: She is well-developed.   HENT:      Head: Normocephalic and atraumatic.      Mouth/Throat:      Pharynx: No oropharyngeal exudate.   Eyes:      Conjunctiva/sclera: Conjunctivae normal.      Pupils: Pupils are equal, round, and reactive to light.   Neck:      Musculoskeletal: Normal range of motion and neck supple.   Cardiovascular:      Rate and Rhythm: Normal rate and regular rhythm.      Heart sounds: Normal heart sounds.   Pulmonary:      Effort: Pulmonary effort is normal. No respiratory distress.      Breath sounds: Normal breath sounds. No wheezing.   Abdominal:       General: Bowel sounds are normal. There is no distension.      Palpations: Abdomen is soft.      Tenderness: There is no abdominal tenderness.   Musculoskeletal: Normal range of motion.         General: No tenderness.   Lymphadenopathy:      Cervical: No cervical adenopathy.   Skin:     General: Skin is warm and dry.      Capillary Refill: Capillary refill takes less than 2 seconds.      Findings: No rash.   Neurological:      Mental Status: She is alert and oriented to person, place, and time.      Cranial Nerves: No cranial nerve deficit.      Sensory: No sensory deficit.      Coordination: Coordination normal.   Psychiatric:         Mood and Affect: Mood is anxious.         Behavior: Behavior normal.         Thought Content: Thought content normal.         Judgment: Judgment normal.           CRANIAL NERVES     CN III, IV, VI   Pupils are equal, round, and reactive to light.       Significant Labs:   BMP:   Recent Labs   Lab 09/20/20  1413   *      K 3.6      CO2 24   BUN 19   CREATININE 0.8   CALCIUM 10.1     CBC:   Recent Labs   Lab 09/20/20  1413   WBC 6.24   HGB 13.0   HCT 39.3        Troponin:   Recent Labs   Lab 09/20/20  1413 09/20/20  1823   TROPONINI 0.152* 0.119*       Significant Imaging: I have reviewed all pertinent imaging results/findings within the past 24 hours.    Assessment/Plan:     * Elevated troponin  SOB    Patient admitted with shortness of breath that started overnight and has since resolved. Patient reported associated hunger pains and mild nausea. ED discussed with Cardiology, felt to be demand ischemia in setting of elevated BP readings. Blood pressure better controlled. She denies chest pain.  - EKG with TWI in inferior leads  - troponin 0.152->0.119, trend for 3  - CXR unremarkable  - continue ASA, plavix  - telemetry  - ECHO in AM  - if troponin increases, will consult Cardiology    CAD (coronary artery disease)  Angiogram 5/29/20:  · Successful PCI of 75%  mid LCX stenosis with 2.5X12 MANDEEP  · Successful PCI of 80% OM2 stenosis with 3X15 MANDEEP  · Successful PCI of mid RCA  with 4.5X22 & 4.0X26 MANDEEP  · Successful PCI of proximal PLB  with 3X22 MANDEEP    PET Viability Study 7/29/20  ·  There is scar (no viability) in the base to distal septal, inferoapical and apical septal walls comprising of 18% of myocardium. (LAD territory)  · Gated perfusion images showed an ejection fraction of 46%. Normal ejection fraction is greater than 55%.    Patient has had significant angioplasty in May with prior ischemia and significantly higher troponins than current presentation.  - see elevated troponin    Ischemic cardiomyopathy  Chronic systolic and diastolic heart failure    - continue MTP XL 25mg daily  - continue entresto  - strict Is/Os, daily weights  - last ECHO 3/20 with EF 25%  - ECHO pending    History of 2019 novel coronavirus disease (COVID-19)  Diagnosed at [facility] on 8/14/20, asymptomatic    - Covid rapid negative 9/20    Hypothyroidism  - continue synthroid    Type 2 diabetes mellitus with neurologic complication  - hold home metformin  - low dose SSI ACHS  - diabetic cardiac diet    VTE Risk Mitigation (From admission, onward)         Ordered     IP VTE HIGH RISK PATIENT  Once      09/20/20 1616     Place sequential compression device  Until discontinued      09/20/20 1616                   Erin Staton PA-C  Department of Hospital Medicine   [facility]

## 2020-09-21 NOTE — PLAN OF CARE
09/21/20 1442   Final Note   Assessment Type Final Discharge Note   Anticipated Discharge Disposition Home   What phone number can be called within the next 1-3 days to see how you are doing after discharge? [phone]   Discharge plans and expectations educations in teach back method with documentation complete? Yes   Right Care Referral Info   Post Acute Recommendation No Care   Post-Acute Status   Post-Acute Authorization Other   Other Status No Post-Acute Service Needs     Future Appointments   Date Time Provider Department Center   10/7/2020  8:00 AM Karen Angulo MD [facility] CARDIO [facility]

## 2020-09-21 NOTE — ASSESSMENT & PLAN NOTE
SOB    Patient admitted with shortness of breath that started overnight and has since resolved. Patient reported associated hunger pains and mild nausea. ED discussed with Cardiology, felt to be demand ischemia in setting of elevated BP readings. Blood pressure better controlled. She denies chest pain.  - EKG with TWI in inferior leads  - troponin 0.152->0.119, trend for 3  - CXR unremarkable  - continue ASA, plavix  - telemetry  - ECHO in AM  - if troponin increases, will consult Cardiology

## 2020-09-21 NOTE — ASSESSMENT & PLAN NOTE
Chronic systolic and diastolic heart failure    - continue MTP XL 25mg daily  - continue entresto  - strict Is/Os, daily weights  - last ECHO 3/20 with EF 25%  - ECHO pending

## 2020-09-21 NOTE — ASSESSMENT & PLAN NOTE
Patient has had significant angioplasty in May with prior ischemia and significantly higher troponins than current presentation.  - see elevated troponin

## 2020-09-21 NOTE — PLAN OF CARE
Problem: Fall Injury Risk  Goal: Absence of Fall and Fall-Related Injury  Outcome: Ongoing, Progressing     Problem: Infection  Goal: Infection Symptom Resolution  Outcome: Ongoing, Progressing     Problem: Diabetes Comorbidity  Goal: Blood Glucose Level Within Desired Range  Outcome: Ongoing, Progressing     Problem: Electrolyte Imbalance (Acute Kidney Injury/Impairment)  Goal: Serum Electrolyte Balance  Outcome: Ongoing, Progressing     Problem: Fluid Imbalance (Acute Kidney Injury/Impairment)  Goal: Optimal Fluid Balance  Outcome: Ongoing, Progressing

## 2020-09-21 NOTE — ED NOTES
Telemetry Verification   Patient placed on Telemetry Box  Verified with War Room  Box # [id number]   Rate 76   Rhythm NS

## 2020-09-21 NOTE — PLAN OF CARE
SW spoke to patient. Patient verified information on FS. Patient was calm and cooperative during dc planning assessment. Patient denies use of illicit drugs/ETOH. Patient lives at home with her spouse. There are 3 steps to enter the home. Patient has family supports at home at DC. Patient has transportation at DC. SW/CM to follow and assist with needs as indicated.     Moises Webster MD    [id number] - [place], [place] - [address]  [address] [address]  Phone: [phone] Fax: [phone]       09/21/20 1202   Discharge Assessment   Assessment Type Discharge Planning Assessment   Confirmed/corrected address and phone number on facesheet? Yes   Assessment information obtained from? Patient   Expected Length of Stay (days) 1   Communicated expected length of stay with patient/caregiver yes   Prior to hospitilization cognitive status: Alert/Oriented   Prior to hospitalization functional status: Independent;Needs Assistance   Current cognitive status: Alert/Oriented   Current Functional Status: Independent;Needs Assistance   Lives With spouse   Able to Return to Prior Arrangements yes   Is patient able to care for self after discharge? Yes   Patient's perception of discharge disposition home or selfcare   Patient currently being followed by outpatient case management? No   Patient currently receives any other outside agency services? No   Equipment Currently Used at Home walker, rolling   Do you have any problems affording any of your prescribed medications? No   Is the patient taking medications as prescribed? yes   Does the patient have transportation home? Yes   Transportation Anticipated family or friend will provide   Does the patient receive services at the [facility]? No   Discharge Plan A Home   Discharge Plan B Home   DME Needed Upon Discharge  none   Patient/Family in Agreement with Plan yes       Delmis Chen LMSW  [facility]  [place]  [id number]

## 2020-09-21 NOTE — NURSING
At 2045 patient admitted to Room 704.  AAO x 4.  VSS.  Afebrile.  Patient stated she has chronic neck pain, knee pain and sciatica.  States the only thing she can take for the pain is Tylenol.  Denies chest pain or shortess of breath.  Patient catherize herself 6 - 7 times a day in a 24 hour period.  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  No complaints of numbness or tingling in her lower extremities.  At 2200 assisted patient on and off the bedside commode, voided 300 cc of strong smelling yellow urine.   States her last bowel movement was the 18 Sep.  At 2218 Melatonin given by moth for restlessness.   2223 accu-check was 96.  At 2300 Fall and Allergy bracelet applied.  At 0013 Tylenol given by mouth for complaint of pain (5 on the pain scale) to the neck, both knees, and sciatica pain.  Ice pack applied to her neck.  At 0600 stated the ice pack worked wonders, the pain is gone.Patient stated she can not stand on the scale to be weighed.  Bed scale weight was 66.9 kg = 149.38 lbs.  Patient stated that is what she weighed yesterday.

## 2020-09-22 NOTE — ASSESSMENT & PLAN NOTE
SOB    Patient admitted with shortness of breath that started overnight and has since resolved. Patient reported associated hunger pains and mild nausea. ED discussed with Cardiology, felt to be demand ischemia in setting of elevated BP readings. Blood pressure better controlled. She denies chest pain.  - EKG with TWI in inferior leads  - troponin 0.152->0.119  - CXR unremarkable  - continue ASA, plavix  - telemetry  - ECHO with EF 40%, stable from previous  - follow up with Cardiology outpatient, stable for discharge

## 2020-09-22 NOTE — HOSPITAL COURSE
Patient admitted to observation for elevated troponin. Troponin trended 0.152->0.119. Patient denied chest pain, shortness of breath, and nausea. Case discussed with Cardiology in the ED, not felt to be ACS. ECHO with stable EF 40%. Patient stable for discharge with Cardiology follow up as outpatient.

## 2020-09-22 NOTE — ASSESSMENT & PLAN NOTE
Chronic systolic and diastolic heart failure    - continue MTP XL 25mg daily  - continue entresto  - strict Is/Os, daily weights  - last ECHO 3/20 with EF 25%  - ECHO with 40%

## 2020-09-22 NOTE — DISCHARGE SUMMARY
"[facility] Hospital Medicine  Discharge Summary      Patient Name: Niki Soriano  MRN: [id number]  Admission Date: 9/20/2020  Hospital Length of Stay: 0 days  Discharge Date and Time: 9/21/2020  4:05 PM  Attending Physician: No att. providers found   Discharging Provider: Erin Staton PA-C  Primary Care Provider: Moises Webster MD  [facility] Medicine Team: [facility] J Erin Staton PA-C    HPI:   Patient is a 69yo female with a PMHx of Covid 19 (diagnosed 8/14), HTN, severe CAD, HLD, and DM2 being admitted to observation for elevated troponin. Patient reports onset of shortness of breath this morning while she was talking to her daughter and checking her blood pressure and it was 145/95 which is "high" for her. She states that she was up all night with neck pain that is chronic for her. While talking to her daughter, she noted associated nausea, palpitations, and anxiety that worried her. She denies chest pain. She took all of her morning blood pressure medications on an empty stomach afterwards and she came to the ED for evaluation of her symptoms. Of note, patient was diagnosed with Covid-19 on 8/14 and has since been asymptomatic.    In the ED, /94. EKG with inverted T waves in inferior leads. Intake labs remarkable for troponin 0.152->0.119 (baseline higher previously). The ED discussed her case with Cardiology and felt that it was not ACS. She was admitted for troponin trending and overnight monitoring.    * No surgery found *      Hospital Course:   Patient admitted to observation for elevated troponin. Troponin trended 0.152->0.119. Patient denied chest pain, shortness of breath, and nausea. Case discussed with Cardiology in the ED, not felt to be ACS. ECHO with stable EF 40%. Patient stable for discharge with Cardiology follow up as outpatient.     Consults:     * Elevated troponin  SOB    Patient admitted with shortness of breath that started overnight and has since " resolved. Patient reported associated hunger pains and mild nausea. ED discussed with Cardiology, felt to be demand ischemia in setting of elevated BP readings. Blood pressure better controlled. She denies chest pain.  - EKG with TWI in inferior leads  - troponin 0.152->0.119  - CXR unremarkable  - continue ASA, plavix  - telemetry  - ECHO with EF 40%, stable from previous  - follow up with Cardiology outpatient, stable for discharge    CAD (coronary artery disease)  Patient has had significant angioplasty in May with prior ischemia and significantly higher troponins than current presentation.  - see elevated troponin    Ischemic cardiomyopathy  Chronic systolic and diastolic heart failure    - continue MTP XL 25mg daily  - continue entresto  - strict Is/Os, daily weights  - last ECHO 3/20 with EF 25%  - ECHO with 40%    History of 2019 novel coronavirus disease (COVID-19)  - Covid rapid negative 9/20    Hypothyroidism  - continue synthroid    Type 2 diabetes mellitus with neurologic complication  - hold home metformin  - low dose SSI ACHS  - diabetic cardiac diet      Final Active Diagnoses:    Diagnosis Date Noted POA    PRINCIPAL PROBLEM:  Elevated troponin [R79.89] 09/20/2020 Yes    CAD (coronary artery disease) [I25.10] 03/04/2020 Yes    Ischemic cardiomyopathy [I25.5] 05/01/2020 Yes    History of 2019 novel coronavirus disease (COVID-19) [Z86.19] 09/21/2020 No    Chronic combined systolic and diastolic congestive heart failure [I50.42] 05/15/2020 Yes    Coronary artery disease involving native coronary artery of native heart without angina pectoris [I25.10] 05/01/2020 Yes    Shortness of breath [R06.02] 03/01/2020 Yes    Type 2 diabetes mellitus with neurologic complication [E11.49] 11/01/2017 Yes    Hypothyroidism [E03.9] 11/01/2017 Yes      Problems Resolved During this Admission:       Discharged Condition: good    Disposition: Home or Self Care    Follow Up:  Follow-up Information     Amandeep KELLER  MD Shane.    Specialty: Cardiology  Why: Follow up as scheduled  Contact information:  [address] MAE  [facility] [address]  [phone]                 Patient Instructions:      Ambulatory referral/consult to Cardiology   Standing Status: Future   Referral Priority: Routine Referral Type: Consultation   Referral Reason: Specialty Services Required   Requested Specialty: Cardiology   Number of Visits Requested: 1     Diet diabetic     Diet Cardiac     Notify your health care provider if you experience any of the following:  severe uncontrolled pain     Notify your health care provider if you experience any of the following:  persistent dizziness, light-headedness, or visual disturbances     Notify your health care provider if you experience any of the following:  difficulty breathing or increased cough     Activity as tolerated       Significant Diagnostic Studies: Labs: All labs within the past 24 hours have been reviewed    Pending Diagnostic Studies:     None         Medications:  Reconciled Home Medications:      Medication List      CHANGE how you take these medications    aspirin 81 MG Chew  Take 1 tablet (81 mg total) by mouth once daily.  What changed: when to take this     gabapentin 300 MG capsule  Commonly known as: NEURONTIN  Take 1 capsule (300 mg total) by mouth 3 (three) times daily.  What changed: when to take this        CONTINUE taking these medications    atorvastatin 40 MG tablet  Commonly known as: LIPITOR  Take 80 mg by mouth every evening.     b complex vitamins capsule  Take 1 capsule by mouth once daily.     baclofen 20 MG tablet  Commonly known as: LIORESAL  Take 20 mg by mouth 3 (three) times daily as needed.     BIOTIN ORAL  Take by mouth.     clopidogreL 75 mg tablet  Commonly known as: PLAVIX  Take 1 tablet (75 mg total) by mouth once daily. Take 8 pills today, then one pill daily     DULoxetine 30 MG capsule  Commonly known as: CYMBALTA  Take 30 mg by mouth once daily.      estradioL 0.01 % (0.1 mg/gram) vaginal cream  Commonly known as: ESTRACE  PLEASE SEE ATTACHED FOR DETAILED DIRECTIONS     fish oil-omega-3 fatty acids 300-1,000 mg capsule  Take by mouth once daily.     furosemide 40 MG tablet  Commonly known as: LASIX  TAKE 1/2 TABLET BY MOUTH DAILY AS NEED FOR SWELLING OR WEIGHT GAIN     LEVOTHYROXINE ORAL  Take 50 mcg by mouth before breakfast.     metFORMIN 500 MG tablet  Commonly known as: GLUCOPHAGE  Take 500 mg by mouth once daily.     methenamine 1 gram Tab  Commonly known as: HIPREX  Take 1 tablet (1 g total) by mouth 2 (two) times daily.     metoprolol succinate 25 MG 24 hr tablet  Commonly known as: TOPROL-XL  Take 1 tablet (25 mg total) by mouth once daily.     ONE DAILY MULTIVITAMIN per tablet  Generic drug: multivitamin  Take 1 tablet by mouth once daily.     oxybutynin 10 MG 24 hr tablet  Commonly known as: DITROPAN-XL  Take 1 tablet (10 mg total) by mouth once daily.     sacubitriL-valsartan 49-51 mg per tablet  Commonly known as: ENTRESTO  Take 1 tablet by mouth 2 (two) times daily.     STOOL SOFTENER ORAL  Take by mouth.     UP4 PROBIOTICS ULTRA ORAL  Take by mouth.            Indwelling Lines/Drains at time of discharge:   Lines/Drains/Airways     None                 Time spent on the discharge of patient: 32 minutes  Patient was seen and examined on the date of discharge and determined to be suitable for discharge.         Erin Staton PA-C  Department of Hospital Medicine  [facility]

## 2020-11-25 ENCOUNTER — OFFICE VISIT (OUTPATIENT)
Dept: CARDIOLOGY | Facility: CLINIC | Age: 70
End: 2020-11-25
Payer: MEDICARE

## 2020-11-25 VITALS
WEIGHT: 155.44 LBS | DIASTOLIC BLOOD PRESSURE: 70 MMHG | BODY MASS INDEX: 26.54 KG/M2 | HEIGHT: 64 IN | SYSTOLIC BLOOD PRESSURE: 118 MMHG | OXYGEN SATURATION: 97 % | HEART RATE: 67 BPM

## 2020-11-25 DIAGNOSIS — E78.5 DYSLIPIDEMIA: ICD-10-CM

## 2020-11-25 DIAGNOSIS — E78.5 HYPERLIPIDEMIA, UNSPECIFIED HYPERLIPIDEMIA TYPE: ICD-10-CM

## 2020-11-25 DIAGNOSIS — R79.89 ELEVATED TROPONIN: ICD-10-CM

## 2020-11-25 DIAGNOSIS — I25.10 CORONARY ARTERY DISEASE INVOLVING NATIVE CORONARY ARTERY OF NATIVE HEART WITHOUT ANGINA PECTORIS: ICD-10-CM

## 2020-11-25 DIAGNOSIS — I25.5 ISCHEMIC CARDIOMYOPATHY: ICD-10-CM

## 2020-11-25 DIAGNOSIS — I50.9 CONGESTIVE HEART FAILURE, UNSPECIFIED HF CHRONICITY, UNSPECIFIED HEART FAILURE TYPE: Primary | ICD-10-CM

## 2020-11-25 PROCEDURE — 99999 PR PBB SHADOW E&M-EST. PATIENT-LVL V: ICD-10-PCS | Mod: PBBFAC,GC,, | Performed by: STUDENT IN AN ORGANIZED HEALTH CARE EDUCATION/TRAINING PROGRAM

## 2020-11-25 PROCEDURE — 1159F PR MEDICATION LIST DOCUMENTED IN MEDICAL RECORD: ICD-10-PCS | Mod: GC,S$GLB,, | Performed by: STUDENT IN AN ORGANIZED HEALTH CARE EDUCATION/TRAINING PROGRAM

## 2020-11-25 PROCEDURE — 99999 PR PBB SHADOW E&M-EST. PATIENT-LVL V: CPT | Mod: PBBFAC,GC,, | Performed by: STUDENT IN AN ORGANIZED HEALTH CARE EDUCATION/TRAINING PROGRAM

## 2020-11-25 PROCEDURE — 1159F MED LIST DOCD IN RCRD: CPT | Mod: GC,S$GLB,, | Performed by: STUDENT IN AN ORGANIZED HEALTH CARE EDUCATION/TRAINING PROGRAM

## 2020-11-25 PROCEDURE — 99214 OFFICE O/P EST MOD 30 MIN: CPT | Mod: GC,S$GLB,, | Performed by: STUDENT IN AN ORGANIZED HEALTH CARE EDUCATION/TRAINING PROGRAM

## 2020-11-25 PROCEDURE — 99214 PR OFFICE/OUTPT VISIT, EST, LEVL IV, 30-39 MIN: ICD-10-PCS | Mod: GC,S$GLB,, | Performed by: STUDENT IN AN ORGANIZED HEALTH CARE EDUCATION/TRAINING PROGRAM

## 2020-11-25 RX ORDER — SACUBITRIL AND VALSARTAN 97; 103 MG/1; MG/1
1 TABLET, FILM COATED ORAL 2 TIMES DAILY
Qty: 180 TABLET | Refills: 3 | Status: SHIPPED | OUTPATIENT
Start: 2020-11-25 | End: 2021-12-02 | Stop reason: SDUPTHER

## 2020-11-25 RX ORDER — EZETIMIBE 10 MG/1
10 TABLET ORAL DAILY
Qty: 90 TABLET | Refills: 3 | Status: SHIPPED | OUTPATIENT
Start: 2020-11-25 | End: 2021-11-23 | Stop reason: SDUPTHER

## 2020-11-25 RX ORDER — ROSUVASTATIN CALCIUM 40 MG/1
40 TABLET, COATED ORAL NIGHTLY
Qty: 90 TABLET | Refills: 3 | Status: SHIPPED | OUTPATIENT
Start: 2020-11-25 | End: 2021-12-02 | Stop reason: SDUPTHER

## 2020-11-25 NOTE — ASSESSMENT & PLAN NOTE
- LDL 90 -> 101 on latest OSH Lipid panel  - Switch Lipitor to Crestor and add Ezetimibe  - Low fried diet

## 2020-11-25 NOTE — PROGRESS NOTES
Subjective:    Patient ID:  Niki Soriano is a 70 y.o. female who presents for follow-up of Coronary Artery Disease      HPI  71 yo F with PMH CAD s/p  PCI of RCA and LCX PCI, LAD  not done due to no viability, ICM EF 40%, HTN, DM, HLD, Peripeheral neuropathy with pinched nerve. She denies chest pain or dyspnea. She is doing well overall but has higher lipids despite lipitor 80, diet is more healthy.    Review of Systems   Constitution: Negative for chills, decreased appetite and diaphoresis.   HENT: Negative for congestion and ear discharge.    Eyes: Negative for blurred vision and discharge.   Cardiovascular: Negative for chest pain, dyspnea on exertion, irregular heartbeat, leg swelling and paroxysmal nocturnal dyspnea.   Respiratory: Negative for cough, hemoptysis and shortness of breath.    Gastrointestinal: Negative for abdominal pain.        Objective:    Physical Exam   Constitutional: She is oriented to person, place, and time. She appears well-developed and well-nourished. No distress.   Eyes: Pupils are equal, round, and reactive to light. Conjunctivae are normal.   Neck: No tracheal deviation present. No thyromegaly present.   Cardiovascular: Normal rate, regular rhythm, normal heart sounds and intact distal pulses. Exam reveals no gallop and no friction rub.   No murmur heard.  Pulses:       Radial pulses are 2+ on the right side and 2+ on the left side.        Femoral pulses are 2+ on the right side and 2+ on the left side.  Pulmonary/Chest: Effort normal and breath sounds normal. No respiratory distress. She has no wheezes. She has no rales.   Abdominal: Soft. Bowel sounds are normal. She exhibits no distension. There is no abdominal tenderness.   Musculoskeletal:         General: No deformity or edema.   Neurological: She is alert and oriented to person, place, and time. No cranial nerve deficit. Coordination normal.   Skin: Skin is warm and dry. She is not diaphoretic.   Psychiatric: She has a  normal mood and affect. Her behavior is normal.         Assessment:       1. Congestive heart failure, unspecified HF chronicity, unspecified heart failure type    2. Elevated troponin    3. Hyperlipidemia, unspecified hyperlipidemia type    4. Coronary artery disease involving native coronary artery of native heart without angina pectoris    5. Ischemic cardiomyopathy    6. Dyslipidemia         Plan:       CAD (coronary artery disease)  - Stable, no chest pain  - Cont DAPT, BB, increase statins      Ischemic cardiomyopathy  - Stable, no dyspnea, euvolemic  - Has Lasix If she needs it  - Increased Entresto to 97/103 josh antonio me her BP readings for a week    Dyslipidemia  - LDL 90 -> 101 on latest OSH Lipid panel  - Switch Lipitor to Crestor and add Ezetimibe  - Low fried diet

## 2020-11-25 NOTE — ASSESSMENT & PLAN NOTE
- Stable, no dyspnea, euvolemic  - Has Lasix If she needs it  - Increased Entresto to 97/103 josh alvarez me her BP readings for a week

## 2020-11-30 NOTE — PROGRESS NOTES
I have seen the patient, reviewed the Fellow's history and physical, assessment, and plan. I have personally interviewed and examined the patient at bedside and agree with the findings.         MD Jarrett Sheikh donavan-Cardiology [facility] 3rd Floor

## 2020-12-01 NOTE — PROGRESS NOTES
Family History   Problem Relation Age of Onset    Breast cancer Paternal Aunt     Colon cancer Neg Hx     Ovarian cancer Neg Hx      Social History     Tobacco Use    Smoking status: Never Smoker    Smokeless tobacco: Never Used   Substance Use Topics    Alcohol use: No    Drug use: No

## 2021-01-04 ENCOUNTER — LAB VISIT (OUTPATIENT)
Dept: LAB | Facility: HOSPITAL | Age: 71
End: 2021-01-04
Attending: STUDENT IN AN ORGANIZED HEALTH CARE EDUCATION/TRAINING PROGRAM
Payer: MEDICARE

## 2021-01-04 DIAGNOSIS — E78.5 HYPERLIPIDEMIA, UNSPECIFIED HYPERLIPIDEMIA TYPE: ICD-10-CM

## 2021-01-04 DIAGNOSIS — I50.9 CONGESTIVE HEART FAILURE, UNSPECIFIED HF CHRONICITY, UNSPECIFIED HEART FAILURE TYPE: ICD-10-CM

## 2021-01-04 DIAGNOSIS — R79.89 ELEVATED TROPONIN: ICD-10-CM

## 2021-01-04 LAB
ALBUMIN SERPL BCP-MCNC: 4 G/DL (ref 3.5–5.2)
ALP SERPL-CCNC: 51 U/L (ref 55–135)
ALT SERPL W/O P-5'-P-CCNC: 51 U/L (ref 10–44)
ANION GAP SERPL CALC-SCNC: 10 MMOL/L (ref 8–16)
AST SERPL-CCNC: 38 U/L (ref 10–40)
BILIRUB SERPL-MCNC: 0.8 MG/DL (ref 0.1–1)
BUN SERPL-MCNC: 24 MG/DL (ref 8–23)
CALCIUM SERPL-MCNC: 10.1 MG/DL (ref 8.7–10.5)
CHLORIDE SERPL-SCNC: 104 MMOL/L (ref 95–110)
CHOLEST SERPL-MCNC: 127 MG/DL (ref 120–199)
CHOLEST/HDLC SERPL: 2.3 {RATIO} (ref 2–5)
CK SERPL-CCNC: 168 U/L (ref 20–180)
CO2 SERPL-SCNC: 27 MMOL/L (ref 23–29)
CREAT SERPL-MCNC: 0.9 MG/DL (ref 0.5–1.4)
EST. GFR  (AFRICAN AMERICAN): >60 ML/MIN/1.73 M^2
EST. GFR  (NON AFRICAN AMERICAN): >60 ML/MIN/1.73 M^2
GLUCOSE SERPL-MCNC: 117 MG/DL (ref 70–110)
HDLC SERPL-MCNC: 56 MG/DL (ref 40–75)
HDLC SERPL: 44.1 % (ref 20–50)
LDLC SERPL CALC-MCNC: 46.4 MG/DL (ref 63–159)
NONHDLC SERPL-MCNC: 71 MG/DL
POTASSIUM SERPL-SCNC: 3.7 MMOL/L (ref 3.5–5.1)
PROT SERPL-MCNC: 6.6 G/DL (ref 6–8.4)
SODIUM SERPL-SCNC: 141 MMOL/L (ref 136–145)
TRIGL SERPL-MCNC: 123 MG/DL (ref 30–150)

## 2021-01-04 PROCEDURE — 36415 COLL VENOUS BLD VENIPUNCTURE: CPT | Mod: PO

## 2021-01-04 PROCEDURE — 80061 LIPID PANEL: CPT

## 2021-01-04 PROCEDURE — 82550 ASSAY OF CK (CPK): CPT

## 2021-01-04 PROCEDURE — 80053 COMPREHEN METABOLIC PANEL: CPT

## 2021-01-12 ENCOUNTER — PATIENT MESSAGE (OUTPATIENT)
Dept: CARDIOLOGY | Facility: CLINIC | Age: 71
End: 2021-01-12

## 2021-02-25 ENCOUNTER — OFFICE VISIT (OUTPATIENT)
Dept: CARDIOLOGY | Facility: CLINIC | Age: 71
End: 2021-02-25
Payer: MEDICARE

## 2021-02-25 VITALS
HEART RATE: 70 BPM | BODY MASS INDEX: 26.68 KG/M2 | DIASTOLIC BLOOD PRESSURE: 54 MMHG | HEIGHT: 64 IN | SYSTOLIC BLOOD PRESSURE: 93 MMHG

## 2021-02-25 DIAGNOSIS — E78.5 DYSLIPIDEMIA: ICD-10-CM

## 2021-02-25 DIAGNOSIS — I25.10 CORONARY ARTERY DISEASE INVOLVING NATIVE CORONARY ARTERY OF NATIVE HEART WITHOUT ANGINA PECTORIS: ICD-10-CM

## 2021-02-25 DIAGNOSIS — I25.5 ISCHEMIC CARDIOMYOPATHY: Primary | ICD-10-CM

## 2021-02-25 DIAGNOSIS — I10 HYPERTENSION, UNSPECIFIED TYPE: ICD-10-CM

## 2021-02-25 PROCEDURE — 99999 PR PBB SHADOW E&M-EST. PATIENT-LVL IV: CPT | Mod: PBBFAC,GC,, | Performed by: STUDENT IN AN ORGANIZED HEALTH CARE EDUCATION/TRAINING PROGRAM

## 2021-02-25 PROCEDURE — 3078F DIAST BP <80 MM HG: CPT | Mod: CPTII,S$GLB,, | Performed by: INTERNAL MEDICINE

## 2021-02-25 PROCEDURE — 1159F PR MEDICATION LIST DOCUMENTED IN MEDICAL RECORD: ICD-10-PCS | Mod: S$GLB,,, | Performed by: INTERNAL MEDICINE

## 2021-02-25 PROCEDURE — 1159F MED LIST DOCD IN RCRD: CPT | Mod: S$GLB,,, | Performed by: INTERNAL MEDICINE

## 2021-02-25 PROCEDURE — 99214 OFFICE O/P EST MOD 30 MIN: CPT | Mod: S$GLB,,, | Performed by: INTERNAL MEDICINE

## 2021-02-25 PROCEDURE — 3074F SYST BP LT 130 MM HG: CPT | Mod: CPTII,S$GLB,, | Performed by: INTERNAL MEDICINE

## 2021-02-25 PROCEDURE — 3074F PR MOST RECENT SYSTOLIC BLOOD PRESSURE < 130 MM HG: ICD-10-PCS | Mod: CPTII,S$GLB,, | Performed by: INTERNAL MEDICINE

## 2021-02-25 PROCEDURE — 3078F PR MOST RECENT DIASTOLIC BLOOD PRESSURE < 80 MM HG: ICD-10-PCS | Mod: CPTII,S$GLB,, | Performed by: INTERNAL MEDICINE

## 2021-02-25 PROCEDURE — 99999 PR PBB SHADOW E&M-EST. PATIENT-LVL IV: ICD-10-PCS | Mod: PBBFAC,GC,, | Performed by: STUDENT IN AN ORGANIZED HEALTH CARE EDUCATION/TRAINING PROGRAM

## 2021-02-25 PROCEDURE — 99214 PR OFFICE/OUTPT VISIT, EST, LEVL IV, 30-39 MIN: ICD-10-PCS | Mod: S$GLB,,, | Performed by: INTERNAL MEDICINE

## 2021-02-25 RX ORDER — LEVOTHYROXINE SODIUM 50 UG/1
50 TABLET ORAL DAILY
COMMUNITY
Start: 2021-02-09

## 2021-04-16 ENCOUNTER — PATIENT MESSAGE (OUTPATIENT)
Dept: RESEARCH | Facility: HOSPITAL | Age: 71
End: 2021-04-16

## 2021-05-18 NOTE — SUBJECTIVE & OBJECTIVE
Interval History: NAEON. Feels improved shortness of breath since admission. Denies chest pain    Review of Systems   All other systems reviewed and are negative.    Objective:     Vital Signs (Most Recent):  Temp: 97 °F (36.1 °C) (03/03/20 0516)  Pulse: 79 (03/03/20 0657)  Resp: 16 (03/03/20 0516)  BP: (!) 103/52 (03/03/20 0516)  SpO2: 98 % (03/03/20 0516) Vital Signs (24h Range):  Temp:  [96.4 °F (35.8 °C)-98.1 °F (36.7 °C)] 97 °F (36.1 °C)  Pulse:  [60-90] 79  Resp:  [16-18] 16  SpO2:  [95 %-99 %] 98 %  BP: ()/(52-76) 103/52     Weight: 72.6 kg (159 lb 15.1 oz)  Body mass index is 27.45 kg/m².     SpO2: 98 %  O2 Device (Oxygen Therapy): room air      Intake/Output Summary (Last 24 hours) at 3/3/2020 0908  Last data filed at 3/2/2020 1700  Gross per 24 hour   Intake --   Output 700 ml   Net -700 ml       Lines/Drains/Airways     Peripheral Intravenous Line                 Peripheral IV - Single Lumen 03/01/20 1149 20 G Left Forearm 1 day                Physical Exam   Constitutional: She appears well-developed and well-nourished.   Neck: Normal range of motion. No JVD present.   Cardiovascular: Normal rate and regular rhythm.   No murmur heard.  Pulmonary/Chest: Effort normal. No respiratory distress. She has no rales.   Abdominal: Soft. There is no tenderness.   Musculoskeletal: Normal range of motion. She exhibits no edema.   Skin: Skin is warm and dry. No erythema. No pallor.        General

## 2021-07-09 ENCOUNTER — TELEPHONE (OUTPATIENT)
Dept: UROLOGY | Facility: CLINIC | Age: 71
End: 2021-07-09

## 2021-10-07 ENCOUNTER — TELEPHONE (OUTPATIENT)
Dept: UROLOGY | Facility: CLINIC | Age: 71
End: 2021-10-07

## 2021-11-11 ENCOUNTER — OFFICE VISIT (OUTPATIENT)
Dept: UROLOGY | Facility: CLINIC | Age: 71
End: 2021-11-11
Payer: MEDICARE

## 2021-11-11 VITALS
SYSTOLIC BLOOD PRESSURE: 110 MMHG | HEIGHT: 64 IN | WEIGHT: 155 LBS | BODY MASS INDEX: 26.46 KG/M2 | DIASTOLIC BLOOD PRESSURE: 74 MMHG | HEART RATE: 67 BPM

## 2021-11-11 DIAGNOSIS — N39.0 RECURRENT UTI: Primary | ICD-10-CM

## 2021-11-11 DIAGNOSIS — N31.9 NEUROGENIC BLADDER: ICD-10-CM

## 2021-11-11 DIAGNOSIS — N95.2 VAGINAL ATROPHY: ICD-10-CM

## 2021-11-11 DIAGNOSIS — N39.41 URGE INCONTINENCE: ICD-10-CM

## 2021-11-11 PROCEDURE — 3074F PR MOST RECENT SYSTOLIC BLOOD PRESSURE < 130 MM HG: ICD-10-PCS | Mod: CPTII,S$GLB,, | Performed by: UROLOGY

## 2021-11-11 PROCEDURE — 87086 URINE CULTURE/COLONY COUNT: CPT | Performed by: UROLOGY

## 2021-11-11 PROCEDURE — 3078F DIAST BP <80 MM HG: CPT | Mod: CPTII,S$GLB,, | Performed by: UROLOGY

## 2021-11-11 PROCEDURE — 1125F AMNT PAIN NOTED PAIN PRSNT: CPT | Mod: CPTII,S$GLB,, | Performed by: UROLOGY

## 2021-11-11 PROCEDURE — 3074F SYST BP LT 130 MM HG: CPT | Mod: CPTII,S$GLB,, | Performed by: UROLOGY

## 2021-11-11 PROCEDURE — 4010F PR ACE/ARB THEARPY RXD/TAKEN: ICD-10-PCS | Mod: CPTII,S$GLB,, | Performed by: UROLOGY

## 2021-11-11 PROCEDURE — 1125F PR PAIN SEVERITY QUANTIFIED, PAIN PRESENT: ICD-10-PCS | Mod: CPTII,S$GLB,, | Performed by: UROLOGY

## 2021-11-11 PROCEDURE — 3288F FALL RISK ASSESSMENT DOCD: CPT | Mod: CPTII,S$GLB,, | Performed by: UROLOGY

## 2021-11-11 PROCEDURE — 1159F MED LIST DOCD IN RCRD: CPT | Mod: CPTII,S$GLB,, | Performed by: UROLOGY

## 2021-11-11 PROCEDURE — 3288F PR FALLS RISK ASSESSMENT DOCUMENTED: ICD-10-PCS | Mod: CPTII,S$GLB,, | Performed by: UROLOGY

## 2021-11-11 PROCEDURE — 99214 OFFICE O/P EST MOD 30 MIN: CPT | Mod: S$GLB,,, | Performed by: UROLOGY

## 2021-11-11 PROCEDURE — 87077 CULTURE AEROBIC IDENTIFY: CPT | Performed by: UROLOGY

## 2021-11-11 PROCEDURE — 87186 SC STD MICRODIL/AGAR DIL: CPT | Performed by: UROLOGY

## 2021-11-11 PROCEDURE — 87088 URINE BACTERIA CULTURE: CPT | Performed by: UROLOGY

## 2021-11-11 PROCEDURE — 1159F PR MEDICATION LIST DOCUMENTED IN MEDICAL RECORD: ICD-10-PCS | Mod: CPTII,S$GLB,, | Performed by: UROLOGY

## 2021-11-11 PROCEDURE — 3078F PR MOST RECENT DIASTOLIC BLOOD PRESSURE < 80 MM HG: ICD-10-PCS | Mod: CPTII,S$GLB,, | Performed by: UROLOGY

## 2021-11-11 PROCEDURE — 3008F BODY MASS INDEX DOCD: CPT | Mod: CPTII,S$GLB,, | Performed by: UROLOGY

## 2021-11-11 PROCEDURE — 81002 URINALYSIS NONAUTO W/O SCOPE: CPT | Mod: S$GLB,,, | Performed by: UROLOGY

## 2021-11-11 PROCEDURE — 99214 PR OFFICE/OUTPT VISIT, EST, LEVL IV, 30-39 MIN: ICD-10-PCS | Mod: S$GLB,,, | Performed by: UROLOGY

## 2021-11-11 PROCEDURE — 99999 PR PBB SHADOW E&M-EST. PATIENT-LVL IV: CPT | Mod: PBBFAC,,, | Performed by: UROLOGY

## 2021-11-11 PROCEDURE — 1100F PTFALLS ASSESS-DOCD GE2>/YR: CPT | Mod: CPTII,S$GLB,, | Performed by: UROLOGY

## 2021-11-11 PROCEDURE — 1100F PR PT FALLS ASSESS DOC 2+ FALLS/FALL W/INJURY/YR: ICD-10-PCS | Mod: CPTII,S$GLB,, | Performed by: UROLOGY

## 2021-11-11 PROCEDURE — 99999 PR PBB SHADOW E&M-EST. PATIENT-LVL IV: ICD-10-PCS | Mod: PBBFAC,,, | Performed by: UROLOGY

## 2021-11-11 PROCEDURE — 4010F ACE/ARB THERAPY RXD/TAKEN: CPT | Mod: CPTII,S$GLB,, | Performed by: UROLOGY

## 2021-11-11 PROCEDURE — 81002 PR URINALYSIS NONAUTO W/O SCOPE: ICD-10-PCS | Mod: S$GLB,,, | Performed by: UROLOGY

## 2021-11-11 PROCEDURE — 3008F PR BODY MASS INDEX (BMI) DOCUMENTED: ICD-10-PCS | Mod: CPTII,S$GLB,, | Performed by: UROLOGY

## 2021-11-11 RX ORDER — ESTRADIOL 0.1 MG/G
0.5 CREAM VAGINAL
Qty: 42.5 G | Refills: 3 | Status: SHIPPED | OUTPATIENT
Start: 2021-11-12 | End: 2022-10-19 | Stop reason: SDUPTHER

## 2021-11-11 RX ORDER — VIT C/E/ZN/COPPR/LUTEIN/ZEAXAN 250MG-90MG
1000 CAPSULE ORAL DAILY
COMMUNITY

## 2021-11-11 RX ORDER — DOXYCYCLINE HYCLATE 100 MG
100 TABLET ORAL ONCE
Status: CANCELLED | OUTPATIENT
Start: 2021-11-11 | End: 2021-11-11

## 2021-11-11 RX ORDER — MIRABEGRON 50 MG/1
1 TABLET, FILM COATED, EXTENDED RELEASE ORAL DAILY
Qty: 30 TABLET | Refills: 11 | Status: SHIPPED | OUTPATIENT
Start: 2021-11-11 | End: 2021-11-30

## 2021-11-11 RX ORDER — SULFAMETHOXAZOLE AND TRIMETHOPRIM 800; 160 MG/1; MG/1
1 TABLET ORAL 2 TIMES DAILY
Qty: 14 TABLET | Refills: 0 | Status: SHIPPED | OUTPATIENT
Start: 2021-11-11 | End: 2021-11-18

## 2021-11-11 RX ORDER — LIDOCAINE HYDROCHLORIDE 20 MG/ML
JELLY TOPICAL ONCE
Status: CANCELLED | OUTPATIENT
Start: 2021-11-11 | End: 2021-11-11

## 2021-11-13 LAB — BACTERIA UR CULT: ABNORMAL

## 2021-11-23 RX ORDER — EZETIMIBE 10 MG/1
10 TABLET ORAL DAILY
Qty: 90 TABLET | Refills: 3 | Status: SHIPPED | OUTPATIENT
Start: 2021-11-23 | End: 2022-11-14 | Stop reason: SDUPTHER

## 2021-11-24 ENCOUNTER — HOSPITAL ENCOUNTER (OUTPATIENT)
Dept: RADIOLOGY | Facility: HOSPITAL | Age: 71
Discharge: HOME | End: 2021-11-24
Attending: UROLOGY
Payer: MEDICARE

## 2021-11-24 DIAGNOSIS — N39.0 RECURRENT UTI: ICD-10-CM

## 2021-11-24 PROCEDURE — 76770 US EXAM ABDO BACK WALL COMP: CPT | Mod: 26,,, | Performed by: RADIOLOGY

## 2021-11-24 PROCEDURE — 76770 US KIDNEY: ICD-10-PCS | Mod: 26,,, | Performed by: RADIOLOGY

## 2021-11-24 PROCEDURE — 76770 US EXAM ABDO BACK WALL COMP: CPT | Mod: TC

## 2021-11-29 ENCOUNTER — PROCEDURE VISIT (OUTPATIENT)
Dept: UROLOGY | Facility: CLINIC | Age: 71
End: 2021-11-29
Payer: MEDICARE

## 2021-11-29 VITALS
DIASTOLIC BLOOD PRESSURE: 62 MMHG | HEART RATE: 72 BPM | HEIGHT: 64 IN | RESPIRATION RATE: 16 BRPM | BODY MASS INDEX: 26.46 KG/M2 | WEIGHT: 155 LBS | SYSTOLIC BLOOD PRESSURE: 110 MMHG | TEMPERATURE: 98 F

## 2021-11-29 DIAGNOSIS — N39.0 RECURRENT UTI: ICD-10-CM

## 2021-11-29 PROCEDURE — 52000 PR CYSTOURETHROSCOPY: ICD-10-PCS | Mod: S$GLB,,, | Performed by: UROLOGY

## 2021-11-29 PROCEDURE — 52000 CYSTOURETHROSCOPY: CPT | Mod: S$GLB,,, | Performed by: UROLOGY

## 2021-11-29 RX ORDER — LIDOCAINE HYDROCHLORIDE 20 MG/ML
JELLY TOPICAL ONCE
Status: COMPLETED | OUTPATIENT
Start: 2021-11-29 | End: 2021-11-29

## 2021-11-29 RX ORDER — DOXYCYCLINE HYCLATE 100 MG
100 TABLET ORAL ONCE
Status: COMPLETED | OUTPATIENT
Start: 2021-11-29 | End: 2021-11-29

## 2021-11-29 RX ADMIN — LIDOCAINE HYDROCHLORIDE: 20 JELLY TOPICAL at 09:11

## 2021-11-29 RX ADMIN — Medication 100 MG: at 09:11

## 2021-11-30 ENCOUNTER — OFFICE VISIT (OUTPATIENT)
Dept: CARDIOLOGY | Facility: CLINIC | Age: 71
End: 2021-11-30
Payer: MEDICARE

## 2021-11-30 VITALS
HEART RATE: 66 BPM | SYSTOLIC BLOOD PRESSURE: 106 MMHG | DIASTOLIC BLOOD PRESSURE: 56 MMHG | HEIGHT: 64 IN | BODY MASS INDEX: 26.61 KG/M2

## 2021-11-30 DIAGNOSIS — I25.10 CORONARY ARTERY DISEASE INVOLVING NATIVE CORONARY ARTERY OF NATIVE HEART WITHOUT ANGINA PECTORIS: Primary | ICD-10-CM

## 2021-11-30 DIAGNOSIS — E78.5 DYSLIPIDEMIA: ICD-10-CM

## 2021-11-30 DIAGNOSIS — E11.40 TYPE 2 DIABETES MELLITUS WITH DIABETIC NEUROPATHY, WITHOUT LONG-TERM CURRENT USE OF INSULIN: ICD-10-CM

## 2021-11-30 DIAGNOSIS — I25.5 ISCHEMIC CARDIOMYOPATHY: ICD-10-CM

## 2021-11-30 DIAGNOSIS — I50.42 CHRONIC COMBINED SYSTOLIC AND DIASTOLIC CONGESTIVE HEART FAILURE: ICD-10-CM

## 2021-11-30 PROCEDURE — 99213 OFFICE O/P EST LOW 20 MIN: CPT | Mod: GC,S$GLB,, | Performed by: INTERNAL MEDICINE

## 2021-11-30 PROCEDURE — 99213 PR OFFICE/OUTPT VISIT, EST, LEVL III, 20-29 MIN: ICD-10-PCS | Mod: GC,S$GLB,, | Performed by: INTERNAL MEDICINE

## 2021-11-30 PROCEDURE — 99999 PR PBB SHADOW E&M-EST. PATIENT-LVL III: CPT | Mod: PBBFAC,GC,, | Performed by: INTERNAL MEDICINE

## 2021-11-30 PROCEDURE — 99999 PR PBB SHADOW E&M-EST. PATIENT-LVL III: ICD-10-PCS | Mod: PBBFAC,GC,, | Performed by: INTERNAL MEDICINE

## 2021-11-30 RX ORDER — OXYBUTYNIN CHLORIDE 10 MG/1
TABLET, EXTENDED RELEASE ORAL DAILY
COMMUNITY
End: 2022-10-19

## 2021-12-02 DIAGNOSIS — I25.5 ISCHEMIC CARDIOMYOPATHY: ICD-10-CM

## 2021-12-02 RX ORDER — METOPROLOL SUCCINATE 25 MG/1
25 TABLET, EXTENDED RELEASE ORAL DAILY
Qty: 90 TABLET | Refills: 3 | Status: SHIPPED | OUTPATIENT
Start: 2021-12-02 | End: 2023-01-23 | Stop reason: SDUPTHER

## 2021-12-02 RX ORDER — FUROSEMIDE 40 MG/1
TABLET ORAL
Qty: 45 TABLET | Refills: 1 | Status: SHIPPED | OUTPATIENT
Start: 2021-12-02 | End: 2023-08-11 | Stop reason: SDUPTHER

## 2021-12-02 RX ORDER — ROSUVASTATIN CALCIUM 40 MG/1
40 TABLET, COATED ORAL NIGHTLY
Qty: 90 TABLET | Refills: 3 | Status: SHIPPED | OUTPATIENT
Start: 2021-12-02 | End: 2022-12-02 | Stop reason: SDUPTHER

## 2021-12-02 RX ORDER — SACUBITRIL AND VALSARTAN 97; 103 MG/1; MG/1
1 TABLET, FILM COATED ORAL 2 TIMES DAILY
Qty: 180 TABLET | Refills: 3 | Status: SHIPPED | OUTPATIENT
Start: 2021-12-02 | End: 2022-12-02 | Stop reason: SDUPTHER

## 2022-07-19 ENCOUNTER — PATIENT MESSAGE (OUTPATIENT)
Dept: RESEARCH | Facility: CLINIC | Age: 72
End: 2022-07-19
Payer: MEDICARE

## 2022-09-13 NOTE — ASSESSMENT & PLAN NOTE
No known history.  No echo on file.  She does report upper respiratory infection ~1 week ago.  - BNP:  3286 CTA: Small bibasilar pleural effusions mild associated atelectasis, right greater than left.  -Echo pending  - O2 Sats 100% on RA  - Daily weights  - Strict I&O's   - Low salt cardiac diet; 1500mL fluid restriction  - Cardiac monitoring  - Cardiology consult as new onset CHF and elevated troponin.  Will trend troponin.           done...

## 2022-10-19 ENCOUNTER — OFFICE VISIT (OUTPATIENT)
Dept: UROLOGY | Facility: CLINIC | Age: 72
End: 2022-10-19
Payer: MEDICARE

## 2022-10-19 VITALS
HEIGHT: 64 IN | SYSTOLIC BLOOD PRESSURE: 95 MMHG | DIASTOLIC BLOOD PRESSURE: 62 MMHG | BODY MASS INDEX: 29.19 KG/M2 | WEIGHT: 171 LBS | HEART RATE: 68 BPM

## 2022-10-19 DIAGNOSIS — N95.2 VAGINAL ATROPHY: ICD-10-CM

## 2022-10-19 DIAGNOSIS — N31.9 NEUROGENIC BLADDER: ICD-10-CM

## 2022-10-19 DIAGNOSIS — N39.41 URGE INCONTINENCE: ICD-10-CM

## 2022-10-19 DIAGNOSIS — N39.0 RECURRENT UTI: Primary | ICD-10-CM

## 2022-10-19 PROCEDURE — 4010F PR ACE/ARB THEARPY RXD/TAKEN: ICD-10-PCS | Mod: CPTII,S$GLB,, | Performed by: UROLOGY

## 2022-10-19 PROCEDURE — 1159F PR MEDICATION LIST DOCUMENTED IN MEDICAL RECORD: ICD-10-PCS | Mod: CPTII,S$GLB,, | Performed by: UROLOGY

## 2022-10-19 PROCEDURE — 99999 PR PBB SHADOW E&M-EST. PATIENT-LVL III: CPT | Mod: PBBFAC,,, | Performed by: UROLOGY

## 2022-10-19 PROCEDURE — 4010F ACE/ARB THERAPY RXD/TAKEN: CPT | Mod: CPTII,S$GLB,, | Performed by: UROLOGY

## 2022-10-19 PROCEDURE — 3288F PR FALLS RISK ASSESSMENT DOCUMENTED: ICD-10-PCS | Mod: CPTII,S$GLB,, | Performed by: UROLOGY

## 2022-10-19 PROCEDURE — 87186 SC STD MICRODIL/AGAR DIL: CPT | Performed by: UROLOGY

## 2022-10-19 PROCEDURE — 1125F AMNT PAIN NOTED PAIN PRSNT: CPT | Mod: CPTII,S$GLB,, | Performed by: UROLOGY

## 2022-10-19 PROCEDURE — 3078F DIAST BP <80 MM HG: CPT | Mod: CPTII,S$GLB,, | Performed by: UROLOGY

## 2022-10-19 PROCEDURE — 99999 PR PBB SHADOW E&M-EST. PATIENT-LVL III: ICD-10-PCS | Mod: PBBFAC,,, | Performed by: UROLOGY

## 2022-10-19 PROCEDURE — 1101F PR PT FALLS ASSESS DOC 0-1 FALLS W/OUT INJ PAST YR: ICD-10-PCS | Mod: CPTII,S$GLB,, | Performed by: UROLOGY

## 2022-10-19 PROCEDURE — 99215 OFFICE O/P EST HI 40 MIN: CPT | Mod: S$GLB,,, | Performed by: UROLOGY

## 2022-10-19 PROCEDURE — 3074F SYST BP LT 130 MM HG: CPT | Mod: CPTII,S$GLB,, | Performed by: UROLOGY

## 2022-10-19 PROCEDURE — 87088 URINE BACTERIA CULTURE: CPT | Performed by: UROLOGY

## 2022-10-19 PROCEDURE — 99215 PR OFFICE/OUTPT VISIT, EST, LEVL V, 40-54 MIN: ICD-10-PCS | Mod: S$GLB,,, | Performed by: UROLOGY

## 2022-10-19 PROCEDURE — 87086 URINE CULTURE/COLONY COUNT: CPT | Performed by: UROLOGY

## 2022-10-19 PROCEDURE — 87077 CULTURE AEROBIC IDENTIFY: CPT | Performed by: UROLOGY

## 2022-10-19 PROCEDURE — 1125F PR PAIN SEVERITY QUANTIFIED, PAIN PRESENT: ICD-10-PCS | Mod: CPTII,S$GLB,, | Performed by: UROLOGY

## 2022-10-19 PROCEDURE — 3074F PR MOST RECENT SYSTOLIC BLOOD PRESSURE < 130 MM HG: ICD-10-PCS | Mod: CPTII,S$GLB,, | Performed by: UROLOGY

## 2022-10-19 PROCEDURE — 3078F PR MOST RECENT DIASTOLIC BLOOD PRESSURE < 80 MM HG: ICD-10-PCS | Mod: CPTII,S$GLB,, | Performed by: UROLOGY

## 2022-10-19 PROCEDURE — 1159F MED LIST DOCD IN RCRD: CPT | Mod: CPTII,S$GLB,, | Performed by: UROLOGY

## 2022-10-19 PROCEDURE — 1101F PT FALLS ASSESS-DOCD LE1/YR: CPT | Mod: CPTII,S$GLB,, | Performed by: UROLOGY

## 2022-10-19 PROCEDURE — 3288F FALL RISK ASSESSMENT DOCD: CPT | Mod: CPTII,S$GLB,, | Performed by: UROLOGY

## 2022-10-19 RX ORDER — OXYBUTYNIN CHLORIDE 5 MG/1
5 TABLET, EXTENDED RELEASE ORAL DAILY
Qty: 30 TABLET | Refills: 11 | Status: SHIPPED | OUTPATIENT
Start: 2022-10-19 | End: 2023-12-04 | Stop reason: SDUPTHER

## 2022-10-19 RX ORDER — METHENAMINE HIPPURATE 1000 MG/1
1 TABLET ORAL 2 TIMES DAILY
Qty: 60 TABLET | Refills: 6 | Status: SHIPPED | OUTPATIENT
Start: 2022-10-19 | End: 2023-12-04

## 2022-10-19 RX ORDER — ESTRADIOL 0.1 MG/G
0.5 CREAM VAGINAL
Qty: 42.5 G | Refills: 3 | Status: SHIPPED | OUTPATIENT
Start: 2022-10-19 | End: 2023-10-23

## 2022-10-19 NOTE — PROGRESS NOTES
CHIEF COMPLAINT:    Mrs. Soriano is a 72 y.o. female presenting for a follow up on neurogenic bladder.  .    PRESENTING ILLNESS:    Niki Soriano is a 72 y.o. female who returns for follow up.  She states she has ongoing malodorous urine.  No fevers, chills or worsening of her incontinence.  She does have dysuria.  She uses Estrace cream three times a week with the applicator.  Her bowel control is not good.  She states she cannot generate a strong pressure to defecate.  If she takes something like Miralax, she cannot leave the house due to bowel urge incontinence.  She has to watch her diet, if she has bran muffins, it makes her go more, also depends on her vegetable intake.  She takes a probiotic 50 billion, if she took 60 billion, it improved her bowel movements. She is not treated repeatedly with antibiotics, has not had pyelonephritis.     Intermittent catheterization  Catheter type:  Highslip  Size:  10 Fr  Frequency:  7 times in 24 hours  ICD-10 Diagnosis code(s):  N39.41 urge incontinence, N31.9 neurogenic bladder  History of Recurrent UTI?:  yes  Is this indefinite?:  Yes, patient is paraplegic    She states that she takes oxybutynin XL 10 mg intermittently.  She was prescribed Myrbetriq but cannot afford it.  She and her  have numerous heart medications.     REVIEW OF SYSTEMS:    Review of Systems   Constitutional: Negative.    HENT: Negative.     Eyes: Negative.    Respiratory: Negative.     Cardiovascular: Negative.    Gastrointestinal:  Positive for diarrhea.   Musculoskeletal: Negative.    Skin: Negative.    Neurological:         MS, paraplegic   Endo/Heme/Allergies:         Diabetes mellitus   Psychiatric/Behavioral: Negative.       PATIENT HISTORY:    Past Medical History:   Diagnosis Date    Coronary artery disease     Diabetes mellitus     Hypercholesteremia     Hypertension     IC (interstitial cystitis)     Numbness in both hands 11/1/2017    Spondylisthesis     Vulvodynia, unspecified         Past Surgical History:   Procedure Laterality Date    CARPAL TUNNEL RELEASE Right 2019    Procedure: RELEASE, CARPAL TUNNEL, REVISION;  Surgeon: Annabelle Cid MD;  Location: [facility] OR [address] [place] [address];  Service: Orthopedics;  Laterality: Right;  Axogen, Clarix     SECTION      x3    CORONARY ANGIOGRAPHY N/A 3/3/2020    Procedure: ANGIOGRAM, CORONARY ARTERY;  Surgeon: Markie Friend III, MD;  Location: [facility] CATH LAB;  Service: Cardiology;  Laterality: N/A;    CORONARY ANGIOGRAPHY N/A 2020    Procedure: ANGIOGRAM, CORONARY ARTERY;  Surgeon: Rodney Gamble MD;  Location: [facility] CATH LAB;  Service: Cardiology;  Laterality: N/A;    CYSTOSCOPY      HYSTERECTOMY      ischemic colitis      LEFT HEART CATHETERIZATION Left 3/3/2020    Procedure: Left heart cath;  Surgeon: Markie Friend III, MD;  Location: [facility] CATH LAB;  Service: Cardiology;  Laterality: Left;    LEFT HEART CATHETERIZATION Left 2020    Procedure: Left heart cath;  Surgeon: Rodney Gamble MD;  Location: [facility] CATH LAB;  Service: Cardiology;  Laterality: Left;    OOPHORECTOMY      MI REMOVAL OF OVARY/TUBE(S)         Family History   Problem Relation Age of Onset    Breast cancer Paternal Aunt     Colon cancer Neg Hx     Ovarian cancer Neg Hx        Social History     Socioeconomic History    Marital status:    Tobacco Use    Smoking status: Never    Smokeless tobacco: Never   Substance and Sexual Activity    Alcohol use: No    Drug use: No    Sexual activity: Yes     Partners: Male       Allergies:  Augmentin [amoxicillin-pot clavulanate], Tramadol, Tegretol [carbamazepine], and Vicodin [hydrocodone-acetaminophen]    Medications:  Outpatient Encounter Medications as of 10/19/2022   Medication Sig Dispense Refill    b complex vitamins capsule Take 1 capsule by mouth once daily.      baclofen (LIORESAL) 20 MG tablet Take 20 mg by mouth 3 (three) times daily as needed.       cholecalciferol, vitamin D3, (VITAMIN  D3) 25 mcg (1,000 unit) capsule Take 1,000 Units by mouth once daily.      duloxetine (CYMBALTA) 30 MG capsule Take 30 mg by mouth once daily.      estradioL (ESTRACE) 0.01 % (0.1 mg/gram) vaginal cream Place 0.5 g vaginally 3 (three) times a week. 42.5 g 3    ezetimibe (ZETIA) 10 mg tablet Take 1 tablet (10 mg total) by mouth once daily. 90 tablet 3    furosemide (LASIX) 40 MG tablet TAKE 1/2 TABLET BY MOUTH DAILY AS NEED FOR SWELLING OR WEIGHT GAIN 45 tablet 1    gabapentin (NEURONTIN) 300 MG capsule TAKE 1 CAPSULE BY MOUTH THREE TIMES A  capsule 0    Lactobac no.51/Bifidobact no.4 (UP4 PROBIOTICS ULTRA ORAL) Take by mouth.      levothyroxine (SYNTHROID) 50 MCG tablet Take 50 mcg by mouth once daily.      metformin (GLUCOPHAGE) 500 MG tablet Take 500 mg by mouth once daily.       metoprolol succinate (TOPROL-XL) 25 MG 24 hr tablet Take 1 tablet (25 mg total) by mouth once daily. 90 tablet 3    multivitamin (THERAGRAN) per tablet Take 1 tablet by mouth once daily.      omega-3 fatty acids/fish oil (FISH OIL-OMEGA-3 FATTY ACIDS) 300-1,000 mg capsule Take by mouth once daily.      oxybutynin (DITROPAN-XL) 10 MG 24 hr tablet once daily.      rosuvastatin (CRESTOR) 40 MG Tab Take 1 tablet (40 mg total) by mouth every evening. 90 tablet 3    sacubitriL-valsartan (ENTRESTO)  mg per tablet Take 1 tablet by mouth 2 (two) times daily. 180 tablet 3    aspirin 81 MG Chew Take 1 tablet (81 mg total) by mouth once daily. (Patient taking differently: Take 81 mg by mouth every evening.)  0     No facility-administered encounter medications on file as of 10/19/2022.         PHYSICAL EXAMINATION:    The patient generally appears in good health, is appropriately interactive, and is in no apparent distress.    Skin: No lesions.    Mental: Cooperative with normal affect.    Neuro: Grossly intact.    HEENT: Normal. No evidence of lymphadenopathy.    Chest:  normal inspiratory effort.    Abdomen:  Soft, non-tender. No  masses or organomegaly. Bladder is not palpable. No evidence of flank discomfort. No evidence of inguinal hernia.    Extremities: No clubbing, cyanosis, or edema    Normal external female genitalia  Grade II urogenital atrophy  Urethral meatus is normal  Urethra and bladder are nontender to bimanual exam  Well supported anteriorly and posteriorly   Uterus and cervix are normal  No adnexal masses    LABS:    Lab Results   Component Value Date    BUN 24 (H) 01/04/2021    CREATININE 0.9 01/04/2021     UA 1.015, pH 5, + leuk, + nitrite, tr protein, otherwise, negative    IMPRESSION:    Encounter Diagnoses   Name Primary?    Recurrent UTI Yes    Vaginal atrophy     Urge incontinence     Neurogenic bladder        PLAN:    1.  Methenamine with Vitamin C recommended  2.  The catheterized specimen was sent for culture  3.  She would like to take oxybutynin XL 5 mg to see if it would be enough, intermittently  4.  Also discussed Botox therapy of the bladder, since she self catheterizes.  Discussed this is a therapy that requires repeat injections.  Brochure provided  5.  Refilled Estrace cream.    I spent 40 minutes with the patient of which more than half was spent in direct consultation with the patient in regards to our treatment and plan.

## 2022-10-19 NOTE — PROGRESS NOTES
CHIEF COMPLAINT:    Mrs. Soriano is a 72 y.o. female presenting for a consultation at the request of  ***. Patient presents with ***.    PRESENTING ILLNESS:    Niki Soriano is a 72 y.o. female ***    Intermittent catheterization  Catheter type:  hi-slip  Size: 10 Fr  Frequency: 7 times  ICD-10 Diagnosis code(s):   N39.41 urge incontinence, N31.9 neurogenic bladder  History of Recurrent UTI?:  Yes  Is this indefinite?:   Yes  Catheter supplier:  [employer]      REVIEW OF SYSTEMS:    ROS    PATIENT HISTORY:    Past Medical History:   Diagnosis Date    Coronary artery disease     Diabetes mellitus     Hypercholesteremia     Hypertension     IC (interstitial cystitis)     Numbness in both hands 2017    Spondylisthesis     Vulvodynia, unspecified        Past Surgical History:   Procedure Laterality Date    CARPAL TUNNEL RELEASE Right 2019    Procedure: RELEASE, CARPAL TUNNEL, REVISION;  Surgeon: Annabelle Cid MD;  Location: [facility] OR [address] [place] [address];  Service: Orthopedics;  Laterality: Right;  Axogen, Clarix     SECTION      x3    CORONARY ANGIOGRAPHY N/A 3/3/2020    Procedure: ANGIOGRAM, CORONARY ARTERY;  Surgeon: Markie Friend III, MD;  Location: [facility] CATH LAB;  Service: Cardiology;  Laterality: N/A;    CORONARY ANGIOGRAPHY N/A 2020    Procedure: ANGIOGRAM, CORONARY ARTERY;  Surgeon: Rodney Gamble MD;  Location: [facility] CATH LAB;  Service: Cardiology;  Laterality: N/A;    CYSTOSCOPY      HYSTERECTOMY      ischemic colitis      LEFT HEART CATHETERIZATION Left 3/3/2020    Procedure: Left heart cath;  Surgeon: Markie Friend III, MD;  Location: [facility] CATH LAB;  Service: Cardiology;  Laterality: Left;    LEFT HEART CATHETERIZATION Left 2020    Procedure: Left heart cath;  Surgeon: Rodney Gamble MD;  Location: [facility] CATH LAB;  Service: Cardiology;  Laterality: Left;    OOPHORECTOMY      VT REMOVAL OF OVARY/TUBE(S)         Family History   Problem Relation Age of Onset    Breast cancer  Paternal Aunt     Colon cancer Neg Hx     Ovarian cancer Neg Hx        Social History     Socioeconomic History    Marital status:    Tobacco Use    Smoking status: Never    Smokeless tobacco: Never   Substance and Sexual Activity    Alcohol use: No    Drug use: No    Sexual activity: Yes     Partners: Male       Allergies:  Augmentin [amoxicillin-pot clavulanate], Tramadol, Tegretol [carbamazepine], and Vicodin [hydrocodone-acetaminophen]    Medications:  Outpatient Encounter Medications as of 10/19/2022   Medication Sig Dispense Refill    b complex vitamins capsule Take 1 capsule by mouth once daily.      baclofen (LIORESAL) 20 MG tablet Take 20 mg by mouth 3 (three) times daily as needed.       cholecalciferol, vitamin D3, (VITAMIN D3) 25 mcg (1,000 unit) capsule Take 1,000 Units by mouth once daily.      duloxetine (CYMBALTA) 30 MG capsule Take 30 mg by mouth once daily.      estradioL (ESTRACE) 0.01 % (0.1 mg/gram) vaginal cream Place 0.5 g vaginally 3 (three) times a week. 42.5 g 3    ezetimibe (ZETIA) 10 mg tablet Take 1 tablet (10 mg total) by mouth once daily. 90 tablet 3    furosemide (LASIX) 40 MG tablet TAKE 1/2 TABLET BY MOUTH DAILY AS NEED FOR SWELLING OR WEIGHT GAIN 45 tablet 1    gabapentin (NEURONTIN) 300 MG capsule TAKE 1 CAPSULE BY MOUTH THREE TIMES A  capsule 0    Lactobac no.51/Bifidobact no.4 (UP4 PROBIOTICS ULTRA ORAL) Take by mouth.      levothyroxine (SYNTHROID) 50 MCG tablet Take 50 mcg by mouth once daily.      metformin (GLUCOPHAGE) 500 MG tablet Take 500 mg by mouth once daily.       metoprolol succinate (TOPROL-XL) 25 MG 24 hr tablet Take 1 tablet (25 mg total) by mouth once daily. 90 tablet 3    multivitamin (THERAGRAN) per tablet Take 1 tablet by mouth once daily.      omega-3 fatty acids/fish oil (FISH OIL-OMEGA-3 FATTY ACIDS) 300-1,000 mg capsule Take by mouth once daily.      oxybutynin (DITROPAN-XL) 10 MG 24 hr tablet once daily.      rosuvastatin (CRESTOR) 40 MG Tab  Take 1 tablet (40 mg total) by mouth every evening. 90 tablet 3    sacubitriL-valsartan (ENTRESTO)  mg per tablet Take 1 tablet by mouth 2 (two) times daily. 180 tablet 3    aspirin 81 MG Chew Take 1 tablet (81 mg total) by mouth once daily. (Patient taking differently: Take 81 mg by mouth every evening.)  0     No facility-administered encounter medications on file as of 10/19/2022.         PHYSICAL EXAMINATION:    The patient generally appears in good health, is appropriately interactive, and is in no apparent distress.    Skin: No lesions.    Mental: Cooperative with normal affect.    Neuro: Grossly intact.    HEENT: Normal. No evidence of lymphadenopathy.    Chest:  normal inspiratory effort.    Abdomen:  Soft, non-tender. No masses or organomegaly. Bladder is not palpable. No evidence of flank discomfort. No evidence of inguinal hernia.    Extremities: No clubbing, cyanosis, or edema      LABS:    Lab Results   Component Value Date    BUN 24 (H) 01/04/2021    CREATININE 0.9 01/04/2021           IMPRESSION:    No diagnosis found.    PLAN:    1. ***    Copy to: ***

## 2022-10-21 LAB — BACTERIA UR CULT: ABNORMAL

## 2022-10-24 ENCOUNTER — TELEPHONE (OUTPATIENT)
Dept: UROLOGY | Facility: CLINIC | Age: 72
End: 2022-10-24
Payer: MEDICARE

## 2022-10-24 DIAGNOSIS — N30.90 BLADDER INFECTION: Primary | ICD-10-CM

## 2022-10-24 RX ORDER — CEFDINIR 300 MG/1
300 CAPSULE ORAL 2 TIMES DAILY
Qty: 14 CAPSULE | Refills: 0 | Status: SHIPPED | OUTPATIENT
Start: 2022-10-24 | End: 2022-10-31

## 2022-10-24 NOTE — TELEPHONE ENCOUNTER
Patient had a positive urine culture.  Left message on her phone.  Take cefdinir and hold the methenamine and vitamin C while on antibiotics, then restart when completed.  Cefdinir was sent to her [facility] in [place].

## 2022-11-29 ENCOUNTER — OFFICE VISIT (OUTPATIENT)
Dept: OPTOMETRY | Facility: CLINIC | Age: 72
End: 2022-11-29
Payer: MEDICARE

## 2022-11-29 DIAGNOSIS — H52.03 HYPEROPIA OF BOTH EYES WITH ASTIGMATISM AND PRESBYOPIA: ICD-10-CM

## 2022-11-29 DIAGNOSIS — H25.13 NUCLEAR SCLEROSIS, BILATERAL: ICD-10-CM

## 2022-11-29 DIAGNOSIS — H52.203 HYPEROPIA OF BOTH EYES WITH ASTIGMATISM AND PRESBYOPIA: ICD-10-CM

## 2022-11-29 DIAGNOSIS — Z13.5 GLAUCOMA SCREENING: ICD-10-CM

## 2022-11-29 DIAGNOSIS — H52.4 HYPEROPIA OF BOTH EYES WITH ASTIGMATISM AND PRESBYOPIA: ICD-10-CM

## 2022-11-29 DIAGNOSIS — E11.9 DIABETES MELLITUS TYPE 2 WITHOUT RETINOPATHY: Primary | ICD-10-CM

## 2022-11-29 PROCEDURE — 92004 PR EYE EXAM, NEW PATIENT,COMPREHESV: ICD-10-PCS | Mod: S$GLB,,, | Performed by: OPTOMETRIST

## 2022-11-29 PROCEDURE — 99999 PR PBB SHADOW E&M-EST. PATIENT-LVL III: ICD-10-PCS | Mod: PBBFAC,,, | Performed by: OPTOMETRIST

## 2022-11-29 PROCEDURE — 1100F PTFALLS ASSESS-DOCD GE2>/YR: CPT | Mod: CPTII,S$GLB,, | Performed by: OPTOMETRIST

## 2022-11-29 PROCEDURE — 99999 PR PBB SHADOW E&M-EST. PATIENT-LVL III: CPT | Mod: PBBFAC,,, | Performed by: OPTOMETRIST

## 2022-11-29 PROCEDURE — 1159F PR MEDICATION LIST DOCUMENTED IN MEDICAL RECORD: ICD-10-PCS | Mod: CPTII,S$GLB,, | Performed by: OPTOMETRIST

## 2022-11-29 PROCEDURE — 4010F PR ACE/ARB THEARPY RXD/TAKEN: ICD-10-PCS | Mod: CPTII,S$GLB,, | Performed by: OPTOMETRIST

## 2022-11-29 PROCEDURE — 1159F MED LIST DOCD IN RCRD: CPT | Mod: CPTII,S$GLB,, | Performed by: OPTOMETRIST

## 2022-11-29 PROCEDURE — 3288F PR FALLS RISK ASSESSMENT DOCUMENTED: ICD-10-PCS | Mod: CPTII,S$GLB,, | Performed by: OPTOMETRIST

## 2022-11-29 PROCEDURE — 1100F PR PT FALLS ASSESS DOC 2+ FALLS/FALL W/INJURY/YR: ICD-10-PCS | Mod: CPTII,S$GLB,, | Performed by: OPTOMETRIST

## 2022-11-29 PROCEDURE — 92015 DETERMINE REFRACTIVE STATE: CPT | Mod: S$GLB,,, | Performed by: OPTOMETRIST

## 2022-11-29 PROCEDURE — 92015 PR REFRACTION: ICD-10-PCS | Mod: S$GLB,,, | Performed by: OPTOMETRIST

## 2022-11-29 PROCEDURE — 92004 COMPRE OPH EXAM NEW PT 1/>: CPT | Mod: S$GLB,,, | Performed by: OPTOMETRIST

## 2022-11-29 PROCEDURE — 4010F ACE/ARB THERAPY RXD/TAKEN: CPT | Mod: CPTII,S$GLB,, | Performed by: OPTOMETRIST

## 2022-11-29 PROCEDURE — 3288F FALL RISK ASSESSMENT DOCD: CPT | Mod: CPTII,S$GLB,, | Performed by: OPTOMETRIST

## 2022-11-29 NOTE — PROGRESS NOTES
HPI    Diabetic  eye exam    First Time Patient     72 y.o. is here for diabetic eye exam  Pt states last eye exam was a year ago  Pt feels both distance and near vision has change   Pt wear pal gl all the time  Pt want to get a new rx for gl    (-)Flashes (-)Floaters  (+)Itch, (-)tear, (-)burn, (-)Dryness.  (-) OTC Drops  (-)Photophobia(+)Glare   (+) Headaches (-) Eye Pain (-) Double vision    Eye Medications: None     No Past Ocular history     Family Ocular history   Pt brother has glaucoma    Hemoglobin A1C       Date                     Value               Ref Range             Status                03/02/2020               5.8 (H)             4.0 - 5.6 %           Final              Comment:    ADA Screening Guidelines:  5.7-6.4%  Consistent with   prediabetes  >or=6.5%  Consistent with diabetes  High levels of fetal   hemoglobin interfere with the HbA1C  assay. Heterozygous hemoglobin   variants (HbS, HgC, etc)do  not significantly interfere with this assay.     However, presence of multiple variants may affect accuracy.    ----------   Last edited by Sandrine Flanagan MA on 11/29/2022 11:02 AM.            Assessment /Plan     For exam results, see Encounter Report.    Diabetes mellitus type 2 without retinopathy  -No retinopathy noted today.  Continued control with primary care physician and annual comprehensive eye exam.    Nuclear sclerosis, bilateral  -Educated patient on presence of cataracts at today's exam, monitor at annual dilated fundus exam. 5+ years surgical estimate.    Glaucoma screening  -Monitor with annual eye exam and IOP check    Hyperopia of both eyes with astigmatism and presbyopia  Eyeglass Final Rx       Eyeglass Final Rx         Sphere Cylinder Axis Dist VA Add    Right +0.75 +0.50 170 20/20 +2.50    Left +1.25 Sphere  20/25 +2.50      Type: PAL    Expiration Date: 11/29/2023                      RTC 1 yr

## 2022-12-02 RX ORDER — SACUBITRIL AND VALSARTAN 97; 103 MG/1; MG/1
1 TABLET, FILM COATED ORAL 2 TIMES DAILY
Qty: 180 TABLET | Refills: 3 | Status: SHIPPED | OUTPATIENT
Start: 2022-12-02 | End: 2023-11-30

## 2022-12-02 RX ORDER — ROSUVASTATIN CALCIUM 40 MG/1
40 TABLET, COATED ORAL NIGHTLY
Qty: 90 TABLET | Refills: 3 | Status: SHIPPED | OUTPATIENT
Start: 2022-12-02 | End: 2023-08-29

## 2022-12-02 NOTE — TELEPHONE ENCOUNTER
Mercedes Snowden, RN  Caller: Unspecified (Today, 11:41 AM)  Pt called for     Rx:  rosuvastatin (CRESTOR) 40 MG Tab   sacubitriL-valsartan (ENTRESTO)  mg per tablet     Pharmacy:     [facility]/pharmacy #[id number] [address]   Phone: [phone]   Fax: [phone]

## 2023-01-20 DIAGNOSIS — I25.10 CORONARY ARTERY DISEASE INVOLVING NATIVE CORONARY ARTERY OF NATIVE HEART WITHOUT ANGINA PECTORIS: Primary | ICD-10-CM

## 2023-01-20 DIAGNOSIS — I10 HYPERTENSION, UNSPECIFIED TYPE: ICD-10-CM

## 2023-01-20 DIAGNOSIS — I50.42 CHRONIC COMBINED SYSTOLIC AND DIASTOLIC CONGESTIVE HEART FAILURE: ICD-10-CM

## 2023-01-20 DIAGNOSIS — I25.5 ISCHEMIC CARDIOMYOPATHY: ICD-10-CM

## 2023-01-23 RX ORDER — METOPROLOL SUCCINATE 25 MG/1
25 TABLET, EXTENDED RELEASE ORAL DAILY
Qty: 90 TABLET | Refills: 3 | Status: SHIPPED | OUTPATIENT
Start: 2023-01-23 | End: 2023-11-30

## 2023-03-07 ENCOUNTER — HOSPITAL ENCOUNTER (OUTPATIENT)
Dept: CARDIOLOGY | Facility: CLINIC | Age: 73
Discharge: HOME | End: 2023-03-07
Payer: MEDICARE

## 2023-03-07 ENCOUNTER — OFFICE VISIT (OUTPATIENT)
Dept: CARDIOLOGY | Facility: CLINIC | Age: 73
End: 2023-03-07
Payer: MEDICARE

## 2023-03-07 VITALS
HEART RATE: 63 BPM | BODY MASS INDEX: 29.35 KG/M2 | HEIGHT: 64 IN | SYSTOLIC BLOOD PRESSURE: 105 MMHG | DIASTOLIC BLOOD PRESSURE: 57 MMHG

## 2023-03-07 DIAGNOSIS — E78.5 DYSLIPIDEMIA: ICD-10-CM

## 2023-03-07 DIAGNOSIS — I25.10 CORONARY ARTERY DISEASE INVOLVING NATIVE CORONARY ARTERY OF NATIVE HEART WITHOUT ANGINA PECTORIS: ICD-10-CM

## 2023-03-07 DIAGNOSIS — E11.40 TYPE 2 DIABETES MELLITUS WITH DIABETIC NEUROPATHY, WITHOUT LONG-TERM CURRENT USE OF INSULIN: ICD-10-CM

## 2023-03-07 DIAGNOSIS — I25.5 ISCHEMIC CARDIOMYOPATHY: Primary | ICD-10-CM

## 2023-03-07 DIAGNOSIS — I50.42 CHRONIC COMBINED SYSTOLIC AND DIASTOLIC CONGESTIVE HEART FAILURE: ICD-10-CM

## 2023-03-07 DIAGNOSIS — I25.5 ISCHEMIC CARDIOMYOPATHY: ICD-10-CM

## 2023-03-07 DIAGNOSIS — I10 HYPERTENSION, UNSPECIFIED TYPE: ICD-10-CM

## 2023-03-07 PROBLEM — R79.89 ELEVATED TROPONIN: Status: RESOLVED | Noted: 2020-09-20 | Resolved: 2023-03-07

## 2023-03-07 PROBLEM — I50.9 NEW ONSET OF CONGESTIVE HEART FAILURE: Status: RESOLVED | Noted: 2020-03-01 | Resolved: 2023-03-07

## 2023-03-07 PROCEDURE — 3074F PR MOST RECENT SYSTOLIC BLOOD PRESSURE < 130 MM HG: ICD-10-PCS | Mod: CPTII,S$GLB,, | Performed by: PHYSICIAN ASSISTANT

## 2023-03-07 PROCEDURE — 1101F PR PT FALLS ASSESS DOC 0-1 FALLS W/OUT INJ PAST YR: ICD-10-PCS | Mod: CPTII,S$GLB,, | Performed by: PHYSICIAN ASSISTANT

## 2023-03-07 PROCEDURE — 93010 ELECTROCARDIOGRAM REPORT: CPT | Mod: S$GLB,,, | Performed by: INTERNAL MEDICINE

## 2023-03-07 PROCEDURE — 93005 ELECTROCARDIOGRAM TRACING: CPT | Mod: S$GLB,,, | Performed by: PHYSICIAN ASSISTANT

## 2023-03-07 PROCEDURE — 1160F RVW MEDS BY RX/DR IN RCRD: CPT | Mod: CPTII,S$GLB,, | Performed by: PHYSICIAN ASSISTANT

## 2023-03-07 PROCEDURE — 99999 PR PBB SHADOW E&M-EST. PATIENT-LVL V: ICD-10-PCS | Mod: PBBFAC,,, | Performed by: PHYSICIAN ASSISTANT

## 2023-03-07 PROCEDURE — 3074F SYST BP LT 130 MM HG: CPT | Mod: CPTII,S$GLB,, | Performed by: PHYSICIAN ASSISTANT

## 2023-03-07 PROCEDURE — 99214 OFFICE O/P EST MOD 30 MIN: CPT | Mod: 25,S$GLB,, | Performed by: PHYSICIAN ASSISTANT

## 2023-03-07 PROCEDURE — 93005 EKG 12-LEAD: ICD-10-PCS | Mod: S$GLB,,, | Performed by: PHYSICIAN ASSISTANT

## 2023-03-07 PROCEDURE — 1159F MED LIST DOCD IN RCRD: CPT | Mod: CPTII,S$GLB,, | Performed by: PHYSICIAN ASSISTANT

## 2023-03-07 PROCEDURE — 3008F BODY MASS INDEX DOCD: CPT | Mod: CPTII,S$GLB,, | Performed by: PHYSICIAN ASSISTANT

## 2023-03-07 PROCEDURE — 93010 EKG 12-LEAD: ICD-10-PCS | Mod: S$GLB,,, | Performed by: INTERNAL MEDICINE

## 2023-03-07 PROCEDURE — 99214 PR OFFICE/OUTPT VISIT, EST, LEVL IV, 30-39 MIN: ICD-10-PCS | Mod: 25,S$GLB,, | Performed by: PHYSICIAN ASSISTANT

## 2023-03-07 PROCEDURE — 3288F PR FALLS RISK ASSESSMENT DOCUMENTED: ICD-10-PCS | Mod: CPTII,S$GLB,, | Performed by: PHYSICIAN ASSISTANT

## 2023-03-07 PROCEDURE — 3078F DIAST BP <80 MM HG: CPT | Mod: CPTII,S$GLB,, | Performed by: PHYSICIAN ASSISTANT

## 2023-03-07 PROCEDURE — 3008F PR BODY MASS INDEX (BMI) DOCUMENTED: ICD-10-PCS | Mod: CPTII,S$GLB,, | Performed by: PHYSICIAN ASSISTANT

## 2023-03-07 PROCEDURE — 1160F PR REVIEW ALL MEDS BY PRESCRIBER/CLIN PHARMACIST DOCUMENTED: ICD-10-PCS | Mod: CPTII,S$GLB,, | Performed by: PHYSICIAN ASSISTANT

## 2023-03-07 PROCEDURE — 1101F PT FALLS ASSESS-DOCD LE1/YR: CPT | Mod: CPTII,S$GLB,, | Performed by: PHYSICIAN ASSISTANT

## 2023-03-07 PROCEDURE — 3288F FALL RISK ASSESSMENT DOCD: CPT | Mod: CPTII,S$GLB,, | Performed by: PHYSICIAN ASSISTANT

## 2023-03-07 PROCEDURE — 3078F PR MOST RECENT DIASTOLIC BLOOD PRESSURE < 80 MM HG: ICD-10-PCS | Mod: CPTII,S$GLB,, | Performed by: PHYSICIAN ASSISTANT

## 2023-03-07 PROCEDURE — 4010F PR ACE/ARB THEARPY RXD/TAKEN: ICD-10-PCS | Mod: CPTII,S$GLB,, | Performed by: PHYSICIAN ASSISTANT

## 2023-03-07 PROCEDURE — 1126F AMNT PAIN NOTED NONE PRSNT: CPT | Mod: CPTII,S$GLB,, | Performed by: PHYSICIAN ASSISTANT

## 2023-03-07 PROCEDURE — 1159F PR MEDICATION LIST DOCUMENTED IN MEDICAL RECORD: ICD-10-PCS | Mod: CPTII,S$GLB,, | Performed by: PHYSICIAN ASSISTANT

## 2023-03-07 PROCEDURE — 4010F ACE/ARB THERAPY RXD/TAKEN: CPT | Mod: CPTII,S$GLB,, | Performed by: PHYSICIAN ASSISTANT

## 2023-03-07 PROCEDURE — 99999 PR PBB SHADOW E&M-EST. PATIENT-LVL V: CPT | Mod: PBBFAC,,, | Performed by: PHYSICIAN ASSISTANT

## 2023-03-07 PROCEDURE — 1126F PR PAIN SEVERITY QUANTIFIED, NO PAIN PRESENT: ICD-10-PCS | Mod: CPTII,S$GLB,, | Performed by: PHYSICIAN ASSISTANT

## 2023-03-07 NOTE — PROGRESS NOTES
General Cardiology Clinic Note  Reason for Visit: Annual follow up   Last Clinic Visit: 11/30/2021 with Dr. West    HPI:   Niki Soriano is a 72 y.o. female who presents for follow up.     Problems:  ICM EF 40%  HFrEF  CAD s/p PCI mRCA , PLB , LCx, and OM2 5/2020 (Dr. Gamble)  Dyslipidemia  DM2  Hypothyroidism  Spinal stenosis and back pain    HPI  Patient presents for routine follow up. She reports mild HE, but recovers quickly with rest. She overall is significantly debilitated; she uses a wheelchair when out of the house, and a walker in the house. She cooks, does laundry, and cleans. Sometimes has shortness of breath and chest discomfort, and sometimes is fine. Inconsistent symptoms with exertion. She denies orthopnea, PND, rapid weight gain. Her legs are always slightly swollen. She takes Lasix every other day usually and monitors her weight. She denies palpitations, lightheadedness, syncope, near syncope. Denies claudication and symptoms of TIA. BP is controlled at home.     ROS:      Review of Systems   Constitutional: Negative for diaphoresis, malaise/fatigue, weight gain and weight loss.   HENT:  Negative for nosebleeds.    Eyes:  Negative for vision loss in left eye, vision loss in right eye and visual disturbance.   Cardiovascular:  Positive for dyspnea on exertion and leg swelling. Negative for chest pain, claudication, irregular heartbeat, near-syncope, orthopnea, palpitations, paroxysmal nocturnal dyspnea and syncope.   Respiratory:  Positive for shortness of breath. Negative for cough, sleep disturbances due to breathing, snoring and wheezing.    Hematologic/Lymphatic: Negative for bleeding problem. Does not bruise/bleed easily.   Skin:  Negative for poor wound healing and rash.   Musculoskeletal:  Positive for back pain, joint pain, muscle weakness and neck pain. Negative for muscle cramps and myalgias.   Gastrointestinal:  Negative for bloating, abdominal pain, diarrhea, heartburn,  melena, nausea and vomiting.   Genitourinary:  Negative for hematuria and nocturia.   Neurological:  Positive for paresthesias. Negative for brief paralysis, dizziness, headaches, light-headedness, numbness and weakness.   Psychiatric/Behavioral:  Negative for depression.    Allergic/Immunologic: Negative for hives.     PMH:     Past Medical History:   Diagnosis Date    Coronary artery disease     Diabetes mellitus     Hypercholesteremia     Hypertension     IC (interstitial cystitis)     Numbness in both hands 2017    Spondylisthesis     Vulvodynia, unspecified      Past Surgical History:   Procedure Laterality Date    CARPAL TUNNEL RELEASE Right 2019    Procedure: RELEASE, CARPAL TUNNEL, REVISION;  Surgeon: Annabelle Cid MD;  Location: [facility] OR [address] [place] [address];  Service: Orthopedics;  Laterality: Right;  Axogen, Clarix     SECTION      x3    CORONARY ANGIOGRAPHY N/A 3/3/2020    Procedure: ANGIOGRAM, CORONARY ARTERY;  Surgeon: Markie Friend III, MD;  Location: [facility] CATH LAB;  Service: Cardiology;  Laterality: N/A;    CORONARY ANGIOGRAPHY N/A 2020    Procedure: ANGIOGRAM, CORONARY ARTERY;  Surgeon: Rodney Gamble MD;  Location: [facility] CATH LAB;  Service: Cardiology;  Laterality: N/A;    CYSTOSCOPY      HYSTERECTOMY      ischemic colitis      LEFT HEART CATHETERIZATION Left 3/3/2020    Procedure: Left heart cath;  Surgeon: Markie Friend III, MD;  Location: [facility] CATH LAB;  Service: Cardiology;  Laterality: Left;    LEFT HEART CATHETERIZATION Left 2020    Procedure: Left heart cath;  Surgeon: Rodney Gamble MD;  Location: [facility] CATH LAB;  Service: Cardiology;  Laterality: Left;    OOPHORECTOMY      VA REMOVAL OF OVARY/TUBE(S)       Allergies:     Review of patient's allergies indicates:   Allergen Reactions    Augmentin [amoxicillin-pot clavulanate] Other (See Comments)     Patient cannot recall what she had, only that she could not tolerate the Augmentin    Tramadol  Itching    Tegretol [carbamazepine] Itching and Rash    Vicodin [hydrocodone-acetaminophen] Itching and Anxiety     Medications:     Current Outpatient Medications on File Prior to Visit   Medication Sig Dispense Refill    aspirin 81 MG Chew Take 1 tablet (81 mg total) by mouth once daily. (Patient taking differently: Take 81 mg by mouth every evening.)  0    b complex vitamins capsule Take 1 capsule by mouth once daily.      baclofen (LIORESAL) 20 MG tablet Take 20 mg by mouth 3 (three) times daily as needed.       cholecalciferol, vitamin D3, (VITAMIN D3) 25 mcg (1,000 unit) capsule Take 1,000 Units by mouth once daily.      duloxetine (CYMBALTA) 30 MG capsule Take 30 mg by mouth once daily.      estradioL (ESTRACE) 0.01 % (0.1 mg/gram) vaginal cream Place 0.5 g vaginally 3 (three) times a week. 42.5 g 3    ezetimibe (ZETIA) 10 mg tablet TAKE 1 TABLET BY MOUTH EVERY DAY 90 tablet 3    furosemide (LASIX) 40 MG tablet TAKE 1/2 TABLET BY MOUTH DAILY AS NEED FOR SWELLING OR WEIGHT GAIN 45 tablet 1    gabapentin (NEURONTIN) 300 MG capsule TAKE 1 CAPSULE BY MOUTH THREE TIMES A  capsule 0    Lactobac no.51/Bifidobact no.4 (UP4 PROBIOTICS ULTRA ORAL) Take by mouth.      levothyroxine (SYNTHROID) 50 MCG tablet Take 50 mcg by mouth once daily.      metformin (GLUCOPHAGE) 500 MG tablet Take 500 mg by mouth once daily.       methenamine (HIPREX) 1 gram Tab Take 1 tablet (1 g total) by mouth 2 (two) times daily. Take with 500 mg Vitamin C 60 tablet 6    metoprolol succinate (TOPROL-XL) 25 MG 24 hr tablet Take 1 tablet (25 mg total) by mouth once daily. 90 tablet 3    multivitamin (THERAGRAN) per tablet Take 1 tablet by mouth once daily.      omega-3 fatty acids/fish oil (FISH OIL-OMEGA-3 FATTY ACIDS) 300-1,000 mg capsule Take by mouth once daily.      oxybutynin (DITROPAN-XL) 5 MG TR24 Take 1 tablet (5 mg total) by mouth once daily. 30 tablet 11    rosuvastatin (CRESTOR) 40 MG Tab Take 1 tablet (40 mg total) by mouth  "every evening. 90 tablet 3    sacubitriL-valsartan (ENTRESTO)  mg per tablet Take 1 tablet by mouth 2 (two) times daily. 180 tablet 3     No current facility-administered medications on file prior to visit.     Social History:     Social History     Tobacco Use    Smoking status: Never    Smokeless tobacco: Never   Substance Use Topics    Alcohol use: No     Family History:     Family History   Problem Relation Age of Onset    Breast cancer Paternal Aunt     Colon cancer Neg Hx     Ovarian cancer Neg Hx      Physical Exam:   BP (!) 105/57 (BP Location: Left arm, Patient Position: Sitting)   Pulse 63   Ht 5' 4" (1.626 m)   BMI 29.35 kg/m²        Physical Exam  Vitals and nursing note reviewed.   Constitutional:       General: She is not in acute distress.     Appearance: Normal appearance. She is not ill-appearing.   HENT:      Head: Normocephalic and atraumatic.   Eyes:      General: No scleral icterus.     Conjunctiva/sclera: Conjunctivae normal.   Neck:      Thyroid: No thyromegaly.      Vascular: No carotid bruit or JVD.   Cardiovascular:      Rate and Rhythm: Normal rate and regular rhythm.      Pulses: Normal pulses.      Heart sounds: Normal heart sounds. No murmur heard.    No friction rub. No gallop.   Pulmonary:      Effort: Pulmonary effort is normal.      Breath sounds: Normal breath sounds. No wheezing, rhonchi or rales.   Chest:      Chest wall: No tenderness.   Abdominal:      General: Bowel sounds are normal. There is no distension.      Palpations: Abdomen is soft.      Tenderness: There is no abdominal tenderness.   Musculoskeletal:         General: No swelling.      Cervical back: Neck supple.      Right lower leg: No edema.      Left lower leg: No edema.   Skin:     General: Skin is warm and dry.      Coloration: Skin is not pale.      Findings: No erythema or rash.      Nails: There is no clubbing.   Neurological:      Mental Status: She is alert and oriented to person, place, and time. " Mental status is at baseline.   Psychiatric:         Mood and Affect: Mood and affect normal.         Behavior: Behavior normal.        Labs:     Lab Results   Component Value Date     01/04/2021    K 3.7 01/04/2021     01/04/2021    CO2 27 01/04/2021    BUN 24 (H) 01/04/2021    CREATININE 0.9 01/04/2021    ANIONGAP 10 01/04/2021     Lab Results   Component Value Date    HGBA1C 5.8 (H) 03/02/2020     Lab Results   Component Value Date    BNP 1,319 (H) 03/20/2020    BNP 3,286 (H) 03/01/2020    Lab Results   Component Value Date    WBC 5.59 09/21/2020    HGB 12.2 09/21/2020    HCT 37.9 09/21/2020     (L) 09/21/2020    GRAN 2.8 09/21/2020    GRAN 50.5 09/21/2020     Lab Results   Component Value Date    CHOL 127 01/04/2021    HDL 56 01/04/2021    LDLCALC 46.4 (L) 01/04/2021    TRIG 123 01/04/2021          Imaging:   Echocardiograms:   TTE 9/21/2020  Mild left ventricular enlargement.  Mildly decreased left ventricular systolic function. The estimated ejection fraction is 40%.  Normal right ventricular systolic function.  Grade I (mild) left ventricular diastolic dysfunction consistent with impaired relaxation.  Normal central venous pressure (3 mmHg).    TTE 3/2/2020  Severely decreased left ventricular systolic function. The estimated ejection fraction is 20-25%. Significant wall motion abnormalities seen.  Mildly reduced right ventricular systolic function.  Grade III (severe) left ventricular diastolic dysfunction consistent with restrictive physiology.  Mild left atrial enlargement.  Mild-to-moderate mitral regurgitation.  The estimated PA systolic pressure is 44 mmHg.  Intermediate central venous pressure (8 mmHg).    Stress Tests:   PET Viability Scan 7/28/2020    There is scar (no viability) in the base to distal septal, inferoapical and apical septal walls comprising of 18% of myocardium. (LAD territory)    Gated perfusion images showed an ejection fraction of 46%. Normal ejection fraction is  greater than 55%.    Caths:   [facility] 5/29/2020  LVEDP (Pre): 15  Successful PCI of 75% mid LCX stenosis with 2.5X12 MANDEEP  Successful PCI of 80% OM2 stenosis with 3X15 MANDEEP  Successful PCI of mid RCA  with 4.5X22 & 4.0X26 MANDEEP  Successful PCI of proximal PLB  with 3X22 MANDEEP  IVUS utilized for stent sizing  Estimated blood loss: <50 mL    [facility] 3/3/2020  Multi-vessel coronary artery disease, with  of ostial LAD filled via faint collaterals from the OM1 and Ramus, 75% tubular stenisus if the mid LCx, 80% discrete stenosis of the proximal OM1, 99% discrete stenosis of the mid RCA, and  of the distal RCA filled via collaterals from the distal LCx and OM1.  LV filling pressure is elevated, LVEDP 25 mmHg.    Other:  None      EKG 3/7/2023: Sinus rhythm with first degree AV block, possible inferior infarct    Assessment:     1. Ischemic cardiomyopathy    2. Chronic combined systolic and diastolic congestive heart failure    3. Coronary artery disease involving native coronary artery of native heart without angina pectoris    4. Dyslipidemia    5. Type 2 diabetes mellitus with diabetic neuropathy, without long-term current use of insulin      Plan:     ICM EF 40%  HFrEF  Compensated on exam. NYHA Class 2-3 symptoms. She is severely debilitated so it is difficult to assess symptoms. Will obtain TTE and add on SGLT2i if EF is still reduced. BP unlikely to tolerate Spironolactone.   Continue Metoprolol succinate 25 mg daily   Continue Entresto  mg bid   Continue Furosemide PRN   Limit sodium intake to less then 2 gram sodium and 1500cc fluid restriction.  Graded exercise program as tolerated.  Call if  more than 3 lbs in 1 day or 5 lbs in 1 week.     CAD s/p PCI mRCA , PLB , LCx, and OM2 5/2021   Stable angina  Continue ASA, statin, Zetia     Dyslipidemia  Obtain lipid panel  Continue Crestor 40 mg daily and Zetia 10 mg daily     DM2  Management per PCP      Follow up in 9 months or sooner if echo looks worse.      Signed:  Ester Solorzano PA-C  Cardiology   [phone] - General  3/7/2023 9:08 AM

## 2023-03-15 ENCOUNTER — HOSPITAL ENCOUNTER (OUTPATIENT)
Dept: CARDIOLOGY | Facility: HOSPITAL | Age: 73
Discharge: HOME | End: 2023-03-15
Attending: PHYSICIAN ASSISTANT
Payer: MEDICARE

## 2023-03-15 ENCOUNTER — TELEPHONE (OUTPATIENT)
Dept: CARDIOLOGY | Facility: CLINIC | Age: 73
End: 2023-03-15
Payer: MEDICARE

## 2023-03-15 DIAGNOSIS — I50.42 CHRONIC COMBINED SYSTOLIC AND DIASTOLIC CONGESTIVE HEART FAILURE: ICD-10-CM

## 2023-03-15 LAB
ASCENDING AORTA: 3.33 CM
AV INDEX (PROSTH): 0.5
AV MEAN GRADIENT: 3 MMHG
AV PEAK GRADIENT: 5 MMHG
AV VALVE AREA: 1.45 CM2
AV VELOCITY RATIO: 0.43
CV ECHO LV RWT: 0.21 CM
DOP CALC AO PEAK VEL: 1.11 M/S
DOP CALC AO VTI: 22.52 CM
DOP CALC LVOT AREA: 2.9 CM2
DOP CALC LVOT DIAMETER: 1.93 CM
DOP CALC LVOT PEAK VEL: 0.48 M/S
DOP CALC LVOT STROKE VOLUME: 32.66 CM3
DOP CALCLVOT PEAK VEL VTI: 11.17 CM
E WAVE DECELERATION TIME: 196.97 MSEC
E/A RATIO: 0.98
E/E' RATIO: 15.09 M/S
ECHO LV POSTERIOR WALL: 0.51 CM (ref 0.6–1.1)
EJECTION FRACTION: 43 %
FRACTIONAL SHORTENING: 19 % (ref 28–44)
INTERVENTRICULAR SEPTUM: 0.51 CM (ref 0.6–1.1)
LA MAJOR: 4.73 CM
LA MINOR: 4.48 CM
LA WIDTH: 3.22 CM
LEFT ATRIUM SIZE: 3.23 CM
LEFT ATRIUM VOLUME MOD: 32.56 CM3
LEFT ATRIUM VOLUME: 40.68 CM3
LEFT INTERNAL DIMENSION IN SYSTOLE: 4 CM (ref 2.1–4)
LEFT VENTRICLE DIASTOLIC VOLUME: 113.94 ML
LEFT VENTRICLE SYSTOLIC VOLUME: 69.82 ML
LEFT VENTRICULAR INTERNAL DIMENSION IN DIASTOLE: 4.92 CM (ref 3.5–6)
LEFT VENTRICULAR MASS: 75.89 G
LV LATERAL E/E' RATIO: 13.83 M/S
LV SEPTAL E/E' RATIO: 16.6 M/S
MV PEAK A VEL: 0.85 M/S
MV PEAK E VEL: 0.83 M/S
MV STENOSIS PRESSURE HALF TIME: 57.12 MS
MV VALVE AREA P 1/2 METHOD: 3.85 CM2
RA MAJOR: 3.84 CM
RA PRESSURE: 3 MMHG
RA WIDTH: 2.67 CM
RIGHT VENTRICULAR END-DIASTOLIC DIMENSION: 3.24 CM
RV TISSUE DOPPLER FREE WALL SYSTOLIC VELOCITY 1 (APICAL 4 CHAMBER VIEW): 12.02 CM/S
SINUS: 2.23 CM
STJ: 2.25 CM
TDI LATERAL: 0.06 M/S
TDI SEPTAL: 0.05 M/S
TDI: 0.06 M/S
TRICUSPID ANNULAR PLANE SYSTOLIC EXCURSION: 2.23 CM

## 2023-03-15 PROCEDURE — 93306 ECHO (CUPID ONLY): ICD-10-PCS | Mod: 26,,, | Performed by: INTERNAL MEDICINE

## 2023-03-15 PROCEDURE — C8929 TTE W OR WO FOL WCON,DOPPLER: HCPCS

## 2023-03-15 PROCEDURE — 93306 TTE W/DOPPLER COMPLETE: CPT | Mod: 26,,, | Performed by: INTERNAL MEDICINE

## 2023-03-15 NOTE — TELEPHONE ENCOUNTER
Discussed TTE results with patient which shows mildly reduced LVEF of 43%. Initially had discussed starting Jardiance if EF remained reduced, but she self-catheterizes and has frequent UTIs, so will defer starting an SGLT2i at this time. Her BP is unlikely to tolerate Spironolactone. Recommended continuing her current doses of Entresto and Metoprolol.

## 2023-04-10 ENCOUNTER — TELEPHONE (OUTPATIENT)
Dept: VASCULAR SURGERY | Facility: CLINIC | Age: 73
End: 2023-04-10
Payer: MEDICARE

## 2023-04-10 NOTE — TELEPHONE ENCOUNTER
Spoke with the pt and informed her of a referral needed to be seen by vasc.surgeon.Informed the pt when the referral  is obtained she will be contacted to schedule an appt.Pt verbalized understanding of information received.

## 2023-04-12 ENCOUNTER — PATIENT MESSAGE (OUTPATIENT)
Dept: INFECTIOUS DISEASES | Facility: CLINIC | Age: 73
End: 2023-04-12
Payer: MEDICARE

## 2023-04-13 DIAGNOSIS — I73.9 PAD (PERIPHERAL ARTERY DISEASE): Primary | ICD-10-CM

## 2023-05-05 ENCOUNTER — INITIAL CONSULT (OUTPATIENT)
Dept: VASCULAR SURGERY | Facility: CLINIC | Age: 73
End: 2023-05-05
Payer: MEDICARE

## 2023-05-05 ENCOUNTER — HOSPITAL ENCOUNTER (OUTPATIENT)
Dept: VASCULAR SURGERY | Facility: CLINIC | Age: 73
Discharge: HOME | End: 2023-05-05
Attending: SURGERY
Payer: MEDICARE

## 2023-05-05 VITALS
SYSTOLIC BLOOD PRESSURE: 97 MMHG | DIASTOLIC BLOOD PRESSURE: 53 MMHG | WEIGHT: 169.75 LBS | BODY MASS INDEX: 28.98 KG/M2 | TEMPERATURE: 99 F | HEIGHT: 64 IN | HEART RATE: 63 BPM

## 2023-05-05 DIAGNOSIS — I73.9 PAD (PERIPHERAL ARTERY DISEASE): ICD-10-CM

## 2023-05-05 DIAGNOSIS — I73.9 PAD (PERIPHERAL ARTERY DISEASE): Primary | ICD-10-CM

## 2023-05-05 PROCEDURE — 93923 PR NON-INVASIVE PHYSIOLOGIC STUDY EXTREMITY 3 LEVELS: ICD-10-PCS | Mod: S$GLB,,, | Performed by: SURGERY

## 2023-05-05 PROCEDURE — 93923 UPR/LXTR ART STDY 3+ LVLS: CPT | Mod: S$GLB,,, | Performed by: SURGERY

## 2023-05-05 PROCEDURE — 99205 PR OFFICE/OUTPT VISIT, NEW, LEVL V, 60-74 MIN: ICD-10-PCS | Mod: S$GLB,,, | Performed by: SURGERY

## 2023-05-05 PROCEDURE — 99999 PR PBB SHADOW E&M-EST. PATIENT-LVL IV: CPT | Mod: PBBFAC,,, | Performed by: SURGERY

## 2023-05-05 PROCEDURE — 99999 PR PBB SHADOW E&M-EST. PATIENT-LVL IV: ICD-10-PCS | Mod: PBBFAC,,, | Performed by: SURGERY

## 2023-05-05 PROCEDURE — 99205 OFFICE O/P NEW HI 60 MIN: CPT | Mod: S$GLB,,, | Performed by: SURGERY

## 2023-05-05 NOTE — LETTER
May 5, 2023  Odilon Valle, DPM  [address]  [address]  [facility]  [place] [address]      Vascular/Endovascular  Surgery  Department of Surgery  [facility] [address]  [place] [address]  Phone: [phone]  Fax: [phone]    Dr. Mayfield's Vascular Nurse:    Taina Campuzano, LPN  [phone]  [email or web address] Patient: Niki Soriano   MR Number: [id number]   YOB: 1950   Date of Visit: 5/5/2023     Dear Dr. Valle:    Thank you for referring Niki Soriano to me for evaluation. Below is my Impression and Plan for this patient:     72 y.o. 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  She has good glycemic control  The bilateral feet discoloration is neurogenic   Ciont ASA 81 po QD, cont statin rx  Can proceed with toe nail clipping on R foot; on the L foot, she has moderate perfusion - can also proceed with care to avoid tissue loss. Should that occur and not heal, would then consider l leg angio. It is not needed at this time  F/u PRN should any tissue loss develop in future     If you have questions, please do not hesitate to call me. I look forward to following Niki Soriano along with you.    Sincerely,    Lencho Mayfield MD DFSNapa State Hospital  [facility] Professor of Surgery  , Vascular Surgery Fellowship  [facility]  Moises Webster MD

## 2023-05-05 NOTE — PROGRESS NOTES
Niki Soriano  05/05/2023    HPI:  Patient is a 72 y.o. 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   who is here today for evaluation of peripheral arterial disease (PAD). Sent by podatrist after evaluation for ingrown toenails. Pt requesting removal but podiatry fet her blood flow to her feet was not adequate to heal/    Paralysis from T7 down 1996 after multiple traumatic personal events.  Now she is quite debilitated, can only walk short distances with walker. Has residual numbness and burning legs bilaterally. Denies calf pain when walking although unable to walk long distances. Denies leg/foot pain when lying flat, only pain with ingrown toe nail.    + MI/ no stroke  Tobacco use: former, early 20s and quit    Asked to see by Dr Odilon Webster MD   Employment: Retired      Current Outpatient Medications:     b complex vitamins capsule, Take 1 capsule by mouth once daily., Disp: , Rfl:     baclofen (LIORESAL) 20 MG tablet, Take 20 mg by mouth 3 (three) times daily as needed. , Disp: , Rfl:     cholecalciferol, vitamin D3, (VITAMIN D3) 25 mcg (1,000 unit) capsule, Take 1,000 Units by mouth once daily., Disp: , Rfl:     duloxetine (CYMBALTA) 30 MG capsule, Take 30 mg by mouth once daily., Disp: , Rfl:     estradioL (ESTRACE) 0.01 % (0.1 mg/gram) vaginal cream, Place 0.5 g vaginally 3 (three) times a week., Disp: 42.5 g, Rfl: 3    ezetimibe (ZETIA) 10 mg tablet, TAKE 1 TABLET BY MOUTH EVERY DAY, Disp: 90 tablet, Rfl: 3    furosemide (LASIX) 40 MG tablet, TAKE 1/2 TABLET BY MOUTH DAILY AS NEED FOR SWELLING OR WEIGHT GAIN, Disp: 45 tablet, Rfl: 1    gabapentin (NEURONTIN) 300 MG capsule, TAKE 1 CAPSULE BY MOUTH THREE TIMES A DAY, Disp: 270 capsule, Rfl: 0    Lactobac no.51/Bifidobact no.4 (UP4 PROBIOTICS ULTRA ORAL), Take by mouth., Disp: , Rfl:     levothyroxine (SYNTHROID)  50 MCG tablet, Take 50 mcg by mouth once daily., Disp: , Rfl:     metformin (GLUCOPHAGE) 500 MG tablet, Take 500 mg by mouth once daily. , Disp: , Rfl:     methenamine (HIPREX) 1 gram Tab, Take 1 tablet (1 g total) by mouth 2 (two) times daily. Take with 500 mg Vitamin C, Disp: 60 tablet, Rfl: 6    metoprolol succinate (TOPROL-XL) 25 MG 24 hr tablet, Take 1 tablet (25 mg total) by mouth once daily., Disp: 90 tablet, Rfl: 3    multivitamin (THERAGRAN) per tablet, Take 1 tablet by mouth once daily., Disp: , Rfl:     omega-3 fatty acids/fish oil (FISH OIL-OMEGA-3 FATTY ACIDS) 300-1,000 mg capsule, Take by mouth once daily., Disp: , Rfl:     oxybutynin (DITROPAN-XL) 5 MG TR24, Take 1 tablet (5 mg total) by mouth once daily. (Patient taking differently: Take 5 mg by mouth as needed.), Disp: 30 tablet, Rfl: 11    rosuvastatin (CRESTOR) 40 MG Tab, Take 1 tablet (40 mg total) by mouth every evening., Disp: 90 tablet, Rfl: 3    sacubitriL-valsartan (ENTRESTO)  mg per tablet, Take 1 tablet by mouth 2 (two) times daily., Disp: 180 tablet, Rfl: 3    aspirin 81 MG Chew, Take 1 tablet (81 mg total) by mouth once daily. (Patient taking differently: Take 81 mg by mouth every evening.), Disp: , Rfl: 0    PHYSICAL EXAM:   Right Arm BP - Sittin/56 (23 1010)  Left Arm BP - Sittin/53 (23 1010)  Pulse: 63  Temp: 98.7 °F (37.1 °C)            Abdomen: Soft, nontender, nondistended, no masses or organomegaly     No rebound tenderness noted      Extremities:   2+ femoral x2; R popliteal 2+ pulse  Pedal pulses: monophasic PT bilaterally      Tissue loss: no     No pre-tibial edema    LAB RESULTS:  Lab Results   Component Value Date    K 3.7 03/15/2023    K 3.7 2021    K 3.5 2020    CREATININE 0.8 03/15/2023    CREATININE 0.9 2021    CREATININE 0.8 2020     Lab Results   Component Value Date    WBC 5.59 2020    WBC 6.24 2020    WBC 6.13 2020    HCT 37.9 2020    HCT  39.3 09/20/2020    HCT 35.2 (L) 05/29/2020     (L) 09/21/2020     09/20/2020     (L) 05/29/2020     Lab Results   Component Value Date    HGBA1C 5.8 (H) 03/02/2020       IMAGING (I have independently reviewed and interpreted the following tests):  ABIs:  R RICHA: 1.1  L RICHA: 0.85  PVR waveforms:  R: ankle, triphasic    Outside u/s ([facility] - April 2023):  Report -- L prox SFA monophasic from L CFA biphasic     IMP/PLAN:     72 y.o. 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  She has good glycemic control  The bilateral feet discoloration is neurogenic   Ciont ASA 81 po QD, cont statin rx  Can proceed with toe nail clipping on R foot; on the L foot, she has moderate perfusion - can also proceed with care to avoid tissue loss. Should that occur and not heal, would then consider l leg angio. It is not needed at this time  F/u PRN should any tissue loss develop in future    Lencho Mayfield MD DFSVS FACS   Vascular/Endovascular Surgery

## 2023-07-05 ENCOUNTER — OFFICE VISIT (OUTPATIENT)
Dept: PODIATRY | Facility: CLINIC | Age: 73
End: 2023-07-05
Payer: MEDICARE

## 2023-07-05 VITALS
SYSTOLIC BLOOD PRESSURE: 156 MMHG | HEIGHT: 64 IN | HEART RATE: 76 BPM | DIASTOLIC BLOOD PRESSURE: 78 MMHG | BODY MASS INDEX: 28.98 KG/M2 | WEIGHT: 169.75 LBS

## 2023-07-05 DIAGNOSIS — M79.672 PAIN IN BOTH FEET: ICD-10-CM

## 2023-07-05 DIAGNOSIS — L60.0 INGROWN NAIL: ICD-10-CM

## 2023-07-05 DIAGNOSIS — E11.40 TYPE 2 DIABETES MELLITUS WITH DIABETIC NEUROPATHY, WITHOUT LONG-TERM CURRENT USE OF INSULIN: Primary | ICD-10-CM

## 2023-07-05 DIAGNOSIS — B35.1 ONYCHOMYCOSIS: ICD-10-CM

## 2023-07-05 DIAGNOSIS — M79.671 PAIN IN BOTH FEET: ICD-10-CM

## 2023-07-05 DIAGNOSIS — L84 CORN OR CALLUS: ICD-10-CM

## 2023-07-05 PROCEDURE — 4010F PR ACE/ARB THEARPY RXD/TAKEN: ICD-10-PCS | Mod: CPTII,S$GLB,, | Performed by: PODIATRIST

## 2023-07-05 PROCEDURE — 3078F PR MOST RECENT DIASTOLIC BLOOD PRESSURE < 80 MM HG: ICD-10-PCS | Mod: CPTII,S$GLB,, | Performed by: PODIATRIST

## 2023-07-05 PROCEDURE — 11721 PR DEBRIDEMENT OF NAILS, 6 OR MORE: ICD-10-PCS | Mod: Q9,59,S$GLB, | Performed by: PODIATRIST

## 2023-07-05 PROCEDURE — 4010F ACE/ARB THERAPY RXD/TAKEN: CPT | Mod: CPTII,S$GLB,, | Performed by: PODIATRIST

## 2023-07-05 PROCEDURE — 3077F SYST BP >= 140 MM HG: CPT | Mod: CPTII,S$GLB,, | Performed by: PODIATRIST

## 2023-07-05 PROCEDURE — 11721 DEBRIDE NAIL 6 OR MORE: CPT | Mod: Q9,59,S$GLB, | Performed by: PODIATRIST

## 2023-07-05 PROCEDURE — 11730 AVULSION NAIL PLATE SIMPLE 1: CPT | Mod: T5,S$GLB,, | Performed by: PODIATRIST

## 2023-07-05 PROCEDURE — 1125F PR PAIN SEVERITY QUANTIFIED, PAIN PRESENT: ICD-10-PCS | Mod: CPTII,S$GLB,, | Performed by: PODIATRIST

## 2023-07-05 PROCEDURE — 99203 PR OFFICE/OUTPT VISIT, NEW, LEVL III, 30-44 MIN: ICD-10-PCS | Mod: 25,S$GLB,, | Performed by: PODIATRIST

## 2023-07-05 PROCEDURE — 1159F MED LIST DOCD IN RCRD: CPT | Mod: CPTII,S$GLB,, | Performed by: PODIATRIST

## 2023-07-05 PROCEDURE — 1125F AMNT PAIN NOTED PAIN PRSNT: CPT | Mod: CPTII,S$GLB,, | Performed by: PODIATRIST

## 2023-07-05 PROCEDURE — 3008F BODY MASS INDEX DOCD: CPT | Mod: CPTII,S$GLB,, | Performed by: PODIATRIST

## 2023-07-05 PROCEDURE — 99999 PR PBB SHADOW E&M-EST. PATIENT-LVL III: CPT | Mod: PBBFAC,,, | Performed by: PODIATRIST

## 2023-07-05 PROCEDURE — 3078F DIAST BP <80 MM HG: CPT | Mod: CPTII,S$GLB,, | Performed by: PODIATRIST

## 2023-07-05 PROCEDURE — 99203 OFFICE O/P NEW LOW 30 MIN: CPT | Mod: 25,S$GLB,, | Performed by: PODIATRIST

## 2023-07-05 PROCEDURE — 11056 PR TRIM BENIGN HYPERKERATOTIC SKIN LESION,2-4: ICD-10-PCS | Mod: Q9,51,S$GLB, | Performed by: PODIATRIST

## 2023-07-05 PROCEDURE — 3077F PR MOST RECENT SYSTOLIC BLOOD PRESSURE >= 140 MM HG: ICD-10-PCS | Mod: CPTII,S$GLB,, | Performed by: PODIATRIST

## 2023-07-05 PROCEDURE — 3008F PR BODY MASS INDEX (BMI) DOCUMENTED: ICD-10-PCS | Mod: CPTII,S$GLB,, | Performed by: PODIATRIST

## 2023-07-05 PROCEDURE — 11730 PR REMOVAL OF NAIL PLATE: ICD-10-PCS | Mod: T5,S$GLB,, | Performed by: PODIATRIST

## 2023-07-05 PROCEDURE — 99999 PR PBB SHADOW E&M-EST. PATIENT-LVL III: ICD-10-PCS | Mod: PBBFAC,,, | Performed by: PODIATRIST

## 2023-07-05 PROCEDURE — 1159F PR MEDICATION LIST DOCUMENTED IN MEDICAL RECORD: ICD-10-PCS | Mod: CPTII,S$GLB,, | Performed by: PODIATRIST

## 2023-07-05 PROCEDURE — 11056 PARNG/CUTG B9 HYPRKR LES 2-4: CPT | Mod: Q9,51,S$GLB, | Performed by: PODIATRIST

## 2023-07-05 RX ORDER — AMMONIUM LACTATE 12 G/100G
1 CREAM TOPICAL 2 TIMES DAILY
Qty: 140 G | Refills: 11 | Status: SHIPPED | OUTPATIENT
Start: 2023-07-05

## 2023-07-09 NOTE — PROGRESS NOTES
Subjective:      Patient ID: Niki Soriano is a 72 y.o. female.    Chief Complaint:   Ingrown Toenail (Bilateral ingrown toenail to great toe) and Callouses    Niki is a 72 y.o. female who presents to the clinic for evaluation and treatment of high risk feet. Niki has a past medical history of Coronary artery disease, Diabetes mellitus, Hypercholesteremia, Hypertension, IC (interstitial cystitis), Numbness in both hands (11/1/2017), Spondylisthesis, and Vulvodynia, unspecified. The patient's chief complaint is long, thick toenails. This patient has documented high risk feet requiring routine maintenance secondary to diabetes mellitis and those secondary complications of diabetes, as mentioned..    PCP: Moises Webster MD    Date Last Seen by PCP:   Chief Complaint   Patient presents with    Ingrown Toenail     Bilateral ingrown toenail to great toe    Callouses         Current shoe gear:  Affected Foot: Casual shoes     Unaffected Foot: Casual shoes    Hemoglobin A1C   Date Value Ref Range Status   03/02/2020 5.8 (H) 4.0 - 5.6 % Final     Comment:     ADA Screening Guidelines:  5.7-6.4%  Consistent with prediabetes  >or=6.5%  Consistent with diabetes  High levels of fetal hemoglobin interfere with the HbA1C  assay. Heterozygous hemoglobin variants (HbS, HgC, etc)do  not significantly interfere with this assay.   However, presence of multiple variants may affect accuracy.         Review of Systems   Constitutional: Negative for chills, decreased appetite, fever and night sweats.   HENT:  Negative for congestion, ear discharge, nosebleeds and tinnitus.    Eyes:  Negative for double vision, pain and visual disturbance.   Cardiovascular:  Negative for chest pain, claudication, cyanosis and palpitations.   Respiratory:  Negative for cough, hemoptysis, shortness of breath and wheezing.    Endocrine: Negative for cold intolerance and heat intolerance.   Hematologic/Lymphatic: Negative for adenopathy and bleeding  problem.   Skin:  Positive for color change, dry skin, nail changes and unusual hair distribution.   Musculoskeletal:  Positive for arthritis, joint pain and stiffness. Negative for myalgias.   Gastrointestinal:  Negative for abdominal pain, dysphagia, nausea and vomiting.   Genitourinary:  Negative for dysuria, flank pain, hematuria and pelvic pain.   Neurological:  Positive for disturbances in coordination, numbness, paresthesias and sensory change.   Psychiatric/Behavioral:  Negative for altered mental status, hallucinations and suicidal ideas. The patient does not have insomnia.    Allergic/Immunologic: Negative for environmental allergies and persistent infections.         Objective:      Physical Exam  Vitals reviewed.   Constitutional:       Appearance: She is well-developed.   HENT:      Head: Normocephalic and atraumatic.   Cardiovascular:      Pulses:           Dorsalis pedis pulses are 1+ on the right side and 1+ on the left side.        Posterior tibial pulses are 1+ on the right side and 1+ on the left side.   Pulmonary:      Effort: Pulmonary effort is normal.   Musculoskeletal:         General: Normal range of motion.      Comments: Anterior, lateral, and posterior muscle groups bilateral lower extremities show strength 4 over 5 symmetrically. Inspection and palpation of the joints and bones reveal no crepitus or joint effusion. No tenderness upon palpation. Mild plantar flexor contractures noted to digits 2 through 5 bilaterally.  Angle and base of gait are normal.   Feet:      Right foot:      Skin integrity: Callus and dry skin present.      Left foot:      Skin integrity: Callus and dry skin present.   Skin:     General: Skin is warm and dry.      Capillary Refill: Capillary refill takes 2 to 3 seconds.      Coloration: Skin is pale.      Comments: Skin bilateral lower extremities noted to be thin, dry, and atrophic.  Toenails thickened, discolored, with subungual fungal debris and tenderness  noted.  Hyperkeratotic lesions noted to both feet plantarly with tenderness.    Inflamed cryptotic border of right hallux nail with no drainage, pus nor malodor but mild erythema and edema of the associated nail fold     Neurological:      Mental Status: She is alert and oriented to person, place, and time.      Sensory: Sensory deficit present.      Motor: Atrophy present.      Deep Tendon Reflexes: Reflexes abnormal.      Reflex Scores:       Patellar reflexes are 1+ on the right side and 1+ on the left side.       Achilles reflexes are 1+ on the right side and 1+ on the left side.     Comments: Sharp, dull, light touch, and vibratory sensation are diminished bilaterally. Proprioceptive sensation is intact to both lower extremities. [place] Kenneth monofilament exam shows loss of protective sensation to plantar toes 1 through 5 bilaterally. Deep tendon reflexes to the patellar tendons is 1 over 4 bilaterally symmetrical. Deep tendon reflexes to the Achilles tendon is 0 over 4 bilaterally symmetrical. No ankle clonus or Babinski reflex noted bilaterally. Coordination is fair to both lower extremities.  Patient admits to intermittent burning and tingling in the feet.   Psychiatric:         Behavior: Behavior normal.           Assessment:       Encounter Diagnoses   Name Primary?    Type 2 diabetes mellitus with diabetic neuropathy, without long-term current use of insulin Yes    Pain in both feet     Ingrown nail     Corn or callus     Onychomycosis      Independent visualization of imaging was performed.  Results were reviewed in detail with patient.       Plan:       Niki was seen today for ingrown toenail and callouses.    Diagnoses and all orders for this visit:    Type 2 diabetes mellitus with diabetic neuropathy, without long-term current use of insulin    Pain in both feet    Ingrown nail    Corn or callus    Onychomycosis    Other orders  -     ammonium lactate 12 % Crea; Apply 1 application topically  2 (two) times daily.      I counseled the patient on her conditions, their implications and medical management.    The nature of the condition, options for management, as well as potential risks and complications were discussed in detail with patient. Patient was amenable to my recommendations and left my office fully informed and will follow up as instructed or sooner if necessary.      Decision making:  Chronic illnesses discussed in detail, previous records/notes and imaging independently reviewed, prescription drug management performed in addition to lengthy discussion regarding both conservative and surgical treatment options.    Routine Foot Care    Performed by:  Pedro Pablo Duran. GAURAV  Authorized by:  Patient     Consent Done?:  Yes (Verbal)     Nail Care Type:  Debride  Location(s): All  (Left 1st Toe, Left 3rd Toe, Left 2nd Toe, Left 4th Toe, Left 5th Toe, Right 1st Toe, Right 2nd Toe, Right 3rd Toe, Right 4th Toe and Right 5th Toe)  Patient tolerance:  Patient tolerated the procedure well with no immediate complications     With patient's permission, the toenails mentioned above were aggressively reduced and debrided using a nail nipper, removing all offending nail and debris. The patient will continue to monitor the areas daily, inspect the feet, wear protective shoe gear when ambulatory, and moisturizer to maintain skin integrity.      Callus Care Type: Debride    With patient's permission, the calluses/hyperkeratotic lesions mentioned above were aggressively reduced and debrided using a number 15 blade. The patient will continue to monitor the areas daily, inspect the feet, wear protective shoe gear when ambulatory, and moisturizer to maintain skin integrity.     Temporary Nail Removal     Performed by:  Pedro Pablo Duran DPM     Consent Done?:  Yes (Written)     Location:  Right hallux nail    Anesthesia:  Digital block  Local anesthetic: 0.5% Marcaine without epinephrine  Anesthetic total (ml):   4  Preparation:  Skin prepped with alcohol and skin prepped with Betadine     Amount removed:  Bilateral border right hallux nail  Wedge excision of skin of nail fold: No    Nail bed sutured?: No    Nail matrix removed:  No  Removed nail replaced and anchored: No    Dressing applied:  4x4, antibiotic ointment and dressing applied  Patient tolerance:  Patient tolerated the procedure well with no immediate complications    Digital nerve block applied to the above-mentioned toe. Toe was prepped and draped in a sterile fashion and penrose drain applied. Anesthesia was confirmed and the offending portion of nail plate was freed from the nail bed and eponychium, and was then excised. The surgical site was then lavaged with copious amounts of 70% isopropyl alcohol. Penrose drain was removed and betadine ointment and dry sterile dressing applied. Post-op care instructions were discussed and dispensed to patient.    Shoe inspection. Diabetic Foot Education. Patient reminded of the importance of good nutrition and blood sugar control to help prevent podiatric complications of diabetes. Patient instructed on proper foot hygeine. We discussed wearing proper shoe gear, daily foot inspections and Diabetic foot education in detail.    Return to clinic in 3-6 months or sooner if problems arise

## 2023-07-18 ENCOUNTER — PROCEDURE VISIT (OUTPATIENT)
Dept: PODIATRY | Facility: CLINIC | Age: 73
End: 2023-07-18
Payer: MEDICARE

## 2023-07-18 VITALS
DIASTOLIC BLOOD PRESSURE: 74 MMHG | SYSTOLIC BLOOD PRESSURE: 119 MMHG | WEIGHT: 169.75 LBS | HEIGHT: 64 IN | BODY MASS INDEX: 28.98 KG/M2 | HEART RATE: 65 BPM

## 2023-07-18 DIAGNOSIS — M79.671 PAIN IN BOTH FEET: ICD-10-CM

## 2023-07-18 DIAGNOSIS — M79.672 PAIN IN BOTH FEET: ICD-10-CM

## 2023-07-18 DIAGNOSIS — L60.0 INGROWN NAIL: ICD-10-CM

## 2023-07-18 DIAGNOSIS — E11.40 TYPE 2 DIABETES MELLITUS WITH DIABETIC NEUROPATHY, WITHOUT LONG-TERM CURRENT USE OF INSULIN: Primary | ICD-10-CM

## 2023-07-18 PROCEDURE — 11750 PR REMOVAL OF NAIL BED: ICD-10-PCS | Mod: T5,S$GLB,, | Performed by: PODIATRIST

## 2023-07-18 PROCEDURE — 11750 EXCISION NAIL&NAIL MATRIX: CPT | Mod: T5,S$GLB,, | Performed by: PODIATRIST

## 2023-07-20 NOTE — PROCEDURES
Permanent Nail Removal     Performed by:  Pedro Pablo Duran   Authorized by:  Patient     Consent Done?:  Yes (Written)     Location:  Bilateral hallux nails bilateral borders  Anesthesia:  Digital block  Local anesthetic: 0.5% Marcaine without epinephrine  Anesthetic total (ml):  4  Preparation:  Bilateral hallux nails bilateral borders prepped with alcohol and skin prepped with Betadine     Amount removed:  Bilateral hallux nails bilateral borders  Wedge excision of skin of nail fold: No    Nail bed sutured?: No    Nail matrix removed:  Yes  Removed nail replaced and anchored: No    Dressing applied:  4x4, antibiotic ointment and dressing applied  Patient tolerance:  Patient tolerated the procedure well with no immediate complications    Digital nerve block applied to the above-mentioned toe. Toe was prepped and draped in a sterile fashion and penrose drain applied. Anesthesia was confirmed and the offending portion of nail plate was freed from the nail bed and eponychium, and was then excised. 89% phenol was applied to the nail matrix for 3 consecutive periods of 30 seconds and was then lavaged with copious amounts of 70% isopropyl alcohol. Penrose drain was removed and betadine ointment and dry sterile dressing applied. Post-op care instructions were discussed and dispensed to patient.    Postoperative nail procedure instructions were discussed in detail including soaking in warm water and Epsom salt or antibacterial soap, topical antibiotic ointment, dry sterile dressing, and a follow-up in 2 weeks.

## 2023-08-01 ENCOUNTER — OFFICE VISIT (OUTPATIENT)
Dept: PODIATRY | Facility: CLINIC | Age: 73
End: 2023-08-01
Payer: MEDICARE

## 2023-08-01 VITALS
BODY MASS INDEX: 28.98 KG/M2 | HEART RATE: 65 BPM | HEIGHT: 64 IN | DIASTOLIC BLOOD PRESSURE: 74 MMHG | SYSTOLIC BLOOD PRESSURE: 119 MMHG | WEIGHT: 169.75 LBS

## 2023-08-01 DIAGNOSIS — E11.40 TYPE 2 DIABETES MELLITUS WITH DIABETIC NEUROPATHY, WITHOUT LONG-TERM CURRENT USE OF INSULIN: Primary | ICD-10-CM

## 2023-08-01 DIAGNOSIS — M79.671 PAIN IN BOTH FEET: ICD-10-CM

## 2023-08-01 DIAGNOSIS — M79.672 PAIN IN BOTH FEET: ICD-10-CM

## 2023-08-01 DIAGNOSIS — L60.0 INGROWN NAIL: ICD-10-CM

## 2023-08-01 PROCEDURE — 4010F PR ACE/ARB THEARPY RXD/TAKEN: ICD-10-PCS | Mod: CPTII,S$GLB,, | Performed by: PODIATRIST

## 2023-08-01 PROCEDURE — 3074F PR MOST RECENT SYSTOLIC BLOOD PRESSURE < 130 MM HG: ICD-10-PCS | Mod: CPTII,S$GLB,, | Performed by: PODIATRIST

## 2023-08-01 PROCEDURE — 1159F PR MEDICATION LIST DOCUMENTED IN MEDICAL RECORD: ICD-10-PCS | Mod: CPTII,S$GLB,, | Performed by: PODIATRIST

## 2023-08-01 PROCEDURE — 3008F PR BODY MASS INDEX (BMI) DOCUMENTED: ICD-10-PCS | Mod: CPTII,S$GLB,, | Performed by: PODIATRIST

## 2023-08-01 PROCEDURE — 3078F DIAST BP <80 MM HG: CPT | Mod: CPTII,S$GLB,, | Performed by: PODIATRIST

## 2023-08-01 PROCEDURE — 99999 PR PBB SHADOW E&M-EST. PATIENT-LVL III: CPT | Mod: PBBFAC,,, | Performed by: PODIATRIST

## 2023-08-01 PROCEDURE — 99213 PR OFFICE/OUTPT VISIT, EST, LEVL III, 20-29 MIN: ICD-10-PCS | Mod: S$GLB,,, | Performed by: PODIATRIST

## 2023-08-01 PROCEDURE — 4010F ACE/ARB THERAPY RXD/TAKEN: CPT | Mod: CPTII,S$GLB,, | Performed by: PODIATRIST

## 2023-08-01 PROCEDURE — 3074F SYST BP LT 130 MM HG: CPT | Mod: CPTII,S$GLB,, | Performed by: PODIATRIST

## 2023-08-01 PROCEDURE — 1159F MED LIST DOCD IN RCRD: CPT | Mod: CPTII,S$GLB,, | Performed by: PODIATRIST

## 2023-08-01 PROCEDURE — 1125F AMNT PAIN NOTED PAIN PRSNT: CPT | Mod: CPTII,S$GLB,, | Performed by: PODIATRIST

## 2023-08-01 PROCEDURE — 3078F PR MOST RECENT DIASTOLIC BLOOD PRESSURE < 80 MM HG: ICD-10-PCS | Mod: CPTII,S$GLB,, | Performed by: PODIATRIST

## 2023-08-01 PROCEDURE — 3008F BODY MASS INDEX DOCD: CPT | Mod: CPTII,S$GLB,, | Performed by: PODIATRIST

## 2023-08-01 PROCEDURE — 99999 PR PBB SHADOW E&M-EST. PATIENT-LVL III: ICD-10-PCS | Mod: PBBFAC,,, | Performed by: PODIATRIST

## 2023-08-01 PROCEDURE — 99213 OFFICE O/P EST LOW 20 MIN: CPT | Mod: S$GLB,,, | Performed by: PODIATRIST

## 2023-08-01 PROCEDURE — 1125F PR PAIN SEVERITY QUANTIFIED, PAIN PRESENT: ICD-10-PCS | Mod: CPTII,S$GLB,, | Performed by: PODIATRIST

## 2023-08-09 NOTE — PROGRESS NOTES
Subjective:      Patient ID: Niki Soriano is a 72 y.o. female.    Chief Complaint:   Follow-up and Ingrown Toenail (2 wk F/u with drainage present )    Niki is a 72 y.o. female who presents to the clinic for evaluation and treatment of high risk feet. Niki has a past medical history of Coronary artery disease, Diabetes mellitus, Hypercholesteremia, Hypertension, IC (interstitial cystitis), Numbness in both hands (11/1/2017), Spondylisthesis, and Vulvodynia, unspecified. The patient's chief complaint is long, thick toenails. This patient has documented high risk feet requiring routine maintenance secondary to diabetes mellitis and those secondary complications of diabetes, as mentioned..  Doing well after partial nail avulsion bilateral hallux nails with matrixectomy.  No new complaints.    PCP: Moises Webster MD    Date Last Seen by PCP:   Chief Complaint   Patient presents with    Follow-up    Ingrown Toenail     2 wk F/u with drainage present          Current shoe gear:  Affected Foot: Casual shoes     Unaffected Foot: Casual shoes    Hemoglobin A1C   Date Value Ref Range Status   03/02/2020 5.8 (H) 4.0 - 5.6 % Final     Comment:     ADA Screening Guidelines:  5.7-6.4%  Consistent with prediabetes  >or=6.5%  Consistent with diabetes  High levels of fetal hemoglobin interfere with the HbA1C  assay. Heterozygous hemoglobin variants (HbS, HgC, etc)do  not significantly interfere with this assay.   However, presence of multiple variants may affect accuracy.         Review of Systems   Constitutional: Negative for chills, decreased appetite, fever and night sweats.   HENT:  Negative for congestion, ear discharge, nosebleeds and tinnitus.    Eyes:  Negative for double vision, pain and visual disturbance.   Cardiovascular:  Negative for chest pain, claudication, cyanosis and palpitations.   Respiratory:  Negative for cough, hemoptysis, shortness of breath and wheezing.    Endocrine: Negative for cold intolerance  and heat intolerance.   Hematologic/Lymphatic: Negative for adenopathy and bleeding problem.   Skin:  Positive for color change, dry skin, nail changes and unusual hair distribution.   Musculoskeletal:  Positive for arthritis, joint pain and stiffness. Negative for myalgias.   Gastrointestinal:  Negative for abdominal pain, dysphagia, nausea and vomiting.   Genitourinary:  Negative for dysuria, flank pain, hematuria and pelvic pain.   Neurological:  Positive for disturbances in coordination, numbness, paresthesias and sensory change.   Psychiatric/Behavioral:  Negative for altered mental status, hallucinations and suicidal ideas. The patient does not have insomnia.    Allergic/Immunologic: Negative for environmental allergies and persistent infections.           Objective:      Physical Exam  Vitals reviewed.   Constitutional:       Appearance: She is well-developed.   HENT:      Head: Normocephalic and atraumatic.   Cardiovascular:      Pulses:           Dorsalis pedis pulses are 1+ on the right side and 1+ on the left side.        Posterior tibial pulses are 1+ on the right side and 1+ on the left side.   Pulmonary:      Effort: Pulmonary effort is normal.   Musculoskeletal:         General: Normal range of motion.      Comments: Anterior, lateral, and posterior muscle groups bilateral lower extremities show strength 4 over 5 symmetrically. Inspection and palpation of the joints and bones reveal no crepitus or joint effusion. No tenderness upon palpation. Mild plantar flexor contractures noted to digits 2 through 5 bilaterally.  Angle and base of gait are normal.   Feet:      Right foot:      Skin integrity: Callus and dry skin present.      Left foot:      Skin integrity: Callus and dry skin present.   Skin:     General: Skin is warm and dry.      Capillary Refill: Capillary refill takes 2 to 3 seconds.      Coloration: Skin is pale.      Comments: Skin bilateral lower extremities noted to be thin, dry, and  atrophic.    Bilateral hallux nail surgery sites healing well with no signs of infection.   Neurological:      Mental Status: She is alert and oriented to person, place, and time.      Sensory: Sensory deficit present.      Motor: Atrophy present.      Deep Tendon Reflexes: Reflexes abnormal.      Reflex Scores:       Patellar reflexes are 1+ on the right side and 1+ on the left side.       Achilles reflexes are 1+ on the right side and 1+ on the left side.     Comments: Sharp, dull, light touch, and vibratory sensation are diminished bilaterally. Proprioceptive sensation is intact to both lower extremities. [place] Kenneth monofilament exam shows loss of protective sensation to plantar toes 1 through 5 bilaterally. Deep tendon reflexes to the patellar tendons is 1 over 4 bilaterally symmetrical. Deep tendon reflexes to the Achilles tendon is 0 over 4 bilaterally symmetrical. No ankle clonus or Babinski reflex noted bilaterally. Coordination is fair to both lower extremities.  Patient admits to intermittent burning and tingling in the feet.   Psychiatric:         Behavior: Behavior normal.             Assessment:       Encounter Diagnoses   Name Primary?    Type 2 diabetes mellitus with diabetic neuropathy, without long-term current use of insulin Yes    Pain in both feet     Ingrown nail      Independent visualization of imaging was performed.  Results were reviewed in detail with patient.       Plan:       Niki was seen today for follow-up and ingrown toenail.    Diagnoses and all orders for this visit:    Type 2 diabetes mellitus with diabetic neuropathy, without long-term current use of insulin    Pain in both feet    Ingrown nail      I counseled the patient on her conditions, their implications and medical management.    The nature of the condition, options for management, as well as potential risks and complications were discussed in detail with patient. Patient was amenable to my recommendations and left  my office fully informed and will follow up as instructed or sooner if necessary.      Decision making:  Chronic illnesses discussed in detail, previous records/notes and imaging independently reviewed, prescription drug management performed in addition to lengthy discussion regarding both conservative and surgical treatment options.    Shoe inspection. Diabetic Foot Education. Patient reminded of the importance of good nutrition and blood sugar control to help prevent podiatric complications of diabetes. Patient instructed on proper foot hygeine. We discussed wearing proper shoe gear, daily foot inspections and Diabetic foot education in detail.    Return to clinic in 3-6 months or sooner if problems arise

## 2023-08-11 DIAGNOSIS — I25.5 ISCHEMIC CARDIOMYOPATHY: ICD-10-CM

## 2023-08-11 RX ORDER — FUROSEMIDE 40 MG/1
TABLET ORAL
Qty: 45 TABLET | Refills: 1 | Status: SHIPPED | OUTPATIENT
Start: 2023-08-11 | End: 2024-04-03

## 2023-08-29 RX ORDER — EZETIMIBE 10 MG/1
TABLET ORAL
Qty: 90 TABLET | Refills: 3 | Status: SHIPPED | OUTPATIENT
Start: 2023-08-29

## 2023-08-29 RX ORDER — ROSUVASTATIN CALCIUM 40 MG/1
40 TABLET, COATED ORAL NIGHTLY
Qty: 90 TABLET | Refills: 3 | Status: SHIPPED | OUTPATIENT
Start: 2023-08-29

## 2023-10-22 DIAGNOSIS — N95.2 VAGINAL ATROPHY: ICD-10-CM

## 2023-10-22 DIAGNOSIS — N39.0 RECURRENT UTI: ICD-10-CM

## 2023-10-23 RX ORDER — ESTRADIOL 0.1 MG/G
0.5 CREAM VAGINAL
Qty: 42.5 G | Refills: 0 | Status: SHIPPED | OUTPATIENT
Start: 2023-10-23 | End: 2023-12-04 | Stop reason: SDUPTHER

## 2023-11-01 ENCOUNTER — OFFICE VISIT (OUTPATIENT)
Dept: PODIATRY | Facility: CLINIC | Age: 73
End: 2023-11-01
Payer: MEDICARE

## 2023-11-01 VITALS
HEIGHT: 64 IN | HEART RATE: 65 BPM | SYSTOLIC BLOOD PRESSURE: 119 MMHG | DIASTOLIC BLOOD PRESSURE: 74 MMHG | BODY MASS INDEX: 29.14 KG/M2

## 2023-11-01 DIAGNOSIS — M79.671 PAIN IN BOTH FEET: ICD-10-CM

## 2023-11-01 DIAGNOSIS — M54.40 CHRONIC LOW BACK PAIN WITH SCIATICA, SCIATICA LATERALITY UNSPECIFIED, UNSPECIFIED BACK PAIN LATERALITY: ICD-10-CM

## 2023-11-01 DIAGNOSIS — L60.0 INGROWN NAIL: ICD-10-CM

## 2023-11-01 DIAGNOSIS — E11.40 TYPE 2 DIABETES MELLITUS WITH DIABETIC NEUROPATHY, WITHOUT LONG-TERM CURRENT USE OF INSULIN: Primary | ICD-10-CM

## 2023-11-01 DIAGNOSIS — M79.672 PAIN IN BOTH FEET: ICD-10-CM

## 2023-11-01 DIAGNOSIS — G89.29 CHRONIC LOW BACK PAIN WITH SCIATICA, SCIATICA LATERALITY UNSPECIFIED, UNSPECIFIED BACK PAIN LATERALITY: ICD-10-CM

## 2023-11-01 PROCEDURE — 3078F DIAST BP <80 MM HG: CPT | Mod: CPTII,S$GLB,, | Performed by: PODIATRIST

## 2023-11-01 PROCEDURE — 3074F PR MOST RECENT SYSTOLIC BLOOD PRESSURE < 130 MM HG: ICD-10-PCS | Mod: CPTII,S$GLB,, | Performed by: PODIATRIST

## 2023-11-01 PROCEDURE — 3078F PR MOST RECENT DIASTOLIC BLOOD PRESSURE < 80 MM HG: ICD-10-PCS | Mod: CPTII,S$GLB,, | Performed by: PODIATRIST

## 2023-11-01 PROCEDURE — 1159F PR MEDICATION LIST DOCUMENTED IN MEDICAL RECORD: ICD-10-PCS | Mod: CPTII,S$GLB,, | Performed by: PODIATRIST

## 2023-11-01 PROCEDURE — 1125F PR PAIN SEVERITY QUANTIFIED, PAIN PRESENT: ICD-10-PCS | Mod: CPTII,S$GLB,, | Performed by: PODIATRIST

## 2023-11-01 PROCEDURE — 4010F PR ACE/ARB THEARPY RXD/TAKEN: ICD-10-PCS | Mod: CPTII,S$GLB,, | Performed by: PODIATRIST

## 2023-11-01 PROCEDURE — 3008F PR BODY MASS INDEX (BMI) DOCUMENTED: ICD-10-PCS | Mod: CPTII,S$GLB,, | Performed by: PODIATRIST

## 2023-11-01 PROCEDURE — 99214 PR OFFICE/OUTPT VISIT, EST, LEVL IV, 30-39 MIN: ICD-10-PCS | Mod: S$GLB,,, | Performed by: PODIATRIST

## 2023-11-01 PROCEDURE — 99999 PR PBB SHADOW E&M-EST. PATIENT-LVL IV: CPT | Mod: PBBFAC,,, | Performed by: PODIATRIST

## 2023-11-01 PROCEDURE — 99214 OFFICE O/P EST MOD 30 MIN: CPT | Mod: S$GLB,,, | Performed by: PODIATRIST

## 2023-11-01 PROCEDURE — 1125F AMNT PAIN NOTED PAIN PRSNT: CPT | Mod: CPTII,S$GLB,, | Performed by: PODIATRIST

## 2023-11-01 PROCEDURE — 3074F SYST BP LT 130 MM HG: CPT | Mod: CPTII,S$GLB,, | Performed by: PODIATRIST

## 2023-11-01 PROCEDURE — 4010F ACE/ARB THERAPY RXD/TAKEN: CPT | Mod: CPTII,S$GLB,, | Performed by: PODIATRIST

## 2023-11-01 PROCEDURE — 99999 PR PBB SHADOW E&M-EST. PATIENT-LVL IV: ICD-10-PCS | Mod: PBBFAC,,, | Performed by: PODIATRIST

## 2023-11-01 PROCEDURE — 3008F BODY MASS INDEX DOCD: CPT | Mod: CPTII,S$GLB,, | Performed by: PODIATRIST

## 2023-11-01 PROCEDURE — 1159F MED LIST DOCD IN RCRD: CPT | Mod: CPTII,S$GLB,, | Performed by: PODIATRIST

## 2023-11-01 NOTE — PROGRESS NOTES
Subjective:      Patient ID: Niki Soriano is a 73 y.o. female.    Chief Complaint:   Nail Care and Diabetes Mellitus (PCP-Moises Webster MD-)    Niki is a 73 y.o. female who presents to the clinic for evaluation and treatment of high risk feet. Niki has a past medical history of Coronary artery disease, Diabetes mellitus, Hypercholesteremia, Hypertension, IC (interstitial cystitis), Numbness in both hands (11/1/2017), Spondylisthesis, and Vulvodynia, unspecified. The patient's chief complaint is long, thick toenails. This patient has documented high risk feet requiring routine maintenance secondary to diabetes mellitis and those secondary complications of diabetes, as mentioned..  Doing well after partial nail avulsion bilateral hallux nails with matrixectomy.  Ongoing chronic pain issues.  Would like to discuss pain management referral today.    PCP: Moises Webster MD    Date Last Seen by PCP:   Chief Complaint   Patient presents with    Nail Care    Diabetes Mellitus     PCP-Moises Webster MD-         Current shoe gear:  Affected Foot: Casual shoes     Unaffected Foot: Casual shoes    Hemoglobin A1C   Date Value Ref Range Status   03/02/2020 5.8 (H) 4.0 - 5.6 % Final     Comment:     ADA Screening Guidelines:  5.7-6.4%  Consistent with prediabetes  >or=6.5%  Consistent with diabetes  High levels of fetal hemoglobin interfere with the HbA1C  assay. Heterozygous hemoglobin variants (HbS, HgC, etc)do  not significantly interfere with this assay.   However, presence of multiple variants may affect accuracy.         Review of Systems   Constitutional: Negative for chills, decreased appetite, fever and night sweats.   HENT:  Negative for congestion, ear discharge, nosebleeds and tinnitus.    Eyes:  Negative for double vision, pain and visual disturbance.   Cardiovascular:  Negative for chest pain, claudication, cyanosis and palpitations.   Respiratory:  Negative for cough, hemoptysis, shortness of  breath and wheezing.    Endocrine: Negative for cold intolerance and heat intolerance.   Hematologic/Lymphatic: Negative for adenopathy and bleeding problem.   Skin:  Positive for color change, dry skin, nail changes and unusual hair distribution.   Musculoskeletal:  Positive for arthritis, joint pain and stiffness. Negative for myalgias.   Gastrointestinal:  Negative for abdominal pain, dysphagia, nausea and vomiting.   Genitourinary:  Negative for dysuria, flank pain, hematuria and pelvic pain.   Neurological:  Positive for disturbances in coordination, numbness, paresthesias and sensory change.   Psychiatric/Behavioral:  Negative for altered mental status, hallucinations and suicidal ideas. The patient does not have insomnia.    Allergic/Immunologic: Negative for environmental allergies and persistent infections.           Objective:      Physical Exam  Vitals reviewed.   Constitutional:       Appearance: She is well-developed.   HENT:      Head: Normocephalic and atraumatic.   Cardiovascular:      Pulses:           Dorsalis pedis pulses are 1+ on the right side and 1+ on the left side.        Posterior tibial pulses are 1+ on the right side and 1+ on the left side.   Pulmonary:      Effort: Pulmonary effort is normal.   Musculoskeletal:         General: Normal range of motion.      Comments: Anterior, lateral, and posterior muscle groups bilateral lower extremities show strength 4 over 5 symmetrically. Inspection and palpation of the joints and bones reveal no crepitus or joint effusion. No tenderness upon palpation. Mild plantar flexor contractures noted to digits 2 through 5 bilaterally.  Angle and base of gait are normal.   Feet:      Right foot:      Skin integrity: Callus and dry skin present.      Left foot:      Skin integrity: Callus and dry skin present.   Skin:     General: Skin is warm and dry.      Capillary Refill: Capillary refill takes 2 to 3 seconds.      Coloration: Skin is pale.      Comments:  Skin bilateral lower extremities noted to be thin, dry, and atrophic.    Bilateral hallux nail surgery sites well healed.   Neurological:      Mental Status: She is alert and oriented to person, place, and time.      Sensory: Sensory deficit present.      Motor: Atrophy present.      Deep Tendon Reflexes: Reflexes abnormal.      Reflex Scores:       Patellar reflexes are 1+ on the right side and 1+ on the left side.       Achilles reflexes are 1+ on the right side and 1+ on the left side.     Comments: Sharp, dull, light touch, and vibratory sensation are diminished bilaterally. Proprioceptive sensation is intact to both lower extremities. Dale Kenneth monofilament exam shows loss of protective sensation to plantar toes 1 through 5 bilaterally. Deep tendon reflexes to the patellar tendons is 1 over 4 bilaterally symmetrical. Deep tendon reflexes to the Achilles tendon is 0 over 4 bilaterally symmetrical. No ankle clonus or Babinski reflex noted bilaterally. Coordination is fair to both lower extremities.  Patient admits to intermittent burning and tingling in the feet.   Psychiatric:         Behavior: Behavior normal.             Assessment:       Encounter Diagnoses   Name Primary?    Type 2 diabetes mellitus with diabetic neuropathy, without long-term current use of insulin Yes    Pain in both feet     Ingrown nail     Chronic low back pain with sciatica, sciatica laterality unspecified, unspecified back pain laterality      Independent visualization of imaging was performed.  Results were reviewed in detail with patient.       Plan:       Niki was seen today for nail care and diabetes mellitus.    Diagnoses and all orders for this visit:    Type 2 diabetes mellitus with diabetic neuropathy, without long-term current use of insulin    Pain in both feet  -     Ambulatory referral/consult to Pain Clinic; Future    Ingrown nail    Chronic low back pain with sciatica, sciatica laterality unspecified, unspecified  back pain laterality  -     Ambulatory referral/consult to Pain Clinic; Future      I counseled the patient on her conditions, their implications and medical management.    The nature of the condition, options for management, as well as potential risks and complications were discussed in detail with patient. Patient was amenable to my recommendations and left my office fully informed and will follow up as instructed or sooner if necessary.      Decision making:  Chronic illnesses discussed in detail, previous records/notes and imaging independently reviewed, prescription drug management performed in addition to lengthy discussion regarding both conservative and surgical treatment options.    Shoe inspection. Diabetic Foot Education. Patient reminded of the importance of good nutrition and blood sugar control to help prevent podiatric complications of diabetes. Patient instructed on proper foot hygeine. We discussed wearing proper shoe gear, daily foot inspections and Diabetic foot education in detail.    Follow-up with pain management.    Return to clinic in 3-6 months or sooner if problems arise

## 2023-11-03 ENCOUNTER — PATIENT MESSAGE (OUTPATIENT)
Dept: OPTOMETRY | Facility: CLINIC | Age: 73
End: 2023-11-03
Payer: MEDICARE

## 2023-11-20 ENCOUNTER — PATIENT MESSAGE (OUTPATIENT)
Dept: INFECTIOUS DISEASES | Facility: CLINIC | Age: 73
End: 2023-11-20
Payer: MEDICARE

## 2023-11-21 ENCOUNTER — OFFICE VISIT (OUTPATIENT)
Dept: INFECTIOUS DISEASES | Facility: CLINIC | Age: 73
End: 2023-11-21
Payer: MEDICARE

## 2023-11-21 VITALS
HEART RATE: 62 BPM | HEIGHT: 64 IN | BODY MASS INDEX: 30.19 KG/M2 | DIASTOLIC BLOOD PRESSURE: 83 MMHG | SYSTOLIC BLOOD PRESSURE: 144 MMHG | TEMPERATURE: 98 F | WEIGHT: 176.81 LBS

## 2023-11-21 DIAGNOSIS — R82.71 BACTERIURIA: Primary | ICD-10-CM

## 2023-11-21 PROCEDURE — 3288F FALL RISK ASSESSMENT DOCD: CPT | Mod: CPTII,S$GLB,, | Performed by: STUDENT IN AN ORGANIZED HEALTH CARE EDUCATION/TRAINING PROGRAM

## 2023-11-21 PROCEDURE — 3077F PR MOST RECENT SYSTOLIC BLOOD PRESSURE >= 140 MM HG: ICD-10-PCS | Mod: CPTII,S$GLB,, | Performed by: STUDENT IN AN ORGANIZED HEALTH CARE EDUCATION/TRAINING PROGRAM

## 2023-11-21 PROCEDURE — 3008F PR BODY MASS INDEX (BMI) DOCUMENTED: ICD-10-PCS | Mod: CPTII,S$GLB,, | Performed by: STUDENT IN AN ORGANIZED HEALTH CARE EDUCATION/TRAINING PROGRAM

## 2023-11-21 PROCEDURE — 1100F PR PT FALLS ASSESS DOC 2+ FALLS/FALL W/INJURY/YR: ICD-10-PCS | Mod: CPTII,S$GLB,, | Performed by: STUDENT IN AN ORGANIZED HEALTH CARE EDUCATION/TRAINING PROGRAM

## 2023-11-21 PROCEDURE — 99999 PR PBB SHADOW E&M-EST. PATIENT-LVL III: CPT | Mod: PBBFAC,,, | Performed by: STUDENT IN AN ORGANIZED HEALTH CARE EDUCATION/TRAINING PROGRAM

## 2023-11-21 PROCEDURE — 3288F PR FALLS RISK ASSESSMENT DOCUMENTED: ICD-10-PCS | Mod: CPTII,S$GLB,, | Performed by: STUDENT IN AN ORGANIZED HEALTH CARE EDUCATION/TRAINING PROGRAM

## 2023-11-21 PROCEDURE — 1125F AMNT PAIN NOTED PAIN PRSNT: CPT | Mod: CPTII,S$GLB,, | Performed by: STUDENT IN AN ORGANIZED HEALTH CARE EDUCATION/TRAINING PROGRAM

## 2023-11-21 PROCEDURE — 1159F PR MEDICATION LIST DOCUMENTED IN MEDICAL RECORD: ICD-10-PCS | Mod: CPTII,S$GLB,, | Performed by: STUDENT IN AN ORGANIZED HEALTH CARE EDUCATION/TRAINING PROGRAM

## 2023-11-21 PROCEDURE — 4010F PR ACE/ARB THEARPY RXD/TAKEN: ICD-10-PCS | Mod: CPTII,S$GLB,, | Performed by: STUDENT IN AN ORGANIZED HEALTH CARE EDUCATION/TRAINING PROGRAM

## 2023-11-21 PROCEDURE — 99999 PR PBB SHADOW E&M-EST. PATIENT-LVL III: ICD-10-PCS | Mod: PBBFAC,,, | Performed by: STUDENT IN AN ORGANIZED HEALTH CARE EDUCATION/TRAINING PROGRAM

## 2023-11-21 PROCEDURE — 1159F MED LIST DOCD IN RCRD: CPT | Mod: CPTII,S$GLB,, | Performed by: STUDENT IN AN ORGANIZED HEALTH CARE EDUCATION/TRAINING PROGRAM

## 2023-11-21 PROCEDURE — 3079F DIAST BP 80-89 MM HG: CPT | Mod: CPTII,S$GLB,, | Performed by: STUDENT IN AN ORGANIZED HEALTH CARE EDUCATION/TRAINING PROGRAM

## 2023-11-21 PROCEDURE — 3008F BODY MASS INDEX DOCD: CPT | Mod: CPTII,S$GLB,, | Performed by: STUDENT IN AN ORGANIZED HEALTH CARE EDUCATION/TRAINING PROGRAM

## 2023-11-21 PROCEDURE — 4010F ACE/ARB THERAPY RXD/TAKEN: CPT | Mod: CPTII,S$GLB,, | Performed by: STUDENT IN AN ORGANIZED HEALTH CARE EDUCATION/TRAINING PROGRAM

## 2023-11-21 PROCEDURE — 3079F PR MOST RECENT DIASTOLIC BLOOD PRESSURE 80-89 MM HG: ICD-10-PCS | Mod: CPTII,S$GLB,, | Performed by: STUDENT IN AN ORGANIZED HEALTH CARE EDUCATION/TRAINING PROGRAM

## 2023-11-21 PROCEDURE — 1125F PR PAIN SEVERITY QUANTIFIED, PAIN PRESENT: ICD-10-PCS | Mod: CPTII,S$GLB,, | Performed by: STUDENT IN AN ORGANIZED HEALTH CARE EDUCATION/TRAINING PROGRAM

## 2023-11-21 PROCEDURE — 99204 OFFICE O/P NEW MOD 45 MIN: CPT | Mod: S$GLB,,, | Performed by: STUDENT IN AN ORGANIZED HEALTH CARE EDUCATION/TRAINING PROGRAM

## 2023-11-21 PROCEDURE — 99204 PR OFFICE/OUTPT VISIT, NEW, LEVL IV, 45-59 MIN: ICD-10-PCS | Mod: S$GLB,,, | Performed by: STUDENT IN AN ORGANIZED HEALTH CARE EDUCATION/TRAINING PROGRAM

## 2023-11-21 PROCEDURE — 1100F PTFALLS ASSESS-DOCD GE2>/YR: CPT | Mod: CPTII,S$GLB,, | Performed by: STUDENT IN AN ORGANIZED HEALTH CARE EDUCATION/TRAINING PROGRAM

## 2023-11-21 PROCEDURE — 3077F SYST BP >= 140 MM HG: CPT | Mod: CPTII,S$GLB,, | Performed by: STUDENT IN AN ORGANIZED HEALTH CARE EDUCATION/TRAINING PROGRAM

## 2023-11-21 RX ORDER — NITROFURANTOIN 25; 75 MG/1; MG/1
100 CAPSULE ORAL 2 TIMES DAILY
Qty: 10 CAPSULE | Refills: 0 | Status: SHIPPED | OUTPATIENT
Start: 2023-11-21 | End: 2023-11-26

## 2023-11-21 NOTE — PROGRESS NOTES
"  Infectious Diseases Progress Note  Subjective:     Chief Complaint: foul smelling urine     HPI: Niki Soriano is a 73 y.o. female with T7 injury resulting in paraplegia, neurogenic bladder, fecal incontinence, interstitial cystitis who was referred to ID clinic for recurrent bacteriuria. She reports that her original injury was >20 years ago. Since then she has had to self-catheterize about 7 times per day. She has urge incontinence, but unable to completely empty her bladder with urination, so catheterizes to fully empty. She has chronic dysuria and pelvic pain that she attributes to her interstitial cystitis. She has frequent episodes of foul smelling urine that are not accompanied by any increase in her burning or pelvic pain. She has had a bladder infection in the past prior to the development of her neurogenic bladder she states that her current "UTI" symptoms are not similar to those bladder infections in her 20s. She states when she does take antibiotics for her foul smelling urine, she has incomplete relief of her symptoms.     She does not believe she has ever been admitted for a severe bladder/kidney infection/sepsis before. Does not have fevers associated with her foul smelling urine.     She does not have any pressure wounds. She does have chronic constipation that has worsened in the last years. She does mind her constipation because otherwise she has bowel incontinence which is stressful to manage when she leaves her house.     She has tried pelvic floor PT, did not seem to help. She uses vaginal estrogen cream for her vulvodynia.       There is no immunization history on file for this patient.     Review of Systems   Constitutional: Negative for chills, decreased appetite, diaphoresis, fever, malaise/fatigue and night sweats.   Musculoskeletal:  Positive for back pain and falls.   Gastrointestinal:  Positive for bowel incontinence and constipation.   Genitourinary:  Positive for bladder incontinence, " dysuria, frequency, incomplete emptying, pelvic pain and urgency. Negative for flank pain and hematuria.   All other systems reviewed and are negative.       Past Medical History:   Diagnosis Date    Coronary artery disease     Diabetes mellitus     Hypercholesteremia     Hypertension     IC (interstitial cystitis)     Numbness in both hands 2017    Spondylisthesis     Vulvodynia, unspecified      Past Surgical History:   Procedure Laterality Date    CARPAL TUNNEL RELEASE Right 2019    Procedure: RELEASE, CARPAL TUNNEL, REVISION;  Surgeon: Annabelle Cid MD;  Location: [facility] OR [address] [place] [address];  Service: Orthopedics;  Laterality: Right;  Axogen, Clarix     SECTION      x3    CORONARY ANGIOGRAPHY N/A 3/3/2020    Procedure: ANGIOGRAM, CORONARY ARTERY;  Surgeon: Markie Friend III, MD;  Location: [facility] CATH LAB;  Service: Cardiology;  Laterality: N/A;    CORONARY ANGIOGRAPHY N/A 2020    Procedure: ANGIOGRAM, CORONARY ARTERY;  Surgeon: Rodney Gamble MD;  Location: [facility] CATH LAB;  Service: Cardiology;  Laterality: N/A;    CYSTOSCOPY      HYSTERECTOMY      ischemic colitis      LEFT HEART CATHETERIZATION Left 3/3/2020    Procedure: Left heart cath;  Surgeon: Markie Friend III, MD;  Location: [facility] CATH LAB;  Service: Cardiology;  Laterality: Left;    LEFT HEART CATHETERIZATION Left 2020    Procedure: Left heart cath;  Surgeon: Rodney Gamble MD;  Location: [facility] CATH LAB;  Service: Cardiology;  Laterality: Left;    OOPHORECTOMY      AL REMOVAL OF OVARY/TUBE(S)       Family History   Problem Relation Age of Onset    Breast cancer Paternal Aunt     Colon cancer Neg Hx     Ovarian cancer Neg Hx      Social History     Tobacco Use    Smoking status: Never    Smokeless tobacco: Never   Substance Use Topics    Alcohol use: No    Drug use: No       Objective:     Physical Exam  Vitals reviewed.   Constitutional:       Appearance: Normal appearance.      Comments: In wheelchair   HENT:       Head: Normocephalic.   Cardiovascular:      Rate and Rhythm: Normal rate.   Pulmonary:      Effort: Pulmonary effort is normal. No respiratory distress.   Skin:     General: Skin is warm and dry.   Neurological:      Mental Status: She is alert.   Psychiatric:         Mood and Affect: Mood normal.         Behavior: Behavior normal.         Data:    All data, including recent labs, radiology, and pathology, has been independently reviewed.    Labs:    Reviewed and interpreted prior urine culture results    Radiology:    No results found in the last 24 hours.     Assessment:  Asymptomatic bacteriuria: no evidence of recurrent cystitis during our discussion today. She has chronic pelvic pain and dysuria that does not change when she has foul smelling urine. I would not characterize foul smelling urine as a clinical feature of bacterial cystitis. I expect that she would be chronically colonized with bacteria given her history, and she does appear to be from her incomplete bladder emptying as well as self-catheterization. Discussed that a regular bowel regimen could improve her pain and bladder emptying. Given her numerous courses of antibiotics with minimal change in her symptoms, would not recommend continued treatment for these symptoms particularly due to risk of development of antimicrobial resistance.   - hold further antibiotics  - ordered urine culture and 5 day course of nitrofurantoin in case she has alarm symptoms we discussed (increased pain, increased dysuria) which she can start at home when needed   - advised her to attempt a more regular bowel regimen at home, discussed OTC enemas   - advised patient to reach out to our clinic with questions or in the case of alarm symptoms     Follow up in 2 months    The total time for evaluation and management services performed on 11/21/23 was greater than 45 minutes.     Aide Rader MD  Infectious Disease

## 2023-11-30 RX ORDER — SACUBITRIL AND VALSARTAN 97; 103 MG/1; MG/1
1 TABLET, FILM COATED ORAL 2 TIMES DAILY
Qty: 60 TABLET | Refills: 11 | Status: SHIPPED | OUTPATIENT
Start: 2023-11-30 | End: 2024-04-02

## 2023-11-30 RX ORDER — METOPROLOL SUCCINATE 25 MG/1
25 TABLET, EXTENDED RELEASE ORAL
Qty: 90 TABLET | Refills: 3 | Status: SHIPPED | OUTPATIENT
Start: 2023-11-30 | End: 2023-12-13 | Stop reason: SDUPTHER

## 2023-12-04 ENCOUNTER — OFFICE VISIT (OUTPATIENT)
Dept: UROLOGY | Facility: CLINIC | Age: 73
End: 2023-12-04
Payer: MEDICARE

## 2023-12-04 VITALS
DIASTOLIC BLOOD PRESSURE: 72 MMHG | SYSTOLIC BLOOD PRESSURE: 120 MMHG | HEIGHT: 64 IN | BODY MASS INDEX: 30.35 KG/M2 | HEART RATE: 56 BPM

## 2023-12-04 DIAGNOSIS — N95.2 VAGINAL ATROPHY: ICD-10-CM

## 2023-12-04 DIAGNOSIS — N39.41 URGE INCONTINENCE: ICD-10-CM

## 2023-12-04 DIAGNOSIS — N31.9 NEUROGENIC BLADDER: ICD-10-CM

## 2023-12-04 DIAGNOSIS — N39.0 RECURRENT UTI: ICD-10-CM

## 2023-12-04 PROCEDURE — 51701 PR INSERTION OF NON-INDWELLING BLADDER CATHETERIZATION FOR RESIDUAL UR: ICD-10-PCS | Mod: S$GLB,,, | Performed by: UROLOGY

## 2023-12-04 PROCEDURE — 1101F PR PT FALLS ASSESS DOC 0-1 FALLS W/OUT INJ PAST YR: ICD-10-PCS | Mod: CPTII,S$GLB,, | Performed by: UROLOGY

## 2023-12-04 PROCEDURE — 1126F AMNT PAIN NOTED NONE PRSNT: CPT | Mod: CPTII,S$GLB,, | Performed by: UROLOGY

## 2023-12-04 PROCEDURE — 1101F PT FALLS ASSESS-DOCD LE1/YR: CPT | Mod: CPTII,S$GLB,, | Performed by: UROLOGY

## 2023-12-04 PROCEDURE — 3008F BODY MASS INDEX DOCD: CPT | Mod: CPTII,S$GLB,, | Performed by: UROLOGY

## 2023-12-04 PROCEDURE — 99999 PR PBB SHADOW E&M-EST. PATIENT-LVL III: ICD-10-PCS | Mod: PBBFAC,,, | Performed by: UROLOGY

## 2023-12-04 PROCEDURE — 4010F PR ACE/ARB THEARPY RXD/TAKEN: ICD-10-PCS | Mod: CPTII,S$GLB,, | Performed by: UROLOGY

## 2023-12-04 PROCEDURE — 1159F MED LIST DOCD IN RCRD: CPT | Mod: CPTII,S$GLB,, | Performed by: UROLOGY

## 2023-12-04 PROCEDURE — 99215 PR OFFICE/OUTPT VISIT, EST, LEVL V, 40-54 MIN: ICD-10-PCS | Mod: 25,S$GLB,, | Performed by: UROLOGY

## 2023-12-04 PROCEDURE — 3078F PR MOST RECENT DIASTOLIC BLOOD PRESSURE < 80 MM HG: ICD-10-PCS | Mod: CPTII,S$GLB,, | Performed by: UROLOGY

## 2023-12-04 PROCEDURE — 1160F RVW MEDS BY RX/DR IN RCRD: CPT | Mod: CPTII,S$GLB,, | Performed by: UROLOGY

## 2023-12-04 PROCEDURE — 1160F PR REVIEW ALL MEDS BY PRESCRIBER/CLIN PHARMACIST DOCUMENTED: ICD-10-PCS | Mod: CPTII,S$GLB,, | Performed by: UROLOGY

## 2023-12-04 PROCEDURE — 1126F PR PAIN SEVERITY QUANTIFIED, NO PAIN PRESENT: ICD-10-PCS | Mod: CPTII,S$GLB,, | Performed by: UROLOGY

## 2023-12-04 PROCEDURE — 3288F FALL RISK ASSESSMENT DOCD: CPT | Mod: CPTII,S$GLB,, | Performed by: UROLOGY

## 2023-12-04 PROCEDURE — 51701 INSERT BLADDER CATHETER: CPT | Mod: S$GLB,,, | Performed by: UROLOGY

## 2023-12-04 PROCEDURE — 1159F PR MEDICATION LIST DOCUMENTED IN MEDICAL RECORD: ICD-10-PCS | Mod: CPTII,S$GLB,, | Performed by: UROLOGY

## 2023-12-04 PROCEDURE — 3288F PR FALLS RISK ASSESSMENT DOCUMENTED: ICD-10-PCS | Mod: CPTII,S$GLB,, | Performed by: UROLOGY

## 2023-12-04 PROCEDURE — 4010F ACE/ARB THERAPY RXD/TAKEN: CPT | Mod: CPTII,S$GLB,, | Performed by: UROLOGY

## 2023-12-04 PROCEDURE — 3008F PR BODY MASS INDEX (BMI) DOCUMENTED: ICD-10-PCS | Mod: CPTII,S$GLB,, | Performed by: UROLOGY

## 2023-12-04 PROCEDURE — 3078F DIAST BP <80 MM HG: CPT | Mod: CPTII,S$GLB,, | Performed by: UROLOGY

## 2023-12-04 PROCEDURE — 99999 PR PBB SHADOW E&M-EST. PATIENT-LVL III: CPT | Mod: PBBFAC,,, | Performed by: UROLOGY

## 2023-12-04 PROCEDURE — 99215 OFFICE O/P EST HI 40 MIN: CPT | Mod: 25,S$GLB,, | Performed by: UROLOGY

## 2023-12-04 PROCEDURE — 3074F PR MOST RECENT SYSTOLIC BLOOD PRESSURE < 130 MM HG: ICD-10-PCS | Mod: CPTII,S$GLB,, | Performed by: UROLOGY

## 2023-12-04 PROCEDURE — 3074F SYST BP LT 130 MM HG: CPT | Mod: CPTII,S$GLB,, | Performed by: UROLOGY

## 2023-12-04 RX ORDER — OXYBUTYNIN CHLORIDE 5 MG/1
5 TABLET, EXTENDED RELEASE ORAL
Qty: 30 TABLET | Refills: 11 | Status: SHIPPED | OUTPATIENT
Start: 2023-12-04

## 2023-12-04 RX ORDER — ESTRADIOL 0.1 MG/G
0.5 CREAM VAGINAL
Qty: 42.5 G | Refills: 3 | Status: SHIPPED | OUTPATIENT
Start: 2023-12-04

## 2023-12-04 NOTE — PROGRESS NOTES
CHIEF COMPLAINT:    Mrs. Soriano is a 73 y.o. female presenting for an annual follow up on neurogenic bladder.    PRESENTING ILLNESS:    Niki Soriano is a 73 y.o. female who presents with a history T7 spinal cord injury, DM, neurogenic bladder.  She has been performing intermittent catheterization for many years.  She states she has had one instance of a bladder infection in the last year.  She did see Dr. Aide Rader on 11/21/2023 for asymptomatic bacteriuria.  She states that she was counseled (appropriately) that asymptomatic bacteriuria does not require treatment. She did prescribe macrobid which the patient took and it resolved malodorous urine.  (Review of the chart reveals that an order for urine culture was placed and the macrobid was prescribe in case the patient developed a symptomatic UTI however, the patient misunderstood.)       The patient continues on Estrace cream and oxybutynin for occasional bladder spasms.  She had some vaginal bleeding (when she wiped, not in the toilet bowl) but suspects it was because she was not using the estrace cream like she should have been. She started using it regularly and feels better now.  (Status post hysterectomy)     Intermittent catheterization  Catheter type:  Highslip  Size:  10 Fr  Frequency:  7 times in 24 hours  ICD-10 Diagnosis code(s):  N39.41 urge incontinence, N31.9 neurogenic bladder  History of Recurrent UTI?:  yes  Is this indefinite?:  Yes, patient is paraplegic    REVIEW OF SYSTEMS:    Review of Systems   Constitutional: Negative.    HENT: Negative.     Eyes: Negative.    Respiratory: Negative.     Cardiovascular: Negative.    Gastrointestinal: Negative.    Skin: Negative.    Neurological:         Paraplegia   Endo/Heme/Allergies: Negative.    Psychiatric/Behavioral: Negative.         PATIENT HISTORY:    Past Medical History:   Diagnosis Date    Coronary artery disease     Diabetes mellitus     Hypercholesteremia     Hypertension     IC  (interstitial cystitis)     Numbness in both hands 2017    Spondylisthesis     Vulvodynia, unspecified        Past Surgical History:   Procedure Laterality Date    CARPAL TUNNEL RELEASE Right 2019    Procedure: RELEASE, CARPAL TUNNEL, REVISION;  Surgeon: Annabelle Cid MD;  Location: [facility] OR [address] [place] [address];  Service: Orthopedics;  Laterality: Right;  Axogen, Clarix     SECTION      x3    CORONARY ANGIOGRAPHY N/A 3/3/2020    Procedure: ANGIOGRAM, CORONARY ARTERY;  Surgeon: Markie Friend III, MD;  Location: [facility] CATH LAB;  Service: Cardiology;  Laterality: N/A;    CORONARY ANGIOGRAPHY N/A 2020    Procedure: ANGIOGRAM, CORONARY ARTERY;  Surgeon: Rodney Gamble MD;  Location: [facility] CATH LAB;  Service: Cardiology;  Laterality: N/A;    CYSTOSCOPY      HYSTERECTOMY      ischemic colitis      LEFT HEART CATHETERIZATION Left 3/3/2020    Procedure: Left heart cath;  Surgeon: Markie Friend III, MD;  Location: [facility] CATH LAB;  Service: Cardiology;  Laterality: Left;    LEFT HEART CATHETERIZATION Left 2020    Procedure: Left heart cath;  Surgeon: Rodney Gamble MD;  Location: [facility] CATH LAB;  Service: Cardiology;  Laterality: Left;    OOPHORECTOMY      AK REMOVAL OF OVARY/TUBE(S)         Family History   Problem Relation Age of Onset    Breast cancer Paternal Aunt     Colon cancer Neg Hx     Ovarian cancer Neg Hx        Social History     Socioeconomic History    Marital status:    Tobacco Use    Smoking status: Never    Smokeless tobacco: Never   Substance and Sexual Activity    Alcohol use: No    Drug use: No    Sexual activity: Yes     Partners: Male       Allergies:  Augmentin [amoxicillin-pot clavulanate], Tramadol, Tegretol [carbamazepine], and Vicodin [hydrocodone-acetaminophen]    Medications:  Outpatient Encounter Medications as of 2023   Medication Sig Dispense Refill    ammonium lactate 12 % Crea Apply 1 application topically 2 (two) times daily. 140 g 11    b  complex vitamins capsule Take 1 capsule by mouth once daily.      baclofen (LIORESAL) 20 MG tablet Take 20 mg by mouth 3 (three) times daily as needed.       cholecalciferol, vitamin D3, (VITAMIN D3) 25 mcg (1,000 unit) capsule Take 1,000 Units by mouth once daily.      duloxetine (CYMBALTA) 30 MG capsule Take 30 mg by mouth once daily.      ENTRESTO  mg per tablet TAKE 1 TABLET BY MOUTH TWICE A DAY 60 tablet 11    estradioL (ESTRACE) 0.01 % (0.1 mg/gram) vaginal cream PLACE 0.5 G VAGINALLY 3 (THREE) TIMES A WEEK. 42.5 g 0    ezetimibe (ZETIA) 10 mg tablet TAKE 1 TABLET BY MOUTH EVERY DAY 90 tablet 3    furosemide (LASIX) 40 MG tablet TAKE 1/2 TABLET BY MOUTH DAILY AS NEED FOR SWELLING OR WEIGHT GAIN 45 tablet 1    gabapentin (NEURONTIN) 300 MG capsule TAKE 1 CAPSULE BY MOUTH THREE TIMES A  capsule 0    Lactobac no.51/Bifidobact no.4 (UP4 PROBIOTICS ULTRA ORAL) Take by mouth.      levothyroxine (SYNTHROID) 50 MCG tablet Take 50 mcg by mouth once daily.      metformin (GLUCOPHAGE) 500 MG tablet Take 500 mg by mouth once daily.       methenamine (HIPREX) 1 gram Tab Take 1 tablet (1 g total) by mouth 2 (two) times daily. Take with 500 mg Vitamin C 60 tablet 6    metoprolol succinate (TOPROL-XL) 25 MG 24 hr tablet TAKE 1 TABLET BY MOUTH EVERY DAY 90 tablet 3    multivitamin (THERAGRAN) per tablet Take 1 tablet by mouth once daily.      omega-3 fatty acids/fish oil (FISH OIL-OMEGA-3 FATTY ACIDS) 300-1,000 mg capsule Take by mouth once daily.      oxybutynin (DITROPAN-XL) 5 MG TR24 Take 1 tablet (5 mg total) by mouth once daily. (Patient taking differently: Take 5 mg by mouth as needed.) 30 tablet 11    rosuvastatin (CRESTOR) 40 MG Tab TAKE 1 TABLET BY MOUTH EVERY DAY IN THE EVENING 90 tablet 3    aspirin 81 MG Chew Take 1 tablet (81 mg total) by mouth once daily. (Patient taking differently: Take 81 mg by mouth every evening.)  0    [] nitrofurantoin, macrocrystal-monohydrate, (MACROBID) 100 MG  capsule Take 1 capsule (100 mg total) by mouth 2 (two) times daily. For increased pelvic pain, increased burning pain. for 5 days 10 capsule 0    [DISCONTINUED] metoprolol succinate (TOPROL-XL) 25 MG 24 hr tablet Take 1 tablet (25 mg total) by mouth once daily. 90 tablet 3    [DISCONTINUED] sacubitriL-valsartan (ENTRESTO)  mg per tablet Take 1 tablet by mouth 2 (two) times daily. 180 tablet 3     No facility-administered encounter medications on file as of 12/4/2023.         PHYSICAL EXAMINATION:    The patient generally appears in good health, is appropriately interactive, and is in no apparent distress.    Skin: No lesions.    Mental: Cooperative with normal affect.    Neuro: Grossly intact.    HEENT: Normal. No evidence of lymphadenopathy.    Chest:  normal inspiratory effort.    Abdomen: Soft, non-tender. No masses or organomegaly. Bladder is not palpable. No evidence of flank discomfort. No evidence of inguinal hernia.    Extremities: No clubbing, cyanosis, or edema    Normal external female genitalia  Urethral meatus is normal  Urethra and bladder are nontender to bimanual exam  Well supported anteriorly and posteriorly   Uterus and cervix are surgically absent  No adnexal masses  Amount drained by catheterization was 200 ml     LABS:    Lab Results   Component Value Date    BUN 14 03/15/2023    CREATININE 0.8 03/15/2023       IMPRESSION:    Neurogenic bladder  Vaginal atrophy    PLAN:    1.  Estrace and oxybutynin were refilled  2.  Agree with ID, do not treat urine if the only indication is a malodorous urine.  Only treat if other symptoms are present.  Can increase fluids to flush out the malodorous urine.  She expressed understanding.   3.  Follow up in 1 year.     I spent 40 minutes with the patient of which more than half was spent in direct consultation with the patient in regards to our treatment and plan.

## 2023-12-13 ENCOUNTER — OFFICE VISIT (OUTPATIENT)
Dept: CARDIOLOGY | Facility: CLINIC | Age: 73
End: 2023-12-13
Payer: MEDICARE

## 2023-12-13 VITALS
WEIGHT: 179 LBS | DIASTOLIC BLOOD PRESSURE: 58 MMHG | SYSTOLIC BLOOD PRESSURE: 124 MMHG | HEART RATE: 66 BPM | BODY MASS INDEX: 30.56 KG/M2 | OXYGEN SATURATION: 96 % | HEIGHT: 64 IN

## 2023-12-13 DIAGNOSIS — I25.5 ISCHEMIC CARDIOMYOPATHY: ICD-10-CM

## 2023-12-13 DIAGNOSIS — E78.5 DYSLIPIDEMIA: ICD-10-CM

## 2023-12-13 DIAGNOSIS — I73.9 PAD (PERIPHERAL ARTERY DISEASE): ICD-10-CM

## 2023-12-13 DIAGNOSIS — I25.10 CORONARY ARTERY DISEASE INVOLVING NATIVE CORONARY ARTERY OF NATIVE HEART WITHOUT ANGINA PECTORIS: ICD-10-CM

## 2023-12-13 DIAGNOSIS — I50.42 CHRONIC COMBINED SYSTOLIC AND DIASTOLIC CONGESTIVE HEART FAILURE: Primary | ICD-10-CM

## 2023-12-13 PROCEDURE — 1100F PR PT FALLS ASSESS DOC 2+ FALLS/FALL W/INJURY/YR: ICD-10-PCS | Mod: CPTII,GC,S$GLB, | Performed by: STUDENT IN AN ORGANIZED HEALTH CARE EDUCATION/TRAINING PROGRAM

## 2023-12-13 PROCEDURE — 3078F PR MOST RECENT DIASTOLIC BLOOD PRESSURE < 80 MM HG: ICD-10-PCS | Mod: CPTII,GC,S$GLB, | Performed by: STUDENT IN AN ORGANIZED HEALTH CARE EDUCATION/TRAINING PROGRAM

## 2023-12-13 PROCEDURE — 3074F PR MOST RECENT SYSTOLIC BLOOD PRESSURE < 130 MM HG: ICD-10-PCS | Mod: CPTII,GC,S$GLB, | Performed by: STUDENT IN AN ORGANIZED HEALTH CARE EDUCATION/TRAINING PROGRAM

## 2023-12-13 PROCEDURE — 3074F SYST BP LT 130 MM HG: CPT | Mod: CPTII,GC,S$GLB, | Performed by: STUDENT IN AN ORGANIZED HEALTH CARE EDUCATION/TRAINING PROGRAM

## 2023-12-13 PROCEDURE — 99214 PR OFFICE/OUTPT VISIT, EST, LEVL IV, 30-39 MIN: ICD-10-PCS | Mod: GC,S$GLB,, | Performed by: STUDENT IN AN ORGANIZED HEALTH CARE EDUCATION/TRAINING PROGRAM

## 2023-12-13 PROCEDURE — 1125F AMNT PAIN NOTED PAIN PRSNT: CPT | Mod: CPTII,GC,S$GLB, | Performed by: STUDENT IN AN ORGANIZED HEALTH CARE EDUCATION/TRAINING PROGRAM

## 2023-12-13 PROCEDURE — 3078F DIAST BP <80 MM HG: CPT | Mod: CPTII,GC,S$GLB, | Performed by: STUDENT IN AN ORGANIZED HEALTH CARE EDUCATION/TRAINING PROGRAM

## 2023-12-13 PROCEDURE — 3008F PR BODY MASS INDEX (BMI) DOCUMENTED: ICD-10-PCS | Mod: CPTII,GC,S$GLB, | Performed by: STUDENT IN AN ORGANIZED HEALTH CARE EDUCATION/TRAINING PROGRAM

## 2023-12-13 PROCEDURE — 3288F FALL RISK ASSESSMENT DOCD: CPT | Mod: CPTII,GC,S$GLB, | Performed by: STUDENT IN AN ORGANIZED HEALTH CARE EDUCATION/TRAINING PROGRAM

## 2023-12-13 PROCEDURE — 1125F PR PAIN SEVERITY QUANTIFIED, PAIN PRESENT: ICD-10-PCS | Mod: CPTII,GC,S$GLB, | Performed by: STUDENT IN AN ORGANIZED HEALTH CARE EDUCATION/TRAINING PROGRAM

## 2023-12-13 PROCEDURE — 4010F PR ACE/ARB THEARPY RXD/TAKEN: ICD-10-PCS | Mod: CPTII,GC,S$GLB, | Performed by: STUDENT IN AN ORGANIZED HEALTH CARE EDUCATION/TRAINING PROGRAM

## 2023-12-13 PROCEDURE — 1159F PR MEDICATION LIST DOCUMENTED IN MEDICAL RECORD: ICD-10-PCS | Mod: CPTII,GC,S$GLB, | Performed by: STUDENT IN AN ORGANIZED HEALTH CARE EDUCATION/TRAINING PROGRAM

## 2023-12-13 PROCEDURE — 99999 PR PBB SHADOW E&M-EST. PATIENT-LVL IV: CPT | Mod: PBBFAC,GC,, | Performed by: STUDENT IN AN ORGANIZED HEALTH CARE EDUCATION/TRAINING PROGRAM

## 2023-12-13 PROCEDURE — 3008F BODY MASS INDEX DOCD: CPT | Mod: CPTII,GC,S$GLB, | Performed by: STUDENT IN AN ORGANIZED HEALTH CARE EDUCATION/TRAINING PROGRAM

## 2023-12-13 PROCEDURE — 99214 OFFICE O/P EST MOD 30 MIN: CPT | Mod: GC,S$GLB,, | Performed by: STUDENT IN AN ORGANIZED HEALTH CARE EDUCATION/TRAINING PROGRAM

## 2023-12-13 PROCEDURE — 1159F MED LIST DOCD IN RCRD: CPT | Mod: CPTII,GC,S$GLB, | Performed by: STUDENT IN AN ORGANIZED HEALTH CARE EDUCATION/TRAINING PROGRAM

## 2023-12-13 PROCEDURE — 99999 PR PBB SHADOW E&M-EST. PATIENT-LVL IV: ICD-10-PCS | Mod: PBBFAC,GC,, | Performed by: STUDENT IN AN ORGANIZED HEALTH CARE EDUCATION/TRAINING PROGRAM

## 2023-12-13 PROCEDURE — 4010F ACE/ARB THERAPY RXD/TAKEN: CPT | Mod: CPTII,GC,S$GLB, | Performed by: STUDENT IN AN ORGANIZED HEALTH CARE EDUCATION/TRAINING PROGRAM

## 2023-12-13 PROCEDURE — 3288F PR FALLS RISK ASSESSMENT DOCUMENTED: ICD-10-PCS | Mod: CPTII,GC,S$GLB, | Performed by: STUDENT IN AN ORGANIZED HEALTH CARE EDUCATION/TRAINING PROGRAM

## 2023-12-13 PROCEDURE — 1100F PTFALLS ASSESS-DOCD GE2>/YR: CPT | Mod: CPTII,GC,S$GLB, | Performed by: STUDENT IN AN ORGANIZED HEALTH CARE EDUCATION/TRAINING PROGRAM

## 2023-12-13 RX ORDER — SPIRONOLACTONE 25 MG/1
25 TABLET ORAL DAILY
Qty: 30 TABLET | Refills: 11 | Status: SHIPPED | OUTPATIENT
Start: 2023-12-13 | End: 2024-12-12

## 2023-12-13 RX ORDER — METOPROLOL SUCCINATE 50 MG/1
50 TABLET, EXTENDED RELEASE ORAL DAILY
Qty: 90 TABLET | Refills: 3 | Status: SHIPPED | OUTPATIENT
Start: 2023-12-13 | End: 2023-12-13

## 2023-12-13 RX ORDER — METOPROLOL SUCCINATE 25 MG/1
25 TABLET, EXTENDED RELEASE ORAL DAILY
Qty: 30 TABLET | Refills: 11 | Status: SHIPPED | OUTPATIENT
Start: 2023-12-13 | End: 2024-12-12

## 2023-12-13 NOTE — PROGRESS NOTES
I have reviewed the notes, assessments, and/or procedures performed this visit, and I concur with the documentation.    I did tell her that Jardiance and similar drugs are often beneficial in her condition, but she does not the burden of increased diuresis on her lifestyle plus believes she could not afford it

## 2023-12-13 NOTE — PROGRESS NOTES
PCP - Moises Webster MD  Referring Physician:     Subjective:   Patient ID:  Niki Soriano is a 73 y.o. female with past medical history of:   ICM EF 40%  HFrEF  CAD s/p PCI mRCA , PLB , LCx, and OM2 5/2020 (Dr. Gamble)  Dyslipidemia  DM2  Hypothyroidism  Spinal stenosis and back pain    Today, the patient is doing well overall. She has some mild shortness of breath on exertion. Denies chest pain, PND. She states that she does not like to take her Lasix because it makes her urinate all day so she only takes it 1-3 times per week. She is very social and usually goes places during the day. She is watching her salt intake.     History:     Social History     Tobacco Use    Smoking status: Never    Smokeless tobacco: Never   Substance Use Topics    Alcohol use: No     Family History   Problem Relation Age of Onset    Breast cancer Paternal Aunt     Colon cancer Neg Hx     Ovarian cancer Neg Hx        Meds:     Review of patient's allergies indicates:   Allergen Reactions    Augmentin [amoxicillin-pot clavulanate] Other (See Comments)     Patient cannot recall what she had, only that she could not tolerate the Augmentin    Tramadol Itching    Tegretol [carbamazepine] Itching and Rash    Vicodin [hydrocodone-acetaminophen] Itching and Anxiety       Current Outpatient Medications:     ammonium lactate 12 % Crea, Apply 1 application topically 2 (two) times daily., Disp: 140 g, Rfl: 11    aspirin 81 MG Chew, Take 1 tablet (81 mg total) by mouth once daily. (Patient taking differently: Take 81 mg by mouth every evening.), Disp: , Rfl: 0    b complex vitamins capsule, Take 1 capsule by mouth once daily., Disp: , Rfl:     baclofen (LIORESAL) 20 MG tablet, Take 20 mg by mouth 3 (three) times daily as needed. , Disp: , Rfl:     cholecalciferol, vitamin D3, (VITAMIN D3) 25 mcg (1,000 unit) capsule, Take 1,000 Units by mouth once daily., Disp: , Rfl:     duloxetine (CYMBALTA) 30 MG capsule, Take 30 mg by mouth once  "daily., Disp: , Rfl:     ENTRESTO  mg per tablet, TAKE 1 TABLET BY MOUTH TWICE A DAY, Disp: 60 tablet, Rfl: 11    estradioL (ESTRACE) 0.01 % (0.1 mg/gram) vaginal cream, Place 0.5 g vaginally 3 (three) times a week., Disp: 42.5 g, Rfl: 3    ezetimibe (ZETIA) 10 mg tablet, TAKE 1 TABLET BY MOUTH EVERY DAY, Disp: 90 tablet, Rfl: 3    furosemide (LASIX) 40 MG tablet, TAKE 1/2 TABLET BY MOUTH DAILY AS NEED FOR SWELLING OR WEIGHT GAIN, Disp: 45 tablet, Rfl: 1    gabapentin (NEURONTIN) 300 MG capsule, TAKE 1 CAPSULE BY MOUTH THREE TIMES A DAY (Patient taking differently: Patients states she takes 2 capsules 1 time at night.), Disp: 270 capsule, Rfl: 0    Lactobac no.51/Bifidobact no.4 (UP4 PROBIOTICS ULTRA ORAL), Take by mouth., Disp: , Rfl:     levothyroxine (SYNTHROID) 50 MCG tablet, Take 50 mcg by mouth once daily., Disp: , Rfl:     metformin (GLUCOPHAGE) 500 MG tablet, Take 500 mg by mouth once daily. , Disp: , Rfl:     metoprolol succinate (TOPROL-XL) 25 MG 24 hr tablet, TAKE 1 TABLET BY MOUTH EVERY DAY, Disp: 90 tablet, Rfl: 3    multivitamin (THERAGRAN) per tablet, Take 1 tablet by mouth once daily., Disp: , Rfl:     omega-3 fatty acids/fish oil (FISH OIL-OMEGA-3 FATTY ACIDS) 300-1,000 mg capsule, Take by mouth once daily., Disp: , Rfl:     oxybutynin (DITROPAN-XL) 5 MG TR24, Take 1 tablet (5 mg total) by mouth as needed (urgency, urge incontinence)., Disp: 30 tablet, Rfl: 11    rosuvastatin (CRESTOR) 40 MG Tab, TAKE 1 TABLET BY MOUTH EVERY DAY IN THE EVENING, Disp: 90 tablet, Rfl: 3      Objective:   BP (!) 124/58   Pulse 66   Ht 5' 4" (1.626 m)   Wt 81.2 kg (179 lb 0.2 oz)   SpO2 96%   BMI 30.73 kg/m²     Physical Exam  Gen: No apparent distress, resting comfortably  HEENT: Pupils equal and reactive to light  Cardio: Regular rate, point of maximal impulse not displaced, no murmur noted, 2+ radial pulses bilaterally, 2+ DP pulses bilaterally  Resp: CTAB, no wheezing  Abd: Soft, non-tender, " non-distended  Skin: Warm, dry, 1+ edema b/l  Neuro: Alert and oriented x3  Psych: Normal mood and affect      Labs:     Lab Results   Component Value Date     03/15/2023    K 3.7 03/15/2023     03/15/2023    CO2 27 03/15/2023    BUN 14 03/15/2023    CREATININE 0.8 03/15/2023    ANIONGAP 9 03/15/2023     Lab Results   Component Value Date    HGBA1C 5.8 (H) 03/02/2020     Lab Results   Component Value Date    BNP 1,319 (H) 03/20/2020    BNP 3,286 (H) 03/01/2020       Lab Results   Component Value Date    WBC 5.59 09/21/2020    HGB 12.2 09/21/2020    HCT 37.9 09/21/2020     (L) 09/21/2020    GRAN 2.8 09/21/2020    GRAN 50.5 09/21/2020     Lab Results   Component Value Date    CHOL 120 03/15/2023    HDL 51 03/15/2023    LDLCALC 40.0 (L) 03/15/2023    TRIG 145 03/15/2023       Lab Results   Component Value Date     03/15/2023    K 3.7 03/15/2023     03/15/2023    CO2 27 03/15/2023    BUN 14 03/15/2023    CREATININE 0.8 03/15/2023    ANIONGAP 9 03/15/2023     Lab Results   Component Value Date    HGBA1C 5.8 (H) 03/02/2020     Lab Results   Component Value Date    BNP 1,319 (H) 03/20/2020    BNP 3,286 (H) 03/01/2020    Lab Results   Component Value Date    WBC 5.59 09/21/2020    HGB 12.2 09/21/2020    HCT 37.9 09/21/2020     (L) 09/21/2020    GRAN 2.8 09/21/2020    GRAN 50.5 09/21/2020     Lab Results   Component Value Date    CHOL 120 03/15/2023    HDL 51 03/15/2023    LDLCALC 40.0 (L) 03/15/2023    TRIG 145 03/15/2023                Cardiovascular Imaging:     Echo 3/15/23:  The left ventricle is normal in size with mildly decreased systolic function.  The estimated ejection fraction is 43%.  There is mild left ventricular global hypokinesis, slightly more in apex.  Grade I left ventricular diastolic dysfunction.  Normal right ventricular size with normal right ventricular systolic function.  Normal central venous pressure (3 mmHg).         PET viability 7/28/20:    There is scar  (no viability) in the base to distal septal, inferoapical and apical septal walls comprising of 18% of myocardium. (LAD territory)    Gated perfusion images showed an ejection fraction of 46%. Normal ejection fraction is greater than 55%.     [facility] 5/29/2020:  LVEDP (Pre): 15  Successful PCI of 75% mid LCX stenosis with 2.5X12 MANDEEP  Successful PCI of 80% OM2 stenosis with 3X15 MANDEEP  Successful PCI of mid RCA  with 4.5X22 & 4.0X26 MANDEEP  Successful PCI of proximal PLB  with 3X22 MANDEEP  IVUS utilized for stent sizing  Estimated blood loss: <50 mL    Assessment & Plan:       Ischemic cardiomyopathy  -EF 43% in March 2023, NYHA Class II  -GDMT: Continue Metoprolol 25 mg qd, Entresto  BID, start Aldactone 25 mg qd. Check BMP in 2 weeks. Patient did not want to start Jardiance due to not wanting to have to urinate frequently  -Discussed salt restriction  -Start taking Lasix 20 mg qd    Coronary artery disease involving native coronary artery of native heart without angina pectoris  -Continue ASA and rosuvastatin    PAD (peripheral artery disease)  -Currently asymptomatic while walking  -Continue ASA and high intensity statin    Dyslipidemia  -Statin therapy as above    DM  Management per primary    RTC in 3 months. Case staffed with Dr. Cisneros.    Signed:  Bobby Ribeiro MD  [facility] PGY-6    Staff attestation to follow.

## 2023-12-13 NOTE — ASSESSMENT & PLAN NOTE
-EF 43% in March 2023, NYHA Class II  -GDMT: Continue Metoprolol 25 mg qd, Entresto  BID, start Aldactone 25 mg qd. Check BMP in 2 weeks  -Discussed salt restriction  -Start taking Lasix 20 mg qd

## 2023-12-27 ENCOUNTER — LAB VISIT (OUTPATIENT)
Dept: LAB | Facility: HOSPITAL | Age: 73
End: 2023-12-27
Attending: STUDENT IN AN ORGANIZED HEALTH CARE EDUCATION/TRAINING PROGRAM
Payer: MEDICARE

## 2023-12-27 DIAGNOSIS — I50.42 CHRONIC COMBINED SYSTOLIC AND DIASTOLIC CONGESTIVE HEART FAILURE: ICD-10-CM

## 2023-12-27 LAB
ANION GAP SERPL CALC-SCNC: 8 MMOL/L (ref 8–16)
BUN SERPL-MCNC: 22 MG/DL (ref 8–23)
CALCIUM SERPL-MCNC: 10.1 MG/DL (ref 8.7–10.5)
CHLORIDE SERPL-SCNC: 105 MMOL/L (ref 95–110)
CO2 SERPL-SCNC: 27 MMOL/L (ref 23–29)
CREAT SERPL-MCNC: 1 MG/DL (ref 0.5–1.4)
EST. GFR  (NO RACE VARIABLE): 59.9 ML/MIN/1.73 M^2
GLUCOSE SERPL-MCNC: 181 MG/DL (ref 70–110)
POTASSIUM SERPL-SCNC: 3.8 MMOL/L (ref 3.5–5.1)
SODIUM SERPL-SCNC: 140 MMOL/L (ref 136–145)

## 2023-12-27 PROCEDURE — 80048 BASIC METABOLIC PNL TOTAL CA: CPT | Performed by: STUDENT IN AN ORGANIZED HEALTH CARE EDUCATION/TRAINING PROGRAM

## 2023-12-27 PROCEDURE — 36415 COLL VENOUS BLD VENIPUNCTURE: CPT | Mod: PO | Performed by: STUDENT IN AN ORGANIZED HEALTH CARE EDUCATION/TRAINING PROGRAM

## 2024-03-07 ENCOUNTER — TELEPHONE (OUTPATIENT)
Dept: ORTHOPEDICS | Facility: CLINIC | Age: 74
End: 2024-03-07
Payer: MEDICARE

## 2024-03-14 ENCOUNTER — PATIENT MESSAGE (OUTPATIENT)
Dept: ORTHOPEDICS | Facility: CLINIC | Age: 74
End: 2024-03-14
Payer: MEDICARE

## 2024-03-14 ENCOUNTER — TELEPHONE (OUTPATIENT)
Dept: ORTHOPEDICS | Facility: CLINIC | Age: 74
End: 2024-03-14
Payer: MEDICARE

## 2024-03-14 NOTE — TELEPHONE ENCOUNTER
I called the patient and left a voicemail reminding them of their appointment on 03/26/2024 at 9:30 a.m. with Dr. Cid.  I informed them where to go and to arrive 15 minutes before their appointment time.

## 2024-03-20 ENCOUNTER — TELEPHONE (OUTPATIENT)
Dept: ORTHOPEDICS | Facility: CLINIC | Age: 74
End: 2024-03-20
Payer: MEDICARE

## 2024-03-20 NOTE — TELEPHONE ENCOUNTER
I left the patent a voicemail about her upcoming appointment on 03/26/2024. I told her she needs to give us a call back ASAP to know laterality.

## 2024-03-21 ENCOUNTER — TELEPHONE (OUTPATIENT)
Dept: ORTHOPEDICS | Facility: CLINIC | Age: 74
End: 2024-03-21
Payer: MEDICARE

## 2024-03-22 ENCOUNTER — TELEPHONE (OUTPATIENT)
Dept: ORTHOPEDICS | Facility: CLINIC | Age: 74
End: 2024-03-22
Payer: MEDICARE

## 2024-03-22 DIAGNOSIS — M79.642 BILATERAL HAND PAIN: Primary | ICD-10-CM

## 2024-03-22 DIAGNOSIS — M79.641 BILATERAL HAND PAIN: Primary | ICD-10-CM

## 2024-03-22 NOTE — TELEPHONE ENCOUNTER
LVM for pt re: laterality and need for XR prior to Tuesdays appointment. Informed pt that w/o these images there may be delays and/or potential r/s of appt the day of. Requested a call back to the [facility] at [phone] with laterality for XR scheduling.

## 2024-03-25 ENCOUNTER — HOSPITAL ENCOUNTER (OUTPATIENT)
Dept: RADIOLOGY | Facility: HOSPITAL | Age: 74
Discharge: HOME | End: 2024-03-25
Attending: ORTHOPAEDIC SURGERY
Payer: MEDICARE

## 2024-03-25 DIAGNOSIS — M79.642 BILATERAL HAND PAIN: ICD-10-CM

## 2024-03-25 DIAGNOSIS — M79.641 BILATERAL HAND PAIN: ICD-10-CM

## 2024-03-25 PROCEDURE — 73130 X-RAY EXAM OF HAND: CPT | Mod: TC,50

## 2024-03-25 PROCEDURE — 73130 X-RAY EXAM OF HAND: CPT | Mod: 26,50,, | Performed by: RADIOLOGY

## 2024-03-26 ENCOUNTER — OFFICE VISIT (OUTPATIENT)
Dept: ORTHOPEDICS | Facility: CLINIC | Age: 74
End: 2024-03-26
Payer: MEDICARE

## 2024-03-26 VITALS — WEIGHT: 179 LBS | BODY MASS INDEX: 30.56 KG/M2 | HEIGHT: 64 IN

## 2024-03-26 DIAGNOSIS — M79.642 LEFT HAND PAIN: ICD-10-CM

## 2024-03-26 DIAGNOSIS — S62.644A CLOSED NONDISPLACED FRACTURE OF PROXIMAL PHALANX OF RIGHT RING FINGER, INITIAL ENCOUNTER: ICD-10-CM

## 2024-03-26 DIAGNOSIS — M79.641 RIGHT HAND PAIN: Primary | ICD-10-CM

## 2024-03-26 DIAGNOSIS — R52 PAIN: Primary | ICD-10-CM

## 2024-03-26 PROCEDURE — 3288F FALL RISK ASSESSMENT DOCD: CPT | Mod: CPTII,S$GLB,, | Performed by: ORTHOPAEDIC SURGERY

## 2024-03-26 PROCEDURE — 1125F AMNT PAIN NOTED PAIN PRSNT: CPT | Mod: CPTII,S$GLB,, | Performed by: ORTHOPAEDIC SURGERY

## 2024-03-26 PROCEDURE — 1101F PT FALLS ASSESS-DOCD LE1/YR: CPT | Mod: CPTII,S$GLB,, | Performed by: ORTHOPAEDIC SURGERY

## 2024-03-26 PROCEDURE — 4010F ACE/ARB THERAPY RXD/TAKEN: CPT | Mod: CPTII,S$GLB,, | Performed by: ORTHOPAEDIC SURGERY

## 2024-03-26 PROCEDURE — 99205 OFFICE O/P NEW HI 60 MIN: CPT | Mod: S$GLB,,, | Performed by: ORTHOPAEDIC SURGERY

## 2024-03-26 PROCEDURE — 3008F BODY MASS INDEX DOCD: CPT | Mod: CPTII,S$GLB,, | Performed by: ORTHOPAEDIC SURGERY

## 2024-03-26 PROCEDURE — 99999 PR PBB SHADOW E&M-EST. PATIENT-LVL V: CPT | Mod: PBBFAC,,, | Performed by: ORTHOPAEDIC SURGERY

## 2024-03-26 NOTE — PROGRESS NOTES
Patient has multiple complaints bilateral upper extremity on the left side she has numbness and tingling chair last nerve test was 2017 we will get an updated version on the right side she has an injury to her ring finger at radiographs show that she has a fracture we will splint for 3 weeks and start therapy will manage also with radiographic following along for healing in addition she has multiple joint arthritis she has not been worked up for rheumatoid arthritis we will order labs and MRI to work her up for rheumatoid arthritis and possible referral to Rheumatology

## 2024-03-26 NOTE — PROGRESS NOTES
[facility]  History & Physical  Orthopedics    SUBJECTIVE:     Chief Complaint:  right and left hand pain    Referring Provider: Self, Aaareferral     History of Present Illness:  Patient is a 73 y.o. right hand dominant female who presents today with complaints of  bilateral hand pain.  Reports having her right ring finger caught in the refrigerator a week ago.  Reports pain and tenderness at PIP and proximal phalanx, radial aspect of right ring finger. She denies any numbness or tingling of the right hand/fingers.   She reports numbness and tingling along the medial distribution of her left hand.  Her last EMG was in 2017 which demonstrated moderate to severe bilateral CTS.  She has not tried bracing.  She admits to neck pain.   The patient denies any fevers, chills, N/V, D/C and presents for evaluation.    Review of patient's allergies indicates:   Allergen Reactions    Augmentin [amoxicillin-pot clavulanate] Other (See Comments)     Patient cannot recall what she had, only that she could not tolerate the Augmentin    Tramadol Itching    Tegretol [carbamazepine] Itching and Rash    Vicodin [hydrocodone-acetaminophen] Itching and Anxiety       Past Medical History:   Diagnosis Date    Coronary artery disease     Diabetes mellitus     Hypercholesteremia     Hypertension     IC (interstitial cystitis)     Numbness in both hands 2017    Spondylisthesis     Vulvodynia, unspecified      Past Surgical History:   Procedure Laterality Date    CARPAL TUNNEL RELEASE Right 2019    Procedure: RELEASE, CARPAL TUNNEL, REVISION;  Surgeon: Annabelle Cid MD;  Location: [facility] OR [address] [place] [address];  Service: Orthopedics;  Laterality: Right;  Axogen, Clarix     SECTION      x3    CORONARY ANGIOGRAPHY N/A 3/3/2020    Procedure: ANGIOGRAM, CORONARY ARTERY;  Surgeon: Markie Friend III, MD;  Location: [facility] CATH LAB;  Service: Cardiology;  Laterality: N/A;    CORONARY ANGIOGRAPHY N/A 2020     Procedure: ANGIOGRAM, CORONARY ARTERY;  Surgeon: Rodney Gamble MD;  Location: [facility] CATH LAB;  Service: Cardiology;  Laterality: N/A;    CYSTOSCOPY      HYSTERECTOMY      ischemic colitis      LEFT HEART CATHETERIZATION Left 3/3/2020    Procedure: Left heart cath;  Surgeon: Markie Friend III, MD;  Location: [facility] CATH LAB;  Service: Cardiology;  Laterality: Left;    LEFT HEART CATHETERIZATION Left 5/29/2020    Procedure: Left heart cath;  Surgeon: Rodney Gamble MD;  Location: [facility] CATH LAB;  Service: Cardiology;  Laterality: Left;    OOPHORECTOMY      HI REMOVAL OF OVARY/TUBE(S)       Family History   Problem Relation Age of Onset    Breast cancer Paternal Aunt     Colon cancer Neg Hx     Ovarian cancer Neg Hx      Social History     Tobacco Use    Smoking status: Never    Smokeless tobacco: Never   Substance Use Topics    Alcohol use: No    Drug use: No        Review of Systems:  Constitutional: Denies fever/chills  Neurological: Denies numbness/tingling (any exceptions noted in orthopaedic exam)   Psychiatric/Behavioral: Denies change in normal mood  Eyes: Denies change in vision  Cardiovascular: Denies chest pain  Respiratory: Denies shortness of breath  Hematologic/Lymphatic: Denies easy bleeding/bruising   Skin: Denies new rash or skin lesions   Gastrointestinal: Denies nausea/vomitting/diarrhea, change in bowel habits, abdominal pain   Allergic/Immunologic: Denies adverse reactions to current medications  Musculoskeletal: see HPI      OBJECTIVE:     Vital Signs (Most Recent)       Physical Exam:  Gen:  No acute distress  CV:  Peripherally well-perfused.  Pulses 2+ bilaterally.  Lungs:  Normal respiratory effort.  Abdomen:  Soft, non-tender, non-distended  Head/Neck:  Normocephalic.  Atraumatic. No TTP, AROM and PROM intact without pain  Neuro:  CN intact without deficit, SILT throughout B/L Upper & Lower Extremities    Bilateral Hand/Wrist  Examination:    Observation/Inspection:  Swelling  Moderate swelling on right ring PIP and proximal phalanx. +TTP along radial aspect of PIP and P1 of right ring finger.    Deformity  Multiple joint arthritis bilaterally  Discoloration  none     Scars   none    Atrophy  none    HAND/WRIST EXAMINATION:  Snuff box tenderness   Neg      Neurovascular Exam:  Digits WWP, brisk CR < 3s throughout  NVI motor/LTS to M/R/U nerves, radial pulse 2+  Tinel's Test - Carpal Tunnel  POS bilaterally   Tinel's Test - Cubital Tunnel  NEG bilateral  Durkins'                                             Pos bilaterally     Decreased ROM finger right ring finger with decreased flexion, pain radial aspect of P1 and PIP of right ring finger. Inability to make a full composite fist (right hand).     ROM wrist full, painless    ROM elbow full, painless    Abdomen not guarded  Respirations nonlabored  Perfusion intact    Diagnostic Results:  XRAY of bilateral Hands 03/25/2024  FINDINGS:  Left hand shows degenerative changes some the distal interphalangeal chill joints particularly of the 2nd and 3rd fingers and at the 1st carpal metacarpal joint space.  There is little soft tissue calcification between the carpal bones and distal radius and ulna.     Right hand shows mild degenerative changes.  The little cystic change can be seen in the carpal bones particularly the carpal navicular.  Degenerative changes seen at the 1st carpal metacarpal joint space.  Non displaced right proximal phalanx shaft fracture of ring finger visualized on XRAY.   Imaging - I independently viewed the patient's imaging as well as the radiology report.  Xrays of the patient's does  demonstrate  evidence of acute fractures or dislocations or significant degenerative changes.    EMG - 2017  Moderate to sever bilateral CTS  ASSESSMENT/PLAN:     Niki was seen today for pain, swelling, pain and swelling.    Diagnoses and all orders for this visit:    Right hand pain  -      X-Ray Hand Complete Right; Future  -     MRI Hand Fingers W WO Contrast Right; Future  -     CBC auto differential; Future  -     Sedimentation rate; Future  -     C-reactive protein; Future  -     ROSANNE; Future  -     Cyclic citrul peptide antibody, IgG; Future  -     Rheumatoid factor; Future  -     HLA B27 Antigen; Future  -     Ambulatory referral/consult to Physical/Occupational Therapy; Future    Left hand pain  -     EMG W/ ULTRASOUND AND NERVE CONDUCTION TEST 1 Extremity; Future    Closed nondisplaced fracture of proximal phalanx of right ring finger, initial encounter  -     MRI Hand Fingers Without Contrast Right; Future         73 y.o. yo female with bilateral hand pain       Plan:  XRAY of bilateral hand reviewed and discussed with patient today in clinic. Right hand Xray demonstrated in a closed nondisplaced proximal phalanx fracture of right ring finger.  Finger splint  full time for 3 weeks and repeat XRAY of right finger in 3 weeks with FU to assess for healing.  If healing, will begin therapy.   Autoimmune panel and MRI right hand/ fingers ordered to r/o RA  EMG ordered to assess paresthesia of left hand.  She will FU in 3 weeks or imaging.

## 2024-03-28 ENCOUNTER — TELEPHONE (OUTPATIENT)
Dept: ORTHOPEDICS | Facility: CLINIC | Age: 74
End: 2024-03-28
Payer: MEDICARE

## 2024-03-28 NOTE — TELEPHONE ENCOUNTER
Dr. Ruff office reached out to patient to schedule EMG. Patient stated she'll give them a call back to schedule her appointment.

## 2024-04-02 RX ORDER — SACUBITRIL AND VALSARTAN 97; 103 MG/1; MG/1
1 TABLET, FILM COATED ORAL 2 TIMES DAILY
Qty: 60 TABLET | Refills: 11 | Status: SHIPPED | OUTPATIENT
Start: 2024-04-02

## 2024-04-03 ENCOUNTER — OFFICE VISIT (OUTPATIENT)
Dept: CARDIOLOGY | Facility: CLINIC | Age: 74
End: 2024-04-03
Payer: MEDICARE

## 2024-04-03 ENCOUNTER — LAB VISIT (OUTPATIENT)
Dept: LAB | Facility: HOSPITAL | Age: 74
End: 2024-04-03
Payer: MEDICARE

## 2024-04-03 VITALS
WEIGHT: 179 LBS | BODY MASS INDEX: 30.56 KG/M2 | SYSTOLIC BLOOD PRESSURE: 104 MMHG | HEART RATE: 58 BPM | DIASTOLIC BLOOD PRESSURE: 55 MMHG | OXYGEN SATURATION: 100 % | HEIGHT: 64 IN

## 2024-04-03 DIAGNOSIS — M79.641 RIGHT HAND PAIN: ICD-10-CM

## 2024-04-03 DIAGNOSIS — E78.5 DYSLIPIDEMIA: ICD-10-CM

## 2024-04-03 DIAGNOSIS — E11.52 DIABETIC GANGRENE: Primary | ICD-10-CM

## 2024-04-03 DIAGNOSIS — I50.42 CHRONIC COMBINED SYSTOLIC AND DIASTOLIC CONGESTIVE HEART FAILURE: ICD-10-CM

## 2024-04-03 DIAGNOSIS — I25.5 ISCHEMIC CARDIOMYOPATHY: ICD-10-CM

## 2024-04-03 LAB
BASOPHILS # BLD AUTO: 0.05 K/UL (ref 0–0.2)
BASOPHILS NFR BLD: 0.9 % (ref 0–1.9)
CCP AB SER IA-ACNC: <0.5 U/ML
CHOLEST SERPL-MCNC: 112 MG/DL (ref 120–199)
CHOLEST/HDLC SERPL: 2.5 {RATIO} (ref 2–5)
CRP SERPL-MCNC: <0.3 MG/L (ref 0–8.2)
DIFFERENTIAL METHOD BLD: NORMAL
EOSINOPHIL # BLD AUTO: 0.2 K/UL (ref 0–0.5)
EOSINOPHIL NFR BLD: 4.4 % (ref 0–8)
ERYTHROCYTE [DISTWIDTH] IN BLOOD BY AUTOMATED COUNT: 13.7 % (ref 11.5–14.5)
ERYTHROCYTE [SEDIMENTATION RATE] IN BLOOD BY PHOTOMETRIC METHOD: 5 MM/HR (ref 0–36)
HCT VFR BLD AUTO: 41.2 % (ref 37–48.5)
HDLC SERPL-MCNC: 45 MG/DL (ref 40–75)
HDLC SERPL: 40.2 % (ref 20–50)
HGB BLD-MCNC: 13.2 G/DL (ref 12–16)
IMM GRANULOCYTES # BLD AUTO: 0.01 K/UL (ref 0–0.04)
IMM GRANULOCYTES NFR BLD AUTO: 0.2 % (ref 0–0.5)
LDLC SERPL CALC-MCNC: 39.6 MG/DL (ref 63–159)
LYMPHOCYTES # BLD AUTO: 1.7 K/UL (ref 1–4.8)
LYMPHOCYTES NFR BLD: 30.2 % (ref 18–48)
MCH RBC QN AUTO: 30.1 PG (ref 27–31)
MCHC RBC AUTO-ENTMCNC: 32 G/DL (ref 32–36)
MCV RBC AUTO: 94 FL (ref 82–98)
MONOCYTES # BLD AUTO: 0.4 K/UL (ref 0.3–1)
MONOCYTES NFR BLD: 6.4 % (ref 4–15)
NEUTROPHILS # BLD AUTO: 3.2 K/UL (ref 1.8–7.7)
NEUTROPHILS NFR BLD: 57.9 % (ref 38–73)
NONHDLC SERPL-MCNC: 67 MG/DL
NRBC BLD-RTO: 0 /100 WBC
PLATELET # BLD AUTO: 153 K/UL (ref 150–450)
PMV BLD AUTO: 10.5 FL (ref 9.2–12.9)
RBC # BLD AUTO: 4.39 M/UL (ref 4–5.4)
RHEUMATOID FACT SERPL-ACNC: <13 IU/ML (ref 0–15)
TRIGL SERPL-MCNC: 137 MG/DL (ref 30–150)
WBC # BLD AUTO: 5.5 K/UL (ref 3.9–12.7)

## 2024-04-03 PROCEDURE — 86140 C-REACTIVE PROTEIN: CPT

## 2024-04-03 PROCEDURE — 3074F SYST BP LT 130 MM HG: CPT | Mod: CPTII,GC,S$GLB, | Performed by: STUDENT IN AN ORGANIZED HEALTH CARE EDUCATION/TRAINING PROGRAM

## 2024-04-03 PROCEDURE — 36415 COLL VENOUS BLD VENIPUNCTURE: CPT

## 2024-04-03 PROCEDURE — 85025 COMPLETE CBC W/AUTO DIFF WBC: CPT

## 2024-04-03 PROCEDURE — 86038 ANTINUCLEAR ANTIBODIES: CPT

## 2024-04-03 PROCEDURE — 86200 CCP ANTIBODY: CPT

## 2024-04-03 PROCEDURE — 86431 RHEUMATOID FACTOR QUANT: CPT

## 2024-04-03 PROCEDURE — 1159F MED LIST DOCD IN RCRD: CPT | Mod: CPTII,GC,S$GLB, | Performed by: STUDENT IN AN ORGANIZED HEALTH CARE EDUCATION/TRAINING PROGRAM

## 2024-04-03 PROCEDURE — 85652 RBC SED RATE AUTOMATED: CPT

## 2024-04-03 PROCEDURE — 80061 LIPID PANEL: CPT | Performed by: STUDENT IN AN ORGANIZED HEALTH CARE EDUCATION/TRAINING PROGRAM

## 2024-04-03 PROCEDURE — 99214 OFFICE O/P EST MOD 30 MIN: CPT | Mod: GC,S$GLB,, | Performed by: STUDENT IN AN ORGANIZED HEALTH CARE EDUCATION/TRAINING PROGRAM

## 2024-04-03 PROCEDURE — 3008F BODY MASS INDEX DOCD: CPT | Mod: CPTII,GC,S$GLB, | Performed by: STUDENT IN AN ORGANIZED HEALTH CARE EDUCATION/TRAINING PROGRAM

## 2024-04-03 PROCEDURE — 81374 HLA I TYPING 1 ANTIGEN LR: CPT

## 2024-04-03 PROCEDURE — 99999 PR PBB SHADOW E&M-EST. PATIENT-LVL IV: CPT | Mod: PBBFAC,GC,, | Performed by: STUDENT IN AN ORGANIZED HEALTH CARE EDUCATION/TRAINING PROGRAM

## 2024-04-03 PROCEDURE — 1126F AMNT PAIN NOTED NONE PRSNT: CPT | Mod: CPTII,GC,S$GLB, | Performed by: STUDENT IN AN ORGANIZED HEALTH CARE EDUCATION/TRAINING PROGRAM

## 2024-04-03 PROCEDURE — 3078F DIAST BP <80 MM HG: CPT | Mod: CPTII,GC,S$GLB, | Performed by: STUDENT IN AN ORGANIZED HEALTH CARE EDUCATION/TRAINING PROGRAM

## 2024-04-03 PROCEDURE — 4010F ACE/ARB THERAPY RXD/TAKEN: CPT | Mod: CPTII,GC,S$GLB, | Performed by: STUDENT IN AN ORGANIZED HEALTH CARE EDUCATION/TRAINING PROGRAM

## 2024-04-03 RX ORDER — FUROSEMIDE 40 MG/1
40 TABLET ORAL DAILY
Qty: 30 TABLET | Refills: 11 | Status: SHIPPED | OUTPATIENT
Start: 2024-04-03 | End: 2025-04-03

## 2024-04-03 NOTE — PROGRESS NOTES
PCP - Moises Webster MD  Referring Physician:     Subjective:   Patient ID:  Niki Soriano is a 73 y.o. female with past medical history of:   ICM EF 40%  HFrEF  CAD s/p PCI mRCA , PLB , LCx, and OM2 5/2020 (Dr. Gamble)  Dyslipidemia  DM2  Hypothyroidism  Spinal stenosis and back pain  Frequent UTI's    Today, patient reports that she has had a lot of salt intake over the weekend. She has seen an increase in lower extremity edema in past seven days. Denies chest pain, SOB, palpitations. She has not been taking her home Lasix regularly. Taking her heart failure medications. She has chronic neck and lower back pain which causes her to use a walker at home. She is at our appointment today in a wheelchair. Lives at home with her .    History:     Social History     Tobacco Use    Smoking status: Never    Smokeless tobacco: Never   Substance Use Topics    Alcohol use: No     Family History   Problem Relation Age of Onset    Breast cancer Paternal Aunt     Colon cancer Neg Hx     Ovarian cancer Neg Hx        Meds:     Review of patient's allergies indicates:   Allergen Reactions    Augmentin [amoxicillin-pot clavulanate] Other (See Comments)     Patient cannot recall what she had, only that she could not tolerate the Augmentin    Tramadol Itching    Tegretol [carbamazepine] Itching and Rash    Vicodin [hydrocodone-acetaminophen] Itching and Anxiety       Current Outpatient Medications:     ammonium lactate 12 % Crea, Apply 1 application topically 2 (two) times daily., Disp: 140 g, Rfl: 11    b complex vitamins capsule, Take 1 capsule by mouth once daily., Disp: , Rfl:     baclofen (LIORESAL) 20 MG tablet, Take 20 mg by mouth 3 (three) times daily as needed. , Disp: , Rfl:     cholecalciferol, vitamin D3, (VITAMIN D3) 25 mcg (1,000 unit) capsule, Take 1,000 Units by mouth once daily., Disp: , Rfl:     duloxetine (CYMBALTA) 30 MG capsule, Take 30 mg by mouth once daily., Disp: , Rfl:     ENTRESTO   "mg per tablet, TAKE 1 TABLET BY MOUTH TWICE A DAY, Disp: 60 tablet, Rfl: 11    estradioL (ESTRACE) 0.01 % (0.1 mg/gram) vaginal cream, Place 0.5 g vaginally 3 (three) times a week., Disp: 42.5 g, Rfl: 3    ezetimibe (ZETIA) 10 mg tablet, TAKE 1 TABLET BY MOUTH EVERY DAY, Disp: 90 tablet, Rfl: 3    gabapentin (NEURONTIN) 300 MG capsule, TAKE 1 CAPSULE BY MOUTH THREE TIMES A DAY (Patient taking differently: Patients states she takes 2 capsules 1 time at night.), Disp: 270 capsule, Rfl: 0    Lactobac no.51/Bifidobact no.4 (UP4 PROBIOTICS ULTRA ORAL), Take by mouth., Disp: , Rfl:     levothyroxine (SYNTHROID) 50 MCG tablet, Take 50 mcg by mouth once daily., Disp: , Rfl:     metformin (GLUCOPHAGE) 500 MG tablet, Take 500 mg by mouth once daily. , Disp: , Rfl:     metoprolol succinate (TOPROL-XL) 25 MG 24 hr tablet, Take 1 tablet (25 mg total) by mouth once daily., Disp: 30 tablet, Rfl: 11    multivitamin (THERAGRAN) per tablet, Take 1 tablet by mouth once daily., Disp: , Rfl:     omega-3 fatty acids/fish oil (FISH OIL-OMEGA-3 FATTY ACIDS) 300-1,000 mg capsule, Take by mouth once daily., Disp: , Rfl:     oxybutynin (DITROPAN-XL) 5 MG TR24, Take 1 tablet (5 mg total) by mouth as needed (urgency, urge incontinence)., Disp: 30 tablet, Rfl: 11    rosuvastatin (CRESTOR) 40 MG Tab, TAKE 1 TABLET BY MOUTH EVERY DAY IN THE EVENING, Disp: 90 tablet, Rfl: 3    spironolactone (ALDACTONE) 25 MG tablet, Take 1 tablet (25 mg total) by mouth once daily., Disp: 30 tablet, Rfl: 11    aspirin 81 MG Chew, Take 1 tablet (81 mg total) by mouth once daily. (Patient taking differently: Take 81 mg by mouth every evening.), Disp: , Rfl: 0    furosemide (LASIX) 40 MG tablet, Take 1 tablet (40 mg total) by mouth once daily., Disp: 30 tablet, Rfl: 11      Objective:   BP (!) 104/55   Pulse (!) 58   Ht 5' 4" (1.626 m)   Wt 81.2 kg (179 lb 0.2 oz)   SpO2 100%   BMI 30.73 kg/m²     Physical Exam  Gen: No apparent distress, resting comfortably, in " wheelchair  HEENT: Pupils equal and reactive to light  Cardio: Regular rate, point of maximal impulse not displaced, no murmur noted, 2+ radial pulses bilaterally, 2+ DP pulses bilaterally  Resp: CTAB, no wheezing  Abd: Soft, non-tender, non-distended  Skin: Warm, dry, 1+ pitting edema b/l  Neuro: Alert and oriented x3  Psych: Normal mood and affect      Labs:     Lab Results   Component Value Date     12/27/2023    K 3.8 12/27/2023     12/27/2023    CO2 27 12/27/2023    BUN 22 12/27/2023    CREATININE 1.0 12/27/2023    ANIONGAP 8 12/27/2023     Lab Results   Component Value Date    HGBA1C 5.8 (H) 03/02/2020     Lab Results   Component Value Date    BNP 1,319 (H) 03/20/2020    BNP 3,286 (H) 03/01/2020       Lab Results   Component Value Date    WBC 5.59 09/21/2020    HGB 12.2 09/21/2020    HCT 37.9 09/21/2020     (L) 09/21/2020    GRAN 2.8 09/21/2020    GRAN 50.5 09/21/2020     Lab Results   Component Value Date    CHOL 120 03/15/2023    HDL 51 03/15/2023    LDLCALC 40.0 (L) 03/15/2023    TRIG 145 03/15/2023       Lab Results   Component Value Date     12/27/2023    K 3.8 12/27/2023     12/27/2023    CO2 27 12/27/2023    BUN 22 12/27/2023    CREATININE 1.0 12/27/2023    ANIONGAP 8 12/27/2023     Lab Results   Component Value Date    HGBA1C 5.8 (H) 03/02/2020     Lab Results   Component Value Date    BNP 1,319 (H) 03/20/2020    BNP 3,286 (H) 03/01/2020    Lab Results   Component Value Date    WBC 5.59 09/21/2020    HGB 12.2 09/21/2020    HCT 37.9 09/21/2020     (L) 09/21/2020    GRAN 2.8 09/21/2020    GRAN 50.5 09/21/2020     Lab Results   Component Value Date    CHOL 120 03/15/2023    HDL 51 03/15/2023    LDLCALC 40.0 (L) 03/15/2023    TRIG 145 03/15/2023                Cardiovascular Imaging:     Echo 3/15/23:  The left ventricle is normal in size with mildly decreased systolic function.  The estimated ejection fraction is 43%.  There is mild left ventricular global  hypokinesis, slightly more in apex.  Grade I left ventricular diastolic dysfunction.  Normal right ventricular size with normal right ventricular systolic function.  Normal central venous pressure (3 mmHg).         PET viability 7/28/20:    There is scar (no viability) in the base to distal septal, inferoapical and apical septal walls comprising of 18% of myocardium. (LAD territory)    Gated perfusion images showed an ejection fraction of 46%. Normal ejection fraction is greater than 55%.     [facility] 5/29/2020:  LVEDP (Pre): 15  Successful PCI of 75% mid LCX stenosis with 2.5X12 MANDEEP  Successful PCI of 80% OM2 stenosis with 3X15 MANDEEP  Successful PCI of mid RCA  with 4.5X22 & 4.0X26 MANDEEP  Successful PCI of proximal PLB  with 3X22 MANDEEP  IVUS utilized for stent sizing  Estimated blood loss: <50 mL    Assessment & Plan:       Ischemic cardiomyopathy  -EF 43% in March 2023, NYHA Class II. Etiology is ischemic.  -GDMT: Continue Metoprolol 25 mg qd, Entresto  BID, start Aldactone 25 mg qd. SGLT2i contraindicated due to frequent UTI's.  -Discussed salt restriction  -Start taking Lasix 40 mg qd    Coronary artery disease involving native coronary artery of native heart without angina pectoris  -Continue ASA and rosuvastatin    PAD (peripheral artery disease)  -Currently asymptomatic while walking  -Continue ASA and high intensity statin    Dyslipidemia  -Statin therapy and Zetia as above  -Check lipid panel today, LDL goal <70    DM  Management per primary    RTC in 3 months. Case staffed with Dr. Lynn    Signed:  Bobby Ribeiro MD  [facility] PGY-6    Staff attestation to follow.

## 2024-04-05 LAB — ANA SER QL IF: NORMAL

## 2024-04-08 ENCOUNTER — OFFICE VISIT (OUTPATIENT)
Dept: OPTOMETRY | Facility: CLINIC | Age: 74
End: 2024-04-08
Payer: MEDICARE

## 2024-04-08 DIAGNOSIS — H25.13 NUCLEAR SCLEROSIS, BILATERAL: ICD-10-CM

## 2024-04-08 DIAGNOSIS — E11.9 DIABETES MELLITUS TYPE 2 WITHOUT RETINOPATHY: Primary | ICD-10-CM

## 2024-04-08 DIAGNOSIS — H52.03 HYPEROPIA OF BOTH EYES WITH ASTIGMATISM AND PRESBYOPIA: ICD-10-CM

## 2024-04-08 DIAGNOSIS — H52.203 HYPEROPIA OF BOTH EYES WITH ASTIGMATISM AND PRESBYOPIA: ICD-10-CM

## 2024-04-08 DIAGNOSIS — Z13.5 GLAUCOMA SCREENING: ICD-10-CM

## 2024-04-08 DIAGNOSIS — H52.4 HYPEROPIA OF BOTH EYES WITH ASTIGMATISM AND PRESBYOPIA: ICD-10-CM

## 2024-04-08 PROCEDURE — 92015 DETERMINE REFRACTIVE STATE: CPT | Mod: S$GLB,,, | Performed by: OPTOMETRIST

## 2024-04-08 PROCEDURE — 92014 COMPRE OPH EXAM EST PT 1/>: CPT | Mod: S$GLB,,, | Performed by: OPTOMETRIST

## 2024-04-08 PROCEDURE — 4010F ACE/ARB THERAPY RXD/TAKEN: CPT | Mod: CPTII,S$GLB,, | Performed by: OPTOMETRIST

## 2024-04-08 PROCEDURE — 3288F FALL RISK ASSESSMENT DOCD: CPT | Mod: CPTII,S$GLB,, | Performed by: OPTOMETRIST

## 2024-04-08 PROCEDURE — 1100F PTFALLS ASSESS-DOCD GE2>/YR: CPT | Mod: CPTII,S$GLB,, | Performed by: OPTOMETRIST

## 2024-04-08 PROCEDURE — 99999 PR PBB SHADOW E&M-EST. PATIENT-LVL III: CPT | Mod: PBBFAC,,, | Performed by: OPTOMETRIST

## 2024-04-08 PROCEDURE — 2023F DILAT RTA XM W/O RTNOPTHY: CPT | Mod: CPTII,S$GLB,, | Performed by: OPTOMETRIST

## 2024-04-08 PROCEDURE — 1126F AMNT PAIN NOTED NONE PRSNT: CPT | Mod: CPTII,S$GLB,, | Performed by: OPTOMETRIST

## 2024-04-08 PROCEDURE — 1159F MED LIST DOCD IN RCRD: CPT | Mod: CPTII,S$GLB,, | Performed by: OPTOMETRIST

## 2024-04-08 NOTE — PROGRESS NOTES
HPI    Annual Diabetic Eye Exam   Pt states vision is poor c specs     Pt reports flashes bright white eye   Pt denies Floaters    Pt denies Dry/ Burning  Pt reports itchy eyes   Gtt: No    DM Dx'ed   BS: 109   A1C:  7.5%  Hemoglobin A1C       Date                       Value               Ref Range           Status                  03/02/2020               5.8 (H)             4.0 - 5.6 %           Final                Last edited by Vale Sandy on 4/8/2024  9:04 AM.            Assessment /Plan     For exam results, see Encounter Report.    Diabetes mellitus type 2 without retinopathy  -No retinopathy noted today.  Continued control with primary care physician and annual comprehensive eye exam.    Nuclear sclerosis, bilateral  -Educated patient on presence of cataracts at today's exam, monitor at annual dilated fundus exam. 5+ years surgical estimate.    Glaucoma screening  -Monitor with annual eye exam and IOP check    Hyperopia of both eyes with astigmatism and presbyopia  Eyeglass Final Rx       Eyeglass Final Rx         Sphere Cylinder Dist VA Add    Right +0.50 Sphere 20/25 +2.50    Left +0.75 Sphere 20/25 +2.50      Type: PAL    Expiration Date: 4/8/2025                      RTC 1 yr

## 2024-04-12 ENCOUNTER — TELEPHONE (OUTPATIENT)
Dept: ORTHOPEDICS | Facility: CLINIC | Age: 74
End: 2024-04-12
Payer: MEDICARE

## 2024-04-12 DIAGNOSIS — R52 PAIN: Primary | ICD-10-CM

## 2024-04-24 LAB
HLA B27 INTERPRETATION: NORMAL
HLA-B27 RELATED AG QL: NORMAL
HLA-B27 RELATED AG QL: POSITIVE

## 2024-05-29 ENCOUNTER — LAB VISIT (OUTPATIENT)
Dept: LAB | Facility: HOSPITAL | Age: 74
End: 2024-05-29
Payer: MEDICARE

## 2024-05-29 ENCOUNTER — OFFICE VISIT (OUTPATIENT)
Dept: ALLERGY | Facility: CLINIC | Age: 74
End: 2024-05-29
Payer: MEDICARE

## 2024-05-29 VITALS — BODY MASS INDEX: 29.89 KG/M2 | HEIGHT: 64 IN | WEIGHT: 175.06 LBS

## 2024-05-29 DIAGNOSIS — Z86.16 HISTORY OF 2019 NOVEL CORONAVIRUS DISEASE (COVID-19): ICD-10-CM

## 2024-05-29 DIAGNOSIS — I25.10 CORONARY ARTERY DISEASE INVOLVING NATIVE CORONARY ARTERY OF NATIVE HEART WITHOUT ANGINA PECTORIS: ICD-10-CM

## 2024-05-29 DIAGNOSIS — I25.5 ISCHEMIC CARDIOMYOPATHY: ICD-10-CM

## 2024-05-29 DIAGNOSIS — E11.40 TYPE 2 DIABETES MELLITUS WITH DIABETIC NEUROPATHY, WITHOUT LONG-TERM CURRENT USE OF INSULIN: ICD-10-CM

## 2024-05-29 DIAGNOSIS — J31.0 CHRONIC RHINITIS: ICD-10-CM

## 2024-05-29 DIAGNOSIS — E03.9 HYPOTHYROIDISM, UNSPECIFIED TYPE: ICD-10-CM

## 2024-05-29 DIAGNOSIS — J31.0 CHRONIC RHINITIS: Primary | ICD-10-CM

## 2024-05-29 DIAGNOSIS — G06.1 SPINAL CORD ABSCESS: ICD-10-CM

## 2024-05-29 PROCEDURE — 36415 COLL VENOUS BLD VENIPUNCTURE: CPT | Performed by: ALLERGY & IMMUNOLOGY

## 2024-05-29 PROCEDURE — 1160F RVW MEDS BY RX/DR IN RCRD: CPT | Mod: CPTII,S$GLB,, | Performed by: ALLERGY & IMMUNOLOGY

## 2024-05-29 PROCEDURE — 82785 ASSAY OF IGE: CPT | Performed by: ALLERGY & IMMUNOLOGY

## 2024-05-29 PROCEDURE — 99999 PR PBB SHADOW E&M-EST. PATIENT-LVL III: CPT | Mod: PBBFAC,,, | Performed by: ALLERGY & IMMUNOLOGY

## 2024-05-29 PROCEDURE — 1125F AMNT PAIN NOTED PAIN PRSNT: CPT | Mod: CPTII,S$GLB,, | Performed by: ALLERGY & IMMUNOLOGY

## 2024-05-29 PROCEDURE — 86003 ALLG SPEC IGE CRUDE XTRC EA: CPT | Performed by: ALLERGY & IMMUNOLOGY

## 2024-05-29 PROCEDURE — 1159F MED LIST DOCD IN RCRD: CPT | Mod: CPTII,S$GLB,, | Performed by: ALLERGY & IMMUNOLOGY

## 2024-05-29 PROCEDURE — 4010F ACE/ARB THERAPY RXD/TAKEN: CPT | Mod: CPTII,S$GLB,, | Performed by: ALLERGY & IMMUNOLOGY

## 2024-05-29 PROCEDURE — 3008F BODY MASS INDEX DOCD: CPT | Mod: CPTII,S$GLB,, | Performed by: ALLERGY & IMMUNOLOGY

## 2024-05-29 PROCEDURE — 99205 OFFICE O/P NEW HI 60 MIN: CPT | Mod: S$GLB,,, | Performed by: ALLERGY & IMMUNOLOGY

## 2024-05-29 PROCEDURE — 86003 ALLG SPEC IGE CRUDE XTRC EA: CPT | Mod: 59 | Performed by: ALLERGY & IMMUNOLOGY

## 2024-05-29 NOTE — PROGRESS NOTES
Niki Soriano is a 73-year-old female who presents to clinic today for evaluation of chronic rhinitis and itching.  She is here alone.     About a year ago she started having increased itching of her nose and eyes which has been increasing in severity.  She also has itching on her face around her eyes and mouth.  She does not have any rash.  She does not as red marks when she scratches her skin.    She has also had increased soreness in both nostrils.    She has had postnasal drip, sore throats, hoarseness, frequent throat clearing, a sensation that something is in the back of the throat, difficulty swallowing, and cough that has been nonproductive.  The cough has improved in the last month.     She has had food getting lodged in her throat but none recently.    She does have a history of gastroesophageal reflux disease and now only has if she eats late or spicy foods.    For ROS, FH, SH please see Allergy and Asthma Questionnaire dated today.    Some relevant pertinent positives:    Review of Systems/PMH:  She has diabetes and was taking metformin until she developed diarrhea recently.  She has hypertension, coronary artery disease, and congestive heart failure.  She takes spironolactone, Lasix, Entresto, and metoprolol. She has hyperlipidemia and takes Zetia and Crestor.  She had an abscess in her spine and since then has had weakness in both legs.  She also has sciatica and neuropathy in both legs.  She continues to have lower back pain with numbness.  She takes gabapentin.  She has hypothyroidism and takes levothyroxine.  She has interstitial cystitis.  She had a COVID-19 infection in 2020.     Family History:  Negative for atopic disease.    Home environment:  She has lived in the same house for over 50 years.  There was no water damage. There is no evidence of mold. There one dog that lives inside the house.  There is also one cat.  She does not have any problems around the pets.    Social History:  She is a  nonsmoker.  She is retired.    Physical Examination:  General: Well-developed, well-nourished, no acute distress.  Clearing throat throughout interview.  In wheelchair.  Head: No sinus tenderness.  Eyes: Conjunctivae:  No bulbar or palpebral conjunctival injection.  Ears: EAC's clear.  TM's clear.  No pre-auricular nodes.  Nose: Nasal Mucosa:  Pink.  Septum: No apparent deviation.  Turbinates:  No significant edema.  Polyps/Mass:  None visible.  Teeth/Gums:  No bleeding noted.  Oropharynx: No exudates.  Neck: Supple without thyromegaly. No cervical lymphadenopathy.    Respiratory/Chest: Effort: Good.  Auscultation:  Clear bilaterally.  Cardiovascular:  No murmur, rubs, or gallop heard.   GI:  Non-tender.  No masses.  No organomegaly appreciated.  Extremities:  No cyanosis, clubbing, or edema.  Skin: Good turgor.  No urticaria or angioedema.  Neuro/Psych: Oriented x 3.    Chest x-ray 09/20/2020:  The cardiomediastinal silhouette is not enlarged. There is no pleural effusion. The trachea is midline. The lungs are symmetrically expanded bilaterally without evidence of acute parenchymal process. No large focal consolidation seen. There is no pneumothorax. The osseous structures are remarkable for degenerative changes     Assessment:  1. Chronic rhinitis, consider allergic.  2. Chronic conjunctivitis, consider allergic.  3. Chronic cough, consider allergic component.  4. Chronic throat clearing, consider LPR.   5. GERD, controlled.   6. Diabetes previously on metformin.   7. Hypertension coronary artery disease, and congestive heart failure on spironolactone, Lasix, Entresto, and metoprolol.   8. Hyperlipidemia on Zetia and Crestor.   9. S/P spinal abscess with bilateral leg weakness.   10. DJD.  11. Peripheral neuropathy.   12. Hypothyroidism on replacement.   13. Interstitial cystitis.  14. COVID 19 2020.    Recommendations:  1. Laboratory as ordered.   2. Consider skin testing off antihistamines if needed.   3. She  will contact her cardiologist regarding holding beta blocker for 48 hours.   4. Return to clinic in two weeks.    This includes face to face time and non-face to face time preparing to see the patient (eg, review of tests), obtaining and/or reviewing separately obtained history, documenting clinical information in the electronic or other health record, independently interpreting results and communicating results to the patient/family/caregiver, or care coordinator.  65 minutes.

## 2024-05-30 LAB — IGE SERPL-ACNC: <35 IU/ML (ref 0–100)

## 2024-06-04 LAB
A ALTERNATA IGE QN: <0.1 KU/L
A FUMIGATUS IGE QN: <0.1 KU/L
BERMUDA GRASS IGE QN: <0.1 KU/L
CAT DANDER IGE QN: <0.1 KU/L
CEDAR IGE QN: <0.1 KU/L
D FARINAE IGE QN: <0.1 KU/L
D PTERONYSS IGE QN: <0.1 KU/L
DEPRECATED A ALTERNATA IGE RAST QL: NORMAL
DEPRECATED A FUMIGATUS IGE RAST QL: NORMAL
DEPRECATED BERMUDA GRASS IGE RAST QL: NORMAL
DEPRECATED CAT DANDER IGE RAST QL: NORMAL
DEPRECATED CEDAR IGE RAST QL: NORMAL
DEPRECATED D FARINAE IGE RAST QL: NORMAL
DEPRECATED D PTERONYSS IGE RAST QL: NORMAL
DEPRECATED DOG DANDER IGE RAST QL: NORMAL
DEPRECATED ELDER IGE RAST QL: NORMAL
DEPRECATED ENGL PLANTAIN IGE RAST QL: NORMAL
DEPRECATED PECAN/HICK TREE IGE RAST QL: NORMAL
DEPRECATED ROACH IGE RAST QL: NORMAL
DEPRECATED TIMOTHY IGE RAST QL: NORMAL
DEPRECATED WEST RAGWEED IGE RAST QL: NORMAL
DEPRECATED WHITE OAK IGE RAST QL: NORMAL
DOG DANDER IGE QN: <0.1 KU/L
ELDER IGE QN: <0.1 KU/L
ENGL PLANTAIN IGE QN: <0.1 KU/L
PECAN/HICK TREE IGE QN: <0.1 KU/L
ROACH IGE QN: <0.1 KU/L
TIMOTHY IGE QN: <0.1 KU/L
WEST RAGWEED IGE QN: <0.1 KU/L
WHITE OAK IGE QN: <0.1 KU/L

## 2024-06-14 ENCOUNTER — TELEPHONE (OUTPATIENT)
Dept: PODIATRY | Facility: CLINIC | Age: 74
End: 2024-06-14
Payer: MEDICARE

## 2024-06-14 ENCOUNTER — OFFICE VISIT (OUTPATIENT)
Dept: URGENT CARE | Facility: CLINIC | Age: 74
End: 2024-06-14
Payer: MEDICARE

## 2024-06-14 VITALS
BODY MASS INDEX: 29.88 KG/M2 | TEMPERATURE: 99 F | RESPIRATION RATE: 16 BRPM | SYSTOLIC BLOOD PRESSURE: 98 MMHG | DIASTOLIC BLOOD PRESSURE: 61 MMHG | HEIGHT: 64 IN | WEIGHT: 175 LBS | OXYGEN SATURATION: 97 % | HEART RATE: 69 BPM

## 2024-06-14 DIAGNOSIS — L03.031 CELLULITIS OF TOE OF RIGHT FOOT: Primary | ICD-10-CM

## 2024-06-14 PROCEDURE — 99203 OFFICE O/P NEW LOW 30 MIN: CPT | Mod: 25,S$GLB,,

## 2024-06-14 PROCEDURE — 96372 THER/PROPH/DIAG INJ SC/IM: CPT | Mod: S$GLB,,,

## 2024-06-14 RX ORDER — SULFAMETHOXAZOLE AND TRIMETHOPRIM 800; 160 MG/1; MG/1
1 TABLET ORAL 2 TIMES DAILY
Qty: 14 TABLET | Refills: 0 | Status: SHIPPED | OUTPATIENT
Start: 2024-06-14 | End: 2024-06-21

## 2024-06-14 RX ORDER — CEFTRIAXONE 1 G/1
1 INJECTION, POWDER, FOR SOLUTION INTRAMUSCULAR; INTRAVENOUS
Status: COMPLETED | OUTPATIENT
Start: 2024-06-14 | End: 2024-06-14

## 2024-06-14 RX ORDER — LIDOCAINE HYDROCHLORIDE 10 MG/ML
3.6 INJECTION INFILTRATION; PERINEURAL
Status: COMPLETED | OUTPATIENT
Start: 2024-06-14 | End: 2024-06-14

## 2024-06-14 RX ADMIN — LIDOCAINE HYDROCHLORIDE 3.6 ML: 10 INJECTION INFILTRATION; PERINEURAL at 12:06

## 2024-06-14 RX ADMIN — CEFTRIAXONE 1 G: 1 INJECTION, POWDER, FOR SOLUTION INTRAMUSCULAR; INTRAVENOUS at 12:06

## 2024-06-14 NOTE — TELEPHONE ENCOUNTER
Type:  Same Day Appointment Request     Caller is requesting a same day appointment.  Name of Caller:pt   When is the first available appointment?June 17, 2024   Symptoms:Corn, Infected , swollen   Best Call Back Number:[phone] Or [phone]       Left voice message for patient to give our office a call back at [phone].

## 2024-06-14 NOTE — PATIENT INSTRUCTIONS
Take Bactrim twice a day for 7 days.  If no improvement of skin infection please follow up in Emergency Room as discussed. Take your Lasix as discussed for your bilateral foot swelling.     When do I need to get emergency help?   Return to the ED if:   The pain in and around the area gets much worse.  There is a bad smell or pus (thick yellow, green, or gray fluid) coming from your wound.  You develop a fever of 102.2 F (39 C) or higher or chills.  You notice a crunchy feeling or blisters in the skin around the wound.  The redness around your wound gets bigger or is spreading up your arm or leg.  When do I need to call the doctor?   Fluid that is not pus drains from your wound.  Your swelling doesnt improve or gets worse.  You develop a fever of 100°F (37.8°C) or higher.  You have new or worsening symptoms.  - Rest.    - Drink plenty of fluids.    - Acetaminophen (tylenol) or Ibuprofen (advil,motrin) as directed as needed for fever/pain. Avoid tylenol if you have a history of liver disease. Do not take ibuprofen if you have a history of GI bleeding, kidney disease, or if you take blood thinners.     - Follow up with your PCP or specialty clinic as directed in the next 1-2 weeks if not improved or as needed.  You can call [phone] to schedule an appointment with the appropriate provider.    - Go to the ER or seek medical attention immediately if you develop new or worsening symptoms.     - You must understand that you have received an Urgent Care treatment only and that you may be released before all of your medical problems are known or treated.   - You, the patient, will arrange for follow up care as instructed.   - If your condition worsens or fails to improve we recommend that you receive another evaluation at the ER immediately or contact your PCP to discuss your concerns or return here.

## 2024-06-14 NOTE — PROGRESS NOTES
"Subjective:      Patient ID: Niki Soriano is a 73 y.o. female.    Vitals:  height is 5' 4.02" (1.626 m) and weight is 79.4 kg (175 lb). Her oral temperature is 98.7 °F (37.1 °C). Her blood pressure is 98/61 and her pulse is 69. Her respiration is 16 and oxygen saturation is 97%.     Chief Complaint: Toe Pain    72 y/o female with hx DM, CHF presents with redness, tenderness, discharge to the right 5th toe after she picked off a corn with excessive amount of dead skin yesterday.  Patient has been applying mupiricin ointment with no relief. Patient has been soaking foot in warm Epson salt with no relief. Patient has hx of CHF reports both foot are swollen but states she hasn't been taking her lasix due to her back pain.  Patient states Lasix makes her urinate and she is unable to move much due to her back.  Patient states she has not taken her Lasix in about a week. Patient has been elevating foot daily  Patient denies any foot pain or shortness of breath.      Toe Pain   The incident occurred 6 to 12 hours ago. The incident occurred at home. There was no injury mechanism. The pain is present in the right toes. Quality: stinging. The pain is at a severity of 2/10. The pain is mild. The pain has been Constant since onset. She reports no foreign bodies present. Nothing aggravates the symptoms. Treatments tried: antibiotic cream and salt water soaks. The treatment provided no relief.       Constitution: Negative for activity change, appetite change, chills and sweating.   HENT:  Negative for ear pain, ear discharge, foreign body in ear and tinnitus.    Neck: Negative for neck pain, neck stiffness and painful lymph nodes.   Cardiovascular:  Negative for chest trauma, chest pain and leg swelling.   Eyes:  Negative for eye trauma, foreign body in eye and eye discharge.   Respiratory:  Negative for sleep apnea, chest tightness, cough, sputum production, bloody sputum, COPD, shortness of breath, stridor, wheezing and asthma. "    Gastrointestinal:  Negative for abdominal trauma, abdominal pain, abdominal bloating, history of abdominal surgery and nausea.   Endocrine: hair loss, cold intolerance and heat intolerance.   Genitourinary:  Negative for dysuria, frequency and urgency.   Musculoskeletal:  Negative for pain, trauma, joint pain, joint swelling and abnormal ROM of joint.   Skin:  Positive for wound and erythema. Negative for color change, pale, rash, abrasion, laceration, lesion, skin thickening/induration, puncture wound and bruising.   Allergic/Immunologic: Negative for environmental allergies, seasonal allergies, food allergies, eczema and asthma.   Neurological:  Negative for dizziness, history of vertigo, light-headedness, passing out, disorientation and altered mental status.   Hematologic/Lymphatic: Negative for swollen lymph nodes, easy bruising/bleeding and trouble clotting. Does not bruise/bleed easily.   Psychiatric/Behavioral:  Negative for altered mental status, disorientation, confusion, agitation and nervous/anxious. The patient is not nervous/anxious.       Objective:     Physical Exam   Constitutional: She is oriented to person, place, and time. She appears well-developed. She is cooperative.  Non-toxic appearance. She does not appear ill. No distress.   HENT:   Head: Normocephalic and atraumatic.   Ears:   Right Ear: Hearing and external ear normal.   Left Ear: Hearing and external ear normal.   Nose: Nose normal. No mucosal edema, rhinorrhea or nasal deformity. No epistaxis. Right sinus exhibits no maxillary sinus tenderness and no frontal sinus tenderness. Left sinus exhibits no maxillary sinus tenderness and no frontal sinus tenderness.   Mouth/Throat: Uvula is midline, oropharynx is clear and moist and mucous membranes are normal. No trismus in the jaw. Normal dentition. No uvula swelling. No oropharyngeal exudate, posterior oropharyngeal edema or posterior oropharyngeal erythema.   Eyes: Conjunctivae and lids  are normal. No scleral icterus.   Neck: Trachea normal and phonation normal. Neck supple. No edema present. No erythema present. No neck rigidity present.   Cardiovascular: Normal rate, regular rhythm, normal heart sounds and normal pulses.   Pulmonary/Chest: Effort normal and breath sounds normal. No stridor. No respiratory distress. She has no decreased breath sounds. She has no wheezes. She has no rhonchi. She has no rales. She exhibits no tenderness.   Abdominal: Normal appearance and bowel sounds are normal. Soft.   Musculoskeletal: Normal range of motion.         General: Swelling and tenderness present. No deformity or signs of injury. Normal range of motion.      Right lower le+ Pitting Edema present.      Left lower le+ Pitting Edema present.   Neurological: She is alert and oriented to person, place, and time. She exhibits normal muscle tone. Coordination normal.   Skin: Skin is warm, dry, intact, not diaphoretic, not pale and no rash. erythema No bruising and No lesion jaundice  Psychiatric: Her speech is normal and behavior is normal. Judgment and thought content normal.   Nursing note and vitals reviewed.      Assessment:     1. Cellulitis of toe of right foot        Plan:       Cellulitis of toe of right foot  -     cefTRIAXone injection 1 g  -     LIDOcaine HCL 10 mg/ml (1%) injection 3.6 mL  -     sulfamethoxazole-trimethoprim 800-160mg (BACTRIM DS) 800-160 mg Tab; Take 1 tablet by mouth 2 (two) times daily. for 7 days  Dispense: 14 tablet; Refill: 0          Medical Decision Making:   Urgent Care Management:  Discussed with patient to take her Lasix as prescribed for her bilateral foot edema.  Discussed patient to monitor her wound infection.  If no improvement with antibiotics to go to emergency room for further evaluation.  Patient verbalized understanding.    Additional MDM:     Heart Failure Score:   COPD = No

## 2024-07-26 ENCOUNTER — OFFICE VISIT (OUTPATIENT)
Dept: ALLERGY | Facility: CLINIC | Age: 74
End: 2024-07-26
Payer: MEDICARE

## 2024-07-26 VITALS — WEIGHT: 172 LBS | BODY MASS INDEX: 29.51 KG/M2

## 2024-07-26 DIAGNOSIS — J31.0 CHRONIC RHINITIS: Primary | ICD-10-CM

## 2024-07-26 DIAGNOSIS — K21.00 GASTROESOPHAGEAL REFLUX DISEASE WITH ESOPHAGITIS, UNSPECIFIED WHETHER HEMORRHAGE: ICD-10-CM

## 2024-07-26 DIAGNOSIS — I50.42 CHRONIC COMBINED SYSTOLIC AND DIASTOLIC CONGESTIVE HEART FAILURE: ICD-10-CM

## 2024-07-26 DIAGNOSIS — H10.403 CHRONIC CONJUNCTIVITIS OF BOTH EYES, UNSPECIFIED CHRONIC CONJUNCTIVITIS TYPE: ICD-10-CM

## 2024-07-26 PROCEDURE — 99999 PR PBB SHADOW E&M-EST. PATIENT-LVL III: CPT | Mod: PBBFAC,,, | Performed by: ALLERGY & IMMUNOLOGY

## 2024-07-26 NOTE — PROGRESS NOTES
"Niki Soriano returns to clinic today for continued evaluation of chronic rhinitis and itching.  She is here alone.  She was last seen May 25, 2024.     Since her last visit, she has continued to have itching in her nose that at times as "severe".  She has developed a furuncle in the end of her nose.  She has had this before. She has tried antibody creams but they have made it worse in the past. She is reluctant to try again.     She also continues to have postnasal drip, sore throats, hoarseness, frequent throat clearing, a sensation that something is in the back of the throat, difficulty swallowing, and a nonproductive cough.     She does get food lodged in her throat.     She does have gastroesophageal reflux disease.     She takes metoprolol for her hypertension.  She has not yet talked with her cardiologist about stopping for 48 hours.    Physical Examination:  General: Well-developed, well-nourished, no acute distress.  Clearing throat throughout interview.  In wheelchair.  Nose:  Furuncle on the tip her nose.ppreciated.  Skin: No urticaria or angioedema.  Neuro/Psych: Oriented x 3.    Chest x-ray 09/20/2020:  The cardiomediastinal silhouette is not enlarged. There is no pleural effusion. The trachea is midline. The lungs are symmetrically expanded bilaterally without evidence of acute parenchymal process. No large focal consolidation seen. There is no pneumothorax. The osseous structures are remarkable for degenerative changes.    Laboratory 05/29/2024:   IgE level:  Less than 35.   ImmunoCAP:  Negative.    Assessment:  1. Chronic rhinitis, consider allergic.  2. Chronic conjunctivitis, consider allergic.  3. Chronic cough, consider allergic component.  4. Chronic throat clearing, consider LPR.   5. GERD, controlled.   6. Diabetes previously on metformin.   7. Hypertension coronary artery disease, and congestive heart failure on spironolactone, Lasix, Entresto, and metoprolol.   8. Hyperlipidemia on Zetia and " Crestor.   9. S/P spinal abscess with bilateral leg weakness.   10. DJD.  11. Peripheral neuropathy.   12. Hypothyroidism on replacement.   13. Interstitial cystitis.  14. COVID 19 2020.  15. Furuncle tip of nose.    Recommendations:  1. She will contact her cardiologist regarding holding the beta blocker for 48 hours.  2. Inhalant skin testing on return to clinic.  3. Observe for uncle closely and discuss with primary care physician if it increases in severity.  3. Return to clinic in 10 days.

## 2024-08-13 DIAGNOSIS — I50.42 CHRONIC COMBINED SYSTOLIC AND DIASTOLIC CONGESTIVE HEART FAILURE: ICD-10-CM

## 2024-08-14 RX ORDER — FUROSEMIDE 40 MG/1
TABLET ORAL
Qty: 45 TABLET | Refills: 1 | Status: SHIPPED | OUTPATIENT
Start: 2024-08-14

## 2024-11-20 DIAGNOSIS — N95.2 VAGINAL ATROPHY: ICD-10-CM

## 2024-11-20 DIAGNOSIS — N39.0 RECURRENT UTI: ICD-10-CM

## 2024-11-26 ENCOUNTER — TELEPHONE (OUTPATIENT)
Dept: CARDIOLOGY | Facility: CLINIC | Age: 74
End: 2024-11-26
Payer: MEDICARE

## 2024-11-26 DIAGNOSIS — R00.2 PALPITATIONS: Primary | ICD-10-CM

## 2024-11-27 ENCOUNTER — OFFICE VISIT (OUTPATIENT)
Dept: CARDIOLOGY | Facility: CLINIC | Age: 74
End: 2024-11-27
Payer: MEDICARE

## 2024-11-27 ENCOUNTER — HOSPITAL ENCOUNTER (OUTPATIENT)
Dept: CARDIOLOGY | Facility: CLINIC | Age: 74
Discharge: HOME | End: 2024-11-27
Payer: MEDICARE

## 2024-11-27 VITALS
SYSTOLIC BLOOD PRESSURE: 88 MMHG | HEART RATE: 70 BPM | DIASTOLIC BLOOD PRESSURE: 54 MMHG | OXYGEN SATURATION: 98 % | BODY MASS INDEX: 29.17 KG/M2 | HEIGHT: 64 IN | WEIGHT: 170.88 LBS

## 2024-11-27 DIAGNOSIS — I50.42 CHRONIC COMBINED SYSTOLIC AND DIASTOLIC CONGESTIVE HEART FAILURE: ICD-10-CM

## 2024-11-27 DIAGNOSIS — E11.40 TYPE 2 DIABETES MELLITUS WITH DIABETIC NEUROPATHY, WITHOUT LONG-TERM CURRENT USE OF INSULIN: ICD-10-CM

## 2024-11-27 DIAGNOSIS — R00.2 PALPITATIONS: ICD-10-CM

## 2024-11-27 DIAGNOSIS — I73.9 PAD (PERIPHERAL ARTERY DISEASE): ICD-10-CM

## 2024-11-27 DIAGNOSIS — E78.5 DYSLIPIDEMIA: ICD-10-CM

## 2024-11-27 DIAGNOSIS — I25.5 ISCHEMIC CARDIOMYOPATHY: Primary | ICD-10-CM

## 2024-11-27 DIAGNOSIS — I25.10 CORONARY ARTERY DISEASE INVOLVING NATIVE CORONARY ARTERY OF NATIVE HEART WITHOUT ANGINA PECTORIS: ICD-10-CM

## 2024-11-27 LAB
OHS QRS DURATION: 86 MS
OHS QTC CALCULATION: 420 MS

## 2024-11-27 PROCEDURE — 99999 PR PBB SHADOW E&M-EST. PATIENT-LVL IV: CPT | Mod: PBBFAC,,,

## 2024-11-27 PROCEDURE — 93010 ELECTROCARDIOGRAM REPORT: CPT | Mod: S$GLB,,, | Performed by: STUDENT IN AN ORGANIZED HEALTH CARE EDUCATION/TRAINING PROGRAM

## 2024-11-27 RX ORDER — FUROSEMIDE 40 MG/1
40 TABLET ORAL DAILY
Qty: 30 TABLET | Refills: 11 | Status: SHIPPED | OUTPATIENT
Start: 2024-11-27

## 2024-11-27 RX ORDER — EZETIMIBE 10 MG/1
10 TABLET ORAL DAILY
Qty: 90 TABLET | Refills: 3 | Status: SHIPPED | OUTPATIENT
Start: 2024-11-27

## 2024-11-27 RX ORDER — METOPROLOL SUCCINATE 25 MG/1
25 TABLET, EXTENDED RELEASE ORAL DAILY
Qty: 90 TABLET | Refills: 3 | Status: SHIPPED | OUTPATIENT
Start: 2024-11-27 | End: 2025-11-27

## 2024-11-27 RX ORDER — SPIRONOLACTONE 25 MG/1
25 TABLET ORAL DAILY
Qty: 90 TABLET | Refills: 3 | Status: SHIPPED | OUTPATIENT
Start: 2024-11-27 | End: 2025-11-27

## 2024-11-27 RX ORDER — ROSUVASTATIN CALCIUM 40 MG/1
40 TABLET, COATED ORAL NIGHTLY
Qty: 90 TABLET | Refills: 3 | Status: SHIPPED | OUTPATIENT
Start: 2024-11-27

## 2024-11-27 RX ORDER — SACUBITRIL AND VALSARTAN 97; 103 MG/1; MG/1
1 TABLET, FILM COATED ORAL 2 TIMES DAILY
Qty: 60 TABLET | Refills: 11 | Status: SHIPPED | OUTPATIENT
Start: 2024-11-27

## 2024-11-27 NOTE — PROGRESS NOTES
General Cardiology Clinic Note  Reason for Visit: Follow up   Last Clinic Visit: 04/03/24 with Dr. Ramirez      HPI:     Niki Soriano is a 74 y.o. , who presents for follow up of ICM and HFrEF     PROBLEM LIST:  ICM EF 40%  HFrEF  CAD s/p PCI mRCA , PLB , LCx, and OM2 5/2020 (Dr. Gamble)  Dyslipidemia  DM2  Hypothyroidism  Spinal stenosis and back pain  Frequent UTI'    Interval HPI:   She presents today for clinical follow up. At her last clinical visit, patient endorsed increase BLE and was started on Spironolactone and increased furosemide from 20 mg to 40 mg qd. Today, she reports she is unsure if her symptoms have improved since the addition of the Spironolactone. She states she has not been compliant with the furosemide due to frequency of needing to use the restroom and not being able to make it on time. Today, there is trace swelling present R>L. She reports some issues with acid reflux largely due to diet. Patient states she has been consuming a lot of sweets/junk food recently. She denies any exertional chest pain or shortness of breath. Her biggest complaint being that of multiple joint pain. She presents today in a wheelchair but ambulates at home performing her own ADLs. Patient is not as active with exercise as she would like to be, limited from peripheral neuropathy and sciatic pain, but stays active cooking, washing the dishes and cleaning. She does not check her blood pressure regularly at home, BP 88/54 today in clinic. She reports BP range usually on the lower end. She denies any symptoms of lightheadedness or dizziness at baseline, only when changing positions to quickly. She denies chest pain/pressure/tightness/discomfort, dyspnea on exertion, orthopnea, PND, peripheral edema, palpitations, syncope or claudication.    Last visit with Dr. Ribeiro: 04/03/24   Today, patient reports that she has had a lot of salt intake over the weekend. She has seen an increase in lower extremity  edema in past seven days. Denies chest pain, SOB, palpitations. She has not been taking her home Lasix regularly. Taking her heart failure medications. She has chronic neck and lower back pain which causes her to use a walker at home. She is at our appointment today in a wheelchair. Lives at home with her .     ROS:    Pertinent ROS included in HPI and below.  PMH:     Past Medical History:   Diagnosis Date    Allergy     Coronary artery disease     Diabetes mellitus     Hypercholesteremia     Hypertension     IC (interstitial cystitis)     Numbness in both hands 2017    Spondylisthesis     Vulvodynia, unspecified      Past Surgical History:   Procedure Laterality Date    CARPAL TUNNEL RELEASE Right 2019    Procedure: RELEASE, CARPAL TUNNEL, REVISION;  Surgeon: Annabelle Cid MD;  Location: [facility] OR [address] [place] [address];  Service: Orthopedics;  Laterality: Right;  Axogen, Clarix     SECTION      x3    CORONARY ANGIOGRAPHY N/A 3/3/2020    Procedure: ANGIOGRAM, CORONARY ARTERY;  Surgeon: Markie Friend III, MD;  Location: [facility] CATH LAB;  Service: Cardiology;  Laterality: N/A;    CORONARY ANGIOGRAPHY N/A 2020    Procedure: ANGIOGRAM, CORONARY ARTERY;  Surgeon: Rodney Gamble MD;  Location: [facility] CATH LAB;  Service: Cardiology;  Laterality: N/A;    CYSTOSCOPY      HYSTERECTOMY      ischemic colitis      LEFT HEART CATHETERIZATION Left 3/3/2020    Procedure: Left heart cath;  Surgeon: Markie Friend III, MD;  Location: [facility] CATH LAB;  Service: Cardiology;  Laterality: Left;    LEFT HEART CATHETERIZATION Left 2020    Procedure: Left heart cath;  Surgeon: Rodney Gamble MD;  Location: [facility] CATH LAB;  Service: Cardiology;  Laterality: Left;    OOPHORECTOMY      MD REMOVAL OF OVARY/TUBE(S)       Allergies:     Review of patient's allergies indicates:   Allergen Reactions    Augmentin [amoxicillin-pot clavulanate] Other (See Comments)     Patient cannot recall what she had, only  that she could not tolerate the Augmentin    Tramadol Itching    Tegretol [carbamazepine] Itching and Rash    Vicodin [hydrocodone-acetaminophen] Itching and Anxiety     Medications:     Current Outpatient Medications on File Prior to Visit   Medication Sig Dispense Refill    aspirin 81 MG Chew Take 1 tablet (81 mg total) by mouth once daily.  0    b complex vitamins capsule Take 1 capsule by mouth once daily.      baclofen (LIORESAL) 20 MG tablet Take 20 mg by mouth 3 (three) times daily as needed.       cholecalciferol, vitamin D3, (VITAMIN D3) 25 mcg (1,000 unit) capsule Take 1,000 Units by mouth once daily.      duloxetine (CYMBALTA) 30 MG capsule Take 30 mg by mouth once daily.      ENTRESTO  mg per tablet TAKE 1 TABLET BY MOUTH TWICE A DAY 60 tablet 11    estradioL (ESTRACE) 0.01 % (0.1 mg/gram) vaginal cream Place 0.5 g vaginally 3 (three) times a week. 42.5 g 3    ezetimibe (ZETIA) 10 mg tablet TAKE 1 TABLET BY MOUTH EVERY DAY 90 tablet 3    furosemide (LASIX) 40 MG tablet TAKE 1/2 TABLET BY MOUTH DAILY AS NEED FOR SWELLING OR WEIGHT GAIN 45 tablet 1    gabapentin (NEURONTIN) 300 MG capsule TAKE 1 CAPSULE BY MOUTH THREE TIMES A DAY (Patient taking differently: Patients states she takes 2 capsules 1 time at night.) 270 capsule 0    Lactobac no.51/Bifidobact no.4 (UP4 PROBIOTICS ULTRA ORAL) Take by mouth.      levothyroxine (SYNTHROID) 50 MCG tablet Take 50 mcg by mouth once daily.      metoprolol succinate (TOPROL-XL) 25 MG 24 hr tablet Take 1 tablet (25 mg total) by mouth once daily. 30 tablet 11    multivitamin (THERAGRAN) per tablet Take 1 tablet by mouth once daily.      omega-3 fatty acids/fish oil (FISH OIL-OMEGA-3 FATTY ACIDS) 300-1,000 mg capsule Take by mouth once daily.      oxybutynin (DITROPAN-XL) 5 MG TR24 Take 1 tablet (5 mg total) by mouth as needed (urgency, urge incontinence). 30 tablet 11    rosuvastatin (CRESTOR) 40 MG Tab TAKE 1 TABLET BY MOUTH EVERY DAY IN THE EVENING 90 tablet 3     "spironolactone (ALDACTONE) 25 MG tablet Take 1 tablet (25 mg total) by mouth once daily. 30 tablet 11    ammonium lactate 12 % Crea Apply 1 application topically 2 (two) times daily. (Patient not taking: Reported on 11/27/2024) 140 g 11    metformin (GLUCOPHAGE) 500 MG tablet Take 500 mg by mouth once daily.  (Patient not taking: Reported on 11/27/2024)       No current facility-administered medications on file prior to visit.     Social History:     Social History     Tobacco Use    Smoking status: Never    Smokeless tobacco: Never   Substance Use Topics    Alcohol use: No     Family History:     Family History   Problem Relation Name Age of Onset    No Known Problems Mother      No Known Problems Father      No Known Problems Sister 1     No Known Problems Brother 1     No Known Problems Maternal Aunt      No Known Problems Maternal Uncle      Breast cancer Paternal Aunt      No Known Problems Paternal Uncle      No Known Problems Maternal Grandmother      No Known Problems Maternal Grandfather      No Known Problems Paternal Grandmother      No Known Problems Paternal Grandfather      No Known Problems Other      Colon cancer Neg Hx      Ovarian cancer Neg Hx      Allergic rhinitis Neg Hx      Allergies Neg Hx      Angioedema Neg Hx      Asthma Neg Hx      Atopy Neg Hx      Eczema Neg Hx      Immunodeficiency Neg Hx      Rhinitis Neg Hx      Urticaria Neg Hx       Physical Exam:   Ht 5' 4.02" (1.626 m)   Wt 77.5 kg (170 lb 13.7 oz)   BMI 29.31 kg/m²      Physical Exam  Constitutional:       General: She is not in acute distress.     Appearance: Normal appearance. She is obese. She is not ill-appearing.      Comments: Patient examined in wheelchair    HENT:      Head: Normocephalic and atraumatic.      Nose: Nose normal. No congestion or rhinorrhea.      Mouth/Throat:      Mouth: Mucous membranes are moist.      Pharynx: Oropharynx is clear. No oropharyngeal exudate or posterior oropharyngeal erythema.   Eyes: "      General:         Right eye: No discharge.         Left eye: No discharge.      Extraocular Movements: Extraocular movements intact.      Conjunctiva/sclera: Conjunctivae normal.   Neck:      Vascular: No carotid bruit.   Cardiovascular:      Rate and Rhythm: Normal rate and regular rhythm.      Pulses:           Carotid pulses are 2+ on the right side and 2+ on the left side.       Radial pulses are 2+ on the right side and 2+ on the left side.        Dorsalis pedis pulses are 0 on the right side and 0 on the left side.        Posterior tibial pulses are 0 on the right side and 0 on the left side.      Heart sounds: Normal heart sounds. No murmur heard.     No friction rub. No gallop.   Pulmonary:      Effort: Pulmonary effort is normal. No respiratory distress.      Breath sounds: Normal breath sounds. No stridor. No wheezing or rales.   Musculoskeletal:         General: Swelling (R>L) present. Normal range of motion.      Cervical back: Normal range of motion. No rigidity or tenderness.      Right lower leg: Edema present.      Left lower leg: No edema.   Skin:     General: Skin is warm and dry.      Coloration: Skin is not jaundiced or pale.      Findings: No bruising or lesion.   Neurological:      General: No focal deficit present.      Mental Status: She is alert and oriented to person, place, and time. Mental status is at baseline.      Cranial Nerves: No cranial nerve deficit.      Motor: Weakness present.      Gait: Gait abnormal.   Psychiatric:         Mood and Affect: Mood normal.         Behavior: Behavior normal.         Thought Content: Thought content normal.         Judgment: Judgment normal.       Labs:     Blood Tests:  Lab Results   Component Value Date    BNP 1,319 (H) 03/20/2020     12/27/2023    K 3.8 12/27/2023     12/27/2023    CO2 27 12/27/2023    BUN 22 12/27/2023    CREATININE 1.0 12/27/2023     (H) 12/27/2023    HGBA1C 5.8 (H) 03/02/2020    MG 1.4 (L) 09/21/2020     AST 38 01/04/2021    ALT 51 (H) 01/04/2021    ALBUMIN 4.0 01/04/2021    PROT 6.6 01/04/2021    BILITOT 0.8 01/04/2021    WBC 5.50 04/03/2024    HGB 13.2 04/03/2024    HCT 41.2 04/03/2024    MCV 94 04/03/2024     04/03/2024    INR 1.1 03/01/2020    TSH 4.072 (H) 03/01/2020       Lab Results   Component Value Date    CHOL 112 (L) 04/03/2024    HDL 45 04/03/2024    TRIG 137 04/03/2024       Lab Results   Component Value Date    LDLCALC 39.6 (L) 04/03/2024       Lab Results   Component Value Date    TSH 4.072 (H) 03/01/2020       Lab Results   Component Value Date    HGBA1C 5.8 (H) 03/02/2020     Imaging:     Echocardiogram  TTE 03/15/23  The left ventricle is normal in size with mildly decreased systolic function.  The estimated ejection fraction is 43%.  There is mild left ventricular global hypokinesis, slightly more in apex.  Grade I left ventricular diastolic dysfunction.  Normal right ventricular size with normal right ventricular systolic function.  Normal central venous pressure (3 mmHg).    TTE 09/21/20  Mild left ventricular enlargement.  Mildly decreased left ventricular systolic function. The estimated ejection fraction is 40%.  Normal right ventricular systolic function.  Grade I (mild) left ventricular diastolic dysfunction consistent with impaired relaxation.  Normal central venous pressure (3 mmHg).    TTE 03/20/20  Severely decreased left ventricular systolic function. The estimated ejection fraction is 20-25%. Significant wall motion abnormalities seen.  Mildly reduced right ventricular systolic function.  Grade III (severe) left ventricular diastolic dysfunction consistent with restrictive physiology.  Mild left atrial enlargement.  Mild-to-moderate mitral regurgitation.  The estimated PA systolic pressure is 44 mmHg.  Intermediate central venous pressure (8 mmHg).    Stress testing  Cardiac PET stress 07/28/20    There is scar (no viability) in the base to distal septal, inferoapical and  apical septal walls comprising of 18% of myocardium. (LAD territory)    Gated perfusion images showed an ejection fraction of 46%. Normal ejection fraction is greater than 55%    Cardiac PET 03/05/20    Resting Rb-82 perfusion scan demonstrates normal perfusion in the left circumflex and right coronary artery distrubutions.    There is decreased uptake in the septal, inferoseptal and anteroseptal walls (prox LAD distribution) consistent with either scar or viable myocardium.  If clinically indicated, FDG viability testing can be performed.    Gated perfusion images showed an ejection fraction of 33% at rest. Normal ejection fraction is greater than 51%.    Cath Lab  [facility] 05/29/20  LVEDP (Pre): 15  Successful PCI of 75% mid LCX stenosis with 2.5X12 MANDEEP  Successful PCI of 80% OM2 stenosis with 3X15 MANDEEP  Successful PCI of mid RCA  with 4.5X22 & 4.0X26 MANDEEP  Successful PCI of proximal PLB  with 3X22 MANDEEP  IVUS utilized for stent sizing  Estimated blood loss: <50 mL    [facility] 03/03/20  Multi-vessel coronary artery disease, with  of ostial LAD filled via faint collaterals from the OM1 and Ramus, 75% tubular stenisus if the mid LCx, 80% discrete stenosis of the proximal OM1, 99% discrete stenosis of the mid RCA, and  of the distal RCA filled via collaterals from the distal LCx and OM1.  LV filling pressure is elevated, LVEDP 25 mmHg.    Other  RICHA 05/06/23  Pressure Lower   ============   Rt brachial A syst BP  93 mmHg   Rt low thigh BP    117 mmHg   Rt calf BP 97 mmHg   Rt PTA BP  102 mmHg   Rt dors pedis A BP 92 mmHg   Right RICHA ratio use:   post. tibial   Rt RICHA post tibial (rt post tib A BP / max brach A BP) 1.10   Rt RICHA (rt dors ped A BP / max brach A BP) 0.99   Rt ankle BP / max brach A BP   1.10   Lt brachial A syst BP  88 mmHg   Lt low thigh BP    109 mmHg   Lt calf BP 78 mmHg   Lt PTA BP  79 mmHg   Lt dors pedis A BP 71 mmHg   Left RICHA ratio use:    post. tibial   Lt RICHA (lt post tibial A BP / max brach A  BP)  0.85   Lt RICHA dors ped (lt dors ped A BP / max brach A BP)    0.76   Lt ankle BP / max brach A BP   0.85     Impression   =========   Right Leg: Segmental pressures and PVR waveforms suggest minimal peripheral arterial occlusive disease.   Left Leg: Segmental pressures and PVR waveforms suggest moderate peripheral arterial occlusive disease.     EK24: Normal sinus rhythm with 1st degree A-V block at 76 bpm (personally reviewed)     Assessment:     1. Ischemic cardiomyopathy    2. Chronic combined systolic and diastolic congestive heart failure    3. Coronary artery disease involving native coronary artery of native heart without angina pectoris    4. Dyslipidemia    5. PAD (peripheral artery disease)    6. Type 2 diabetes mellitus with diabetic neuropathy, without long-term current use of insulin      Plan:     Ischemic cardiomyopathy  Chronic combined systolic and diastolic congestive heart failure  Patient euvolemic during examination, trace swelling present to RLE  Continue Entresto  mg BID   Continue metoprolol 25 mg qd   Continue Spironolactone 25 mg qd, can consider decreasing in future if symptomatic hypotension were to occur  Take furosemide 20 mg prn for swelling   Sodium restriction reinforced, discussed importance of increasing activity     Coronary artery disease involving native coronary artery of native heart without angina pectoris  Stable, no anginal complaints   Continue metoprolol 25 mg qd  Continue ASA 81 mg qd   Continue Zetia 10 mg qd   Continue rosuvastatin 40 mg qd     Dyslipidemia  LDL 39.6, at goal <70   Continue medications as above     PAD (peripheral artery disease)  Chronic, stable   Walking regimen reinforced     Type 2 diabetes mellitus with diabetic neuropathy, without long-term current use of insulin  Managed by PCP   Continue medications       Follow up in 3 months     Signed:  Sharon Lagos, PA-C [facility]     2024 7:53 AM    Follow-up:     No  future appointments.

## 2024-11-28 RX ORDER — ESTRADIOL 0.1 MG/G
CREAM VAGINAL
Qty: 42.5 G | Refills: 0 | Status: SHIPPED | OUTPATIENT
Start: 2024-11-28

## 2024-12-17 ENCOUNTER — HOSPITAL ENCOUNTER (EMERGENCY)
Facility: HOSPITAL | Age: 74
Discharge: HOME | End: 2024-12-17
Attending: EMERGENCY MEDICINE
Payer: MEDICARE

## 2024-12-17 VITALS
SYSTOLIC BLOOD PRESSURE: 113 MMHG | RESPIRATION RATE: 20 BRPM | TEMPERATURE: 98 F | DIASTOLIC BLOOD PRESSURE: 63 MMHG | BODY MASS INDEX: 29.88 KG/M2 | HEART RATE: 71 BPM | OXYGEN SATURATION: 96 % | WEIGHT: 175 LBS | HEIGHT: 64 IN

## 2024-12-17 DIAGNOSIS — R51.9 ACUTE NONINTRACTABLE HEADACHE, UNSPECIFIED HEADACHE TYPE: ICD-10-CM

## 2024-12-17 DIAGNOSIS — R05.9 COUGH IN ADULT PATIENT: ICD-10-CM

## 2024-12-17 DIAGNOSIS — B34.9 ACUTE VIRAL SYNDROME: Primary | ICD-10-CM

## 2024-12-17 LAB
ALBUMIN SERPL BCP-MCNC: 3.5 G/DL (ref 3.5–5.2)
ALP SERPL-CCNC: 58 U/L (ref 40–150)
ALT SERPL W/O P-5'-P-CCNC: 65 U/L (ref 10–44)
AMORPH CRY UR QL COMP ASSIST: ABNORMAL
ANION GAP SERPL CALC-SCNC: 11 MMOL/L (ref 8–16)
AST SERPL-CCNC: 55 U/L (ref 10–40)
BACTERIA #/AREA URNS AUTO: ABNORMAL /HPF
BASOPHILS # BLD AUTO: 0.03 K/UL (ref 0–0.2)
BASOPHILS NFR BLD: 0.4 % (ref 0–1.9)
BILIRUB SERPL-MCNC: 0.3 MG/DL (ref 0.1–1)
BILIRUB UR QL STRIP: NEGATIVE
BUN SERPL-MCNC: 39 MG/DL (ref 8–23)
CALCIUM SERPL-MCNC: 10.2 MG/DL (ref 8.7–10.5)
CHLORIDE SERPL-SCNC: 110 MMOL/L (ref 95–110)
CLARITY UR REFRACT.AUTO: CLEAR
CO2 SERPL-SCNC: 19 MMOL/L (ref 23–29)
COLOR UR AUTO: YELLOW
CREAT SERPL-MCNC: 1.8 MG/DL (ref 0.5–1.4)
DIFFERENTIAL METHOD BLD: ABNORMAL
EOSINOPHIL # BLD AUTO: 0.2 K/UL (ref 0–0.5)
EOSINOPHIL NFR BLD: 2.2 % (ref 0–8)
ERYTHROCYTE [DISTWIDTH] IN BLOOD BY AUTOMATED COUNT: 13.4 % (ref 11.5–14.5)
EST. GFR  (NO RACE VARIABLE): 29.4 ML/MIN/1.73 M^2
GLUCOSE SERPL-MCNC: 234 MG/DL (ref 70–110)
GLUCOSE UR QL STRIP: NEGATIVE
HCT VFR BLD AUTO: 40.8 % (ref 37–48.5)
HCV AB SERPL QL IA: NORMAL
HGB BLD-MCNC: 13 G/DL (ref 12–16)
HGB UR QL STRIP: ABNORMAL
HIV 1+2 AB+HIV1 P24 AG SERPL QL IA: NORMAL
HYALINE CASTS UR QL AUTO: 4 /LPF
IMM GRANULOCYTES # BLD AUTO: 0.02 K/UL (ref 0–0.04)
IMM GRANULOCYTES NFR BLD AUTO: 0.3 % (ref 0–0.5)
KETONES UR QL STRIP: NEGATIVE
LEUKOCYTE ESTERASE UR QL STRIP: NEGATIVE
LYMPHOCYTES # BLD AUTO: 1.5 K/UL (ref 1–4.8)
LYMPHOCYTES NFR BLD: 19.9 % (ref 18–48)
MCH RBC QN AUTO: 30.1 PG (ref 27–31)
MCHC RBC AUTO-ENTMCNC: 31.9 G/DL (ref 32–36)
MCV RBC AUTO: 94 FL (ref 82–98)
MICROSCOPIC COMMENT: ABNORMAL
MONOCYTES # BLD AUTO: 0.5 K/UL (ref 0.3–1)
MONOCYTES NFR BLD: 6.4 % (ref 4–15)
NEUTROPHILS # BLD AUTO: 5.4 K/UL (ref 1.8–7.7)
NEUTROPHILS NFR BLD: 70.8 % (ref 38–73)
NITRITE UR QL STRIP: NEGATIVE
NRBC BLD-RTO: 0 /100 WBC
OHS QRS DURATION: 82 MS
OHS QTC CALCULATION: 419 MS
PH UR STRIP: 6 [PH] (ref 5–8)
PLATELET # BLD AUTO: 213 K/UL (ref 150–450)
PMV BLD AUTO: 10.6 FL (ref 9.2–12.9)
POTASSIUM SERPL-SCNC: 4.2 MMOL/L (ref 3.5–5.1)
PROT SERPL-MCNC: 6.9 G/DL (ref 6–8.4)
PROT UR QL STRIP: ABNORMAL
RBC # BLD AUTO: 4.32 M/UL (ref 4–5.4)
RBC #/AREA URNS AUTO: 0 /HPF (ref 0–4)
SODIUM SERPL-SCNC: 140 MMOL/L (ref 136–145)
SP GR UR STRIP: 1.01 (ref 1–1.03)
SQUAMOUS #/AREA URNS AUTO: 1 /HPF
URN SPEC COLLECT METH UR: ABNORMAL
WBC # BLD AUTO: 7.65 K/UL (ref 3.9–12.7)
WBC #/AREA URNS AUTO: 2 /HPF (ref 0–5)

## 2024-12-17 PROCEDURE — 96374 THER/PROPH/DIAG INJ IV PUSH: CPT

## 2024-12-17 PROCEDURE — 93010 ELECTROCARDIOGRAM REPORT: CPT | Mod: ,,, | Performed by: INTERNAL MEDICINE

## 2024-12-17 PROCEDURE — 25000242 PHARM REV CODE 250 ALT 637 W/ HCPCS: Performed by: EMERGENCY MEDICINE

## 2024-12-17 PROCEDURE — 87389 HIV-1 AG W/HIV-1&-2 AB AG IA: CPT | Performed by: PHYSICIAN ASSISTANT

## 2024-12-17 PROCEDURE — 85025 COMPLETE CBC W/AUTO DIFF WBC: CPT | Performed by: EMERGENCY MEDICINE

## 2024-12-17 PROCEDURE — 93005 ELECTROCARDIOGRAM TRACING: CPT

## 2024-12-17 PROCEDURE — 94640 AIRWAY INHALATION TREATMENT: CPT

## 2024-12-17 PROCEDURE — 27100098 HC SPACER

## 2024-12-17 PROCEDURE — 80053 COMPREHEN METABOLIC PANEL: CPT | Performed by: EMERGENCY MEDICINE

## 2024-12-17 PROCEDURE — 63600175 PHARM REV CODE 636 W HCPCS: Performed by: EMERGENCY MEDICINE

## 2024-12-17 PROCEDURE — 86803 HEPATITIS C AB TEST: CPT | Performed by: PHYSICIAN ASSISTANT

## 2024-12-17 PROCEDURE — 99285 EMERGENCY DEPT VISIT HI MDM: CPT | Mod: 25

## 2024-12-17 PROCEDURE — 81001 URINALYSIS AUTO W/SCOPE: CPT | Performed by: EMERGENCY MEDICINE

## 2024-12-17 PROCEDURE — 25000003 PHARM REV CODE 250: Performed by: EMERGENCY MEDICINE

## 2024-12-17 RX ORDER — BENZONATATE 100 MG/1
100 CAPSULE ORAL 3 TIMES DAILY PRN
Qty: 20 CAPSULE | Refills: 0 | Status: SHIPPED | OUTPATIENT
Start: 2024-12-17 | End: 2024-12-27

## 2024-12-17 RX ORDER — OXYMETAZOLINE HCL 0.05 %
2 SPRAY, NON-AEROSOL (ML) NASAL
Status: COMPLETED | OUTPATIENT
Start: 2024-12-17 | End: 2024-12-17

## 2024-12-17 RX ORDER — METOCLOPRAMIDE HYDROCHLORIDE 5 MG/ML
10 INJECTION INTRAMUSCULAR; INTRAVENOUS
Status: COMPLETED | OUTPATIENT
Start: 2024-12-17 | End: 2024-12-17

## 2024-12-17 RX ORDER — ALBUTEROL SULFATE 90 UG/1
2 INHALANT RESPIRATORY (INHALATION)
Status: COMPLETED | OUTPATIENT
Start: 2024-12-17 | End: 2024-12-17

## 2024-12-17 RX ADMIN — METOCLOPRAMIDE 10 MG: 5 INJECTION, SOLUTION INTRAMUSCULAR; INTRAVENOUS at 02:12

## 2024-12-17 RX ADMIN — Medication 2 SPRAY: at 02:12

## 2024-12-17 RX ADMIN — SODIUM CHLORIDE 500 ML: 9 INJECTION, SOLUTION INTRAVENOUS at 02:12

## 2024-12-17 RX ADMIN — ALBUTEROL SULFATE 2 PUFF: 108 INHALANT RESPIRATORY (INHALATION) at 02:12

## 2024-12-17 NOTE — ED PROVIDER NOTES
Encounter Date: 12/17/2024       History     Chief Complaint   Patient presents with    Multiple complaints     Nov 8, had trouble speaking and remembering and it's cont, hx chronic neck pain, feels like elephant sitting on my head     74-year-old female presents with multiple complaints.  Today she has cough productive of green phlegm.  She also has a headache.  Worse on the right side.  Associated runny nose.  She says this headache is worse than her usual migraines.  She denies nausea, vomiting, diarrhea, fever, shortness of breath, chest pain, or abdominal pain.  Patient also has dysuria in his worried she has a UTI.  She has a history of interstitial cystitis.  A month ago patient had an episode of speech difficulty.  She was not seen by a physician at that time.  Now she thinks she has slurred speech and left lower extremity weakness.  Patient is a difficult historian with limited insight into their medical issues.   The patients available PMH, PSH, Social History, medications, allergies, and triage vital signs were reviewed just prior to their medical evaluation.         Review of patient's allergies indicates:   Allergen Reactions    Augmentin [amoxicillin-pot clavulanate] Other (See Comments)     Patient cannot recall what she had, only that she could not tolerate the Augmentin    Tramadol Itching    Tegretol [carbamazepine] Itching and Rash    Vicodin [hydrocodone-acetaminophen] Itching and Anxiety     Past Medical History:   Diagnosis Date    Allergy     Coronary artery disease     Diabetes mellitus     Hypercholesteremia     Hypertension     IC (interstitial cystitis)     Numbness in both hands 11/01/2017    Spondylisthesis     Vulvodynia, unspecified      Past Surgical History:   Procedure Laterality Date    CARPAL TUNNEL RELEASE Right 8/21/2019    Procedure: RELEASE, CARPAL TUNNEL, REVISION;  Surgeon: Annabelle Cid MD;  Location: [facility] OR [address] [place] [address];  Service: Orthopedics;  Laterality: Right;   Axogen, Clarix     SECTION      x3    CORONARY ANGIOGRAPHY N/A 3/3/2020    Procedure: ANGIOGRAM, CORONARY ARTERY;  Surgeon: Markie Friend III, MD;  Location: [facility] CATH LAB;  Service: Cardiology;  Laterality: N/A;    CORONARY ANGIOGRAPHY N/A 2020    Procedure: ANGIOGRAM, CORONARY ARTERY;  Surgeon: Rodney Gamble MD;  Location: [facility] CATH LAB;  Service: Cardiology;  Laterality: N/A;    CYSTOSCOPY      HYSTERECTOMY      ischemic colitis      LEFT HEART CATHETERIZATION Left 3/3/2020    Procedure: Left heart cath;  Surgeon: Markie Friend III, MD;  Location: [facility] CATH LAB;  Service: Cardiology;  Laterality: Left;    LEFT HEART CATHETERIZATION Left 2020    Procedure: Left heart cath;  Surgeon: Rodney Gamble MD;  Location: [facility] CATH LAB;  Service: Cardiology;  Laterality: Left;    OOPHORECTOMY      WI REMOVAL OF OVARY/TUBE(S)       Family History   Problem Relation Name Age of Onset    No Known Problems Mother      No Known Problems Father      No Known Problems Sister 1     No Known Problems Brother 1     No Known Problems Maternal Aunt      No Known Problems Maternal Uncle      Breast cancer Paternal Aunt      No Known Problems Paternal Uncle      No Known Problems Maternal Grandmother      No Known Problems Maternal Grandfather      No Known Problems Paternal Grandmother      No Known Problems Paternal Grandfather      No Known Problems Other      Colon cancer Neg Hx      Ovarian cancer Neg Hx      Allergic rhinitis Neg Hx      Allergies Neg Hx      Angioedema Neg Hx      Asthma Neg Hx      Atopy Neg Hx      Eczema Neg Hx      Immunodeficiency Neg Hx      Rhinitis Neg Hx      Urticaria Neg Hx       Social History     Tobacco Use    Smoking status: Never    Smokeless tobacco: Never   Substance Use Topics    Alcohol use: No    Drug use: No     Review of Systems   Constitutional:  Negative for fever.   Respiratory:  Positive for cough. Negative for shortness of breath.     Cardiovascular:  Negative for chest pain.   Gastrointestinal:  Negative for abdominal pain, diarrhea, nausea and vomiting.   Genitourinary:  Positive for dysuria.   Neurological:  Positive for headaches.       Physical Exam     Initial Vitals [12/17/24 1219]   BP Pulse Resp Temp SpO2   (!) 93/54 84 20 98.4 °F (36.9 °C) 96 %      MAP       --         Physical Exam    Nursing note and vitals reviewed.  Constitutional: She appears well-developed and well-nourished. She is not diaphoretic. No distress.   HENT:   Head: Normocephalic and atraumatic.   Nose: Nose normal.   Eyes: Conjunctivae are normal. Right eye exhibits no discharge. Left eye exhibits no discharge.   Neck: Neck supple.   No meningitis   Normal range of motion.  Cardiovascular:  Normal rate, regular rhythm and normal heart sounds.     Exam reveals no gallop and no friction rub.       No murmur heard.  Pulmonary/Chest: No respiratory distress. She has wheezes. She has no rhonchi. She has no rales.   Mild bilateral end expiratory wheezing   Abdominal: Abdomen is soft. She exhibits no distension. There is no abdominal tenderness. There is no rebound and no guarding.   Musculoskeletal:         General: No tenderness or edema. Normal range of motion.      Cervical back: Normal range of motion and neck supple.     Neurological: She is alert and oriented to person, place, and time. She has normal strength. GCS score is 15. GCS eye subscore is 4. GCS verbal subscore is 5. GCS motor subscore is 6.   No slurred speech/AMS/facial droop/aphasia/extremity weakness      Skin: Skin is warm and dry. No rash noted. No erythema.   Psychiatric: She has a normal mood and affect. Her behavior is normal. Judgment and thought content normal.         ED Course   Procedures  Labs Reviewed   CBC W/ AUTO DIFFERENTIAL - Abnormal       Result Value    WBC 7.65      RBC 4.32      Hemoglobin 13.0      Hematocrit 40.8      MCV 94      MCH 30.1      MCHC 31.9 (*)     RDW 13.4       Platelets 213      MPV 10.6      Immature Granulocytes 0.3      Gran # (ANC) 5.4      Immature Grans (Abs) 0.02      Lymph # 1.5      Mono # 0.5      Eos # 0.2      Baso # 0.03      nRBC 0      Gran % 70.8      Lymph % 19.9      Mono % 6.4      Eosinophil % 2.2      Basophil % 0.4      Differential Method Automated     COMPREHENSIVE METABOLIC PANEL - Abnormal    Sodium 140      Potassium 4.2      Chloride 110      CO2 19 (*)     Glucose 234 (*)     BUN 39 (*)     Creatinine 1.8 (*)     Calcium 10.2      Total Protein 6.9      Albumin 3.5      Total Bilirubin 0.3      Alkaline Phosphatase 58      AST 55 (*)     ALT 65 (*)     eGFR 29.4 (*)     Anion Gap 11     URINALYSIS, REFLEX TO URINE CULTURE - Abnormal    Specimen UA Urine, Catheterized      Color, UA Yellow      Appearance, UA Clear      pH, UA 6.0      Specific Gravity, UA 1.010      Protein, UA 1+ (*)     Glucose, UA Negative      Ketones, UA Negative      Bilirubin (UA) Negative      Occult Blood UA 1+ (*)     Nitrite, UA Negative      Leukocytes, UA Negative      Narrative:     Specimen Source->Urine   URINALYSIS MICROSCOPIC - Abnormal    RBC, UA 0      WBC, UA 2      Bacteria Moderate (*)     Squam Epithel, UA 1      Hyaline Casts, UA 4 (*)     Amorphous, UA Rare      Microscopic Comment SEE COMMENT      Narrative:     Specimen Source->Urine   HEPATITIS C ANTIBODY    Hepatitis C Ab Non-reactive      Narrative:     Release to patient->Immediate   HIV 1 / 2 ANTIBODY    HIV 1/2 Ag/Ab Non-reactive      Narrative:     Release to patient->Immediate     EKG Readings: (Independently Interpreted)   Initial Reading: No STEMI. Rhythm: Normal Sinus Rhythm. Heart Rate: 71. Ectopy: No Ectopy. Conduction: 1st Degree AV Block. ST Segments: Normal ST Segments. T Waves: Normal.     ECG Results              EKG 12-lead (Final result)        Collection Time Result Time QRS Duration OHS QTC Calculation    12/17/24 14:05:22 12/17/24 15:26:19 82 419                     Final  result by Interface, Lab In [facility] (12/17/24 15:26:25)                   Narrative:    Test Reason : R07.9,    Vent. Rate :  71 BPM     Atrial Rate :  71 BPM     P-R Int : 222 ms          QRS Dur :  82 ms      QT Int : 386 ms       P-R-T Axes :  71  75  78 degrees    QTcB Int : 419 ms    Sinus rhythm with 1st degree A-V block    Anteroseptal infarct  When compared with ECG of 27-Nov-2024 10:18,    No significant change was found  Confirmed by Amandeep Lynn ([id number]) on 12/17/2024 3:26:18 PM    Referred By: AAAREFERRAL SELF           Confirmed By: Amandeep Lynn                                  Imaging Results              X-Ray Chest PA And Lateral (Final result)  Result time 12/17/24 17:37:00      Final result by Gio Soriano MD (12/17/24 17:37:00)                   Impression:      No acute intrathoracic process.      Electronically signed by: Gio Soriano MD  Date:    12/17/2024  Time:    17:37               Narrative:    EXAMINATION:  XR CHEST PA AND LATERAL    CLINICAL HISTORY:  Cough, unspecified    TECHNIQUE:  PA and lateral views of the chest were performed.    COMPARISON:  09/20/2020.    FINDINGS:  Monitoring EKG leads are present.  The trachea is unremarkable.  The cardiomediastinal silhouette is stable.  There is no evidence of free air beneath the hemidiaphragms.  There are no pleural effusions.  There is no evidence of a pneumothorax.  There is no evidence of pneumomediastinum.  No airspace opacity is present.  There are degenerative changes in the osseous structures.                                       CT Head Without Contrast (Final result)  Result time 12/17/24 16:14:54      Final result by Portillo Bro MD (12/17/24 16:14:54)                   Impression:      No evidence of acute hemorrhage or major vascular distribution infarct.    Sinusitis.      Electronically signed by: Portillo Bro MD  Date:    12/17/2024  Time:    16:14               Narrative:    EXAMINATION:  CT HEAD WITHOUT  CONTRAST    CLINICAL HISTORY:  Headache, sudden, severe;    TECHNIQUE:  Low dose axial CT images obtained throughout the head without the use of intravenous contrast.  Axial, sagittal and coronal reconstructions were performed.    COMPARISON:  01/26/2020.    FINDINGS:  Intracranial compartment:    Ventricles are stable in size, without evidence of hydrocephalus.    Brain appears unchanged. No new parenchymal hemorrhage, edema, mass effect or major vascular distribution infarct.    No new extra-axial blood or fluid collections.    Skull/extracranial contents (limited evaluation):    No displaced calvarial fracture.    Mastoid air cells are clear. Patchy fluid and mucosal thickening in the visualized paranasal sinuses.                                       Medications   metoclopramide injection 10 mg (10 mg Intravenous Given 12/17/24 1444)   sodium chloride 0.9% bolus 500 mL 500 mL (0 mLs Intravenous Stopped 12/17/24 1514)   oxymetazoline 0.05 % nasal spray 2 spray (2 sprays Each Nostril Given 12/17/24 1444)   albuterol inhaler 2 puff (2 puffs Inhalation Given 12/17/24 1437)     Medical Decision Making  74-year-old female presents with multiple complaints.  Vitals normal.  Physical exam as above.  EKG unremarkable.  Labs unremarkable.  No UTI.  Head CT unremarkable.  No old stroke.  Chest x-ray unremarkable.  No pneumonia.  Suspect viral syndrome.  Improved with albuterol, Afrin, and Reglan.  Will discharge home.  No antibiotics indicated.  Patient will call their primary physician tomorrow to schedule close follow-up. Patient will return to ED for worsening symptoms, inability to eat/drink, fever greater than 100.4, or any other concerns. Did bedside teaching with return precautions.  All questions answered.  The patient acknowledges understanding.  Gave written and verbal discharge instructions.     Amount and/or Complexity of Data Reviewed  Labs: ordered. Decision-making details documented in ED Course.  Radiology:  ordered. Decision-making details documented in ED Course.  ECG/medicine tests: ordered and independent interpretation performed. Decision-making details documented in ED Course.    Risk  OTC drugs.  Prescription drug management.                                      Clinical Impression:  Final diagnoses:  [R05.9] Cough in adult patient  [B34.9] Acute viral syndrome (Primary)  [R51.9] Acute nonintractable headache, unspecified headache type          ED Disposition Condition    Discharge Stable          ED Prescriptions       Medication Sig Dispense Start Date End Date Auth. Provider    benzonatate (TESSALON) 100 MG capsule Take 1 capsule (100 mg total) by mouth 3 (three) times daily as needed for Cough. 20 capsule 12/17/2024 12/27/2024 Pacheco Hitchcock MD          Follow-up Information       Follow up With Specialties Details Why Contact Info    Follow up with primary physician as soon as possible.  Call tomorrow for an appointment.        [facility] Emergency Medicine  Return to ED for worsening symptoms, inability to eat/drink, fever greater than 100.4, or any other concerns. [address]  [facility] [id number] [phone]             Pacheco Hitchcock MD  12/17/24 1936

## 2024-12-17 NOTE — ED TRIAGE NOTES
Niki Soriano, a 74 y.o. female presents to the ED w/ complaint of multiple complaints. Pt arrives to ED via personal vehicle with co neck pain. Pt states she has chronic neck pain but the pain worsened this morning.     Triage note:  Chief Complaint   Patient presents with    Multiple complaints     Nov 8, had trouble speaking and remembering and it's cont, hx chronic neck pain, feels like elephant sitting on my head     Review of patient's allergies indicates:   Allergen Reactions    Augmentin [amoxicillin-pot clavulanate] Other (See Comments)     Patient cannot recall what she had, only that she could not tolerate the Augmentin    Tramadol Itching    Tegretol [carbamazepine] Itching and Rash    Vicodin [hydrocodone-acetaminophen] Itching and Anxiety     Past Medical History:   Diagnosis Date    Allergy     Coronary artery disease     Diabetes mellitus     Hypercholesteremia     Hypertension     IC (interstitial cystitis)     Numbness in both hands 11/01/2017    Spondylisthesis     Vulvodynia, unspecified

## 2024-12-17 NOTE — DISCHARGE INSTRUCTIONS
Use afrin as directed on packaging.    Use inhaler as shown:  2 puffs every 6 hours as needed for cough with spacer.    Our goal in the emergency department is to always give you outstanding care and exceptional service. You may receive a survey by mail or e-mail in the next week regarding your experience in our ED. We would greatly appreciate your completing and returning the survey. Your feedback provides us with a way to recognize our staff who give very good care and it helps us learn how to improve when your experience was below our aspiration of excellence.

## 2024-12-17 NOTE — FIRST PROVIDER EVALUATION
Emergency Department TeleTriage Encounter Note      CHIEF COMPLAINT    Chief Complaint   Patient presents with    Multiple complaints     Nov 8, had trouble speaking and remembering and it's cont, hx chronic neck pain, feels like elephant sitting on my head     VITAL SIGNS   Initial Vitals [12/17/24 1219]   BP Pulse Resp Temp SpO2   (!) 93/54 84 20 98.4 °F (36.9 °C) 96 %      MAP       --            ALLERGIES    Review of patient's allergies indicates:   Allergen Reactions    Augmentin [amoxicillin-pot clavulanate] Other (See Comments)     Patient cannot recall what she had, only that she could not tolerate the Augmentin    Tramadol Itching    Tegretol [carbamazepine] Itching and Rash    Vicodin [hydrocodone-acetaminophen] Itching and Anxiety       PROVIDER TRIAGE NOTE  Verbal consent for the teletriage evaluation was given by the patient at the start of the evaluation.  All efforts will be made to maintain patient's privacy during the evaluation.      This is a teletriage evaluation of a 74 y.o. female presenting to the ED with c/o neck pain, HA, trouble speaking that started 11/8/2024. Limited physical exam via telehealth: The patient is awake, alert, answering questions appropriately and is not in respiratory distress.  As the Teletriage provider, I performed an initial assessment and ordered appropriate labs and imaging studies, if any, to facilitate the patient's care once placed in the ED. Once a room is available, care and a full evaluation will be completed by an alternate ED provider.  Any additional orders and the final disposition will be determined by that provider.  All imaging and labs will not be followed-up by the Teletriage Team, including myself.       ORDERS  Labs Reviewed   HEPATITIS C ANTIBODY   HIV 1 / 2 ANTIBODY   CBC W/ AUTO DIFFERENTIAL   COMPREHENSIVE METABOLIC PANEL   URINALYSIS, REFLEX TO URINE CULTURE   C-REACTIVE PROTEIN   SEDIMENTATION RATE       ED Orders (720h ago, onward)       Start Ordered     Status Ordering Provider    12/17/24 1333 12/17/24 1332  C-reactive protein  STAT         Ordered GAB DIANNA A.    12/17/24 1333 12/17/24 1332  Sedimentation rate  STAT         Ordered DIANNA DE GUZMAN    12/17/24 1321 12/17/24 1332  Vital signs  Every 15 min         Ordered MARYANABIMAL DIANNA A.    12/17/24 1321 12/17/24 1332  Cardiac Monitoring - Adult  Continuous        Comments: Notify Physician If:    Ordered DIANNA DE GUZMAN    12/17/24 1321 12/17/24 1332  Pulse Oximetry Continuous  Continuous         Ordered DIANAN DE GUZMAN    12/17/24 1321 12/17/24 1332  Diet NPO  Diet effective now         Ordered DIANNA DE GUZMAN    12/17/24 1321 12/17/24 1332  Saline lock IV  Once         Ordered DIANNA DE GUZMAN    12/17/24 1321 12/17/24 1332  EKG 12-lead  Once        Comments: Do not perform if previously done during this visit/ triage    Ordered DIANNA DE GUZMAN    12/17/24 1321 12/17/24 1332  CBC auto differential  STAT         Ordered DIANNA DE GUZMAN    12/17/24 1321 12/17/24 1332  Comprehensive metabolic panel  STAT         Ordered DIANNA DE GUZMAN    12/17/24 1321 12/17/24 1332  Urinalysis, Reflex to Urine Culture Urine, Clean Catch  STAT         Ordered DIANNA DE GUZMAN    12/17/24 1222 12/17/24 1221  Hepatitis C Antibody  STAT         Ordered CHOLO CARIAS    12/17/24 1222 12/17/24 1221  HIV 1/2 Ag/Ab (4th Gen)  STAT         Ordered CHOLO CARIAS              Virtual Visit Note: The provider triage portion of this emergency department evaluation and documentation was performed via [employer], a HIPAA-compliant telemedicine application, in concert with a tele-presenter in the room. A face to face patient evaluation with one of my colleagues will occur once the patient is placed in an emergency department room.      DISCLAIMER: This note was prepared with [employer] voice recognition transcription software. Garbled syntax, mangled pronouns,  and other bizarre constructions may be attributed to that software system.

## 2024-12-19 ENCOUNTER — TELEPHONE (OUTPATIENT)
Dept: UROLOGY | Facility: CLINIC | Age: 74
End: 2024-12-19
Payer: MEDICARE

## 2024-12-19 NOTE — TELEPHONE ENCOUNTER
----- Message from Rosa Isela sent at 12/19/2024 11:08 AM CST -----  Regarding: Pt called wld like to speak with someone regarding the orders for Catheter since she's unable to get an appt until March  Contact: [phone]  Name of Who is Calling:MARCELA DO [[id number]]        What is the request in detail:Pt called wld like to speak with someone regarding the orders for Catheter since she's unable to get an appt until March. Please advise         Can the clinic reply by [facility]:No        What Number to Call Back if not in [facility]: Telephone Information:  Mobile          [phone]

## 2024-12-20 DIAGNOSIS — I50.42 CHRONIC COMBINED SYSTOLIC AND DIASTOLIC CONGESTIVE HEART FAILURE: ICD-10-CM

## 2024-12-24 DIAGNOSIS — I50.20 HFREF (HEART FAILURE WITH REDUCED EJECTION FRACTION): Primary | ICD-10-CM

## 2024-12-24 RX ORDER — SACUBITRIL AND VALSARTAN 97; 103 MG/1; MG/1
1 TABLET, FILM COATED ORAL 2 TIMES DAILY
Qty: 60 TABLET | Refills: 11 | OUTPATIENT
Start: 2024-12-24

## 2024-12-24 RX ORDER — VALSARTAN 320 MG/1
320 TABLET ORAL DAILY
Qty: 90 TABLET | Refills: 3 | Status: SHIPPED | OUTPATIENT
Start: 2024-12-24 | End: 2025-12-24

## 2024-12-24 NOTE — TELEPHONE ENCOUNTER
Spoke with pt daughter in regards to med changes made by Ms. Singh. She verbalized understanding. Also notified her that I left a detailed message for pt on her voicemail in regards to med changes.

## 2024-12-24 NOTE — TELEPHONE ENCOUNTER
Please let patient know I will send in Valsartan 320 mg daily to replace the Entresto. If she is currently in the donut hole we can always put her back on the Entresto if her insurance approves it. I will send in the new prescription to her [facility] pharmacy in [place]

## 2024-12-24 NOTE — PROGRESS NOTES
Patient states insurance no longer covers Entresto, will send in Valsartan 320 mg qd to replace Entresto  mg BID

## 2025-01-05 DIAGNOSIS — N95.2 VAGINAL ATROPHY: ICD-10-CM

## 2025-01-05 DIAGNOSIS — N39.0 RECURRENT UTI: ICD-10-CM

## 2025-01-06 RX ORDER — ESTRADIOL 0.1 MG/G
CREAM VAGINAL
Qty: 42.5 G | Refills: 0 | OUTPATIENT
Start: 2025-01-06

## 2025-01-06 NOTE — TELEPHONE ENCOUNTER
We received a refill request for medication however it has been over a year since you've been seen in our office.  It is our policy to see our patients  yearly to render the best care.  Please call at your earliest convenience to schedule an appointment.  [phone].

## 2025-01-06 NOTE — PROGRESS NOTES
CHIEF COMPLAINT:  Catheterization renewal       HISTORY OF PRESENTING ILLINESS:  Niki Soriano is a 74 y.o. female is an established pt with the Urology dept. She has a hx of T7 spinal cord injury, DM, and neurogenic bladder. She last saw Dr Garcia on 2023 for her annual follow up on neurogenic bladder. She intermittently catherizes without any issues. She states she is doing well on her Oxybutynin and only uses it when she goes out of the house. Pt states she always has malodorous urine, discussed asymptomatic bacteriemia.       Intermittent catheterization  Catheter type:  Highslip  Size:  10 Fr  Frequency:  7 times in 24 hours  ICD-10 Diagnosis code(s):  N39.41 urge incontinence, N31.9 neurogenic bladder  History of Recurrent UTI?:  yes  Is this indefinite?:  Yes, patient is paraplegic    REVIEW OF SYSTEMS:  Review of Systems   Constitutional:  Negative for chills and fever.   Respiratory:  Negative for cough and shortness of breath.    Gastrointestinal:  Negative for abdominal pain, nausea and vomiting.   Genitourinary:  Negative for frequency, hematuria and urgency.         PATIENT HISTORY:    Past Medical History:   Diagnosis Date    Allergy     Coronary artery disease     Diabetes mellitus     Hypercholesteremia     Hypertension     IC (interstitial cystitis)     Numbness in both hands 2017    Spondylisthesis     Vulvodynia, unspecified        Past Surgical History:   Procedure Laterality Date    CARPAL TUNNEL RELEASE Right 2019    Procedure: RELEASE, CARPAL TUNNEL, REVISION;  Surgeon: Annabelle Cid MD;  Location: [facility] OR [address] [place] [address];  Service: Orthopedics;  Laterality: Right;  Axogen, Clarix     SECTION      x3    CORONARY ANGIOGRAPHY N/A 3/3/2020    Procedure: ANGIOGRAM, CORONARY ARTERY;  Surgeon: Markie Friend III, MD;  Location: [facility] CATH LAB;  Service: Cardiology;  Laterality: N/A;    CORONARY ANGIOGRAPHY N/A 2020    Procedure: ANGIOGRAM, CORONARY ARTERY;  Surgeon:  Rodney Gamble MD;  Location: [facility] CATH LAB;  Service: Cardiology;  Laterality: N/A;    CYSTOSCOPY      HYSTERECTOMY      ischemic colitis      LEFT HEART CATHETERIZATION Left 3/3/2020    Procedure: Left heart cath;  Surgeon: Markie Friend III, MD;  Location: [facility] CATH LAB;  Service: Cardiology;  Laterality: Left;    LEFT HEART CATHETERIZATION Left 5/29/2020    Procedure: Left heart cath;  Surgeon: Rodney Gamble MD;  Location: [facility] CATH LAB;  Service: Cardiology;  Laterality: Left;    OOPHORECTOMY      DE REMOVAL OF OVARY/TUBE(S)         Family History   Problem Relation Name Age of Onset    No Known Problems Mother      No Known Problems Father      No Known Problems Sister 1     No Known Problems Brother 1     No Known Problems Maternal Aunt      No Known Problems Maternal Uncle      Breast cancer Paternal Aunt      No Known Problems Paternal Uncle      No Known Problems Maternal Grandmother      No Known Problems Maternal Grandfather      No Known Problems Paternal Grandmother      No Known Problems Paternal Grandfather      No Known Problems Other      Colon cancer Neg Hx      Ovarian cancer Neg Hx      Allergic rhinitis Neg Hx      Allergies Neg Hx      Angioedema Neg Hx      Asthma Neg Hx      Atopy Neg Hx      Eczema Neg Hx      Immunodeficiency Neg Hx      Rhinitis Neg Hx      Urticaria Neg Hx         Social History     Socioeconomic History    Marital status:    Tobacco Use    Smoking status: Never    Smokeless tobacco: Never   Substance and Sexual Activity    Alcohol use: No    Drug use: No    Sexual activity: Yes     Partners: Male       Allergies:  Augmentin [amoxicillin-pot clavulanate], Tramadol, Tegretol [carbamazepine], and Vicodin [hydrocodone-acetaminophen]    Medications:    Current Outpatient Medications:     ammonium lactate 12 % Crea, Apply 1 application topically 2 (two) times daily. (Patient not taking: Reported on 11/27/2024), Disp: 140 g, Rfl: 11    aspirin 81 MG  Chew, Take 1 tablet (81 mg total) by mouth once daily., Disp: , Rfl: 0    b complex vitamins capsule, Take 1 capsule by mouth once daily., Disp: , Rfl:     baclofen (LIORESAL) 20 MG tablet, Take 20 mg by mouth 3 (three) times daily as needed. , Disp: , Rfl:     cholecalciferol, vitamin D3, (VITAMIN D3) 25 mcg (1,000 unit) capsule, Take 1,000 Units by mouth once daily., Disp: , Rfl:     duloxetine (CYMBALTA) 30 MG capsule, Take 30 mg by mouth once daily., Disp: , Rfl:     estradioL (ESTRACE) 0.01 % (0.1 mg/gram) vaginal cream, PLACE 0.5 G VAGINALLY THREE TIMES A WEEK, Disp: 42.5 g, Rfl: 0    ezetimibe (ZETIA) 10 mg tablet, Take 1 tablet (10 mg total) by mouth once daily., Disp: 90 tablet, Rfl: 3    furosemide (LASIX) 40 MG tablet, Take 1 tablet (40 mg total) by mouth once daily., Disp: 30 tablet, Rfl: 11    gabapentin (NEURONTIN) 300 MG capsule, TAKE 1 CAPSULE BY MOUTH THREE TIMES A DAY (Patient taking differently: Patients states she takes 2 capsules 1 time at night.), Disp: 270 capsule, Rfl: 0    Lactobac no.51/Bifidobact no.4 (UP4 PROBIOTICS ULTRA ORAL), Take by mouth., Disp: , Rfl:     levothyroxine (SYNTHROID) 50 MCG tablet, Take 50 mcg by mouth once daily., Disp: , Rfl:     metformin (GLUCOPHAGE) 500 MG tablet, Take 500 mg by mouth once daily.  (Patient not taking: Reported on 11/27/2024), Disp: , Rfl:     metoprolol succinate (TOPROL-XL) 25 MG 24 hr tablet, Take 1 tablet (25 mg total) by mouth once daily., Disp: 90 tablet, Rfl: 3    multivitamin (THERAGRAN) per tablet, Take 1 tablet by mouth once daily., Disp: , Rfl:     omega-3 fatty acids/fish oil (FISH OIL-OMEGA-3 FATTY ACIDS) 300-1,000 mg capsule, Take by mouth once daily., Disp: , Rfl:     oxybutynin (DITROPAN-XL) 5 MG TR24, Take 1 tablet (5 mg total) by mouth as needed (urgency, urge incontinence)., Disp: 30 tablet, Rfl: 11    rosuvastatin (CRESTOR) 40 MG Tab, Take 1 tablet (40 mg total) by mouth every evening., Disp: 90 tablet, Rfl: 3     "sacubitriL-valsartan (ENTRESTO)  mg per tablet, Take 1 tablet by mouth 2 (two) times daily., Disp: 60 tablet, Rfl: 11    spironolactone (ALDACTONE) 25 MG tablet, Take 1 tablet (25 mg total) by mouth once daily., Disp: 90 tablet, Rfl: 3    valsartan (DIOVAN) 320 MG tablet, Take 1 tablet (320 mg total) by mouth once daily., Disp: 90 tablet, Rfl: 3    PHYSICAL EXAMINATION:  Physical Exam  Constitutional:       Appearance: Normal appearance.   HENT:      Head: Normocephalic and atraumatic.   Eyes:      Pupils: Pupils are equal, round, and reactive to light.   Pulmonary:      Effort: Pulmonary effort is normal. No respiratory distress.   Musculoskeletal:      Cervical back: Normal range of motion.   Skin:     General: Skin is warm and dry.      Capillary Refill: Capillary refill takes less than 2 seconds.   Neurological:      General: No focal deficit present.      Mental Status: She is alert.           LABS:    Reviewed micro urinalysis from 12/17/2024- normal        No results found for: "PSA", "PSADIAG", "PSATOTAL", "PHIND"    Lab Results   Component Value Date    CREATININE 1.8 (H) 12/17/2024    EGFRNORACEVR 29.4 (A) 12/17/2024               IMPRESSION:    Encounter Diagnoses   Name Primary?    Neurogenic bladder Yes    Incomplete emptying of bladder     RAHUL (acute kidney injury)     IC (interstitial cystitis)     Vulvar pain          Assessment:       1. Neurogenic bladder    2. Incomplete emptying of bladder    3. RAHUL (acute kidney injury)    4. IC (interstitial cystitis)    5. Vulvar pain        Plan:       -Renew catheters and supplies. Order faxed to [facility].   -Continue use of daily probiotic, Oxybutynin as needed, and estrace cream.   -Follow up in 1 year  -Referral to internal medicine     I spent a total of 30 minutes on the day of the visit. This includes face to face time and non-face to face time preparing to see the patient (eg, review of tests), obtaining and/or reviewing separately obtained history, " documenting clinical information in the electronic or other health record, independently interpreting results and communicating results to the patient/family/caregiver, or care coordinator  Reviewed the possible contributory factors.

## 2025-01-07 ENCOUNTER — OFFICE VISIT (OUTPATIENT)
Dept: UROLOGY | Facility: CLINIC | Age: 75
End: 2025-01-07
Payer: MEDICARE

## 2025-01-07 VITALS
HEIGHT: 63 IN | WEIGHT: 174.81 LBS | BODY MASS INDEX: 30.97 KG/M2 | HEART RATE: 81 BPM | SYSTOLIC BLOOD PRESSURE: 85 MMHG | DIASTOLIC BLOOD PRESSURE: 51 MMHG

## 2025-01-07 DIAGNOSIS — R33.9 INCOMPLETE EMPTYING OF BLADDER: ICD-10-CM

## 2025-01-07 DIAGNOSIS — N31.9 NEUROGENIC BLADDER: Primary | ICD-10-CM

## 2025-01-07 DIAGNOSIS — N30.10 IC (INTERSTITIAL CYSTITIS): ICD-10-CM

## 2025-01-07 DIAGNOSIS — R10.2 VULVAR PAIN: ICD-10-CM

## 2025-01-07 DIAGNOSIS — N17.9 AKI (ACUTE KIDNEY INJURY): ICD-10-CM

## 2025-01-07 PROCEDURE — 3072F LOW RISK FOR RETINOPATHY: CPT | Mod: CPTII,S$GLB,,

## 2025-01-07 PROCEDURE — 3078F DIAST BP <80 MM HG: CPT | Mod: CPTII,S$GLB,,

## 2025-01-07 PROCEDURE — 99214 OFFICE O/P EST MOD 30 MIN: CPT | Mod: S$GLB,,,

## 2025-01-07 PROCEDURE — 1159F MED LIST DOCD IN RCRD: CPT | Mod: CPTII,S$GLB,,

## 2025-01-07 PROCEDURE — 3008F BODY MASS INDEX DOCD: CPT | Mod: CPTII,S$GLB,,

## 2025-01-07 PROCEDURE — 1125F AMNT PAIN NOTED PAIN PRSNT: CPT | Mod: CPTII,S$GLB,,

## 2025-01-07 PROCEDURE — 3074F SYST BP LT 130 MM HG: CPT | Mod: CPTII,S$GLB,,

## 2025-01-07 PROCEDURE — 99999 PR PBB SHADOW E&M-EST. PATIENT-LVL IV: CPT | Mod: PBBFAC,,,

## 2025-01-12 ENCOUNTER — OFFICE VISIT (OUTPATIENT)
Dept: URGENT CARE | Facility: CLINIC | Age: 75
End: 2025-01-12
Payer: MEDICARE

## 2025-01-12 VITALS
BODY MASS INDEX: 30.83 KG/M2 | DIASTOLIC BLOOD PRESSURE: 79 MMHG | SYSTOLIC BLOOD PRESSURE: 99 MMHG | OXYGEN SATURATION: 98 % | HEIGHT: 63 IN | WEIGHT: 174 LBS | TEMPERATURE: 98 F | HEART RATE: 69 BPM | RESPIRATION RATE: 18 BRPM

## 2025-01-12 DIAGNOSIS — S93.601A SPRAIN OF RIGHT FOOT, INITIAL ENCOUNTER: ICD-10-CM

## 2025-01-12 DIAGNOSIS — S93.401A SPRAIN OF RIGHT ANKLE, UNSPECIFIED LIGAMENT, INITIAL ENCOUNTER: Primary | ICD-10-CM

## 2025-01-12 PROCEDURE — 99213 OFFICE O/P EST LOW 20 MIN: CPT | Mod: S$GLB,,,

## 2025-01-12 PROCEDURE — 73610 X-RAY EXAM OF ANKLE: CPT | Mod: RT,S$GLB,, | Performed by: RADIOLOGY

## 2025-01-12 PROCEDURE — 73630 X-RAY EXAM OF FOOT: CPT | Mod: RT,S$GLB,, | Performed by: RADIOLOGY

## 2025-01-12 NOTE — PATIENT INSTRUCTIONS
OTC Tylenol for pain  Rest, ice, compression, elevation  Attention not to aggravate area.  Keep your foot in the boot for support and protection. Follow up with podiatry.    - Follow up with your PCP or specialty clinic as directed in the next 1-2 weeks if not improved or as needed.  You can call [phone] to schedule an appointment with the appropriate provider.    - Go to the ER or seek medical attention immediately if you develop new or worsening symptoms.    - You must understand that you have received an Urgent Care treatment only and that you may be released before all of your medical problems are known or treated.   - You, the patient, will arrange for follow up care as instructed.   - If your condition worsens or fails to improve we recommend that you receive another evaluation at the ER immediately or contact your PCP to discuss your concerns or return here.

## 2025-01-12 NOTE — PROGRESS NOTES
"Subjective:      Patient ID: Niki Soriano is a 74 y.o. female.    Vitals:  height is 5' 3" (1.6 m) and weight is 78.9 kg (174 lb). Her oral temperature is 98 °F (36.7 °C). Her blood pressure is 99/79 and her pulse is 69. Her respiration is 18 and oxygen saturation is 98%.     Chief Complaint: Fall    74-year-old female with history of type 2 diabetes, peripheral neuropathy, peripheral artery disease, spinal cord injury, neurogenic bladder here for right foot and ankle injury that occurred yesterday evening around 4:00 p.m.  Patient states that she was sitting at the edge of her walker.  She tried to throw away some trash in the garbage can when she fell forward.  She hyper plantar flexed her right ankle when she fell forward.  Did not hit her head.  States that she already has diminished sensation due to history of peripheral neuropathy.  She is having minimal pain.    Fall  Incident onset: last night. The fall occurred while walking. She fell from a height of 1 to 2 ft. She landed on Hard floor. There was no blood loss. The point of impact was the left foot. The pain is present in the right foot. The pain is at a severity of 6/10. The pain is moderate. The symptoms are aggravated by movement. Pertinent negatives include no fever or numbness. She has tried nothing for the symptoms.       Constitution: Negative for chills and fever.   Musculoskeletal:  Positive for joint pain and joint swelling.   Skin:  Positive for bruising. Negative for wound, abrasion and erythema.   Neurological:  Positive for tingling. Negative for numbness.      Objective:     Physical Exam   Constitutional: She is oriented to person, place, and time. She appears well-developed.   HENT:   Head: Normocephalic and atraumatic.   Ears:   Right Ear: External ear normal.   Left Ear: External ear normal.   Nose: Nose normal.   Mouth/Throat: Oropharynx is clear and moist.   Eyes: Conjunctivae, EOM and lids are normal. Pupils are equal, round, and " reactive to light.   Neck: Trachea normal and phonation normal. Neck supple.   Musculoskeletal: Normal range of motion.         General: No swelling or tenderness. Normal range of motion.   Neurological: She is alert and oriented to person, place, and time.   Skin: Skin is warm, dry and intact. Capillary refill takes less than 2 seconds. No erythema   Psychiatric: Her speech is normal and behavior is normal. Judgment and thought content normal.   Nursing note and vitals reviewed.    X-Ray Foot Complete 3 view Right    Result Date: 1/12/2025  EXAMINATION: XR FOOT COMPLETE 3 VIEW RIGHT CLINICAL HISTORY: Pt tripped and hyperplantarflexed R ankle yesterday evening.;. Unspecified injury of right foot, initial encounter TECHNIQUE: AP, lateral, and oblique views of the right foot were performed. COMPARISON: None FINDINGS: No acute fracture, subluxation or dislocation.  No mass or foreign body no significant arthropathy.  No mass or foreign body.     No acute radiographic abnormality. Electronically signed by: Elan Rudd Date:    01/12/2025 Time:    15:51    XR ANKLE COMPLETE 3 VIEW RIGHT    Result Date: 1/12/2025  EXAMINATION: XR ANKLE COMPLETE 3 VIEW RIGHT CLINICAL HISTORY: Pt tripped and hyperplantarflexed R ankle yesterday evening.; Unspecified injury of right ankle, initial encounter TECHNIQUE: AP, lateral, and oblique images of the right ankle were performed. COMPARISON: None FINDINGS: No acute fracture, subluxation or dislocation.  No mass or foreign body.  No significant arthropathy.  Calcaneal spurring inferiorly.     No acute radiographic abnormality. Electronically signed by: Elan Rudd Date:    01/12/2025 Time:    15:49      Assessment:     1. Sprain of right ankle, unspecified ligament, initial encounter    2. Sprain of right foot, initial encounter      Plan:     Sprain of right ankle, unspecified ligament, initial encounter  -     XR ANKLE COMPLETE 3 VIEW RIGHT; Future; Expected date: 01/12/2025  -      Walking Boot For Home Use  -     Ambulatory referral/consult to Podiatry    Sprain of right foot, initial encounter  -     X-Ray Foot Complete 3 view Right; Future; Expected date: 01/12/2025              Patient Instructions   Tylenol for pain  Rest, ice, compression, elevation  Attention not to aggravate area.  Keep your foot in the boot for at least 1 week or until follow up with podiatry    - Follow up with your PCP or specialty clinic as directed in the next 1-2 weeks if not improved or as needed.  You can call [phone] to schedule an appointment with the appropriate provider.    - Go to the ER or seek medical attention immediately if you develop new or worsening symptoms.    - You must understand that you have received an Urgent Care treatment only and that you may be released before all of your medical problems are known or treated.   - You, the patient, will arrange for follow up care as instructed.   - If your condition worsens or fails to improve we recommend that you receive another evaluation at the ER immediately or contact your PCP to discuss your concerns or return here.

## 2025-01-13 NOTE — PROGRESS NOTES
INTERNAL MEDICINE CLINIC - ANNUAL   Progress Note    PRESENTING HISTORY     PCP: Moises Webster MD    Chief Complaint/Reason for Visit:   No chief complaint on file.    History of Present Illness & ROS : Ms. Niki Soriano is a 74 y.o. female.    Annual   New to me and practice site.   Pleasant lady.   Here with Ms. Herbert, her friend today.   Patient no longer drives.  Was formerly followed by [facility], preference to transfer medical care to [facility] main campus  Being followed by Urology her at [facility].   Being followed by Dr. ROHAN Duran with Podiatry and will be following up with him after a right ankle sprain, mechanical fall with walker, as of last Saturday. Currently in air walking boot today.   She has several medical existing chronic medical problems including:   ` chronic LBP / Lumbago  ` chronic neck pain  ` CTS (seen by Dr. Bimal Daley); no follow ups  ` chronic allergies (Seen by Dr. JANET Vital with our Allergy  / Immunology dept); no follow ups.   ` CPS (reports that most recently being managed by her PCP, but understands that may need to be referred to PMR / Pain mgt)  ` Neurogenic Bladder (being followed by Dr. Rodriguez and Urology team at [facility]); she intermittent self caths.   ` PAD / post PCI with stenting / DLP  / Ischemic Cardiomyopathy: followed by [facility] Cardiology (YUDITH Solorzano)     Request refills on some of her medications today:   Metformin   Synthroid  *She is fasting; will check labs and levels.     She has no acute complaints today.     Review of Systems:  Eyes: denies visual changes at this time denies floaters   ENT: no nasal congestion or sore throat  Respiratory: no cough or shorness of breath  Cardiovascular: no chest pain or palpitations  Gastrointestinal: no nausea or vomiting, no abdominal pain or change in bowel habits  Genitourinary: no hematuria or dysuria; denies frequency  Hematologic/Lymphatic: no easy bruising or lymphadenopathy  Musculoskeletal: no arthralgias or myalgias  Neurological: no  seizures or tremors  Endocrine: no heat or cold intolerance      PAST HISTORY:     Past Medical History:   Diagnosis Date    Allergy     Coronary artery disease     Diabetes mellitus     Hypercholesteremia     Hypertension     IC (interstitial cystitis)     Numbness in both hands 2017    Spondylisthesis     Vulvodynia, unspecified        Past Surgical History:   Procedure Laterality Date    CARPAL TUNNEL RELEASE Right 2019    Procedure: RELEASE, CARPAL TUNNEL, REVISION;  Surgeon: Annabelle Cid MD;  Location: [facility] OR [address] [place] [address];  Service: Orthopedics;  Laterality: Right;  Axogen, Clarix     SECTION      x3    CORONARY ANGIOGRAPHY N/A 3/3/2020    Procedure: ANGIOGRAM, CORONARY ARTERY;  Surgeon: Markie Friend III, MD;  Location: [facility] CATH LAB;  Service: Cardiology;  Laterality: N/A;    CORONARY ANGIOGRAPHY N/A 2020    Procedure: ANGIOGRAM, CORONARY ARTERY;  Surgeon: Rodney Gamble MD;  Location: [facility] CATH LAB;  Service: Cardiology;  Laterality: N/A;    CYSTOSCOPY      HYSTERECTOMY      ischemic colitis      LEFT HEART CATHETERIZATION Left 3/3/2020    Procedure: Left heart cath;  Surgeon: Markie Friend III, MD;  Location: [facility] CATH LAB;  Service: Cardiology;  Laterality: Left;    LEFT HEART CATHETERIZATION Left 2020    Procedure: Left heart cath;  Surgeon: Rodney Gamble MD;  Location: [facility] CATH LAB;  Service: Cardiology;  Laterality: Left;    OOPHORECTOMY      WY REMOVAL OF OVARY/TUBE(S)         Family History   Problem Relation Name Age of Onset    No Known Problems Mother      No Known Problems Father      No Known Problems Sister 1     No Known Problems Brother 1     No Known Problems Maternal Aunt      No Known Problems Maternal Uncle      Breast cancer Paternal Aunt      No Known Problems Paternal Uncle      No Known Problems Maternal Grandmother      No Known Problems Maternal Grandfather      No Known Problems Paternal Grandmother      No Known Problems  Paternal Grandfather      No Known Problems Other      Colon cancer Neg Hx      Ovarian cancer Neg Hx      Allergic rhinitis Neg Hx      Allergies Neg Hx      Angioedema Neg Hx      Asthma Neg Hx      Atopy Neg Hx      Eczema Neg Hx      Immunodeficiency Neg Hx      Rhinitis Neg Hx      Urticaria Neg Hx         Social History     Socioeconomic History    Marital status:    Tobacco Use    Smoking status: Never    Smokeless tobacco: Never   Substance and Sexual Activity    Alcohol use: No    Drug use: No    Sexual activity: Yes     Partners: Male       MEDICATIONS & ALLERGIES:     Current Outpatient Medications on File Prior to Visit   Medication Sig Dispense Refill    ammonium lactate 12 % Crea Apply 1 application topically 2 (two) times daily. (Patient not taking: Reported on 11/27/2024) 140 g 11    aspirin 81 MG Chew Take 1 tablet (81 mg total) by mouth once daily.  0    b complex vitamins capsule Take 1 capsule by mouth once daily.      baclofen (LIORESAL) 20 MG tablet Take 20 mg by mouth 3 (three) times daily as needed.       cholecalciferol, vitamin D3, (VITAMIN D3) 25 mcg (1,000 unit) capsule Take 1,000 Units by mouth once daily.      duloxetine (CYMBALTA) 30 MG capsule Take 30 mg by mouth once daily.      estradioL (ESTRACE) 0.01 % (0.1 mg/gram) vaginal cream PLACE 0.5 G VAGINALLY THREE TIMES A WEEK 42.5 g 0    ezetimibe (ZETIA) 10 mg tablet Take 1 tablet (10 mg total) by mouth once daily. 90 tablet 3    furosemide (LASIX) 40 MG tablet Take 1 tablet (40 mg total) by mouth once daily. 30 tablet 11    gabapentin (NEURONTIN) 300 MG capsule TAKE 1 CAPSULE BY MOUTH THREE TIMES A DAY (Patient taking differently: Patients states she takes 2 capsules 1 time at night.) 270 capsule 0    Lactobac no.51/Bifidobact no.4 (UP4 PROBIOTICS ULTRA ORAL) Take by mouth.      levothyroxine (SYNTHROID) 50 MCG tablet Take 50 mcg by mouth once daily.      metformin (GLUCOPHAGE) 500 MG tablet Take 500 mg by mouth once daily.   (Patient not taking: Reported on 11/27/2024)      metoprolol succinate (TOPROL-XL) 25 MG 24 hr tablet Take 1 tablet (25 mg total) by mouth once daily. 90 tablet 3    multivitamin (THERAGRAN) per tablet Take 1 tablet by mouth once daily.      omega-3 fatty acids/fish oil (FISH OIL-OMEGA-3 FATTY ACIDS) 300-1,000 mg capsule Take by mouth once daily.      oxybutynin (DITROPAN-XL) 5 MG TR24 Take 1 tablet (5 mg total) by mouth as needed (urgency, urge incontinence). 30 tablet 11    rosuvastatin (CRESTOR) 40 MG Tab Take 1 tablet (40 mg total) by mouth every evening. 90 tablet 3    sacubitriL-valsartan (ENTRESTO)  mg per tablet Take 1 tablet by mouth 2 (two) times daily. 60 tablet 11    spironolactone (ALDACTONE) 25 MG tablet Take 1 tablet (25 mg total) by mouth once daily. 90 tablet 3    valsartan (DIOVAN) 320 MG tablet Take 1 tablet (320 mg total) by mouth once daily. 90 tablet 3     No current facility-administered medications on file prior to visit.        Review of patient's allergies indicates:   Allergen Reactions    Augmentin [amoxicillin-pot clavulanate] Other (See Comments)     Patient cannot recall what she had, only that she could not tolerate the Augmentin    Tramadol Itching    Tegretol [carbamazepine] Itching and Rash    Vicodin [hydrocodone-acetaminophen] Itching and Anxiety       Medications Reconciliation:   I have reconciled the patient's home medications with the patient/family. I have updated all changes.  Refer to After-Visit Medication List.    OBJECTIVE:     Vital Signs:  There were no vitals filed for this visit.  Wt Readings from Last 3 Encounters:   01/12/25 1347 78.9 kg (174 lb)   01/07/25 0916 79.3 kg (174 lb 13.2 oz)   12/17/24 1219 79.4 kg (175 lb)     There is no height or weight on file to calculate BMI.   Wt Readings from Last 3 Encounters:   01/14/25 78 kg (171 lb 15.3 oz)   01/12/25 78.9 kg (174 lb)   01/07/25 79.3 kg (174 lb 13.2 oz)     Temp Readings from Last 3 Encounters:    01/12/25 98 °F (36.7 °C) (Oral)   12/17/24 98.2 °F (36.8 °C) (Oral)   06/14/24 98.7 °F (37.1 °C) (Oral)     BP Readings from Last 3 Encounters:   01/14/25 102/68   01/12/25 99/79   01/07/25 (!) 85/51     Pulse Readings from Last 3 Encounters:   01/14/25 62   01/12/25 69   01/07/25 81       Physical Exam:  General: Well developed, well nourished. No distress.  HEENT: Head is normocephalic, atraumatic  Eyes: Clear conjunctiva.  Neck: Supple, symmetrical neck; trachea midline.  Lungs: Clear to auscultation bilaterally and normal respiratory effort.  Cardiovascular: Heart with regular rate and rhythm. No murmurs, gallops or rubs  Extremities: No LE edema. Pulses 2+ and symmetric.   (See under MSK)  Abdomen: Abdomen is soft, non-tender non-distended with normal bowel sounds.  Skin: Skin color, texture, turgor normal. No rashes.  Musculoskeletal: presents in WC today with air boot to RLE  Neurologic: No focal numbness or weakness.     Laboratory  Lab Results   Component Value Date    WBC 7.65 12/17/2024    HGB 13.0 12/17/2024    HCT 40.8 12/17/2024     12/17/2024    CHOL 112 (L) 04/03/2024    TRIG 137 04/03/2024    HDL 45 04/03/2024    ALT 65 (H) 12/17/2024    AST 55 (H) 12/17/2024     12/17/2024    K 4.2 12/17/2024     12/17/2024    CREATININE 1.8 (H) 12/17/2024    BUN 39 (H) 12/17/2024    CO2 19 (L) 12/17/2024    TSH 4.072 (H) 03/01/2020    INR 1.1 03/01/2020    HGBA1C 5.8 (H) 03/02/2020       ASSESSMENT & PLAN:     Annual physical exam    Neurogenic bladder    Chronic combined systolic and diastolic congestive heart failure    Coronary artery disease involving native coronary artery of native heart without angina pectoris    Dyslipidemia    Other specified hypothyroidism    IC (interstitial cystitis)    Incomplete emptying of bladder    Ischemic cardiomyopathy    PAD (peripheral artery disease)    Recurrent UTI    Type 2 diabetes mellitus with diabetic neuropathy, without long-term current use of  insulin    Vulvodynia, unspecified    Pre-diabetes    Annual physical exam  -     Comprehensive Metabolic Panel; Future; Expected date: 01/14/2025  -     CBC Auto Differential; Future; Expected date: 01/14/2025  -     Lipid Panel; Future; Expected date: 01/14/2025  -     Hemoglobin A1C; Future; Expected date: 01/14/2025  -     TSH; Future; Expected date: 01/14/2025  -     Vitamin D; Future; Expected date: 01/14/2025  -     Mammo Digital Screening Bilat; Future; Expected date: 01/14/2025  -     DXA Bone Density Axial Skeleton 1 or more sites; Future; Expected date: 01/14/2025    Neurogenic bladder  IC (interstitial cystitis)  Incomplete emptying of bladder  Recurrent UTI  Vulvodynia, unspecified  **Noting, chronic Vulvar pain and Vulvodynia, recurrent UTIs, IC  *Seen by Urology: Jamila MARY on 1/7/2025  *Defer to Urology   Intermittent Self Caths...  ` Ditropan  ` Topical Estrace      Chronic combined systolic and diastolic congestive heart failure  Coronary artery disease involving native coronary artery of native heart without angina pectoris  Dyslipidemia  Ischemic cardiomyopathy  PAD (peripheral artery disease)  Ischemic Cardiomyopathy:   DLP / CAD / PCI / PAD:   *Followed by Cardiology; seen in 11/2024, needs follow up 2/2025   ` Entresto (no longer taking)  ` Metoprolol   ` Aldactone   ` Lasix   ` ASA  ` Zetia  ` Crestor   ` Diovan   -     Lipid Panel; Future; Expected date: 01/14/2025      Other specified hypothyroidism  Lab Results   Component Value Date    TSH 4.072 (H) 03/01/2020    *Normal T4; check levels today to surveillance   ` Synthroid   -     TSH; Future; Expected date: 01/14/2025  -     levothyroxine (SYNTHROID) 50 MCG tablet; Take 1 tablet (50 mcg total) by mouth once daily.  Dispense: 90 tablet; Refill: 0      Type 2 diabetes mellitus with diabetic neuropathy, without long-term current use of insulin  *Diet controlled and Metformin 500 bid   *Unable to exercise due to chronic balance / gait  issues  *Recommend follow up repeat surveillance A1C in 3 months   Lab Results   Component Value Date    HGBA1C 5.8 (H) 03/02/2020   -     Lipid Panel; Future; Expected date: 01/14/2025  -     Hemoglobin A1C; Future; Expected date: 01/14/2025  -     metFORMIN (GLUCOPHAGE) 500 MG tablet; Take 1 tablet (500 mg total) by mouth once daily.  Dispense: 180 tablet; Refill: 0    Carpal tunnel syndrome, unspecified laterality  *Seen by Dr. Bimal Daley; currently stable.   Chronic problem.     Chronic bilateral low back pain without sciatica  Leg weakness, bilateral  -     Ambulatory referral/consult to [facility]; Future; Expected date: 01/21/2025    Vitamin D deficiency, unspecified  -     Vitamin D; Future; Expected date: 01/14/2025    Encounter for screening mammogram for malignant neoplasm of breast  -     Mammo Digital Screening Bilat; Future; Expected date: 01/14/2025    Unspecified menopausal and perimenopausal disorder  -     DXA Bone Density Axial Skeleton 1 or more sites; Future; Expected date: 01/14/2025      Screening future:   C Scope vs Fit Kit (future)  Urine Microalbumin  (future)    Vaccines:   TD (future)  Shingrix (future)  RSV (future)       *Annual PE today and will follow up with one of our  Physician Providers to be considered est'd in medical care with practice site.        Future Appointments   Date Time Provider Department Center   1/22/2025  1:00 PM Dale Wu MD [facility] IM [facility] PCW   2/27/2025 10:30 AM Awilda Singh PA-C [facility] CARDIO [facility]        Medication List            Accurate as of January 14, 2025  3:52 PM. If you have any questions, ask your nurse or doctor.                CHANGE how you take these medications      gabapentin 300 MG capsule  Commonly known as: NEURONTIN  TAKE 1 CAPSULE BY MOUTH THREE TIMES A DAY  What changed:   how much to take  how to take this  when to take this  additional instructions            CONTINUE taking these medications      ammonium lactate  12 % Crea  Apply 1 application topically 2 (two) times daily.     aspirin 81 MG Chew  Take 1 tablet (81 mg total) by mouth once daily.     b complex vitamins capsule     baclofen 20 MG tablet  Commonly known as: LIORESAL     cholecalciferol (vitamin D3) 25 mcg (1,000 unit) capsule  Commonly known as: VITAMIN D3     DULoxetine 30 MG capsule  Commonly known as: CYMBALTA     ENTRESTO  mg per tablet  Generic drug: sacubitriL-valsartan  Take 1 tablet by mouth 2 (two) times daily.     estradioL 0.01 % (0.1 mg/gram) vaginal cream  Commonly known as: ESTRACE  PLACE 0.5 G VAGINALLY THREE TIMES A WEEK     ezetimibe 10 mg tablet  Commonly known as: ZETIA  Take 1 tablet (10 mg total) by mouth once daily.     fish oil-omega-3 fatty acids 300-1,000 mg capsule     furosemide 40 MG tablet  Commonly known as: LASIX  Take 1 tablet (40 mg total) by mouth once daily.     levothyroxine 50 MCG tablet  Commonly known as: SYNTHROID  Take 1 tablet (50 mcg total) by mouth once daily.     metFORMIN 500 MG tablet  Commonly known as: GLUCOPHAGE  Take 1 tablet (500 mg total) by mouth once daily.     metoprolol succinate 25 MG 24 hr tablet  Commonly known as: TOPROL-XL  Take 1 tablet (25 mg total) by mouth once daily.     multivitamin per tablet  Commonly known as: THERAGRAN     oxybutynin 5 MG Tr24  Commonly known as: DITROPAN-XL  Take 1 tablet (5 mg total) by mouth as needed (urgency, urge incontinence).     rosuvastatin 40 MG Tab  Commonly known as: CRESTOR  Take 1 tablet (40 mg total) by mouth every evening.     spironolactone 25 MG tablet  Commonly known as: ALDACTONE  Take 1 tablet (25 mg total) by mouth once daily.     UP4 PROBIOTICS ULTRA ORAL     valsartan 320 MG tablet  Commonly known as: DIOVAN  Take 1 tablet (320 mg total) by mouth once daily.               Where to Get Your Medications        These medications were sent to [facility] #[id number], [place] - [address]  [address] [address]       Phone: [phone]   levothyroxine 50 MCG tablet  metFORMIN 500 MG tablet           Signing Physician:  RALEIGH Burch

## 2025-01-14 ENCOUNTER — LAB VISIT (OUTPATIENT)
Dept: LAB | Facility: HOSPITAL | Age: 75
End: 2025-01-14
Payer: MEDICARE

## 2025-01-14 ENCOUNTER — OFFICE VISIT (OUTPATIENT)
Dept: INTERNAL MEDICINE | Facility: CLINIC | Age: 75
End: 2025-01-14
Payer: MEDICARE

## 2025-01-14 VITALS
HEIGHT: 63 IN | BODY MASS INDEX: 30.46 KG/M2 | OXYGEN SATURATION: 95 % | DIASTOLIC BLOOD PRESSURE: 68 MMHG | WEIGHT: 171.94 LBS | HEART RATE: 62 BPM | SYSTOLIC BLOOD PRESSURE: 102 MMHG

## 2025-01-14 DIAGNOSIS — E78.5 DYSLIPIDEMIA: ICD-10-CM

## 2025-01-14 DIAGNOSIS — I50.42 CHRONIC COMBINED SYSTOLIC AND DIASTOLIC CONGESTIVE HEART FAILURE: ICD-10-CM

## 2025-01-14 DIAGNOSIS — R73.03 PRE-DIABETES: ICD-10-CM

## 2025-01-14 DIAGNOSIS — M54.50 CHRONIC BILATERAL LOW BACK PAIN WITHOUT SCIATICA: ICD-10-CM

## 2025-01-14 DIAGNOSIS — E03.8 OTHER SPECIFIED HYPOTHYROIDISM: ICD-10-CM

## 2025-01-14 DIAGNOSIS — E55.9 VITAMIN D DEFICIENCY, UNSPECIFIED: ICD-10-CM

## 2025-01-14 DIAGNOSIS — N39.0 RECURRENT UTI: ICD-10-CM

## 2025-01-14 DIAGNOSIS — N31.9 NEUROGENIC BLADDER: ICD-10-CM

## 2025-01-14 DIAGNOSIS — N94.819 VULVODYNIA, UNSPECIFIED: ICD-10-CM

## 2025-01-14 DIAGNOSIS — E11.40 TYPE 2 DIABETES MELLITUS WITH DIABETIC NEUROPATHY, WITHOUT LONG-TERM CURRENT USE OF INSULIN: ICD-10-CM

## 2025-01-14 DIAGNOSIS — I25.5 ISCHEMIC CARDIOMYOPATHY: ICD-10-CM

## 2025-01-14 DIAGNOSIS — G89.29 CHRONIC BILATERAL LOW BACK PAIN WITHOUT SCIATICA: ICD-10-CM

## 2025-01-14 DIAGNOSIS — G56.00 CARPAL TUNNEL SYNDROME, UNSPECIFIED LATERALITY: ICD-10-CM

## 2025-01-14 DIAGNOSIS — N30.10 IC (INTERSTITIAL CYSTITIS): ICD-10-CM

## 2025-01-14 DIAGNOSIS — Z00.00 ANNUAL PHYSICAL EXAM: Primary | ICD-10-CM

## 2025-01-14 DIAGNOSIS — I73.9 PAD (PERIPHERAL ARTERY DISEASE): ICD-10-CM

## 2025-01-14 DIAGNOSIS — R33.9 INCOMPLETE EMPTYING OF BLADDER: ICD-10-CM

## 2025-01-14 DIAGNOSIS — I25.10 CORONARY ARTERY DISEASE INVOLVING NATIVE CORONARY ARTERY OF NATIVE HEART WITHOUT ANGINA PECTORIS: ICD-10-CM

## 2025-01-14 DIAGNOSIS — Z00.00 ANNUAL PHYSICAL EXAM: ICD-10-CM

## 2025-01-14 DIAGNOSIS — Z12.31 ENCOUNTER FOR SCREENING MAMMOGRAM FOR MALIGNANT NEOPLASM OF BREAST: ICD-10-CM

## 2025-01-14 DIAGNOSIS — N95.9 UNSPECIFIED MENOPAUSAL AND PERIMENOPAUSAL DISORDER: ICD-10-CM

## 2025-01-14 DIAGNOSIS — R29.898 LEG WEAKNESS, BILATERAL: ICD-10-CM

## 2025-01-14 LAB
25(OH)D3+25(OH)D2 SERPL-MCNC: 46 NG/ML (ref 30–96)
ALBUMIN SERPL BCP-MCNC: 3.9 G/DL (ref 3.5–5.2)
ALP SERPL-CCNC: 45 U/L (ref 40–150)
ALT SERPL W/O P-5'-P-CCNC: 109 U/L (ref 10–44)
ANION GAP SERPL CALC-SCNC: 8 MMOL/L (ref 8–16)
AST SERPL-CCNC: 100 U/L (ref 10–40)
BASOPHILS # BLD AUTO: 0.04 K/UL (ref 0–0.2)
BASOPHILS NFR BLD: 0.7 % (ref 0–1.9)
BILIRUB SERPL-MCNC: 0.5 MG/DL (ref 0.1–1)
BUN SERPL-MCNC: 40 MG/DL (ref 8–23)
CALCIUM SERPL-MCNC: 11 MG/DL (ref 8.7–10.5)
CHLORIDE SERPL-SCNC: 107 MMOL/L (ref 95–110)
CHOLEST SERPL-MCNC: 105 MG/DL (ref 120–199)
CHOLEST/HDLC SERPL: 2.5 {RATIO} (ref 2–5)
CO2 SERPL-SCNC: 23 MMOL/L (ref 23–29)
CREAT SERPL-MCNC: 1.7 MG/DL (ref 0.5–1.4)
DIFFERENTIAL METHOD BLD: ABNORMAL
EOSINOPHIL # BLD AUTO: 0.2 K/UL (ref 0–0.5)
EOSINOPHIL NFR BLD: 3.2 % (ref 0–8)
ERYTHROCYTE [DISTWIDTH] IN BLOOD BY AUTOMATED COUNT: 13.2 % (ref 11.5–14.5)
EST. GFR  (NO RACE VARIABLE): 31.5 ML/MIN/1.73 M^2
ESTIMATED AVG GLUCOSE: 157 MG/DL (ref 68–131)
GLUCOSE SERPL-MCNC: 109 MG/DL (ref 70–110)
HBA1C MFR BLD: 7.1 % (ref 4–5.6)
HCT VFR BLD AUTO: 37.7 % (ref 37–48.5)
HDLC SERPL-MCNC: 42 MG/DL (ref 40–75)
HDLC SERPL: 40 % (ref 20–50)
HGB BLD-MCNC: 11.8 G/DL (ref 12–16)
IMM GRANULOCYTES # BLD AUTO: 0.02 K/UL (ref 0–0.04)
IMM GRANULOCYTES NFR BLD AUTO: 0.3 % (ref 0–0.5)
LDLC SERPL CALC-MCNC: 28.4 MG/DL (ref 63–159)
LYMPHOCYTES # BLD AUTO: 1.7 K/UL (ref 1–4.8)
LYMPHOCYTES NFR BLD: 28.1 % (ref 18–48)
MCH RBC QN AUTO: 29.5 PG (ref 27–31)
MCHC RBC AUTO-ENTMCNC: 31.3 G/DL (ref 32–36)
MCV RBC AUTO: 94 FL (ref 82–98)
MONOCYTES # BLD AUTO: 0.5 K/UL (ref 0.3–1)
MONOCYTES NFR BLD: 7.7 % (ref 4–15)
NEUTROPHILS # BLD AUTO: 3.6 K/UL (ref 1.8–7.7)
NEUTROPHILS NFR BLD: 60 % (ref 38–73)
NONHDLC SERPL-MCNC: 63 MG/DL
NRBC BLD-RTO: 0 /100 WBC
PLATELET # BLD AUTO: 184 K/UL (ref 150–450)
PMV BLD AUTO: 11.4 FL (ref 9.2–12.9)
POTASSIUM SERPL-SCNC: 4.9 MMOL/L (ref 3.5–5.1)
PROT SERPL-MCNC: 6.7 G/DL (ref 6–8.4)
RBC # BLD AUTO: 4 M/UL (ref 4–5.4)
SODIUM SERPL-SCNC: 138 MMOL/L (ref 136–145)
TRIGL SERPL-MCNC: 173 MG/DL (ref 30–150)
WBC # BLD AUTO: 6.01 K/UL (ref 3.9–12.7)

## 2025-01-14 PROCEDURE — 36415 COLL VENOUS BLD VENIPUNCTURE: CPT | Performed by: NURSE PRACTITIONER

## 2025-01-14 PROCEDURE — 85025 COMPLETE CBC W/AUTO DIFF WBC: CPT | Performed by: NURSE PRACTITIONER

## 2025-01-14 PROCEDURE — 3288F FALL RISK ASSESSMENT DOCD: CPT | Mod: CPTII,S$GLB,, | Performed by: NURSE PRACTITIONER

## 2025-01-14 PROCEDURE — 3072F LOW RISK FOR RETINOPATHY: CPT | Mod: CPTII,S$GLB,, | Performed by: NURSE PRACTITIONER

## 2025-01-14 PROCEDURE — 99397 PER PM REEVAL EST PAT 65+ YR: CPT | Mod: S$GLB,,, | Performed by: NURSE PRACTITIONER

## 2025-01-14 PROCEDURE — 99999 PR PBB SHADOW E&M-EST. PATIENT-LVL V: CPT | Mod: PBBFAC,,, | Performed by: NURSE PRACTITIONER

## 2025-01-14 PROCEDURE — 3078F DIAST BP <80 MM HG: CPT | Mod: CPTII,S$GLB,, | Performed by: NURSE PRACTITIONER

## 2025-01-14 PROCEDURE — 1160F RVW MEDS BY RX/DR IN RCRD: CPT | Mod: CPTII,S$GLB,, | Performed by: NURSE PRACTITIONER

## 2025-01-14 PROCEDURE — 80061 LIPID PANEL: CPT | Performed by: NURSE PRACTITIONER

## 2025-01-14 PROCEDURE — 3008F BODY MASS INDEX DOCD: CPT | Mod: CPTII,S$GLB,, | Performed by: NURSE PRACTITIONER

## 2025-01-14 PROCEDURE — 83036 HEMOGLOBIN GLYCOSYLATED A1C: CPT | Performed by: NURSE PRACTITIONER

## 2025-01-14 PROCEDURE — 84443 ASSAY THYROID STIM HORMONE: CPT | Performed by: NURSE PRACTITIONER

## 2025-01-14 PROCEDURE — 1101F PT FALLS ASSESS-DOCD LE1/YR: CPT | Mod: CPTII,S$GLB,, | Performed by: NURSE PRACTITIONER

## 2025-01-14 PROCEDURE — 1159F MED LIST DOCD IN RCRD: CPT | Mod: CPTII,S$GLB,, | Performed by: NURSE PRACTITIONER

## 2025-01-14 PROCEDURE — 82306 VITAMIN D 25 HYDROXY: CPT | Performed by: NURSE PRACTITIONER

## 2025-01-14 PROCEDURE — 80053 COMPREHEN METABOLIC PANEL: CPT | Performed by: NURSE PRACTITIONER

## 2025-01-14 PROCEDURE — 3074F SYST BP LT 130 MM HG: CPT | Mod: CPTII,S$GLB,, | Performed by: NURSE PRACTITIONER

## 2025-01-14 PROCEDURE — 1125F AMNT PAIN NOTED PAIN PRSNT: CPT | Mod: CPTII,S$GLB,, | Performed by: NURSE PRACTITIONER

## 2025-01-14 RX ORDER — METFORMIN HYDROCHLORIDE 500 MG/1
500 TABLET ORAL DAILY
Qty: 180 TABLET | Refills: 0 | Status: SHIPPED | OUTPATIENT
Start: 2025-01-14

## 2025-01-14 RX ORDER — LEVOTHYROXINE SODIUM 50 UG/1
50 TABLET ORAL DAILY
Qty: 90 TABLET | Refills: 0 | Status: SHIPPED | OUTPATIENT
Start: 2025-01-14

## 2025-01-15 ENCOUNTER — TELEPHONE (OUTPATIENT)
Dept: INTERNAL MEDICINE | Facility: CLINIC | Age: 75
End: 2025-01-15
Payer: MEDICARE

## 2025-01-15 ENCOUNTER — TELEPHONE (OUTPATIENT)
Dept: PODIATRY | Facility: CLINIC | Age: 75
End: 2025-01-15
Payer: MEDICARE

## 2025-01-15 DIAGNOSIS — E86.9 VOLUME DEPLETION: ICD-10-CM

## 2025-01-15 DIAGNOSIS — I25.5 ISCHEMIC CARDIOMYOPATHY: Primary | ICD-10-CM

## 2025-01-15 DIAGNOSIS — R79.89 ELEVATED LFTS: ICD-10-CM

## 2025-01-15 DIAGNOSIS — N17.9 AKI (ACUTE KIDNEY INJURY): ICD-10-CM

## 2025-01-15 LAB — TSH SERPL DL<=0.005 MIU/L-ACNC: 1.76 UIU/ML (ref 0.4–4)

## 2025-01-15 NOTE — TELEPHONE ENCOUNTER
----- Message from Dia sent at 1/15/2025  3:08 PM CST -----  Contact: [phone]  Patient is returning a phone call.    Who left a message for the patient:  Savannah Thorpe MA    Does patient know what this is regarding: yes      Would you like a call back, or a response through your [facility] portal?:   call back     Comments:

## 2025-01-15 NOTE — TELEPHONE ENCOUNTER
Spoke with pt and advise her to check her portal message sent by Ms. Esquivel regarding her blood test result.  Pt verbalized understand .

## 2025-01-15 NOTE — TELEPHONE ENCOUNTER
----- Message from RALEIGH Motley sent at 1/15/2025  7:12 AM CST -----  #To respectfully keep you in the loop Dr. Wu... she is new to us at [facility] saw her on yesterday, has lots going on / complex case.... will be seeing you next week, but I am here to support if you need me.     #Savannah, can you please reach out to ascertain she has received the message below... not certain she is checking her portal. Thanks for the help.

## 2025-01-15 NOTE — TELEPHONE ENCOUNTER
Called pt and left brief VM to daughter and pt with detailed message to check pt portal.  Contact no was provided to call back if pt have question.

## 2025-01-15 NOTE — TELEPHONE ENCOUNTER
Spoke with pt and was able to get scheduled for a appointment with Dr. Antony. Pt verbally confirmed new appointment date and time

## 2025-01-29 ENCOUNTER — OFFICE VISIT (OUTPATIENT)
Dept: INTERNAL MEDICINE | Facility: CLINIC | Age: 75
End: 2025-01-29
Payer: MEDICARE

## 2025-01-29 ENCOUNTER — CLINICAL SUPPORT (OUTPATIENT)
Dept: INTERNAL MEDICINE | Facility: CLINIC | Age: 75
End: 2025-01-29
Attending: FAMILY MEDICINE
Payer: MEDICARE

## 2025-01-29 VITALS
HEIGHT: 64 IN | HEART RATE: 61 BPM | WEIGHT: 172.81 LBS | OXYGEN SATURATION: 96 % | DIASTOLIC BLOOD PRESSURE: 60 MMHG | SYSTOLIC BLOOD PRESSURE: 96 MMHG | BODY MASS INDEX: 29.5 KG/M2

## 2025-01-29 DIAGNOSIS — Z12.4 ENCOUNTER FOR SCREENING FOR MALIGNANT NEOPLASM OF CERVIX: ICD-10-CM

## 2025-01-29 DIAGNOSIS — E83.52 HYPERCALCEMIA: ICD-10-CM

## 2025-01-29 DIAGNOSIS — I50.42 CHRONIC COMBINED SYSTOLIC AND DIASTOLIC CONGESTIVE HEART FAILURE: ICD-10-CM

## 2025-01-29 DIAGNOSIS — I25.5 ISCHEMIC CARDIOMYOPATHY: ICD-10-CM

## 2025-01-29 DIAGNOSIS — N39.0 RECURRENT UTI: ICD-10-CM

## 2025-01-29 DIAGNOSIS — N94.819 VULVODYNIA, UNSPECIFIED: ICD-10-CM

## 2025-01-29 DIAGNOSIS — Z12.11 ENCOUNTER FOR SCREENING FOR MALIGNANT NEOPLASM OF COLON: ICD-10-CM

## 2025-01-29 DIAGNOSIS — E03.9 HYPOTHYROIDISM, UNSPECIFIED TYPE: ICD-10-CM

## 2025-01-29 DIAGNOSIS — E78.5 DYSLIPIDEMIA: ICD-10-CM

## 2025-01-29 DIAGNOSIS — Z13.820 ENCOUNTER FOR SCREENING FOR OSTEOPOROSIS: ICD-10-CM

## 2025-01-29 DIAGNOSIS — G56.03 CARPAL TUNNEL SYNDROME, BILATERAL: ICD-10-CM

## 2025-01-29 DIAGNOSIS — Z12.39 ENCOUNTER FOR OTHER SCREENING FOR MALIGNANT NEOPLASM OF BREAST: ICD-10-CM

## 2025-01-29 DIAGNOSIS — E11.40 TYPE 2 DIABETES MELLITUS WITH DIABETIC NEUROPATHY, WITHOUT LONG-TERM CURRENT USE OF INSULIN: ICD-10-CM

## 2025-01-29 DIAGNOSIS — R79.89 ELEVATED LFTS: ICD-10-CM

## 2025-01-29 DIAGNOSIS — Z76.89 ENCOUNTER TO ESTABLISH CARE WITH NEW DOCTOR: Primary | ICD-10-CM

## 2025-01-29 DIAGNOSIS — I25.10 CORONARY ARTERY DISEASE INVOLVING NATIVE CORONARY ARTERY OF NATIVE HEART WITHOUT ANGINA PECTORIS: ICD-10-CM

## 2025-01-29 DIAGNOSIS — N30.10 IC (INTERSTITIAL CYSTITIS): ICD-10-CM

## 2025-01-29 DIAGNOSIS — Z23 NEED FOR VACCINATION: ICD-10-CM

## 2025-01-29 DIAGNOSIS — N18.32 STAGE 3B CHRONIC KIDNEY DISEASE: ICD-10-CM

## 2025-01-29 PROBLEM — N17.9 AKI (ACUTE KIDNEY INJURY): Status: RESOLVED | Noted: 2020-03-02 | Resolved: 2025-01-29

## 2025-01-29 PROBLEM — R06.02 SHORTNESS OF BREATH: Status: RESOLVED | Noted: 2020-03-01 | Resolved: 2025-01-29

## 2025-01-29 PROBLEM — Z86.16 HISTORY OF 2019 NOVEL CORONAVIRUS DISEASE (COVID-19): Status: RESOLVED | Noted: 2020-09-21 | Resolved: 2025-01-29

## 2025-01-29 PROCEDURE — 3072F LOW RISK FOR RETINOPATHY: CPT | Mod: CPTII,S$GLB,, | Performed by: FAMILY MEDICINE

## 2025-01-29 PROCEDURE — 3288F FALL RISK ASSESSMENT DOCD: CPT | Mod: CPTII,S$GLB,, | Performed by: FAMILY MEDICINE

## 2025-01-29 PROCEDURE — 1101F PT FALLS ASSESS-DOCD LE1/YR: CPT | Mod: CPTII,S$GLB,, | Performed by: FAMILY MEDICINE

## 2025-01-29 PROCEDURE — 99215 OFFICE O/P EST HI 40 MIN: CPT | Mod: S$GLB,,, | Performed by: FAMILY MEDICINE

## 2025-01-29 PROCEDURE — 3074F SYST BP LT 130 MM HG: CPT | Mod: CPTII,S$GLB,, | Performed by: FAMILY MEDICINE

## 2025-01-29 PROCEDURE — 1159F MED LIST DOCD IN RCRD: CPT | Mod: CPTII,S$GLB,, | Performed by: FAMILY MEDICINE

## 2025-01-29 PROCEDURE — 99499 UNLISTED E&M SERVICE: CPT | Mod: S$GLB,,, | Performed by: FAMILY MEDICINE

## 2025-01-29 PROCEDURE — 3078F DIAST BP <80 MM HG: CPT | Mod: CPTII,S$GLB,, | Performed by: FAMILY MEDICINE

## 2025-01-29 PROCEDURE — 3051F HG A1C>EQUAL 7.0%<8.0%: CPT | Mod: CPTII,S$GLB,, | Performed by: FAMILY MEDICINE

## 2025-01-29 PROCEDURE — 1160F RVW MEDS BY RX/DR IN RCRD: CPT | Mod: CPTII,S$GLB,, | Performed by: FAMILY MEDICINE

## 2025-01-29 PROCEDURE — 3008F BODY MASS INDEX DOCD: CPT | Mod: CPTII,S$GLB,, | Performed by: FAMILY MEDICINE

## 2025-01-29 PROCEDURE — 99999 PR PBB SHADOW E&M-EST. PATIENT-LVL IV: CPT | Mod: PBBFAC,,, | Performed by: FAMILY MEDICINE

## 2025-01-29 NOTE — PROGRESS NOTES
Not eligible for Eye Camera. Should not have been an add on. Camera order canceled referral placed and scheduled.

## 2025-01-29 NOTE — PROGRESS NOTES
Subjective:     Patient ID: Niki Soriano is a 74 y.o. female.   Chief Complaint: Establish Care    HPI:  History of Present Illness    CHIEF COMPLAINT:  Patient presents today for follow up of recent lab work    Had physical with a colleague earlier this month. Fasting labs were drawn at that time.    Hemoglobin was slightly low indicating mild anemia. Triglycerides were significantly elevated, being the only concerning component of the cholesterol panel. Liver function tests were elevated compared to previous results. Calcium levels have been chronically elevated for many years. Hemoglobin A1C was 7.1, approaching target of 7 or under. Thyroid and vitamin D levels were normal.    She reports poor dietary habits over the past few months, consisting primarily of high-calorie, processed foods including cake, cookies, candy, ice cream, and potato chips consumed throughout the day and evening. In 2019, she achieved a 20-pound weight loss over five months through reduced carbohydrate intake, increased protein consumption, and incorporation of more vegetables and fruits. She acknowledges the need to return to healthier eating habits.    She reports improvement in her ankle injury from last encounter. The air cast is off and she is able to wear shoes again.    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    Ischemic cardiomyopathy    Coronary artery disease involving native coronary artery of native heart without angina pectoris    Chronic combined systolic and diastolic  congestive heart failure    Spinal cord abscess    Dyslipidemia    Elevated LFTs    PAD (peripheral artery disease)    Diabetic gangrene    Stage 3b chronic kidney disease      Past Medical History:   Diagnosis Date    Allergy     Coronary artery disease     Diabetes mellitus     History of 2019 novel coronavirus disease (COVID-19) 2020    Diagnosed at [facility] on 20, asymptomatic      Hypercholesteremia     Hypertension     IC (interstitial cystitis)     Numbness in both hands 2017    Spondylisthesis     Vulvodynia, unspecified       Past Surgical History:   Procedure Laterality Date    CARPAL TUNNEL RELEASE Right 2019    Procedure: RELEASE, CARPAL TUNNEL, REVISION;  Surgeon: Annabelle Cid MD;  Location: [facility] OR [address] [place] [address];  Service: Orthopedics;  Laterality: Right;  Axogen, Clarix     SECTION      x3    CORONARY ANGIOGRAPHY N/A 2020    Procedure: ANGIOGRAM, CORONARY ARTERY;  Surgeon: Markie Friend III, MD;  Location: [facility] CATH LAB;  Service: Cardiology;  Laterality: N/A;    CORONARY ANGIOGRAPHY N/A 2020    Procedure: ANGIOGRAM, CORONARY ARTERY;  Surgeon: Rodney Gamble MD;  Location: [facility] CATH LAB;  Service: Cardiology;  Laterality: N/A;    CYSTOSCOPY      HYSTERECTOMY  1998    ischemic colitis      LEFT HEART CATHETERIZATION Left 2020    Procedure: Left heart cath;  Surgeon: Markie Friend III, MD;  Location: [facility] CATH LAB;  Service: Cardiology;  Laterality: Left;    LEFT HEART CATHETERIZATION Left 2020    Procedure: Left heart cath;  Surgeon: Rodney Gamble MD;  Location: [facility] CATH LAB;  Service: Cardiology;  Laterality: Left;    OOPHORECTOMY      [place] REMOVAL OF OVARY/TUBE(S)        Social History     Socioeconomic History    Marital status:    Tobacco Use    Smoking status: Never    Smokeless tobacco: Never   Substance and Sexual Activity    Alcohol use: No    Drug use: No    Sexual activity: Yes     Partners: Male         Medication Review:    Current Outpatient Medications:     ammonium lactate 12 % Crea, Apply 1 application topically 2 (two) times daily., Disp: 140 g, Rfl: 11    b complex vitamins capsule, Take 1 capsule by mouth once daily., Disp: , Rfl:     baclofen (LIORESAL) 20 MG tablet, Take 20 mg by mouth 3 (three) times daily as needed. , Disp: , Rfl:     cholecalciferol, vitamin D3, (VITAMIN D3) 25 mcg (1,000 unit) capsule, Take 1,000 Units by mouth once daily., Disp: , Rfl:     duloxetine (CYMBALTA) 30 MG capsule, Take 30 mg by mouth once daily., Disp: , Rfl:     estradioL (ESTRACE) 0.01 % (0.1 mg/gram) vaginal cream, PLACE 0.5 G VAGINALLY THREE TIMES A WEEK, Disp: 42.5 g, Rfl: 0    ezetimibe (ZETIA) 10 mg tablet, Take 1 tablet (10 mg total) by mouth once daily., Disp: 90 tablet, Rfl: 3    furosemide (LASIX) 40 MG tablet, Take 1 tablet (40 mg total) by mouth once daily., Disp: 30 tablet, Rfl: 11    gabapentin (NEURONTIN) 300 MG capsule, TAKE 1 CAPSULE BY MOUTH THREE TIMES A DAY (Patient taking differently: Patients states she takes 2 capsules 1 time at night.), Disp: 270 capsule, Rfl: 0    Lactobac no.51/Bifidobact no.4 (UP4 PROBIOTICS ULTRA ORAL), Take by mouth., Disp: , Rfl:     levothyroxine (SYNTHROID) 50 MCG tablet, Take 1 tablet (50 mcg total) by mouth once daily., Disp: 90 tablet, Rfl: 0    metFORMIN (GLUCOPHAGE) 500 MG tablet, Take 1 tablet (500 mg total) by mouth once daily., Disp: 180 tablet, Rfl: 0    metoprolol succinate (TOPROL-XL) 25 MG 24 hr tablet, Take 1 tablet (25 mg total) by mouth once daily., Disp: 90 tablet, Rfl: 3    multivitamin (THERAGRAN) per tablet, Take 1 tablet by mouth once daily., Disp: , Rfl:     omega-3 fatty acids/fish oil (FISH OIL-OMEGA-3 FATTY ACIDS) 300-1,000 mg capsule, Take by mouth once daily., Disp: , Rfl:     oxybutynin (DITROPAN-XL) 5 MG TR24, Take 1 tablet (5 mg total) by mouth as needed (urgency, urge incontinence)., Disp: 30 tablet, Rfl: 11    rosuvastatin (CRESTOR) 40 MG  "Tab, Take 1 tablet (40 mg total) by mouth every evening., Disp: 90 tablet, Rfl: 3    sacubitriL-valsartan (ENTRESTO)  mg per tablet, Take 1 tablet by mouth 2 (two) times daily., Disp: 60 tablet, Rfl: 11    spironolactone (ALDACTONE) 25 MG tablet, Take 1 tablet (25 mg total) by mouth once daily., Disp: 90 tablet, Rfl: 3    valsartan (DIOVAN) 320 MG tablet, Take 1 tablet (320 mg total) by mouth once daily., Disp: 90 tablet, Rfl: 3    aspirin 81 MG Chew, Take 1 tablet (81 mg total) by mouth once daily., Disp: , Rfl: 0     Review of Systems   Constitutional:  Negative for chills, fatigue and fever.   HENT:  Negative for nasal congestion, ear pain, postnasal drip and sore throat.    Eyes:  Negative for visual disturbance.   Respiratory:  Negative for cough, shortness of breath and wheezing.    Cardiovascular:  Negative for chest pain and palpitations.   Gastrointestinal:  Negative for abdominal pain, change in bowel habit, constipation, diarrhea, nausea and vomiting.   Genitourinary:  Negative for dysuria and hematuria.   Musculoskeletal:  Negative for back pain, leg pain and neck pain.   Neurological:  Negative for dizziness, numbness and headaches.   Hematological:  Negative for adenopathy.   Psychiatric/Behavioral:  Negative for dysphoric mood, sleep disturbance and suicidal ideas. The patient is not nervous/anxious.           Objective:      Vitals:    01/29/25 1311   BP: 96/60   BP Location: Left arm   Patient Position: Sitting   Pulse: 61   SpO2: 96%   Weight: 78.4 kg (172 lb 13.5 oz)   Height: 5' 4" (1.626 m)      Physical Exam  Vitals reviewed.   Constitutional:       General: She is not in acute distress.     Appearance: Normal appearance. She is not ill-appearing.   HENT:      Head: Normocephalic and atraumatic.      Right Ear: Tympanic membrane, ear canal and external ear normal. There is no impacted cerumen.      Left Ear: Tympanic membrane, ear canal and external ear normal. There is no impacted " cerumen.      Nose: Nose normal. No congestion or rhinorrhea.      Mouth/Throat:      Mouth: Mucous membranes are moist.      Pharynx: Oropharynx is clear. No oropharyngeal exudate or posterior oropharyngeal erythema.   Eyes:      General: No scleral icterus.        Right eye: No discharge.         Left eye: No discharge.      Conjunctiva/sclera: Conjunctivae normal.   Neck:      Thyroid: No thyroid mass, thyromegaly or thyroid tenderness.   Cardiovascular:      Rate and Rhythm: Normal rate and regular rhythm.      Pulses: Normal pulses.      Heart sounds: Normal heart sounds. No murmur heard.     No friction rub. No gallop.   Pulmonary:      Effort: Pulmonary effort is normal. No respiratory distress.      Breath sounds: Normal breath sounds. No stridor. No wheezing, rhonchi or rales.   Abdominal:      General: Abdomen is flat. Bowel sounds are normal. There is no distension.      Palpations: Abdomen is soft. There is no mass.      Tenderness: There is no abdominal tenderness. There is no guarding.      Hernia: No hernia is present.   Musculoskeletal:         General: Swelling (R ankle) present. No deformity.      Cervical back: Normal range of motion and neck supple. No tenderness.      Comments: Pt present in WC today d/t healing R ankle injury   Lymphadenopathy:      Cervical: No cervical adenopathy.   Skin:     General: Skin is warm and dry.   Neurological:      General: No focal deficit present.      Mental Status: She is alert and oriented to person, place, and time. Mental status is at baseline.   Psychiatric:         Mood and Affect: Mood normal.         Behavior: Behavior normal.         Thought Content: Thought content normal.         Judgment: Judgment normal.           Assessment/Plan:             ICD-10-CM ICD-9-CM   1. Encounter to establish care with new doctor  Z76.89 V65.8   2. Ischemic cardiomyopathy  I25.5 414.8   3. Dyslipidemia  E78.5 272.4   4. Chronic combined systolic and diastolic  congestive heart failure  I50.42 428.42     428.0   5. Carpal tunnel syndrome, bilateral  G56.03 354.0   6. Type 2 diabetes mellitus with diabetic neuropathy, without long-term current use of insulin  E11.40 250.60     357.2   7. Hypothyroidism, unspecified type  E03.9 244.9   8. IC (interstitial cystitis)  N30.10 595.1   9. Recurrent UTI  N39.0 599.0   10. Vulvodynia, unspecified  N94.819 625.70   11. Coronary artery disease involving native coronary artery of native heart without angina pectoris  I25.10 414.01   12. Encounter for screening for malignant neoplasm of colon  Z12.11 V76.51   13. Encounter for other screening for malignant neoplasm of breast  Z12.39 V76.19   14. Encounter for screening for malignant neoplasm of cervix  Z12.4 V76.2   15. Encounter for screening for osteoporosis  Z13.820 V82.81   16. Need for vaccination  Z23 V05.9   17. Elevated LFTs  R79.89 790.6   18. Stage 3b chronic kidney disease  N18.32 585.3   19. Hypercalcemia  E83.52 275.42     Orders Placed This Encounter   Procedures    COMPREHENSIVE METABOLIC PANEL    PTH, intact    LIPID PANEL    Diabetic Eye Screening Photo - Automated Analysis       Assessment & Plan    - Reviewed recent lab results  - Assessed HbA1c at 7.1%, slightly above target of 7%  - Confirmed patient's history of full hysterectomy, eliminating need for further pap smears    1. Encounter to establish care with new doctor (Primary)  Reviewed chronic medical conditions, updated problem list and medication list    2. Ischemic cardiomyopathy  Stable, following w/ cardiology  No changes to therapy today  Continue lasix, metoprolol, entresto, spironolactone    3. Dyslipidemia  Elevated triglycerides - appears to be out of her normal well controlled trend  Continue zetia  Plan to recheck lipids again 6-12 weeks  - LIPID PANEL; Future    4. Chronic combined systolic and diastolic congestive heart failure  Stable, no acute issues  See above for management    5. Carpal tunnel  syndrome, bilateral  No acute issues  Continue conservative management  Engage specialist if needed    6. Type 2 diabetes mellitus with diabetic neuropathy, without long-term current use of insulin  Stable, near goal for A1c  Plan to check again in 3 months  Sending for A-EYE scan today  F/u in 6 months for full diabetic visit  Focus on improving diet as discussed  Continue metformin  - Diabetic Eye Screening Photo - Automated Analysis; Future  - COMPREHENSIVE METABOLIC PANEL; Future    7. Hypothyroidism, unspecified type  Stable, controlled  Continue synthroid 50mcg    8. IC (interstitial cystitis)  Controlled  Continue ditropan    9. Recurrent UTI  No active issues, but will be mindful if she becomes symptomatic in the future    10. Vulvodynia, unspecified  Stable, managed w/ estrace  No change to therapy    11. Coronary artery disease involving native coronary artery of native heart without angina pectoris  Stable, followed by cardiology  Continue risk mitigation with comorbid tx as outlined above    12. Encounter for screening for malignant neoplasm of colon  Obtaining colonoscopy records to determine need for further screening    13. Encounter for other screening for malignant neoplasm of breast  Mammogram ordered    14. Encounter for screening for malignant neoplasm of cervix  S/p total hysterectomy  Pap no longer indicated    15. Encounter for screening for osteoporosis  DEXA ordered    16. Need for vaccination  Influenza declined    17. Elevated LFTs  Outside of established baseline  F/u repeat CMP in 6-12 weeks for normalization  - COMPREHENSIVE METABOLIC PANEL; Future    18. Stage 3b chronic kidney disease  Stable, improved on most recent CMP  F/u repeat CMP for continued trend  - COMPREHENSIVE METABOLIC PANEL; Future    19. Hypercalcemia  Noted on several previous CMPs  Ordering repeat CMP  Plan to check PTH as well - not sure if this has been done before  - COMPREHENSIVE METABOLIC PANEL; Future  - PTH,  intact; Future        No follow-ups on file.     Dale Wu MD, [facility]  Family Medicine Physician  [facility]  01/29/2025    This note was generated with the assistance of ambient listening technology. Verbal consent was obtained by the patient and accompanying visitor(s) for the recording of patient appointment to facilitate this note. I attest to having reviewed and edited the generated note for accuracy, though some syntax or spelling errors may persist. Please contact the author of this note for any clarification.

## 2025-01-30 ENCOUNTER — TELEPHONE (OUTPATIENT)
Dept: ADMINISTRATIVE | Facility: HOSPITAL | Age: 75
End: 2025-01-30
Payer: MEDICARE

## 2025-01-30 ENCOUNTER — OFFICE VISIT (OUTPATIENT)
Dept: PODIATRY | Facility: CLINIC | Age: 75
End: 2025-01-30
Payer: MEDICARE

## 2025-01-30 ENCOUNTER — PATIENT OUTREACH (OUTPATIENT)
Dept: INTERNAL MEDICINE | Facility: CLINIC | Age: 75
End: 2025-01-30
Payer: MEDICARE

## 2025-01-30 VITALS — WEIGHT: 172.81 LBS | HEIGHT: 64 IN | BODY MASS INDEX: 29.5 KG/M2

## 2025-01-30 DIAGNOSIS — E11.49 TYPE II DIABETES MELLITUS WITH NEUROLOGICAL MANIFESTATIONS: ICD-10-CM

## 2025-01-30 DIAGNOSIS — M76.821 POSTERIOR TIBIAL TENDON DYSFUNCTION, BILATERAL: ICD-10-CM

## 2025-01-30 DIAGNOSIS — M76.822 POSTERIOR TIBIAL TENDON DYSFUNCTION, BILATERAL: ICD-10-CM

## 2025-01-30 DIAGNOSIS — S93.401A SPRAIN OF RIGHT ANKLE, UNSPECIFIED LIGAMENT, INITIAL ENCOUNTER: Primary | ICD-10-CM

## 2025-01-30 PROCEDURE — 99999 PR PBB SHADOW E&M-EST. PATIENT-LVL III: CPT | Mod: PBBFAC,,, | Performed by: PODIATRIST

## 2025-01-30 NOTE — TELEPHONE ENCOUNTER
----- Message from Dale Wu MD sent at 1/29/2025  8:50 PM CST -----  Patient reports a prior colonoscopy through [facility], possibly as long as 12 or 13 years ago. Can you please assist in obtaining this record for HM? Thanks!

## 2025-01-30 NOTE — PROGRESS NOTES
Subjective:      Patient ID: Niki Soriano is a 74 y.o. female.    Chief Complaint: Ankle Injury (Sprain R ankle)    Diabetes, increased risk amputation needing evaluation/management/optomization of foot care.    Cc2  pain swelling right ankle.  Rapid onset about 3 weeks ago during transfer from wheelchair where she had turn of the ankle funny.  She has gotten good improvement since with rest.  No prior medical treatment.  No self-treatment.  Denies repeat trauma and surgery of the right foot.  Patient has a known significant weakness bilateral lower extremities with functional deficit due to spinal injury many years ago.    Review of Systems   Constitutional: Negative for chills, diaphoresis, fever, malaise/fatigue and night sweats.   Cardiovascular:  Positive for leg swelling. Negative for claudication, cyanosis and syncope.   Skin:  Positive for nail changes. Negative for color change, dry skin, rash, suspicious lesions and unusual hair distribution.   Musculoskeletal:  Negative for falls, joint pain, joint swelling, muscle cramps, muscle weakness and stiffness.   Gastrointestinal:  Negative for constipation, diarrhea, nausea and vomiting.   Neurological:  Positive for numbness, paresthesias and sensory change. Negative for brief paralysis, disturbances in coordination, focal weakness and tremors.           Objective:      Physical Exam  Constitutional:       General: She is not in acute distress.     Appearance: She is well-developed. She is not diaphoretic.   Cardiovascular:      Pulses:           Popliteal pulses are 2+ on the right side and 2+ on the left side.        Dorsalis pedis pulses are 2+ on the right side and 2+ on the left side.        Posterior tibial pulses are 2+ on the right side and 2+ on the left side.      Comments: Capillary refill 3 seconds all toes/distal feet, all toes/both feet warm to touch.      Negative lymphadenopathy bilateral popliteal fossa and tarsal tunnel.     Less than 2+  pitting lower extremity edema bilateral.    Musculoskeletal:      Right ankle: No swelling, deformity, ecchymosis or lacerations. Normal range of motion. Normal pulse.      Right Achilles Tendon: Normal. No defects. Mancini's test negative.      Comments: Patient has weakness hypermobility of the ankle and foot bilateral without gross deformity or signs of acute trauma.    No current tenderness to palpation right or left ankle.  Stress testing right and left ankles is bilateral symmetrical somewhat positive for the anterior drawer sign, negative posterior drawer, negative external rotation test, negative squeeze test, negative Fry test bilateral.   Lymphadenopathy:      Lower Body: No right inguinal adenopathy. No left inguinal adenopathy.      Comments: Negative lymphadenopathy bilateral popliteal fossa and tarsal tunnel.    Negative lymphangitic streaking bilateral feet/ankles/legs.   Skin:     General: Skin is warm and dry.      Capillary Refill: Capillary refill takes 2 to 3 seconds.      Coloration: Skin is not pale.      Findings: No abrasion, bruising, burn, ecchymosis, erythema, laceration, lesion or rash.      Nails: There is no clubbing.      Comments:   Patient does have tenderness and perceived pain around the right and left big toenails without pain to palpation manipulation today manually in the office.    Both big toes, all toes for that matter, without periungual skin abnormality open skin drainage pus tracking fluctuance malodor signs infection.      Skin thin, shiny, atrophic, with decreased density and distribution of pedal hair bilateral, but without hyperpigmentation, maribel discoloration,  ulcers, masses, nodules or cords palpated bilateral feet and legs.    Neurological:      Mental Status: She is alert and oriented to person, place, and time.      Sensory: Sensory deficit present.      Motor: No tremor, atrophy or abnormal muscle tone.      Gait: Gait normal.      Comments: Diminished/loss  of protective sensation all toes bilateral to 10 gram monofilament.    Paresthesias, and burning bilateral feet with no clearly identified trigger or source.     Psychiatric:         Behavior: Behavior is cooperative.               Assessment:       Encounter Diagnoses   Name Primary?    Sprain of right ankle, unspecified ligament, initial encounter Yes    Type II diabetes mellitus with neurological manifestations     Posterior tibial tendon dysfunction, bilateral          Plan:       Niki was seen today for ankle injury.    Diagnoses and all orders for this visit:    Sprain of right ankle, unspecified ligament, initial encounter    Type II diabetes mellitus with neurological manifestations    Posterior tibial tendon dysfunction, bilateral      I counseled the patient on her conditions, their implications and medical management.    The patient has received literature on basic diabetic foot care.  Patient will inspect feet daily, wear protective shoe gear when ambulatory, and apply moisturizer to skin as needed to maintain elasticity and help prevent ulceration.    Inspect feet multiple times daily for signs of occurrence/recurrence ulceration.      Activity to tolerance and shoes of choice.      I recommend continuing self maintenance at home of the nails with nail file inappropriate socks and shoes of the right size and dimension.      Tala Gutierrez, PGY-3 present for and full participant in all patient care this encounter.      Follow up in about 1 year (around 1/30/2026).

## 2025-01-30 NOTE — LETTER
"       AUTHORIZATION FOR RELEASE OF   CONFIDENTIAL INFORMATION    Dear [facility] Medical Records,    We are seeing Niki Soriano, date of birth 1950, in the clinic at [facility] INTER[facility]. Dale Wu MD is the patient's PCP. Niki Soriano has an outstanding lab/procedure at the time we reviewed her chart. In order to help keep her health information updated, she has authorized us to request the following medical record(s):        (  )  MAMMOGRAM                                      ( xx )  COLONOSCOPY      (  )  PAP SMEAR                                          (  )  OUTSIDE LAB RESULTS     (  )  DEXA SCAN                                          (  )  EYE EXAM            (  )  FOOT EXAM                                          (  )  ENTIRE RECORD     (  )  OUTSIDE IMMUNIZATIONS                 (  )  _______________         Please fax records to Dale Wu MD, [phone]. Patient states that she had a colonoscopy 10-15 years ago.     If you have any questions, please contact WHITNEY Bonilla at [phone].           Patient Name: Niik Soriano  : 1950  Patient Phone #: [phone]              Niki Soriano  MRN: [id number]  : 1950  Age: 74 y.o.  Sex: female       HOSPITAL AUTHORIZATION      A. Consent for Examination and Treatment: I hereby authorize the providers and employees of [facility] to provide medical treatment/services which includes, but is not limited to, performing and administering tests and diagnostic procedures that are deemed necessary, including, but not limited to, imaging examinations, blood tests and other laboratory procedures as may be required by the hospital, clinic, or may be ordered by my physician(s) or persons working under the general and/or special instructions of my physician(s).  I understand and agree that this consent covers all authorized persons, including but not limited to residents, nurse practitioners, physicians' assistants, " specialists, consultants and independently contracted physicians who are called upon by the physician in charge to carry out the diagnostic procedures and medical or surgical treatment.  I hereby authorize [facility] to retain or dispose of any specimens or tissue, should there be such remaining from any test or procedure.  I hereby authorize and give consent for [facility] providers and employees to take photographs, images or videotapes of such diagnostic, surgical or treatment procedures of Patient as may be required by [facility] or as may be ordered by a physician. I further acknowledge and agree that [facility] may use cameras or other devices for patient monitoring  I am aware that the practice of medicine is not an exact science, and I acknowledge that no guarantees have been made to me as to the outcome of any tests, procedures or treatment.  As part of your [facility] delivery, you will be offered a Covid-19 vaccine. Certain eligibility criteria may be supported under Emergency Use Authorization (EUA). Please let your medical team know if you wish to receive the Covid-19 vaccine during this hospitalization.       B. Authorization for Release of Information: I understand that my insurance company and/or their agents may need information necessary to make determinations about payment/reimbursement. I hereby provide authorization to release to all insurance companies, their successors, assignees, other parties with whom they may have contracted, or others acting on their behalf, that are involved with payment for any hospital and/or clinic charges incurred by the patient, any information that they request and deem necessary for payment/reimbursement, and/or quality review. I further authorize the release of my health information to physicians or other health care practitioners on staff who are involved in my health care now and in the future, and to other health care providers, entities, or institutions for the  purpose of my continued care and treatment, including referrals.     C. Medicare Patient's Certification and Authorization to Release Information and Payment Request: I certify that the information given by me in applying for payment under Title XVIII of the Social Security Act is correct. I authorize any morgan of medical or other information about me to release to the Social Security Administration or its intermediaries or carriers, any information needed for this or a related Medicare claim. I request that payment of authorized benefits be made on my behalf.        REGISTRATION AUTHORIZATION  Form No. 1084 (Rev. 1/6/2023) Page 1 of 3           [facility]     D. Assignment of Insurance Benefits: I hereby authorize all insurance companies, health plans, defined benefit plans, health insurers or any entity that is or may be responsible for payment of my medical expenses to pay all hospital and medical benefits now due, and to become due and payable to me under any hospital benefits, sick benefits, injury benefits or any other benefit for services rendered to me, including Major Medical Benefits, direct to [facility] and all independently contracted physicians. I assign any and all rights that I may have against any and all insurance companies, health plans, defined benefit plans, health insurers or any entity that is or may be responsible for payment of my medical expenses, including, but not limited to any right to appeal a denial of a claim, any right to bring any action, lawsuit, administrative proceeding, or other cause of action on my behalf. I specifically assign my right to pursue litigation against any and all insurance companies, health plans, defined benefit plans, health insurers or any entity that is or may be responsible for payment of my medical expenses based upon a refusal to pay charges.     E. Valuables: It is understood and agreed that [facility] is not liable for the damage to or loss of any  money, jewelry, documents, dentures, eye glasses, hearing aids, prosthetics, or other property of value.     F. Computer Equipment: I understand and agree that should I choose to use computer equipment owned by [facility] or if I choose to access the Internet via [facility]'s network, I do so at my own risk. [facility] is not responsible for any damage to my computer equipment or to any damages of any type that might arise from my loss of equipment or data.     G. Acceptance of Financial Responsibility: I agree that in consideration of the services and supplies that have been or will be furnished to the patient, I am hereby obligated to pay all charges made for or on the account of the patient according to the standard rates (in effect at the time the services and supplies are delivered) established by [facility], including its Patient Financial Assistance Policy to the extent it is applicable. I understand that I am responsible for all charges, or portions thereof, not covered by insurance or other sources. Patient refunds will be distributed only after balances at all [facility] facilities are paid.     H. Communication Authorization: I hereby authorize [facility] and its representatives, along with any billing service or  who may work on their behalf, to contact me on my cell phone and/or home phone using prerecorded messages, artificial voice messages, automatic telephone dialing devices or other computer assisted technology, or by electronic mail, text messaging, or by any other form of electronic communication. This includes, but is not limited to, appointment reminders, yearly physical exam reminders, preventive care reminders, patient campaigns, welcome calls, and calls about account balances on my account or any account on which I am listed as a guarantor. I understand I have the right to opt out of these communications at any time.     I.  Relationship Between Facility and Physician: I understand that some,  but not all, providers furnishing services to the patient are not employees or agents of [facility]. The patient is under the care and supervision of his/her attending physician, and it is the responsibility of the facility and its nursing staff to carry out the instructions of such physicians. It is the responsibility of the patient's physician/designee to obtain the patient's informed consent, when required, for medical or surgical treatment, special diagnostic or therapeutic procedures, or hospital services rendered for the patient under the special instructions of the physician/designee. Pharmacy services may be provided by individuals located outside of the facility.           REGISTRATION AUTHORIZATION  Form No. 1084 (Rev. 1/6/2023 Page 2 of 3        [facility]     J.  Notice of Privacy Practices: I acknowledge I have received a copy of [facility]'s Notice of Privacy Practices.     K. Facility Directory: I have discussed with the organization my desire to be either included or excluded in the facility directory. I understand that if my choice is to opt-out of being identified in the facility directory that the facility will not provide any information about me such as my condition (e.g. fair, stable, etc.) or my location in the facility (eg room number, department).        L. Immunizations: [facility] shares immunization information with state sponsored health departments to help you and your doctor keep track of your immunization records. By signing, you consent to have this information shared with the health department in your state:                                [place] - LINKS ([place] Immunization Network for Kids Statewide)                                [place] - MIIX ([place] Immunization Information eXchange)                                [place] - ImmPRINT (Immunization Patient Registry with Integrated Technology)     M. TERM: This authorization is valid for this and subsequent  care/treatment I receive at [facility] and will remain valid unless/until revoked in writing by me.     N. [facility]: As used in this document, [facility] means all [facility] owned and managed facilities, including, but not limited to, all health centers, surgery centers, clinics, urgent care centers, and hospitals.               Patient/Legal Gaurdian Signature     This signature was collected at 12/17/2024            _______________________________   Printed Name/Relationship to Patient                              [facility] complies with applicable Federal civil rights laws and does not discriminate on the basis of race, color, national origin, age, disability, or sex.  ATENCIÓN: si dre camarena, tiene a mauricio disposición servicios gratuitos de asistencia lingüística. Llame al [phone].  CHÚ Ý: N?u b?n nói Ti?ng Vi?t, có các d?ch v? h? tr? ngôn ng? mi?n phí dành cho b?n. G?i s? [phone].        REGISTRATION AUTHORIZATION  Form No. 1084 (Rev. 1/6/2023) Page 3 of 3

## 2025-01-31 ENCOUNTER — PATIENT OUTREACH (OUTPATIENT)
Dept: INTERNAL MEDICINE | Facility: CLINIC | Age: 75
End: 2025-01-31
Payer: MEDICARE

## 2025-01-31 NOTE — PROGRESS NOTES
Chart reviewed and updated. Reconciled immunizations, health maintenance and care everywhere.  Imported Colonoscopy from 09.08.2014 [facility].      Irene Jacinto [facility]  Panel Care Coordinator  [facility]  [phone]  For Dale Wu MD

## 2025-02-06 DIAGNOSIS — E11.9 TYPE 2 DIABETES MELLITUS WITHOUT COMPLICATION: ICD-10-CM

## 2025-02-12 ENCOUNTER — LAB VISIT (OUTPATIENT)
Dept: LAB | Facility: HOSPITAL | Age: 75
End: 2025-02-12
Attending: FAMILY MEDICINE
Payer: MEDICARE

## 2025-02-12 DIAGNOSIS — N18.32 STAGE 3B CHRONIC KIDNEY DISEASE: ICD-10-CM

## 2025-02-12 DIAGNOSIS — E78.5 DYSLIPIDEMIA: ICD-10-CM

## 2025-02-12 DIAGNOSIS — E83.52 HYPERCALCEMIA: ICD-10-CM

## 2025-02-12 DIAGNOSIS — R79.89 ELEVATED LFTS: ICD-10-CM

## 2025-02-12 DIAGNOSIS — E11.40 TYPE 2 DIABETES MELLITUS WITH DIABETIC NEUROPATHY, WITHOUT LONG-TERM CURRENT USE OF INSULIN: ICD-10-CM

## 2025-02-12 LAB
ALBUMIN SERPL BCP-MCNC: 3.9 G/DL (ref 3.5–5.2)
ALP SERPL-CCNC: 47 U/L (ref 40–150)
ALT SERPL W/O P-5'-P-CCNC: 71 U/L (ref 10–44)
ANION GAP SERPL CALC-SCNC: 6 MMOL/L (ref 8–16)
AST SERPL-CCNC: 65 U/L (ref 10–40)
BILIRUB SERPL-MCNC: 0.5 MG/DL (ref 0.1–1)
BUN SERPL-MCNC: 19 MG/DL (ref 8–23)
CALCIUM SERPL-MCNC: 10.4 MG/DL (ref 8.7–10.5)
CHLORIDE SERPL-SCNC: 108 MMOL/L (ref 95–110)
CHOLEST SERPL-MCNC: 108 MG/DL (ref 120–199)
CHOLEST/HDLC SERPL: 2.4 {RATIO} (ref 2–5)
CO2 SERPL-SCNC: 25 MMOL/L (ref 23–29)
CREAT SERPL-MCNC: 1.1 MG/DL (ref 0.5–1.4)
EST. GFR  (NO RACE VARIABLE): 53.1 ML/MIN/1.73 M^2
GLUCOSE SERPL-MCNC: 132 MG/DL (ref 70–110)
HDLC SERPL-MCNC: 45 MG/DL (ref 40–75)
HDLC SERPL: 41.7 % (ref 20–50)
LDLC SERPL CALC-MCNC: 32.2 MG/DL (ref 63–159)
NONHDLC SERPL-MCNC: 63 MG/DL
POTASSIUM SERPL-SCNC: 4.7 MMOL/L (ref 3.5–5.1)
PROT SERPL-MCNC: 6.3 G/DL (ref 6–8.4)
PTH-INTACT SERPL-MCNC: 134.3 PG/ML (ref 9–77)
SODIUM SERPL-SCNC: 139 MMOL/L (ref 136–145)
TRIGL SERPL-MCNC: 154 MG/DL (ref 30–150)

## 2025-02-12 PROCEDURE — 36415 COLL VENOUS BLD VENIPUNCTURE: CPT | Performed by: FAMILY MEDICINE

## 2025-02-12 PROCEDURE — 80061 LIPID PANEL: CPT | Performed by: FAMILY MEDICINE

## 2025-02-12 PROCEDURE — 80053 COMPREHEN METABOLIC PANEL: CPT | Performed by: FAMILY MEDICINE

## 2025-02-12 PROCEDURE — 83970 ASSAY OF PARATHORMONE: CPT | Performed by: FAMILY MEDICINE

## 2025-02-21 DIAGNOSIS — I50.42 CHRONIC COMBINED SYSTOLIC AND DIASTOLIC CONGESTIVE HEART FAILURE: ICD-10-CM

## 2025-02-21 RX ORDER — FUROSEMIDE 40 MG/1
TABLET ORAL
Qty: 45 TABLET | Refills: 1 | Status: SHIPPED | OUTPATIENT
Start: 2025-02-21

## 2025-02-27 ENCOUNTER — HOSPITAL ENCOUNTER (EMERGENCY)
Facility: HOSPITAL | Age: 75
Discharge: HOME | End: 2025-02-27
Attending: EMERGENCY MEDICINE
Payer: MEDICARE

## 2025-02-27 VITALS
SYSTOLIC BLOOD PRESSURE: 100 MMHG | HEART RATE: 78 BPM | DIASTOLIC BLOOD PRESSURE: 68 MMHG | TEMPERATURE: 99 F | OXYGEN SATURATION: 99 % | HEIGHT: 64 IN | RESPIRATION RATE: 18 BRPM | WEIGHT: 172 LBS | BODY MASS INDEX: 29.37 KG/M2
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
## 2025-02-27 DIAGNOSIS — S62.114A NONDISPLACED FRACTURE OF TRIQUETRUM (CUNEIFORM) BONE, RIGHT WRIST, INITIAL ENCOUNTER FOR CLOSED FRACTURE: Primary | ICD-10-CM

## 2025-02-27 PROCEDURE — 29125 APPL SHORT ARM SPLINT STATIC: CPT | Mod: RT,ER

## 2025-02-27 PROCEDURE — 99283 EMERGENCY DEPT VISIT LOW MDM: CPT | Mod: 25,ER

## 2025-02-27 RX ORDER — OXYCODONE HYDROCHLORIDE 5 MG/1
5 CAPSULE ORAL EVERY 6 HOURS PRN
Qty: 12 CAPSULE | Refills: 0 | Status: SHIPPED | OUTPATIENT
Start: 2025-02-27

## 2025-02-27 NOTE — ED PROVIDER NOTES
"Encounter Date: 2/27/2025       History     Chief Complaint   Patient presents with    Hand Injury     Patient stated "I fell yesterday on my right hand going on up two steps .' Decreased ROM on right hand and wrist. Bilateral Pulse 2+. Denies LOC or head injury.      Niki Soriano is a 74-year-old female with past medical history of CAD, type 2 diabetes, hypertension, and arthritis who presents to the emergency department for evaluation of right hand and wrist pain.  Last night, she was walking up the cement stairs at her home when she had a slip and fall.  When trying to catch herself, she forcefully struck her right hand on the cement step.  Since then, she has been having pain to the right hand and wrist, worst at the palmar aspect of the right hand.  She denies numbness, weakness, or pallor.  She does report decreased range of motion but states that some of this is baseline secondary to her arthritis.  She denies head injury or loss of consciousness.  No medications prior to arrival.  Patient reports allergies to most narcotics including hydrocodone and tramadol and tells me that she is not allowed to take Tylenol or NSAIDs per her primary care physician's instructions.    The history is provided by the patient. No  was used.     Review of patient's allergies indicates:   Allergen Reactions    Augmentin [amoxicillin-pot clavulanate] Other (See Comments)     Patient cannot recall what she had, only that she could not tolerate the Augmentin    Tramadol Itching    Tegretol [carbamazepine] Itching and Rash    Vicodin [hydrocodone-acetaminophen] Itching and Anxiety     Past Medical History:   Diagnosis Date    Allergy     Coronary artery disease     Diabetes mellitus     History of 2019 novel coronavirus disease (COVID-19) 09/21/2020    Diagnosed at [facility] on 8/14/20, asymptomatic      Hypercholesteremia     Hypertension     IC (interstitial cystitis)     Numbness in both hands 11/01/2017    " Spondylisthesis     Vulvodynia, unspecified      Past Surgical History:   Procedure Laterality Date    CARPAL TUNNEL RELEASE Right 2019    Procedure: RELEASE, CARPAL TUNNEL, REVISION;  Surgeon: Annabelle Cid MD;  Location: [address];  Service: Orthopedics;  Laterality: Right;  Axogen, Clarix     SECTION      x3    CORONARY ANGIOGRAPHY N/A 2020    Procedure: ANGIOGRAM, CORONARY ARTERY;  Surgeon: Markie Friend III, MD;  Location: [facility] CATH LAB;  Service: Cardiology;  Laterality: N/A;    CORONARY ANGIOGRAPHY N/A 2020    Procedure: ANGIOGRAM, CORONARY ARTERY;  Surgeon: Rodney Gamble MD;  Location: [facility] CATH LAB;  Service: Cardiology;  Laterality: N/A;    CYSTOSCOPY      HYSTERECTOMY  1998    ischemic colitis      LEFT HEART CATHETERIZATION Left 2020    Procedure: Left heart cath;  Surgeon: Markie Friend III, MD;  Location: [facility] CATH LAB;  Service: Cardiology;  Laterality: Left;    LEFT HEART CATHETERIZATION Left 2020    Procedure: Left heart cath;  Surgeon: Rodney Gamble MD;  Location: [facility] CATH LAB;  Service: Cardiology;  Laterality: Left;    OOPHORECTOMY      MT REMOVAL OF OVARY/TUBE(S)       Family History   Problem Relation Name Age of Onset    No Known Problems Mother      No Known Problems Father      No Known Problems Sister 1     No Known Problems Brother 1     No Known Problems Maternal Aunt      No Known Problems Maternal Uncle      Breast cancer Paternal Aunt      No Known Problems Paternal Uncle      No Known Problems Maternal Grandmother      No Known Problems Maternal Grandfather      No Known Problems Paternal Grandmother      No Known Problems Paternal Grandfather      No Known Problems Other      Colon cancer Neg Hx      Ovarian cancer Neg Hx      Allergic rhinitis Neg Hx      Allergies Neg Hx      Angioedema Neg Hx      Asthma Neg Hx      Atopy Neg Hx      Eczema Neg Hx      Immunodeficiency Neg Hx      Rhinitis Neg Hx       Urticaria Neg Hx       Social History[1]  Review of Systems   Constitutional:  Negative for chills and fever.   HENT:  Negative for sore throat.    Respiratory:  Negative for shortness of breath.    Cardiovascular:  Negative for chest pain.   Gastrointestinal:  Negative for nausea.   Genitourinary:  Negative for dysuria.   Musculoskeletal:  Positive for arthralgias and joint swelling. Negative for back pain.   Skin:  Positive for color change. Negative for rash.   Neurological:  Negative for weakness.   Hematological:  Does not bruise/bleed easily.       Physical Exam     Initial Vitals [02/27/25 1100]   BP Pulse Resp Temp SpO2   (!) 97/57 78 17 98.5 °F (36.9 °C) 96 %      MAP       --         Physical Exam    Nursing note and vitals reviewed.  Constitutional: Vital signs are normal. She appears well-developed and well-nourished. She is cooperative. She does not appear ill. No distress.   Well-appearing.  No acute distress.  Alert and interactive.   HENT:   Head: Normocephalic and atraumatic.   Right Ear: Hearing and external ear normal.   Left Ear: Hearing and external ear normal.   Nose: Nose normal.   Eyes: Conjunctivae and EOM are normal.   Neck: Phonation normal.   Normal range of motion.  Cardiovascular:  Normal rate and regular rhythm.           No murmur heard.  Pulmonary/Chest: Effort normal. No respiratory distress.   Abdominal: Abdomen is soft. She exhibits no distension. There is no abdominal tenderness.   Musculoskeletal:      Cervical back: Normal range of motion.      Comments: There is moderate swelling and patchy ecchymosis of the right wrist, worse on the dorsum of the right wrist.  She has mildly diminished range of motion of the right hand.  She is unable to oppose her 1st and 5th fingers but can oppose her 1st and 4th fingers.  Mildly diminished flexion and extension.  There is no focal tenderness in the anatomical snuffbox.  Decreased range of motion secondary to pain with extension of the  right wrist.  Full range of motion on radial and ulnar deviation.  Sensation and capillary refill intact in all 5 fingers of the right hand.     Neurological: She is alert and oriented to person, place, and time. GCS eye subscore is 4. GCS verbal subscore is 5. GCS motor subscore is 6.   Skin: Skin is warm. Capillary refill takes less than 2 seconds.         ED Course   Procedures  Labs Reviewed - No data to display       Imaging Results              X-Ray Wrist Complete Right (Final result)  Result time 02/27/25 12:45:47      Final result by Harpreet Posey MD (02/27/25 12:45:47)                   Impression:      Suspected acute triquetral fracture.    Hand is otherwise intact.      Electronically signed by: Harpreet Posey MD  Date:    02/27/2025  Time:    12:45               Narrative:    EXAMINATION:  XR WRIST COMPLETE 3 VIEWS RIGHT; XR HAND COMPLETE 3 VIEW RIGHT    CLINICAL HISTORY:  fall; Unspecified fall, initial encounter    TECHNIQUE:  PA, lateral, and oblique views of the right wrist and right hand were performed.    COMPARISON:  Bilateral hand series 03/25/2024    FINDINGS:  Overall alignment within normal limits.  New small ossific body along the dorsal aspect of the wrist with overlying soft tissue swelling suggesting acute triquetral fracture.  No dislocation or destructive osseous process.  Degenerative change about the wrist and hand, not significantly progressed from prior.  Suspected mild chondrocalcinosis about the wrist.  No subcutaneous emphysema or radiopaque foreign body.                                       X-Ray Hand 3 view Right (Final result)  Result time 02/27/25 12:45:47      Final result by Harpreet Posey MD (02/27/25 12:45:47)                   Impression:      Suspected acute triquetral fracture.    Hand is otherwise intact.      Electronically signed by: Harpreet Posey MD  Date:    02/27/2025  Time:    12:45               Narrative:    EXAMINATION:  XR WRIST COMPLETE 3 VIEWS RIGHT;  XR HAND COMPLETE 3 VIEW RIGHT    CLINICAL HISTORY:  fall; Unspecified fall, initial encounter    TECHNIQUE:  PA, lateral, and oblique views of the right wrist and right hand were performed.    COMPARISON:  Bilateral hand series 03/25/2024    FINDINGS:  Overall alignment within normal limits.  New small ossific body along the dorsal aspect of the wrist with overlying soft tissue swelling suggesting acute triquetral fracture.  No dislocation or destructive osseous process.  Degenerative change about the wrist and hand, not significantly progressed from prior.  Suspected mild chondrocalcinosis about the wrist.  No subcutaneous emphysema or radiopaque foreign body.                                       Medications - No data to display  Medical Decision Making  74-year-old female presenting to the emergency department for evaluation of right wrist and hand injury after a fall yesterday where she struck her right wrist/hand on some cement steps.  Mechanical trip and fall.  Denies prodromal symptoms of dizziness, lightheadedness, tunnel vision, etc..  Denies head injury or loss of consciousness.  On exam, well-appearing and in no acute distress.  Mildly hypotensive.  All other vital signs are within normal limits.  Some swelling, tenderness to palpation, ecchymosis, decreased range of motion of the right wrist and hand as further described above.  Neurovascularly intact.    Differential diagnosis includes but is not limited to contusion, strain, sprain, fracture, dislocation, or ligamentous injury of the affected wrist or hand including scaphoid fracture.    X-rays of the wrist and hand negative for any acute fracture or dislocation per my an dependent interpretation.  Radiology notes a radiopaque body at the dorsum of the right wrist concerning for acute triquetrum fracture.  Patient will be placed in a volar short-arm splint.  I will place a referral to orthopedics for follow up.  Phone numbers were also provided for  local hand surgeon.  Patient has numerous allergies and tells me that she can not take Tylenol, Motrin or similar NSAIDs, Vicodin, or tramadol.  Denies any anaphylactic reaction or anaphylactoid reaction to any of these medications but states that they made her feel anxious and jittery.  She is unsure if she has ever had oxycodone before.  She is willing to try a short course.  Instructed to start with a half of a tablet.  Monitor closely for adverse effects.  Immediate cessation of use if any adverse effects occur.  ED return precautions discussed.  Stable for discharge home to outpatient follow up.    Return precautions were discussed, all patient questions were answered, and the patient was agreeable to the plan of care.  She was discharged home in stable condition and will follow up with her primary care provider or return to the emergency department if her symptoms worsen or do not improve.     Amount and/or Complexity of Data Reviewed  Radiology: ordered.    Risk  Prescription drug management.                                      Clinical Impression:  Final diagnoses:  [W19.XXXA] Fall  [S62.114A] Nondisplaced fracture of triquetrum (cuneiform) bone, right wrist, initial encounter for closed fracture (Primary)          ED Disposition Condition    Discharge Stable          ED Prescriptions       Medication Sig Dispense Start Date End Date Auth. Provider    oxyCODONE (OXY-IR) 5 mg Cap Take 1 capsule (5 mg total) by mouth every 6 (six) hours as needed (Pain not relieved with other methods). 12 capsule 2/27/2025 -- Anil Deras PA-C          Follow-up Information       Follow up With Specialties Details Why Contact Info    Dale Wu MD Internal Medicine Schedule an appointment as soon as possible for a visit  As needed, If symptoms worsen [address]  [facility] [address]  [phone]      Nelia Baca III, MD Orthopedic Surgery Schedule an appointment as soon as possible for a visit in 3  days  [address] [place] [address]  [phone]                   [1]   Social History  Tobacco Use    Smoking status: Never    Smokeless tobacco: Never   Substance Use Topics    Alcohol use: No    Drug use: No        Anil Deras, PAHennaC  02/27/25 [id number]

## 2025-02-27 NOTE — DISCHARGE INSTRUCTIONS

## 2025-03-28 ENCOUNTER — TELEPHONE (OUTPATIENT)
Dept: INTERNAL MEDICINE | Facility: CLINIC | Age: 75
End: 2025-03-28
Payer: MEDICARE

## 2025-03-28 DIAGNOSIS — M54.14 THORACIC AND LUMBOSACRAL NEURITIS: ICD-10-CM

## 2025-03-28 DIAGNOSIS — M48.061 FORAMINAL STENOSIS OF LUMBAR REGION: ICD-10-CM

## 2025-03-28 DIAGNOSIS — M48.061 SPINAL STENOSIS OF LUMBAR REGION WITHOUT NEUROGENIC CLAUDICATION: ICD-10-CM

## 2025-03-28 DIAGNOSIS — M47.26 OTHER SPONDYLOSIS WITH RADICULOPATHY, LUMBAR REGION: ICD-10-CM

## 2025-03-28 DIAGNOSIS — G89.29 OTHER CHRONIC PAIN: ICD-10-CM

## 2025-03-28 DIAGNOSIS — G89.29 CHRONIC BILATERAL LOW BACK PAIN WITH RIGHT-SIDED SCIATICA: ICD-10-CM

## 2025-03-28 DIAGNOSIS — M54.17 THORACIC AND LUMBOSACRAL NEURITIS: ICD-10-CM

## 2025-03-28 DIAGNOSIS — M43.10 ACQUIRED SPONDYLOLISTHESIS: ICD-10-CM

## 2025-03-28 DIAGNOSIS — M54.41 CHRONIC BILATERAL LOW BACK PAIN WITH RIGHT-SIDED SCIATICA: ICD-10-CM

## 2025-03-28 DIAGNOSIS — M51.379 DDD (DEGENERATIVE DISC DISEASE), LUMBOSACRAL: ICD-10-CM

## 2025-03-28 RX ORDER — GABAPENTIN 300 MG/1
CAPSULE ORAL
Qty: 270 CAPSULE | Refills: 0 | Status: SHIPPED | OUTPATIENT
Start: 2025-03-28

## 2025-03-28 NOTE — TELEPHONE ENCOUNTER
----- Message from Felipe sent at 3/28/2025  2:25 PM CDT -----  Contact: [phone]@patient  Requesting an RX refill or new RX.gabapentin (NEURONTIN) 300 MG capsuleIs this a refill or new RX:  refill RX name and strength (copy/paste from chart):  Is this a 30 day or 90 day RX: Pharmacy name and phone #   [id number] [place], [place] - [address] doctors have asked that we provide their patients with the following 2 reminders -- prescription refills can take up to 72 hours, and a friendly reminder that in the future you can use your [facility] account to request refills:

## 2025-03-28 NOTE — TELEPHONE ENCOUNTER
No care due was identified.  [facility] Embedded Care Due Messages. Reference number: [id number].   3/28/2025 2:39:23 PM CDT

## 2025-04-02 DIAGNOSIS — G89.29 OTHER CHRONIC PAIN: ICD-10-CM

## 2025-04-02 DIAGNOSIS — M51.379 DDD (DEGENERATIVE DISC DISEASE), LUMBOSACRAL: ICD-10-CM

## 2025-04-02 DIAGNOSIS — G89.29 CHRONIC BILATERAL LOW BACK PAIN WITH RIGHT-SIDED SCIATICA: ICD-10-CM

## 2025-04-02 DIAGNOSIS — M48.061 FORAMINAL STENOSIS OF LUMBAR REGION: ICD-10-CM

## 2025-04-02 DIAGNOSIS — M47.26 OTHER SPONDYLOSIS WITH RADICULOPATHY, LUMBAR REGION: ICD-10-CM

## 2025-04-02 DIAGNOSIS — M54.17 THORACIC AND LUMBOSACRAL NEURITIS: ICD-10-CM

## 2025-04-02 DIAGNOSIS — M54.14 THORACIC AND LUMBOSACRAL NEURITIS: ICD-10-CM

## 2025-04-02 DIAGNOSIS — M48.061 SPINAL STENOSIS OF LUMBAR REGION WITHOUT NEUROGENIC CLAUDICATION: ICD-10-CM

## 2025-04-02 DIAGNOSIS — M54.41 CHRONIC BILATERAL LOW BACK PAIN WITH RIGHT-SIDED SCIATICA: ICD-10-CM

## 2025-04-02 DIAGNOSIS — M43.10 ACQUIRED SPONDYLOLISTHESIS: ICD-10-CM

## 2025-04-02 RX ORDER — GABAPENTIN 300 MG/1
CAPSULE ORAL
Qty: 180 CAPSULE | Refills: 1 | Status: SHIPPED | OUTPATIENT
Start: 2025-04-02

## 2025-04-11 ENCOUNTER — TELEPHONE (OUTPATIENT)
Dept: CARDIOLOGY | Facility: CLINIC | Age: 75
End: 2025-04-11
Payer: MEDICARE

## 2025-05-01 ENCOUNTER — TELEPHONE (OUTPATIENT)
Dept: INTERNAL MEDICINE | Facility: CLINIC | Age: 75
End: 2025-05-01

## 2025-05-01 ENCOUNTER — OFFICE VISIT (OUTPATIENT)
Dept: INTERNAL MEDICINE | Facility: CLINIC | Age: 75
End: 2025-05-01
Payer: MEDICARE

## 2025-05-01 VITALS
HEIGHT: 64 IN | SYSTOLIC BLOOD PRESSURE: 100 MMHG | WEIGHT: 171.31 LBS | DIASTOLIC BLOOD PRESSURE: 54 MMHG | OXYGEN SATURATION: 98 % | BODY MASS INDEX: 29.24 KG/M2 | HEART RATE: 61 BPM

## 2025-05-01 DIAGNOSIS — G63 NEUROPATHY DUE TO PERIPHERAL VASCULAR DISEASE: ICD-10-CM

## 2025-05-01 DIAGNOSIS — E11.40 TYPE 2 DIABETES MELLITUS WITH DIABETIC NEUROPATHY, WITHOUT LONG-TERM CURRENT USE OF INSULIN: ICD-10-CM

## 2025-05-01 DIAGNOSIS — R29.898 LEG WEAKNESS, BILATERAL: ICD-10-CM

## 2025-05-01 DIAGNOSIS — R79.89 ELEVATED LFTS: ICD-10-CM

## 2025-05-01 DIAGNOSIS — E21.3 HYPERPARATHYROIDISM: Primary | ICD-10-CM

## 2025-05-01 DIAGNOSIS — L03.032 CELLULITIS OF TOE OF LEFT FOOT: ICD-10-CM

## 2025-05-01 DIAGNOSIS — I73.9 PAD (PERIPHERAL ARTERY DISEASE): Primary | ICD-10-CM

## 2025-05-01 DIAGNOSIS — I73.9 PAD (PERIPHERAL ARTERY DISEASE): ICD-10-CM

## 2025-05-01 DIAGNOSIS — Z74.09 REDUCED MOBILITY: ICD-10-CM

## 2025-05-01 DIAGNOSIS — I73.9 NEUROPATHY DUE TO PERIPHERAL VASCULAR DISEASE: ICD-10-CM

## 2025-05-01 PROCEDURE — G2211 COMPLEX E/M VISIT ADD ON: HCPCS | Mod: S$GLB,,, | Performed by: FAMILY MEDICINE

## 2025-05-01 PROCEDURE — 99999 PR PBB SHADOW E&M-EST. PATIENT-LVL V: CPT | Mod: PBBFAC,,, | Performed by: FAMILY MEDICINE

## 2025-05-01 PROCEDURE — 1160F RVW MEDS BY RX/DR IN RCRD: CPT | Mod: CPTII,S$GLB,, | Performed by: FAMILY MEDICINE

## 2025-05-01 PROCEDURE — 1159F MED LIST DOCD IN RCRD: CPT | Mod: CPTII,S$GLB,, | Performed by: FAMILY MEDICINE

## 2025-05-01 PROCEDURE — 3074F SYST BP LT 130 MM HG: CPT | Mod: CPTII,S$GLB,, | Performed by: FAMILY MEDICINE

## 2025-05-01 PROCEDURE — 3078F DIAST BP <80 MM HG: CPT | Mod: CPTII,S$GLB,, | Performed by: FAMILY MEDICINE

## 2025-05-01 PROCEDURE — 99214 OFFICE O/P EST MOD 30 MIN: CPT | Mod: S$GLB,,, | Performed by: FAMILY MEDICINE

## 2025-05-01 PROCEDURE — 3051F HG A1C>EQUAL 7.0%<8.0%: CPT | Mod: CPTII,S$GLB,, | Performed by: FAMILY MEDICINE

## 2025-05-01 PROCEDURE — 1126F AMNT PAIN NOTED NONE PRSNT: CPT | Mod: CPTII,S$GLB,, | Performed by: FAMILY MEDICINE

## 2025-05-01 PROCEDURE — 3072F LOW RISK FOR RETINOPATHY: CPT | Mod: CPTII,S$GLB,, | Performed by: FAMILY MEDICINE

## 2025-05-01 PROCEDURE — 3008F BODY MASS INDEX DOCD: CPT | Mod: CPTII,S$GLB,, | Performed by: FAMILY MEDICINE

## 2025-05-01 PROCEDURE — 4010F ACE/ARB THERAPY RXD/TAKEN: CPT | Mod: CPTII,S$GLB,, | Performed by: FAMILY MEDICINE

## 2025-05-01 RX ORDER — SULFAMETHOXAZOLE AND TRIMETHOPRIM 800; 160 MG/1; MG/1
1 TABLET ORAL 2 TIMES DAILY
Qty: 14 TABLET | Refills: 0 | Status: SHIPPED | OUTPATIENT
Start: 2025-05-01 | End: 2025-05-08

## 2025-05-01 NOTE — TELEPHONE ENCOUNTER
Patient states that a fax for a motorized scooter should have been sent over, we advice we did not receive.

## 2025-05-01 NOTE — PROGRESS NOTES
[facility]  Primary Care Office Visit    Patient Information  Niki Soriano  74 y.o. female    PCP  Dale Wu MD    Reason for Visit  No chief complaint on file.      Subjective:  History of Present Illness    CHIEF COMPLAINT:  Patient presents today for follow up of multiple medical concerns    She reports persistent hoarseness and sore throat. She has significant mobility limitations requiring assistance to move and is unable to walk independently. She is requesting DME rx for an electric scooter.     She reports a suspected toe infection following removal of embedded toenail pieces.The skin around the big toe on the left foot is red and there has been purulent drainage. She has baseline severe neuropathy on both legs to the level of the knees and has difficulty saying if she is having more pain than normal in her toe.    She experiences significant fluid retention and bloating. She is non-compliant with prescribed diuretic due to concerns about frequent urination when away from home, compounded by her limited mobility.    She has a history of paralysis below T7 since 1997 with incomplete sensory recovery. She experiences neuropathy extending upward from feet and severe right leg sciatica with symptoms on bottom of foot. She has a history of drop foot on the left. She has been seen for  peripheral artery disease by vascular surgery within the past couple of years. She says the symptoms she is having in her lower extremities appear to be on par with or worse than when she saw them last.    She was unable to accomplish her retinal eye photo d/t exclusion criteria. She needs referral to ophthalmology for eye exam.    Review of recent labs shows elevated triglycerides with otherwise normal results. Calcium levels have normalized from previous elevation. Kidney function has improved from stage 3B to 3A. Liver function tests are improving but remain elevated. PTH levels are  "elevated. She has a history of consistently high calcium levels without exogenous calcium intake. She denies previous endocrinology consultation regarding elevated calcium levels.    She endorses recent onset hoarseness and feeling of fullness in her upper neck.          Review of Systems   Constitutional:  Negative for chills and fever.   HENT:  Positive for sore throat and voice change. Negative for nasal congestion, ear pain and trouble swallowing.    Eyes:  Negative for visual disturbance.   Respiratory:  Negative for cough and shortness of breath.    Cardiovascular:  Negative for chest pain.   Gastrointestinal:  Negative for constipation, diarrhea, nausea and vomiting.   Genitourinary:  Negative for dysuria and frequency.   Integumentary:  Positive for wound. Negative for rash.   Neurological:  Positive for weakness and numbness. Negative for headaches.   Psychiatric/Behavioral:  Negative for suicidal ideas.    All other systems reviewed and are negative.       Patient answers are not available for this visit.      Problem List and History  Problem List[1]  Past Medical History[2]  Past Surgical History[3]    Medication List  Current Medications[4]    Objective:  Vitals:    05/01/25 1319   BP: (!) 100/54   Pulse: 61   SpO2: 98%   Weight: 77.7 kg (171 lb 4.8 oz)   Height: 5' 4" (1.626 m)       Physical Exam  Constitutional:       General: She is not in acute distress.     Appearance: Normal appearance. She is not ill-appearing.   HENT:      Head: Normocephalic and atraumatic.   Pulmonary:      Effort: Pulmonary effort is normal. No respiratory distress.   Feet:      Comments: Left great toe with purulent drainage at medial border of toenail as seen in photograph  Skin:     Comments: BLE with faint reddish hue c/f vascular disease process   Neurological:      Mental Status: She is alert and oriented to person, place, and time. Mental status is at baseline.      Sensory: Sensory deficit present.      Motor: " Weakness present.      Gait: Gait abnormal (WCB).   Psychiatric:         Mood and Affect: Mood normal.         Behavior: Behavior normal.         Thought Content: Thought content normal.         Judgment: Judgment normal.         Assessment and Plan:    ICD-10-CM ICD-9-CM   1. Hyperparathyroidism  E21.3 252.00   2. Reduced mobility  Z74.09 799.89   3. Leg weakness, bilateral  R29.898 729.89   4. Elevated LFTs  R79.89 790.6   5. Neuropathy due to peripheral vascular disease  I73.9 443.9    G63 357.4   6. PAD (peripheral artery disease)  I73.9 443.9   7. Cellulitis of toe of left foot  L03.032 681.10   8. Type 2 diabetes mellitus with diabetic neuropathy, without long-term current use of insulin  E11.40 250.60     357.2     Orders Placed This Encounter    MOTORIZED SCOOTER FOR HOME USE    US Abdomen Limited    US Soft Tissue Head Neck    Ambulatory referral/consult to Endocrinology    Ambulatory referral/consult to ENT    Ambulatory referral/consult to Vascular Surgery    Ambulatory referral/consult to Ophthalmology    sulfamethoxazole-trimethoprim 800-160mg (BACTRIM DS) 800-160 mg Tab       Assessment & Plan    - Assessed lipid panel, noting overall good results with slightly elevated triglycerides.  - Evaluated metabolic panel, noting improvement in renal function from stage 3B to 3A and better liver function, though liver enzymes remain slightly elevated.  - Identified elevated PTH levels, requiring further evaluation by endocrinology and ENT.  - Assessed foot infection, deciding on antibiotic treatment and follow-up to monitor healing.  - Considered reported fluid retention and neuropathy symptoms, determining need for vascular surgery follow-up.    1. Hyperparathyroidism (Primary)  Assessment as above  Ref to ENT and Endo for further eval  Ordering US of neck for initial imaging, deferring additional w/u to specialists  - Ambulatory referral/consult to Endocrinology; Future  - US Soft Tissue Head Neck; Future  -  Ambulatory referral/consult to ENT; Future    2. Reduced mobility  DME order placed for electric scooter  The mobility limitation cannot be sufficiently resolved by the use of a cane. Patient's functional mobility deficit can be sufficiently resolved with the use of a/an electric scooter.  Patient's mobility limitation significantly impairs their ability to participate in one of more activities of daily living.  The use of a/an electric scooter will significantly improve the patient's ability to participate in MRADLS and the patient will use it on regular basis in the home.  - MOTORIZED SCOOTER FOR HOME USE    3. Leg weakness, bilateral  See above  - MOTORIZED SCOOTER FOR HOME USE    4. Elevated LFTs  Ordering US to evaluate further, no prior imaging noted in chart  - US Abdomen Limited; Future    5. Neuropathy due to peripheral vascular disease  Sx worsening  Will ref to Vasc Surgery for eval    6. PAD (peripheral artery disease)  See above  - Ambulatory referral/consult to Vascular Surgery; Future    7. Cellulitis of toe of left foot  Infection of left great toe  Start bactrim BID x7 days  Pt to f/u in 7 days, may need extension of abx  - sulfamethoxazole-trimethoprim 800-160mg (BACTRIM DS) 800-160 mg Tab; Take 1 tablet by mouth 2 (two) times daily. for 7 days  Dispense: 14 tablet; Refill: 0    8. Type 2 diabetes mellitus with diabetic neuropathy, without long-term current use of insulin  - Ambulatory referral/consult to Ophthalmology; Future      Check-Out:  Follow-Up  Follow up in about 1 week (around 5/8/2025) for infection follow-up.    Upcoming Appointments  Future Appointments   Date Time Provider Department Center   5/14/2025 11:00 AM Bhavik MD [facility]ARD[facility]   5/29/2025  1:00 PM Kamila Thibodeaux, OD [facility]         Dale Wu MD, FAAFP  Family Medicine Physician  [facility]  5/1/2025      This note was generated with the assistance of  ambient listening technology. Verbal consent was obtained by the patient and accompanying visitor(s) for the recording of patient appointment to facilitate this note. I attest to having reviewed and edited the generated note for accuracy, though some syntax or spelling errors may persist. Please contact the author of this note for any clarification.                                   [1]   Patient Active Problem List  Diagnosis    Vulvar pain    Vulvodynia, unspecified    Vulvar vestibulitis    Spasm of muscle    Type 2 diabetes mellitus with neurologic complication    Hypothyroidism    Leg weakness, bilateral    Carpal tunnel syndrome, bilateral    IC (interstitial cystitis)    Recurrent UTI    Sebaceous cyst    Reflux esophagitis    Carpal tunnel syndrome    Incomplete emptying of bladder    Infection due to ESBL-producing Klebsiella pneumoniae    Ischemic cardiomyopathy    Coronary artery disease involving native coronary artery of native heart without angina pectoris    Chronic combined systolic and diastolic congestive heart failure    Spinal cord abscess    Dyslipidemia    Elevated LFTs    PAD (peripheral artery disease)    Diabetic gangrene    Stage 3b chronic kidney disease   [2]   Past Medical History:  Diagnosis Date    Allergy     Coronary artery disease     Diabetes mellitus     History of 2019 novel coronavirus disease (COVID-19) 2020    Diagnosed at [facility] on 20, asymptomatic      Hypercholesteremia     Hypertension     IC (interstitial cystitis)     Numbness in both hands 2017    Spondylisthesis     Vulvodynia, unspecified    [3]   Past Surgical History:  Procedure Laterality Date    CARPAL TUNNEL RELEASE Right 2019    Procedure: RELEASE, CARPAL TUNNEL, REVISION;  Surgeon: Annabelle Cid MD;  Location: [facility] OR [address] [place] [address];  Service: Orthopedics;  Laterality: Right;  Axogen, Clarix     SECTION      x3    CORONARY ANGIOGRAPHY N/A 2020    Procedure: ANGIOGRAM,  CORONARY ARTERY;  Surgeon: Markie Friend III, MD;  Location: [facility] CATH LAB;  Service: Cardiology;  Laterality: N/A;    CORONARY ANGIOGRAPHY N/A 05/29/2020    Procedure: ANGIOGRAM, CORONARY ARTERY;  Surgeon: Rodney Gamble MD;  Location: [facility] CATH LAB;  Service: Cardiology;  Laterality: N/A;    CYSTOSCOPY      HYSTERECTOMY  1998    ischemic colitis      LEFT HEART CATHETERIZATION Left 03/03/2020    Procedure: Left heart cath;  Surgeon: Markie Friend III, MD;  Location: [facility] CATH LAB;  Service: Cardiology;  Laterality: Left;    LEFT HEART CATHETERIZATION Left 05/29/2020    Procedure: Left heart cath;  Surgeon: Rodney Gamble MD;  Location: [facility] CATH LAB;  Service: Cardiology;  Laterality: Left;    OOPHORECTOMY      MN REMOVAL OF OVARY/TUBE(S)     [4]   Current Outpatient Medications:     ammonium lactate 12 % Crea, Apply 1 application topically 2 (two) times daily., Disp: 140 g, Rfl: 11    aspirin 81 MG Chew, Take 1 tablet (81 mg total) by mouth once daily., Disp: , Rfl: 0    b complex vitamins capsule, Take 1 capsule by mouth once daily., Disp: , Rfl:     baclofen (LIORESAL) 20 MG tablet, Take 20 mg by mouth 3 (three) times daily as needed. , Disp: , Rfl:     cholecalciferol, vitamin D3, (VITAMIN D3) 25 mcg (1,000 unit) capsule, Take 1,000 Units by mouth once daily., Disp: , Rfl:     duloxetine (CYMBALTA) 30 MG capsule, Take 30 mg by mouth once daily., Disp: , Rfl:     estradioL (ESTRACE) 0.01 % (0.1 mg/gram) vaginal cream, PLACE 0.5 G VAGINALLY THREE TIMES A WEEK, Disp: 42.5 g, Rfl: 0    ezetimibe (ZETIA) 10 mg tablet, Take 1 tablet (10 mg total) by mouth once daily., Disp: 90 tablet, Rfl: 3    furosemide (LASIX) 40 MG tablet, TAKE 1/2 TABLET BY MOUTH DAILY AS NEED FOR SWELLING OR WEIGHT GAIN, Disp: 45 tablet, Rfl: 1    gabapentin (NEURONTIN) 300 MG capsule, Take 2 capsules 1 time at night., Disp: 180 capsule, Rfl: 1    Lactobac no.51/Bifidobact no.4 (UP4 PROBIOTICS ULTRA ORAL), Take by mouth.,  Disp: , Rfl:     levothyroxine (SYNTHROID) 50 MCG tablet, Take 1 tablet (50 mcg total) by mouth once daily., Disp: 90 tablet, Rfl: 0    metFORMIN (GLUCOPHAGE) 500 MG tablet, Take 1 tablet (500 mg total) by mouth once daily., Disp: 180 tablet, Rfl: 0    metoprolol succinate (TOPROL-XL) 25 MG 24 hr tablet, Take 1 tablet (25 mg total) by mouth once daily., Disp: 90 tablet, Rfl: 3    multivitamin (THERAGRAN) per tablet, Take 1 tablet by mouth once daily., Disp: , Rfl:     omega-3 fatty acids/fish oil (FISH OIL-OMEGA-3 FATTY ACIDS) 300-1,000 mg capsule, Take by mouth once daily., Disp: , Rfl:     oxybutynin (DITROPAN-XL) 5 MG TR24, Take 1 tablet (5 mg total) by mouth as needed (urgency, urge incontinence)., Disp: 30 tablet, Rfl: 11    oxyCODONE (OXY-IR) 5 mg Cap, Take 1 capsule (5 mg total) by mouth every 6 (six) hours as needed (Pain not relieved with other methods)., Disp: 12 capsule, Rfl: 0    rosuvastatin (CRESTOR) 40 MG Tab, Take 1 tablet (40 mg total) by mouth every evening., Disp: 90 tablet, Rfl: 3    sacubitriL-valsartan (ENTRESTO)  mg per tablet, Take 1 tablet by mouth 2 (two) times daily., Disp: 60 tablet, Rfl: 11    spironolactone (ALDACTONE) 25 MG tablet, Take 1 tablet (25 mg total) by mouth once daily., Disp: 90 tablet, Rfl: 3    sulfamethoxazole-trimethoprim 800-160mg (BACTRIM DS) 800-160 mg Tab, Take 1 tablet by mouth 2 (two) times daily. for 7 days, Disp: 14 tablet, Rfl: 0    valsartan (DIOVAN) 320 MG tablet, Take 1 tablet (320 mg total) by mouth once daily., Disp: 90 tablet, Rfl: 3

## 2025-05-01 NOTE — TELEPHONE ENCOUNTER
----- Message from Dale Wu MD sent at 5/1/2025  2:08 PM CDT -----  Regarding: DME Paperwork  Patient reports Radhika sent over some paperwork for her DME prescription for an electric scooter. I haven't seen this come through. Could you help locate it? Thanks!

## 2025-05-02 ENCOUNTER — TELEPHONE (OUTPATIENT)
Dept: INTERNAL MEDICINE | Facility: CLINIC | Age: 75
End: 2025-05-02
Payer: MEDICARE

## 2025-05-02 NOTE — TELEPHONE ENCOUNTER
----- Message from Med Assistant Grider sent at 5/2/2025 11:02 AM CDT -----  Regarding: FW: [employer] Callback Request  Contact: [employer] [phone]    ----- Message -----  From: Micah Barahona  Sent: 5/2/2025  10:36 AM CDT  To: Bryce Hunter Staff  Subject: [employer] Callback Request                  .[email or web address] Patient is calling for Medical Advice regarding: [employer] needs a call back to discuss order request for a scooter. Please call PH rep at number provided to discuss further.How long has patient had these symptoms:Pharmacy name and phone#:[facility]: CallComments:Please advise patient replies from provider may take up to 48 hours.

## 2025-05-02 NOTE — TELEPHONE ENCOUNTER
I spoke with [facility] and they stated you are requesting a motorized scooter for the patient but your notes do not reflect that the patient can not use a manual scooter. They stated if you would like a motorized scooter, you would have to reflect that in your notes.

## 2025-05-02 NOTE — TELEPHONE ENCOUNTER
I called the in insurance back at [facility]. No one answered the call, a vm was left to call back.

## 2025-05-12 ENCOUNTER — HOSPITAL ENCOUNTER (OUTPATIENT)
Dept: VASCULAR SURGERY | Facility: CLINIC | Age: 75
Discharge: HOME | End: 2025-05-12
Attending: STUDENT IN AN ORGANIZED HEALTH CARE EDUCATION/TRAINING PROGRAM
Payer: MEDICARE

## 2025-05-12 ENCOUNTER — INITIAL CONSULT (OUTPATIENT)
Dept: VASCULAR SURGERY | Facility: CLINIC | Age: 75
End: 2025-05-12
Attending: STUDENT IN AN ORGANIZED HEALTH CARE EDUCATION/TRAINING PROGRAM
Payer: MEDICARE

## 2025-05-12 VITALS
WEIGHT: 171 LBS | HEART RATE: 65 BPM | TEMPERATURE: 98 F | BODY MASS INDEX: 29.19 KG/M2 | DIASTOLIC BLOOD PRESSURE: 48 MMHG | HEIGHT: 64 IN | SYSTOLIC BLOOD PRESSURE: 84 MMHG

## 2025-05-12 DIAGNOSIS — I73.9 PAD (PERIPHERAL ARTERY DISEASE): ICD-10-CM

## 2025-05-12 PROCEDURE — 99214 OFFICE O/P EST MOD 30 MIN: CPT | Mod: S$GLB,,, | Performed by: STUDENT IN AN ORGANIZED HEALTH CARE EDUCATION/TRAINING PROGRAM

## 2025-05-12 PROCEDURE — 93923 UPR/LXTR ART STDY 3+ LVLS: CPT | Mod: S$GLB,,, | Performed by: SURGERY

## 2025-05-12 PROCEDURE — 99999 PR PBB SHADOW E&M-EST. PATIENT-LVL IV: CPT | Mod: PBBFAC,,, | Performed by: STUDENT IN AN ORGANIZED HEALTH CARE EDUCATION/TRAINING PROGRAM

## 2025-05-12 NOTE — PROGRESS NOTES
VASCULAR SURGERY SERVICE    REFERRING DOCTOR: Dale Wu MD    CHIEF COMPLAINT: PAD    HISTORY OF PRESENT ILLNESS: Niki Soriano is a 74 y.o. female with a past medical history significant for paralysis below T7 since , neuropathy, T2DM, CAD s/p multiple stents, HF, recurrent UTI's who was last seen by Dr. Mayfield in . At her last visit she had mild PAD on the L leg from a SFA lesion and normal RICHA's on the right. She reports having numbness and tingling in her bilateral feet radiating up to the knees which started during the time she was last seen by Dr. Mayfield. She reports symptoms getting progressively worse and wanted to get evaluated. She was able to walk last year however her weakness worsened and is now exclusively wheelchair. Her legs are equally affected by this. She had a wound on her left great toe which healed without issue after taking antibiotics. Patient is former smoker quit 50 years ago. She reports taking Aspirin 81 mg daily, Rosuvastatin and Zetia. Reports no resting pain, claudication symptoms, unhealing wounds.     Past Medical History:   Diagnosis Date    Allergy     Coronary artery disease     Diabetes mellitus     History of  novel coronavirus disease (COVID-19) 2020    Diagnosed at [facility] on 20, asymptomatic      Hypercholesteremia     Hypertension     IC (interstitial cystitis)     Numbness in both hands 2017    Spondylisthesis     Vulvodynia, unspecified        Past Surgical History:   Procedure Laterality Date    CARPAL TUNNEL RELEASE Right 2019    Procedure: RELEASE, CARPAL TUNNEL, REVISION;  Surgeon: Annabelle Cid MD;  Location: [facility] OR [address] [place] [address];  Service: Orthopedics;  Laterality: Right;  Axogen, Clarix     SECTION      x3    CORONARY ANGIOGRAPHY N/A 2020    Procedure: ANGIOGRAM, CORONARY ARTERY;  Surgeon: Markie Friend III, MD;  Location: [facility] CATH LAB;  Service: Cardiology;  Laterality: N/A;    CORONARY ANGIOGRAPHY N/A  05/29/2020    Procedure: ANGIOGRAM, CORONARY ARTERY;  Surgeon: Rodney Gamble MD;  Location: [facility] CATH LAB;  Service: Cardiology;  Laterality: N/A;    CYSTOSCOPY      HYSTERECTOMY  1998    ischemic colitis      LEFT HEART CATHETERIZATION Left 03/03/2020    Procedure: Left heart cath;  Surgeon: Markie Friend III, MD;  Location: [facility] CATH LAB;  Service: Cardiology;  Laterality: Left;    LEFT HEART CATHETERIZATION Left 05/29/2020    Procedure: Left heart cath;  Surgeon: Rodney Gamble MD;  Location: [facility] CATH LAB;  Service: Cardiology;  Laterality: Left;    OOPHORECTOMY      ND REMOVAL OF OVARY/TUBE(S)         Current Medications[1]    Review of patient's allergies indicates:   Allergen Reactions    Augmentin [amoxicillin-pot clavulanate] Other (See Comments)     Patient cannot recall what she had, only that she could not tolerate the Augmentin    Tramadol Itching    Tegretol [carbamazepine] Itching and Rash    Vicodin [hydrocodone-acetaminophen] Itching and Anxiety       Family History   Problem Relation Name Age of Onset    No Known Problems Mother      No Known Problems Father      No Known Problems Sister 1     No Known Problems Brother 1     No Known Problems Maternal Aunt      No Known Problems Maternal Uncle      Breast cancer Paternal Aunt      No Known Problems Paternal Uncle      No Known Problems Maternal Grandmother      No Known Problems Maternal Grandfather      No Known Problems Paternal Grandmother      No Known Problems Paternal Grandfather      No Known Problems Other      Colon cancer Neg Hx      Ovarian cancer Neg Hx      Allergic rhinitis Neg Hx      Allergies Neg Hx      Angioedema Neg Hx      Asthma Neg Hx      Atopy Neg Hx      Eczema Neg Hx      Immunodeficiency Neg Hx      Rhinitis Neg Hx      Urticaria Neg Hx         Social History[2]    REVIEW OF SYSTEMS:  General: No chills, fever, malaise, changes in weight  HEENT: No visual changes, difficulty hearing  Cardiovascular: No  chest pain, palpitations, claudication  Pulmonary: No dyspnea, cough, wheezing  Gastrointestinal: No nausea, vomiting, diarrhea, constipation  Genitourinary: No dysuria, low urine output, hematuria  Endocrine: No polydipsia, polyphagia  Hematologic: No fatigue with exertion, pica, pallor  Musculoskeletal: No extremity or joint pain, no back pain, no difficulty with ambulation  Neurologic: no seizures, no headaches, no weakness  Psychiatric: no mood disturbance      PHYSICAL EXAM:   There were no vitals taken for this visit.  Constitutional:  Alert,   Well-appearing  In no distress.   Neurological: Normal speech  no focal findings  CN II - XII grossly intact.    Psychiatric: Mood and affect appropriate and symmetric.   HEENT: Normocephalic / atraumatic  PERRLA  Midline trachea  No scars across the neck   Cardiac: Regular rate and rhythm.   Pulmonary: Normal pulmonary effort.   Abdomen: Soft, not distended.     Skin: Warm and well perfused.    Vascular:  +2 right DP and PT, Doppler Biphasic left DP and PT   Extremities/  Musculoskeletal: +1 edema Bilaterally        IMAGING:  RICHA: 5/12/2025  Right RICHA : 1.03  Right TBI: 0.74  Pressure PVR: 68 mmHg  Left RICHA: 0.78  Left TBI 0.51    Pressure PVR: 47       IMPRESSION: No evidence significant peripheral vascular disease in the right. Mild Peripheral vascular disease in the left.     PLAN: : Niki Soriano is a 74 y.o. female with a past medical history significant for paralysis below T7 since 1997, neuropathy, T2DM, CAD s/p multiple stents, HF, recurrent UTI's who was last seen by Dr. Mayfield in 2023 for evaluation of PAD. She reports healing wound left big toe. With her RICHA showing mild PAD in the left and physical exam showing palpable pulses right and strong signals on the left with minimal to no change in her RICHA's from previous. This would indicate her numbness is not vascular in nature.    -Will continue ASA, Statin and Zetia for medical Management.   -Follow up in one  year with RICHA studies.                  [1]   Current Outpatient Medications:     ammonium lactate 12 % Crea, Apply 1 application topically 2 (two) times daily., Disp: 140 g, Rfl: 11    aspirin 81 MG Chew, Take 1 tablet (81 mg total) by mouth once daily., Disp: , Rfl: 0    b complex vitamins capsule, Take 1 capsule by mouth once daily., Disp: , Rfl:     baclofen (LIORESAL) 20 MG tablet, Take 20 mg by mouth 3 (three) times daily as needed. , Disp: , Rfl:     cholecalciferol, vitamin D3, (VITAMIN D3) 25 mcg (1,000 unit) capsule, Take 1,000 Units by mouth once daily., Disp: , Rfl:     duloxetine (CYMBALTA) 30 MG capsule, Take 30 mg by mouth once daily., Disp: , Rfl:     estradioL (ESTRACE) 0.01 % (0.1 mg/gram) vaginal cream, PLACE 0.5 G VAGINALLY THREE TIMES A WEEK, Disp: 42.5 g, Rfl: 0    ezetimibe (ZETIA) 10 mg tablet, Take 1 tablet (10 mg total) by mouth once daily., Disp: 90 tablet, Rfl: 3    furosemide (LASIX) 40 MG tablet, TAKE 1/2 TABLET BY MOUTH DAILY AS NEED FOR SWELLING OR WEIGHT GAIN, Disp: 45 tablet, Rfl: 1    gabapentin (NEURONTIN) 300 MG capsule, Take 2 capsules 1 time at night., Disp: 180 capsule, Rfl: 1    Lactobac no.51/Bifidobact no.4 (UP4 PROBIOTICS ULTRA ORAL), Take by mouth., Disp: , Rfl:     levothyroxine (SYNTHROID) 50 MCG tablet, Take 1 tablet (50 mcg total) by mouth once daily., Disp: 90 tablet, Rfl: 0    metFORMIN (GLUCOPHAGE) 500 MG tablet, Take 1 tablet (500 mg total) by mouth once daily., Disp: 180 tablet, Rfl: 0    metoprolol succinate (TOPROL-XL) 25 MG 24 hr tablet, Take 1 tablet (25 mg total) by mouth once daily., Disp: 90 tablet, Rfl: 3    multivitamin (THERAGRAN) per tablet, Take 1 tablet by mouth once daily., Disp: , Rfl:     omega-3 fatty acids/fish oil (FISH OIL-OMEGA-3 FATTY ACIDS) 300-1,000 mg capsule, Take by mouth once daily., Disp: , Rfl:     oxybutynin (DITROPAN-XL) 5 MG TR24, Take 1 tablet (5 mg total) by mouth as needed (urgency, urge incontinence)., Disp: 30 tablet, Rfl:  11    oxyCODONE (OXY-IR) 5 mg Cap, Take 1 capsule (5 mg total) by mouth every 6 (six) hours as needed (Pain not relieved with other methods)., Disp: 12 capsule, Rfl: 0    rosuvastatin (CRESTOR) 40 MG Tab, Take 1 tablet (40 mg total) by mouth every evening., Disp: 90 tablet, Rfl: 3    sacubitriL-valsartan (ENTRESTO)  mg per tablet, Take 1 tablet by mouth 2 (two) times daily., Disp: 60 tablet, Rfl: 11    spironolactone (ALDACTONE) 25 MG tablet, Take 1 tablet (25 mg total) by mouth once daily., Disp: 90 tablet, Rfl: 3    valsartan (DIOVAN) 320 MG tablet, Take 1 tablet (320 mg total) by mouth once daily., Disp: 90 tablet, Rfl: 3  [2]   Social History  Tobacco Use    Smoking status: Never    Smokeless tobacco: Never   Substance Use Topics    Alcohol use: No    Drug use: No

## 2025-05-13 ENCOUNTER — TELEPHONE (OUTPATIENT)
Dept: CARDIOLOGY | Facility: CLINIC | Age: 75
End: 2025-05-13
Payer: MEDICARE

## 2025-05-14 PROBLEM — E11.52 DIABETIC GANGRENE: Status: RESOLVED | Noted: 2024-04-03 | Resolved: 2025-05-14

## 2025-05-14 PROBLEM — N18.31 STAGE 3A CHRONIC KIDNEY DISEASE: Status: ACTIVE | Noted: 2025-01-29

## 2025-05-14 NOTE — PROGRESS NOTES
General Cardiology Clinic Note  Reason for Visit: Follow up   Last Clinic Visit: 11/27/24     HPI:     Niki Soriano is a 74 y.o. female who presents for follow up of HFrEF     PROBLEM LIST:  ICM EF 43%  HFrEF  CAD s/p PCI mRCA , PLB , LCx, and OM2 5/2020 (Dr. Gamble)  Dyslipidemia  DM2  Hypothyroidism  Spinal stenosis and back pain  Frequent UTI's    Interval HPI:    Niki Soriano presents today accompanied by her brother for a 6-month follow-up.  She experiences dyspnea with mild exertion that resolves after taking a few extra breaths. She denies chest pain. At our last clinic visit she endorsed persistent BLE swelling, was not compliant with lasix adherence due to concerns about urinary urgency, particularly in public settings. Patient has since increased use of lasix 40 mg and has noted significant improvement in swelling. She reports hoarseness and progressive throat soreness leading to severe pain with difficulty speaking and swallowing. She denies upper respiratory congestion but notes post-nasal drip. She uses a wheelchair and walker for mobility, no longer able to ambulate on her own. She performs daily activities including laundry, bathroom cleaning while seated in a walker, and cooking. These activities require frequent transfers between wheelchair and stool, utilizing significant upper body strength. She prepares all meals at home and avoids fried foods. Her blood pressure readings in the 90-100s systolic, she reports no symptoms of dizziness or lightheadedness. Patient taking Valsartan 320 mg qd, Spironolactone 25 mg qd, metoprolol 25 mg qd for GDMT.       Last visit with me: 11/27/24   She presents today for clinical follow up. At her last clinical visit, patient endorsed increase BLE edema and was started on Spironolactone and increased furosemide from 20 mg to 40 mg qd. Today, she reports she is unsure if her symptoms have improved since the addition of the Spironolactone. She states she  has not been compliant with the furosemide due to frequency of needing to use the restroom and not being able to make it on time. Today, there is trace swelling present R>L. She reports some issues with acid reflux largely due to diet. Patient states she has been consuming a lot of sweets/junk food recently. She denies any exertional chest pain or shortness of breath. Her biggest complaint being that of multiple joint pain. She presents today in a wheelchair but ambulates at home performing her own ADLs. Patient is not as active with exercise as she would like to be, limited from peripheral neuropathy and sciatic pain, but stays active cooking, washing the dishes and cleaning. She does not check her blood pressure regularly at home, BP 88/54 today in clinic. She reports BP range usually on the lower end. She denies any symptoms of lightheadedness or dizziness at baseline, only when changing positions to quickly. She denies chest pain/pressure/tightness/discomfort, dyspnea on exertion, orthopnea, PND, peripheral edema, palpitations, syncope or claudication.     Last visit with Dr. Ribeiro: 04/03/24   Today, patient reports that she has had a lot of salt intake over the weekend. She has seen an increase in lower extremity edema in past seven days. Denies chest pain, SOB, palpitations. She has not been taking her home Lasix regularly. Taking her heart failure medications. She has chronic neck and lower back pain which causes her to use a walker at home. She is at our appointment today in a wheelchair. Lives at home with her .     ROS:    Pertinent ROS included in HPI and below.  PMH:     Past Medical History:   Diagnosis Date    Allergy     Coronary artery disease     Diabetes mellitus     History of 2019 novel coronavirus disease (COVID-19) 09/21/2020    Diagnosed at [facility] on 8/14/20, asymptomatic      Hypercholesteremia     Hypertension     IC (interstitial cystitis)     Numbness in both hands 11/01/2017     Spondylisthesis     Vulvodynia, unspecified      Past Surgical History:   Procedure Laterality Date    CARPAL TUNNEL RELEASE Right 2019    Procedure: RELEASE, CARPAL TUNNEL, REVISION;  Surgeon: Annabelle Cid MD;  Location: [address];  Service: Orthopedics;  Laterality: Right;  Axogen, Clarix     SECTION      x3    CORONARY ANGIOGRAPHY N/A 2020    Procedure: ANGIOGRAM, CORONARY ARTERY;  Surgeon: Markie Friend III, MD;  Location: [facility] CATH LAB;  Service: Cardiology;  Laterality: N/A;    CORONARY ANGIOGRAPHY N/A 2020    Procedure: ANGIOGRAM, CORONARY ARTERY;  Surgeon: Rodney Gamble MD;  Location: [facility] CATH LAB;  Service: Cardiology;  Laterality: N/A;    CYSTOSCOPY      HYSTERECTOMY  1998    ischemic colitis      LEFT HEART CATHETERIZATION Left 2020    Procedure: Left heart cath;  Surgeon: Markie Friend III, MD;  Location: [facility] CATH LAB;  Service: Cardiology;  Laterality: Left;    LEFT HEART CATHETERIZATION Left 2020    Procedure: Left heart cath;  Surgeon: Rodney Gamble MD;  Location: [facility] CATH LAB;  Service: Cardiology;  Laterality: Left;    OOPHORECTOMY      MA REMOVAL OF OVARY/TUBE(S)       Allergies:     Review of patient's allergies indicates:   Allergen Reactions    Augmentin [amoxicillin-pot clavulanate] Other (See Comments)     Patient cannot recall what she had, only that she could not tolerate the Augmentin    Tramadol Itching    Tegretol [carbamazepine] Itching and Rash    Vicodin [hydrocodone-acetaminophen] Itching and Anxiety     Medications:   Medications Ordered Prior to Encounter[1]  Social History:     Social History     Tobacco Use    Smoking status: Never    Smokeless tobacco: Never   Substance Use Topics    Alcohol use: No     Family History:     Family History   Problem Relation Name Age of Onset    No Known Problems Mother      No Known Problems Father      No Known Problems Sister 1     No Known Problems Brother 1      "No Known Problems Maternal Aunt      No Known Problems Maternal Uncle      Breast cancer Paternal Aunt      No Known Problems Paternal Uncle      No Known Problems Maternal Grandmother      No Known Problems Maternal Grandfather      No Known Problems Paternal Grandmother      No Known Problems Paternal Grandfather      No Known Problems Other      Colon cancer Neg Hx      Ovarian cancer Neg Hx      Allergic rhinitis Neg Hx      Allergies Neg Hx      Angioedema Neg Hx      Asthma Neg Hx      Atopy Neg Hx      Eczema Neg Hx      Immunodeficiency Neg Hx      Rhinitis Neg Hx      Urticaria Neg Hx       Physical Exam:   BP (!) 92/55   Pulse (!) 58   Ht 5' 3" (1.6 m)   Wt 77.2 kg (170 lb 3.1 oz)   SpO2 98%   BMI 30.15 kg/m²      Physical Exam  Constitutional:       General: She is not in acute distress.     Appearance: Normal appearance. She is obese. She is not ill-appearing or toxic-appearing.      Comments: Patient examined in wheelchair    HENT:      Head: Normocephalic and atraumatic.      Nose: Nose normal. No congestion or rhinorrhea.      Mouth/Throat:      Mouth: Mucous membranes are moist.      Pharynx: Oropharynx is clear. No oropharyngeal exudate or posterior oropharyngeal erythema.   Eyes:      General:         Right eye: No discharge.         Left eye: No discharge.      Extraocular Movements: Extraocular movements intact.      Conjunctiva/sclera: Conjunctivae normal.   Neck:      Vascular: No carotid bruit.   Cardiovascular:      Rate and Rhythm: Normal rate and regular rhythm.      Pulses: Normal pulses.           Carotid pulses are 2+ on the right side and 2+ on the left side.       Radial pulses are 2+ on the right side and 2+ on the left side.        Dorsalis pedis pulses are 2+ on the right side and 2+ on the left side.        Posterior tibial pulses are 2+ on the right side and 2+ on the left side.      Heart sounds: Normal heart sounds. No murmur heard.     No gallop.   Pulmonary:      Effort: " Pulmonary effort is normal. No respiratory distress.      Breath sounds: Normal breath sounds. No wheezing or rales.   Musculoskeletal:         General: No swelling. Normal range of motion.      Cervical back: Normal range of motion. No rigidity or tenderness.      Right lower leg: No edema.      Left lower leg: No edema.   Skin:     General: Skin is warm and dry.      Coloration: Skin is not jaundiced.      Findings: No bruising or lesion.   Neurological:      General: No focal deficit present.      Mental Status: She is alert and oriented to person, place, and time.      Motor: Weakness present.      Gait: Gait abnormal.   Psychiatric:         Mood and Affect: Mood normal.         Behavior: Behavior normal.         Thought Content: Thought content normal.         Judgment: Judgment normal.        Labs:     Blood Tests:  Lab Results   Component Value Date    BNP 1,319 (H) 03/20/2020     02/12/2025    K 4.7 02/12/2025     02/12/2025    CO2 25 02/12/2025    BUN 19 02/12/2025    CREATININE 1.1 02/12/2025     (H) 02/12/2025    HGBA1C 7.1 (H) 01/14/2025    MG 1.4 (L) 09/21/2020    AST 65 (H) 02/12/2025    ALT 71 (H) 02/12/2025    ALBUMIN 3.9 02/12/2025    PROT 6.3 02/12/2025    BILITOT 0.5 02/12/2025    WBC 6.01 01/14/2025    HGB 11.8 (L) 01/14/2025    HCT 37.7 01/14/2025    MCV 94 01/14/2025     01/14/2025    INR 1.1 03/01/2020    TSH 1.758 01/14/2025       Lab Results   Component Value Date    CHOL 108 (L) 02/12/2025    HDL 45 02/12/2025    TRIG 154 (H) 02/12/2025       Lab Results   Component Value Date    LDLCALC 32.2 (L) 02/12/2025       Lab Results   Component Value Date    TSH 1.758 01/14/2025       Lab Results   Component Value Date    HGBA1C 7.1 (H) 01/14/2025     Imaging:     Echocardiogram  TTE 03/15/23  The left ventricle is normal in size with mildly decreased systolic function.  The estimated ejection fraction is 43%.  There is mild left ventricular global hypokinesis, slightly  more in apex.  Grade I left ventricular diastolic dysfunction.  Normal right ventricular size with normal right ventricular systolic function.  Normal central venous pressure (3 mmHg).     TTE 09/21/20  Mild left ventricular enlargement.  Mildly decreased left ventricular systolic function. The estimated ejection fraction is 40%.  Normal right ventricular systolic function.  Grade I (mild) left ventricular diastolic dysfunction consistent with impaired relaxation.  Normal central venous pressure (3 mmHg).     TTE 03/20/20  Severely decreased left ventricular systolic function. The estimated ejection fraction is 20-25%. Significant wall motion abnormalities seen.  Mildly reduced right ventricular systolic function.  Grade III (severe) left ventricular diastolic dysfunction consistent with restrictive physiology.  Mild left atrial enlargement.  Mild-to-moderate mitral regurgitation.  The estimated PA systolic pressure is 44 mmHg.  Intermediate central venous pressure (8 mmHg).     Stress testing  Cardiac PET stress 07/28/20    There is scar (no viability) in the base to distal septal, inferoapical and apical septal walls comprising of 18% of myocardium. (LAD territory)    Gated perfusion images showed an ejection fraction of 46%. Normal ejection fraction is greater than 55%     Cardiac PET 03/05/20    Resting Rb-82 perfusion scan demonstrates normal perfusion in the left circumflex and right coronary artery distrubutions.    There is decreased uptake in the septal, inferoseptal and anteroseptal walls (prox LAD distribution) consistent with either scar or viable myocardium.  If clinically indicated, FDG viability testing can be performed.    Gated perfusion images showed an ejection fraction of 33% at rest. Normal ejection fraction is greater than 51%.     Cath Lab  [facility] 05/29/20  LVEDP (Pre): 15  Successful PCI of 75% mid LCX stenosis with 2.5X12 MANDEEP  Successful PCI of 80% OM2 stenosis with 3X15 MANDEEP  Successful PCI of  mid RCA  with 4.5X22 & 4.0X26 MANDEEP  Successful PCI of proximal PLB  with 3X22 MANDEEP  IVUS utilized for stent sizing  Estimated blood loss: <50 mL     [facility] 20  Multi-vessel coronary artery disease, with  of ostial LAD filled via faint collaterals from the OM1 and Ramus, 75% tubular stenisus if the mid LCx, 80% discrete stenosis of the proximal OM1, 99% discrete stenosis of the mid RCA, and  of the distal RCA filled via collaterals from the distal LCx and OM1.  LV filling pressure is elevated, LVEDP 25 mmHg.     Other  RICHA 23  Pressure Lower   ============   Rt brachial A syst BP  93 mmHg   Rt low thigh BP    117 mmHg   Rt calf BP 97 mmHg   Rt PTA BP  102 mmHg   Rt dors pedis A BP 92 mmHg   Right RICHA ratio use:   post. tibial   Rt RICHA post tibial (rt post tib A BP / max brach A BP) 1.10   Rt RICHA (rt dors ped A BP / max brach A BP) 0.99   Rt ankle BP / max brach A BP   1.10   Lt brachial A syst BP  88 mmHg   Lt low thigh BP    109 mmHg   Lt calf BP 78 mmHg   Lt PTA BP  79 mmHg   Lt dors pedis A BP 71 mmHg   Left RICHA ratio use:    post. tibial   Lt RICHA (lt post tibial A BP / max brach A BP)  0.85   Lt RICHA dors ped (lt dors ped A BP / max brach A BP)    0.76   Lt ankle BP / max brach A BP   0.85     Impression   =========   Right Leg: Segmental pressures and PVR waveforms suggest minimal peripheral arterial occlusive disease.   Left Leg: Segmental pressures and PVR waveforms suggest moderate peripheral arterial occlusive disease.      EK24: Sinus rhythm with 1st degree A-V block at 71 bpm (personally reviewed)     Assessment and Plan     Assessment & Plan    E11.40 Type 2 diabetes mellitus with diabetic neuropathy, without long-term current use of insulin  I50.42 Chronic combined systolic and diastolic congestive heart failure  N18.31 Stage 3a chronic kidney disease  I25.5 Ischemic cardiomyopathy  I25.10 Coronary artery disease involving native coronary artery of native heart without angina  pectoris  E78.5 Dyslipidemia  I73.9 PAD (peripheral artery disease)    TYPE 2 DIABETES MELLITUS WITH DIABETIC NEUROPATHY, WITHOUT LONG-TERM CURRENT USE OF INSULIN:  - Followed by PCP   - Continue medications     CHRONIC COMBINED SYSTOLIC AND DIASTOLIC CONGESTIVE HEART FAILURE:  - Patient euvolemic during my examination, no signs fluid/volume overload   - Decided against Jardiance for heart failure management due to increased UTI risk.  - Blood pressure runs low (90s/50s) but asymptomatic, maintaining current medication dosage.  - Continued Valsartan 320 mg daily as alternative to Entresto.  - Continued Spironolactone 25 mg daily.  - Continued Metoprolol 25 mg daily.  - Continued Lasix (furosemide) 40 mg daily, with instruction to skip dose on days with planned outings.  - Contact the office if blood pressure consistently drops to 80s/50s or if experiencing symptoms of lightheadedness or dizziness related to blood pressure.    STAGE 3A CHRONIC KIDNEY DISEASE:  - Renal function improved from 31.5 to 53.1.    DYSLIPIDEMIA:  - Continued Zetia 10 mg daily.  - Continued Rosuvastatin 40 mg daily.    LIFESTYLE CHANGES:  - Niki to continue current level of physical activity, including daily tasks that provide exercise.  - Niki to maintain current diet, avoiding fried foods and limiting sodium intake.  - Niki to continue monitoring fluid intake as previously instructed.         Follow up in one year     Signed:  Sharon Lagos, PA-C [facility]     5/15/2025 12:00 PM    Follow-up:     Future Appointments   Date Time Provider Department Center   5/17/2025  9:30 AM [facility]-US1 MASTER [facility] ULTR [facility]   5/17/2025 11:00 AM [facility] OI-US1 MASTER [facility] ULTR [facility]   5/23/2025 11:15 AM Riya Sweet MD [facility] ENT [facility]   5/29/2025  1:00 PM Kamila Thibodeaux OD [facility]O [place]   7/17/2025  2:00 PM Shari Eastman, VIRAP-C [facility] ENDOLLOYD [place]       This note was generated with the assistance of  ambient listening technology. Verbal consent was obtained by the patient and accompanying visitor(s) for the recording of patient appointment to facilitate this note. I attest to having reviewed and edited the generated note for accuracy, though some syntax or spelling errors may persist. Please contact the author of this note for any clarification.           [1]   Current Outpatient Medications on File Prior to Visit   Medication Sig Dispense Refill    ammonium lactate 12 % Crea Apply 1 application topically 2 (two) times daily. 140 g 11    aspirin 81 MG Chew Take 1 tablet (81 mg total) by mouth once daily.  0    b complex vitamins capsule Take 1 capsule by mouth once daily.      baclofen (LIORESAL) 20 MG tablet Take 20 mg by mouth 3 (three) times daily as needed.       cholecalciferol, vitamin D3, (VITAMIN D3) 25 mcg (1,000 unit) capsule Take 1,000 Units by mouth once daily.      duloxetine (CYMBALTA) 30 MG capsule Take 30 mg by mouth once daily.      estradioL (ESTRACE) 0.01 % (0.1 mg/gram) vaginal cream PLACE 0.5 G VAGINALLY THREE TIMES A WEEK 42.5 g 0    ezetimibe (ZETIA) 10 mg tablet Take 1 tablet (10 mg total) by mouth once daily. 90 tablet 3    furosemide (LASIX) 40 MG tablet TAKE 1/2 TABLET BY MOUTH DAILY AS NEED FOR SWELLING OR WEIGHT GAIN 45 tablet 1    gabapentin (NEURONTIN) 300 MG capsule Take 2 capsules 1 time at night. 180 capsule 1    Lactobac no.51/Bifidobact no.4 (UP4 PROBIOTICS ULTRA ORAL) Take by mouth.      levothyroxine (SYNTHROID) 50 MCG tablet Take 1 tablet (50 mcg total) by mouth once daily. 90 tablet 0    metFORMIN (GLUCOPHAGE) 500 MG tablet Take 1 tablet (500 mg total) by mouth once daily. 180 tablet 0    metoprolol succinate (TOPROL-XL) 25 MG 24 hr tablet Take 1 tablet (25 mg total) by mouth once daily. 90 tablet 3    multivitamin (THERAGRAN) per tablet Take 1 tablet by mouth once daily.      omega-3 fatty acids/fish oil (FISH OIL-OMEGA-3 FATTY ACIDS) 300-1,000 mg capsule Take by mouth  once daily.      oxybutynin (DITROPAN-XL) 5 MG TR24 Take 1 tablet (5 mg total) by mouth as needed (urgency, urge incontinence). 30 tablet 11    oxyCODONE (OXY-IR) 5 mg Cap Take 1 capsule (5 mg total) by mouth every 6 (six) hours as needed (Pain not relieved with other methods). 12 capsule 0    rosuvastatin (CRESTOR) 40 MG Tab Take 1 tablet (40 mg total) by mouth every evening. 90 tablet 3    spironolactone (ALDACTONE) 25 MG tablet Take 1 tablet (25 mg total) by mouth once daily. 90 tablet 3    valsartan (DIOVAN) 320 MG tablet Take 1 tablet (320 mg total) by mouth once daily. 90 tablet 3    [DISCONTINUED] sacubitriL-valsartan (ENTRESTO)  mg per tablet Take 1 tablet by mouth 2 (two) times daily. 60 tablet 11     No current facility-administered medications on file prior to visit.

## 2025-05-15 ENCOUNTER — OFFICE VISIT (OUTPATIENT)
Dept: CARDIOLOGY | Facility: CLINIC | Age: 75
End: 2025-05-15
Payer: MEDICARE

## 2025-05-15 VITALS
HEART RATE: 58 BPM | WEIGHT: 170.19 LBS | BODY MASS INDEX: 30.16 KG/M2 | DIASTOLIC BLOOD PRESSURE: 55 MMHG | SYSTOLIC BLOOD PRESSURE: 92 MMHG | HEIGHT: 63 IN | OXYGEN SATURATION: 98 %

## 2025-05-15 DIAGNOSIS — I50.42 CHRONIC COMBINED SYSTOLIC AND DIASTOLIC CONGESTIVE HEART FAILURE: Primary | ICD-10-CM

## 2025-05-15 DIAGNOSIS — I25.10 CORONARY ARTERY DISEASE INVOLVING NATIVE CORONARY ARTERY OF NATIVE HEART WITHOUT ANGINA PECTORIS: ICD-10-CM

## 2025-05-15 DIAGNOSIS — E78.5 DYSLIPIDEMIA: ICD-10-CM

## 2025-05-15 DIAGNOSIS — I25.5 ISCHEMIC CARDIOMYOPATHY: ICD-10-CM

## 2025-05-15 DIAGNOSIS — E11.40 TYPE 2 DIABETES MELLITUS WITH DIABETIC NEUROPATHY, WITHOUT LONG-TERM CURRENT USE OF INSULIN: ICD-10-CM

## 2025-05-15 DIAGNOSIS — I73.9 PAD (PERIPHERAL ARTERY DISEASE): ICD-10-CM

## 2025-05-15 DIAGNOSIS — N18.31 STAGE 3A CHRONIC KIDNEY DISEASE: ICD-10-CM

## 2025-05-15 PROCEDURE — 99999 PR PBB SHADOW E&M-EST. PATIENT-LVL IV: CPT | Mod: PBBFAC,,,

## 2025-05-17 ENCOUNTER — HOSPITAL ENCOUNTER (OUTPATIENT)
Dept: RADIOLOGY | Facility: HOSPITAL | Age: 75
Discharge: HOME | End: 2025-05-17
Attending: FAMILY MEDICINE
Payer: MEDICARE

## 2025-05-17 DIAGNOSIS — E21.3 HYPERPARATHYROIDISM: ICD-10-CM

## 2025-05-17 DIAGNOSIS — R79.89 ELEVATED LFTS: ICD-10-CM

## 2025-05-17 PROCEDURE — 76705 ECHO EXAM OF ABDOMEN: CPT | Mod: TC

## 2025-05-17 PROCEDURE — 76705 ECHO EXAM OF ABDOMEN: CPT | Mod: 26,,, | Performed by: RADIOLOGY

## 2025-05-17 PROCEDURE — 76536 US EXAM OF HEAD AND NECK: CPT | Mod: TC

## 2025-05-17 PROCEDURE — 76536 US EXAM OF HEAD AND NECK: CPT | Mod: 26,,, | Performed by: RADIOLOGY

## 2025-05-29 ENCOUNTER — OFFICE VISIT (OUTPATIENT)
Dept: OPTOMETRY | Facility: CLINIC | Age: 75
End: 2025-05-29
Payer: MEDICARE

## 2025-05-29 DIAGNOSIS — E11.9 TYPE 2 DIABETES MELLITUS WITHOUT OPHTHALMIC MANIFESTATIONS: Primary | ICD-10-CM

## 2025-05-29 DIAGNOSIS — H25.813 COMBINED FORMS OF AGE-RELATED CATARACT OF BOTH EYES: ICD-10-CM

## 2025-05-29 PROCEDURE — 4010F ACE/ARB THERAPY RXD/TAKEN: CPT | Mod: CPTII,S$GLB,, | Performed by: OPTOMETRIST

## 2025-05-29 PROCEDURE — 99214 OFFICE O/P EST MOD 30 MIN: CPT | Mod: S$GLB,,, | Performed by: OPTOMETRIST

## 2025-05-29 PROCEDURE — 3051F HG A1C>EQUAL 7.0%<8.0%: CPT | Mod: CPTII,S$GLB,, | Performed by: OPTOMETRIST

## 2025-05-29 PROCEDURE — 2023F DILAT RTA XM W/O RTNOPTHY: CPT | Mod: CPTII,S$GLB,, | Performed by: OPTOMETRIST

## 2025-05-29 PROCEDURE — 1126F AMNT PAIN NOTED NONE PRSNT: CPT | Mod: CPTII,S$GLB,, | Performed by: OPTOMETRIST

## 2025-05-29 PROCEDURE — 1100F PTFALLS ASSESS-DOCD GE2>/YR: CPT | Mod: CPTII,S$GLB,, | Performed by: OPTOMETRIST

## 2025-05-29 PROCEDURE — 99999 PR PBB SHADOW E&M-EST. PATIENT-LVL III: CPT | Mod: PBBFAC,,, | Performed by: OPTOMETRIST

## 2025-05-29 PROCEDURE — 3288F FALL RISK ASSESSMENT DOCD: CPT | Mod: CPTII,S$GLB,, | Performed by: OPTOMETRIST

## 2025-05-29 PROCEDURE — 1159F MED LIST DOCD IN RCRD: CPT | Mod: CPTII,S$GLB,, | Performed by: OPTOMETRIST

## 2025-05-29 NOTE — PROGRESS NOTES
HPI    Pt is here today for diabetic eye exam. Constant discomfort OU. States   that everything looks smoky.   DLS: 4/8/2024 Dr. Odom   (-)Flashes   (+)Floaters: OD  (-)Diplopia   (+)Headaches   (-)Itching   (-)Tearing  (-)Burning  (-)Dryness   (+)Photophobia  (+)Glare   (+)Blurred [facility]  Past Eye Sx: (-)  Eye Meds: (-)   Hemoglobin A1C       Date                     Value               Ref Range             Status                01/14/2025               7.1 (H)             4.0 - 5.6 %           Final            Last edited by Kamila Thibodeaux, OD on 5/29/2025  1:33 PM.            Assessment /Plan     For exam results, see Encounter Report.    Type 2 diabetes mellitus without ophthalmic manifestations  -     Ambulatory referral/consult to Optometry  -     Ambulatory referral/consult to Ophthalmology    Combined forms of age-related cataract of both eyes      1. No retinopathy noted today.  Continued control with primary care physician and annual comprehensive eye exam.     2. Referral to Dr. Hansen for cataract extraction evaluation.  Reviewed implant options and surgical process with pt.    RTC to me for cataract post operative care

## 2025-05-30 ENCOUNTER — PATIENT MESSAGE (OUTPATIENT)
Dept: OPHTHALMOLOGY | Facility: CLINIC | Age: 75
End: 2025-05-30
Payer: MEDICARE

## 2025-06-27 ENCOUNTER — OFFICE VISIT (OUTPATIENT)
Dept: OTOLARYNGOLOGY | Facility: CLINIC | Age: 75
End: 2025-06-27
Payer: MEDICARE

## 2025-06-27 VITALS
SYSTOLIC BLOOD PRESSURE: 73 MMHG | HEIGHT: 63 IN | HEART RATE: 69 BPM | BODY MASS INDEX: 30.16 KG/M2 | WEIGHT: 170.19 LBS | DIASTOLIC BLOOD PRESSURE: 49 MMHG

## 2025-06-27 DIAGNOSIS — R07.0 THROAT PAIN IN ADULT: ICD-10-CM

## 2025-06-27 DIAGNOSIS — R09.89 THROAT CLEARING: ICD-10-CM

## 2025-06-27 DIAGNOSIS — R49.0 DYSPHONIA: ICD-10-CM

## 2025-06-27 DIAGNOSIS — E21.3 HYPERPARATHYROIDISM: Primary | ICD-10-CM

## 2025-06-27 DIAGNOSIS — R13.10 DYSPHAGIA, UNSPECIFIED TYPE: ICD-10-CM

## 2025-06-27 PROCEDURE — 1125F AMNT PAIN NOTED PAIN PRSNT: CPT | Mod: CPTII,S$GLB,, | Performed by: OTOLARYNGOLOGY

## 2025-06-27 PROCEDURE — 3074F SYST BP LT 130 MM HG: CPT | Mod: CPTII,S$GLB,, | Performed by: OTOLARYNGOLOGY

## 2025-06-27 PROCEDURE — 1101F PT FALLS ASSESS-DOCD LE1/YR: CPT | Mod: CPTII,S$GLB,, | Performed by: OTOLARYNGOLOGY

## 2025-06-27 PROCEDURE — 4010F ACE/ARB THERAPY RXD/TAKEN: CPT | Mod: CPTII,S$GLB,, | Performed by: OTOLARYNGOLOGY

## 2025-06-27 PROCEDURE — 31575 DIAGNOSTIC LARYNGOSCOPY: CPT | Mod: S$GLB,,, | Performed by: OTOLARYNGOLOGY

## 2025-06-27 PROCEDURE — 3288F FALL RISK ASSESSMENT DOCD: CPT | Mod: CPTII,S$GLB,, | Performed by: OTOLARYNGOLOGY

## 2025-06-27 PROCEDURE — 3051F HG A1C>EQUAL 7.0%<8.0%: CPT | Mod: CPTII,S$GLB,, | Performed by: OTOLARYNGOLOGY

## 2025-06-27 PROCEDURE — 3008F BODY MASS INDEX DOCD: CPT | Mod: CPTII,S$GLB,, | Performed by: OTOLARYNGOLOGY

## 2025-06-27 PROCEDURE — 3078F DIAST BP <80 MM HG: CPT | Mod: CPTII,S$GLB,, | Performed by: OTOLARYNGOLOGY

## 2025-06-27 PROCEDURE — 99204 OFFICE O/P NEW MOD 45 MIN: CPT | Mod: 25,S$GLB,, | Performed by: OTOLARYNGOLOGY

## 2025-06-27 RX ORDER — FLUTICASONE PROPIONATE 50 MCG
2 SPRAY, SUSPENSION (ML) NASAL 2 TIMES DAILY
Qty: 18.2 ML | Refills: 3 | Status: SHIPPED | OUTPATIENT
Start: 2025-06-27 | End: 2025-07-07

## 2025-06-27 RX ORDER — AZELASTINE 1 MG/ML
1 SPRAY, METERED NASAL 2 TIMES DAILY
Qty: 30 ML | Refills: 3 | Status: SHIPPED | OUTPATIENT
Start: 2025-06-27

## 2025-06-27 NOTE — PATIENT INSTRUCTIONS
Information and instructions from your visit with me today:  Clearing your throat leads to more swelling in the voice box.  This will worsen your symptoms.  You should try to minimize throat clearing as much as possible.Get a cup of water and a straw and when you feel like you want to clear your throat, blow bubble through the straw into the water     Can try gaviscon liquid over the counter - take 15 minutes after a meal as needed for throat phlegm    Avoid mouthwash with alcohol such as listerine. You should use biotene mouth wash    Can sleep with bedside humidifier     You should aim to drink 2 to 3 liters of water daily if you are drinking one cup of coffee.  If you have trouble drinking water, you can cut back or cut out caffeine. If you have trouble drinking water, you can cut back or cut out caffeine.    Lozenges:  Halls Breezers - sold with cough drops, Can try sugar free lozenges with pectin ,  such as halls fruit breezers      Xylimelts, on toothpaste aisle, these are convenient for when you are talking and or sleeping - they stick to gumline and provide moisture - [employer] or Prize[employer]        Steam and gargle - 3x day  You may consider getting a facial steamer, breathe in warm moist air  through mouth and nose to hydrate vocal folds. On amazon, I like the [employer] version that is under $25.        Gargle:   1/2 tsp each salt, baking soda, clear corn syrup, 6 oz warm water  Sip, gargle quietly, spit, repeat until gone, don't eat/drink for 30 minutes after.      Chew gum with baking soda after meals, Orbit White     Order online, when it's time to get more Gaviscon, either Alginate therapy such as Reflux Gourmet  or Gaviscon Advance can be used following meals and at bedtime for reflux coverage. The Acid Watcher Diet by Dr Vences as well as the Chronic Cough [place] by Dr Boo are good reads to gain improved understanding of dietary and behavioral changes that can aid in reduction of LPRD, and to better understand  cough and cough control     www.acidwatcher.[employer]    Always use saline every time before a medication spray. You can also use saline on its own. If you are using saline and/or the medication sprays on an as needed basis and you have symptoms use the regimen daily for at least 2 weeks. You can use the flonase and astelin together, or if you prefer to start with just one medication spray, the flonase works better by itself compared to astelin by itself. You can try doing the saline and flonase and if still congested, add on the astelin again doing this regimen daily for up to two weeks when congestion. There may be times of the year that you only need saline and there may be times of the year that you need saline, flonase and astelin to control symptoms.     Start using the following medication nasal sprays:   Fluticasone spray:    This medication is a steroid spray. It stays within the nose and does not have absorption into the body that leads to side effects that one has with oral steroid medication. Fluticasone nasal spray is the same as the Flonase brand nasal spray. Discuss with your pharmacist if the price is lower over the counter or with a prescription ( this varies depending on insurance). The medication that is over the counter is the same as the prescription medication. Use this medication as instructed on the prescription, 1-2 sprays on each side of your nose twice daily.     Azelastine  spray:  This medication is an antihistamine used to treat nasal symptoms of allergy, which works specifically in the nose unlike antihistamine pills which have more of an effect on the whole body. Use this medication as instructed on the prescription, 1 spray on each side of your nose twice daily.     Additional instructions for medication sprays  Place the tip of the medication bottle in your nose and aim slightly up and out on each side to get medication high and deep into your nose and sinuses, and not have it all deposit  "in the very front of your nose. Aim the tip of the nozzle towards the outer corner of your eye . You can imagine aiming towards the back of your eyeball on each side for this, as opposed to straight back to the center of your nose and head.     You need to use this medication every day regardless of symptoms, as it takes time ( a few weeks) to work and get the benefits. It does not work on an "as needed" basis like taking a decongestant. If your symptoms only occur in a particular season, then the medication can be used seasonally instead of year long. For seasonal symptoms, you should start using the spray twice daily a month before when you normally have symptoms ( for example, if symptoms start in August, should start at the end of June).     Start nasal irrigations with saline solution- you can either use a rinse or a mist spray:    NASAL SALINE SPRAY ( simply saline and arm and hammer are examples) There are several different brands found in the cold and flu aisle of the pharmacy. You can use any brand of saline spray - this will deliver the saline by a gentle mist ( if you have difficulty or discomfort with nasal rinse/ a lot of fluid in the nose, this will be more comfortable).       Always rinse your nose with saline prior to using medication sprays and wait a couple of hours before using again. You can use the saline throughout the day to help with stuffy nose or dry nose.    Do not use nasal decongestant sprays such as Afrin or similar products long term ( over 3 days) .  This can cause long term physical nasal addiction. Afrin should only be used if having nose bleeds, severe nasal congestion , or severe ear pain/fullness and should not be used for more than 2-3 days in a row . It is a not a medication that should be used for a long period of time.     It was nice meeting you today, and I look forward to helping you feel better soon. Please don't hesitate to call if you have any other questions or " concerns, or if I can be of any assistance in the meantime.      Riya Sweet MD    [facility]     Phone  [phone]    Fax      [phone]        Riya Sweet MD  Otorhinolaryngology

## 2025-06-27 NOTE — PROGRESS NOTES
OTOLARYNGOLOGY CLINIC NOTE  Date:  06/27/2025     Chief complaint:  Chief Complaint   Patient presents with    Hoarse    Sore Throat     Right side    Dysphagia    Hyperparathyroidism    ear pops     Right ear       History of Present Illness  Niki Soriano is a 74 y.o. female  presenting today for a new evaluation and treatment of hypercalcemia/hyperparathyroid       Reviewed notes from pcp dr preciado from 5-1-25 and noted to have dysphonia and throat pain at that time.   Having pain in right smg area; sleeps with mouth open so dry in am but ; she refers to as throat pain but opints along neck in level 1 area  Has arthritis in the neck as well as disc issues.   Hoarseness comes and goes especially when she uses her voice too much - pain and hoarseness have been going on for a year.   Feels like there is fluid behind the right ear. Feels like hearing is not good she is not sure if it is due to that.   Not too much issue with swallowing but does have to watch how she eats. For example chicken has to chew a certain amount. She can't turn head and swallow , head has to be straight. Food lodging gone on for a while , has slowed down some.     No issue with kidney stones.   No heartburn unless eats certain things.     Has sinus drip all the time    Past Medical History  Past Medical History:   Diagnosis Date    Allergy     Coronary artery disease     Diabetes mellitus     History of 2019 novel coronavirus disease (COVID-19) 09/21/2020    Diagnosed at [facility] on 8/14/20, asymptomatic      Hypercholesteremia     Hypertension     IC (interstitial cystitis)     Numbness in both hands 11/01/2017    Spondylisthesis     Vulvodynia, unspecified         Past Surgical History  Past Surgical History:   Procedure Laterality Date    CARPAL TUNNEL RELEASE Right 08/21/2019    Procedure: RELEASE, CARPAL TUNNEL, REVISION;  Surgeon: Annabelle Cid MD;  Location: [facility] OR [address] [place] [address];  Service: Orthopedics;  Laterality: Right;   Shawnee Amado     SECTION      x3    CORONARY ANGIOGRAPHY N/A 2020    Procedure: ANGIOGRAM, CORONARY ARTERY;  Surgeon: Markie Friend III, MD;  Location: [facility] CATH LAB;  Service: Cardiology;  Laterality: N/A;    CORONARY ANGIOGRAPHY N/A 2020    Procedure: ANGIOGRAM, CORONARY ARTERY;  Surgeon: Rodney Gamble MD;  Location: [facility] CATH LAB;  Service: Cardiology;  Laterality: N/A;    CYSTOSCOPY      HYSTERECTOMY  1998    ischemic colitis      LEFT HEART CATHETERIZATION Left 2020    Procedure: Left heart cath;  Surgeon: Markie Friend III, MD;  Location: [facility] CATH LAB;  Service: Cardiology;  Laterality: Left;    LEFT HEART CATHETERIZATION Left 2020    Procedure: Left heart cath;  Surgeon: Rodney Gamble MD;  Location: [facility] CATH LAB;  Service: Cardiology;  Laterality: Left;    OOPHORECTOMY      WA REMOVAL OF OVARY/TUBE(S)          Medications  Medications Ordered Prior to Encounter[1]    Review of Systems  Review of Systems   Constitutional: Negative.    HENT: Negative.     Eyes: Negative.    Respiratory: Negative.     Cardiovascular: Negative.    Gastrointestinal: Negative.    Genitourinary: Negative.    Musculoskeletal: Negative.    Skin: Negative.    Neurological: Negative.    Psychiatric/Behavioral: Negative.          Social History   reports that she has never smoked. She has never used smokeless tobacco. She reports that she does not drink alcohol and does not use drugs.     Family History  Family History   Problem Relation Name Age of Onset    No Known Problems Mother      No Known Problems Father      No Known Problems Sister 1     No Known Problems Brother 1     No Known Problems Maternal Aunt      No Known Problems Maternal Uncle      Breast cancer Paternal Aunt      No Known Problems Paternal Uncle      No Known Problems Maternal Grandmother      No Known Problems Maternal Grandfather      No Known Problems Paternal Grandmother      No Known Problems Paternal  Grandfather      No Known Problems Other      Colon cancer Neg Hx      Ovarian cancer Neg Hx      Allergic rhinitis Neg Hx      Allergies Neg Hx      Angioedema Neg Hx      Asthma Neg Hx      Atopy Neg Hx      Eczema Neg Hx      Immunodeficiency Neg Hx      Rhinitis Neg Hx      Urticaria Neg Hx          Physical Exam   Vitals:    06/27/25 1024   BP: (!) 73/49   Pulse: 69    Body mass index is 30.15 kg/m².            GENERAL: no acute distress.  HEAD: normocephalic.   EYES: lids and lashes normal. No scleral icterus  EARS: external ear without lesion, normal pinna shape and position.  External auditory canal with normal cerumen, tympanic membrane fully visible, no perforation , no retraction. No middle ear effusion. Ossicles intact. Serous effusion on right but not complete type As tymp  NOSE: external nose without significant bony abnormality  ORAL CAVITY/OROPHARYNX: tongue midline and mobile. Symmetric palate rise. Uvula midline.   NECK: trachea midline.   LYMPH NODES:No cervical lymphadenopathy.  RESPIRATORY: no stridor, no stertor. Respirations nonlabored.  NEURO: alert, responds to questions appropriately.   PSYCH:mood appropriate    PROCEDURE NOTE  NAME OF PROCEDURE: Flexible Laryngoscopy, diagnostic  INDICATIONS: gag reflex precludes mirror exam, intermittent dysphonia   FINDINGS: turbinate hypertrophy; impaired abduction of right vocal fold but does have motion     Consent: After procedure was explained in detail and all questions answered, verbal consent was obtained for performing flexible laryngoscopy.  Anesthesia: topical 4% lidocaine and neosynephrine  Procedure: With patient in seated position, the scope was inserted into the bilateral nasal passageway and advanced atraumatically into the nasopharynx to examine the following structures:  Nasal cavity: Turbinates with  hypertrophy. mild middle meatal edema. No purulent drainage.   Nasopharynx: no mass or lesion noted in nasopharynx.   Oropharynx: base of  "tongue without  mass or ulceration. Lingual tonsils normal in appearance  Hypopharynx: posterior pharyngeal wall without mass or lesion. No pooling of secretions. Pyriform sinuses visible without mass or lesion  Larynx: epiglottis normal without lesion. False vocal folds without edema/erythema/lesion. True vocal folds left fully mobile and right with impaired abduction; mucosa without lesion. Mild interarytenoid edema no erythema . Postcricoid region with mild edema no lesion   Subglottis: visualized portion of subglottis normal in appearance    After examination performed, the scope was removed atraumatically . The patient tolerated the procedure well. Photodocumentation obtained with representative images below, all images and/or videos uploaded in media section of epic.                          Procedure note  Procedure performed : tympanometry  Pre procedure diagnosis: concern for eustachian tube dysfunction and/or middle ear effusion   Post procedure diagnosis: eustachian tube dysfunction     Findings:type As tymp on right, type a on left     Procedure in detail: after procedure explained and verbal consent obtained, with patient in seated position tympanometer was placed into the right ear and seal obtained. Once sealed , compliance, ear canal volume and middle ear pressure was determined. The tympanometer was then removed from the ear and place into the left ear. The same procedure was performed. Patient tolerated procedure well     Imaging:  The patient does have any pertinent and/or recent imaging of the head and neck.Ultrasound images and report personally reviewed and reviewed with patient. Radiology report in quotations below  "The thyroid is normal in size.  Background thyroid parenchyma echotexture is homogenous.  Vascularity is within normal limits.     Right lobe of the thyroid measures 3.3 x 1.3 x 1.1 cm.     Isthmus measures 0.3 cm.     Left lobe of the thyroid measures 2.9 x 1.1 x 2.7 cm.   " "  Cervical lymph nodes demonstrate normal morphology and size.     Single thyroid nodule as detailed below.     Nodule #1     Size: 0.8 x 0.4 x 0.6 cm, Right lobe midportion, solid/almost completely solid (2), isoechoic (1), wider-than-tall (0), smooth (0) margins ,and none (0) foci.     Change: N/A     TI-RADS category: TR3 (3 points)     Impression:     No findings to suggest parathyroid adenoma.     Solitary thyroid nodule as above.     Per TI-RADS criteria, No FNA or follow up recommended.    "      Labs:  CBC  Recent Labs   Lab 04/03/24  1045 12/17/24  1441 01/14/25  1510   WBC 5.50 7.65 6.01   Hemoglobin 13.2 13.0 11.8 L   Hematocrit 41.2 40.8 37.7   MCV 94 94 94   Platelets 153 213 184     BMP  Recent Labs   Lab 12/17/24  1441 01/14/25  1510 02/12/25  1145   Glucose 234 H 109 132 H   Sodium 140 138 139   Potassium 4.2 4.9 4.7   Chloride 110 107 108   CO2 19 L 23 25   BUN 39 H 40 H 19   Creatinine 1.8 H 1.7 H 1.1   Calcium 10.2 11.0 H 10.4     ENDOCRINE  Vitamin D 46 (1-14-25)  TSH 1.758 (1/14/25) 4.072 (3/1/20)  .3 ( 2-12-25)    COAGS        Assessment  1. Hyperparathyroidism  - PTH, Intact; Future  - Calcium, Ionized; Future  - Creatinine, Serum; Future  - CT Neck Parathyroid (4D); Future    2. Dysphonia    3. Dysphagia, unspecified type    4. Throat pain in adult    5. Throat clearing       Plan:  Discussed plan of care with patient in detail and all questions answered. Patient reported understanding of plan of care. I gave the patient the opportunity to ask questions and patient confirmed all questions answered to satisfaction.     No audiology apptmt available today so personally performed tympanometry as suspected may have slight effusion on otoscopy. I think with tymps that is the case but does not have full effusion that would result in type b tymp ( which also correlates with what I see on exam    Reviewed pathophysiology of hyperparathyroid and hypercalcemia and about parathyroid adenoma. " Recommend ct 4d parathyroid. Discussed that surgical mgmt is gold standard for parathyroid adenoma but there are other causes of hyperparathyroid. Has had high normal calcium levels as well as hypercalcemia so I think likely primary hyperparathyroid and most commonly this is adenoma. Ddx of familial hypercalciuric hyper para , secondary hyperpara disease from kidney dysfunction , vitamin d deficiency among conditions that we discussed that can affect calcium and parathyroid hormone levels    Will also recheck pth level- discussed that for diagnosis requires 2 elevated pth levels at least 2 weeks apart.   Will be able to look at course of right RLN on imaging as well   Throat clearing prevention and vocal hygiene strategies discussed  Saline flonase and astelin for etd and middle ear effusion, no mass blocking orifice on scope    F/u asap after ct              [1]   Current Outpatient Medications on File Prior to Visit   Medication Sig Dispense Refill    ammonium lactate 12 % Crea Apply 1 application topically 2 (two) times daily. 140 g 11    aspirin 81 MG Chew Take 1 tablet (81 mg total) by mouth once daily.  0    b complex vitamins capsule Take 1 capsule by mouth once daily.      baclofen (LIORESAL) 20 MG tablet Take 20 mg by mouth 3 (three) times daily as needed.       cholecalciferol, vitamin D3, (VITAMIN D3) 25 mcg (1,000 unit) capsule Take 1,000 Units by mouth once daily.      duloxetine (CYMBALTA) 30 MG capsule Take 30 mg by mouth once daily.      estradioL (ESTRACE) 0.01 % (0.1 mg/gram) vaginal cream PLACE 0.5 G VAGINALLY THREE TIMES A WEEK 42.5 g 0    ezetimibe (ZETIA) 10 mg tablet Take 1 tablet (10 mg total) by mouth once daily. 90 tablet 3    furosemide (LASIX) 40 MG tablet TAKE 1/2 TABLET BY MOUTH DAILY AS NEED FOR SWELLING OR WEIGHT GAIN 45 tablet 1    gabapentin (NEURONTIN) 300 MG capsule Take 2 capsules 1 time at night. 180 capsule 1    Lactobac no.51/Bifidobact no.4 (UP4 PROBIOTICS ULTRA ORAL) Take by  mouth.      levothyroxine (SYNTHROID) 50 MCG tablet Take 1 tablet (50 mcg total) by mouth once daily. 90 tablet 0    metFORMIN (GLUCOPHAGE) 500 MG tablet Take 1 tablet (500 mg total) by mouth once daily. 180 tablet 0    metoprolol succinate (TOPROL-XL) 25 MG 24 hr tablet Take 1 tablet (25 mg total) by mouth once daily. 90 tablet 3    multivitamin (THERAGRAN) per tablet Take 1 tablet by mouth once daily.      omega-3 fatty acids/fish oil (FISH OIL-OMEGA-3 FATTY ACIDS) 300-1,000 mg capsule Take by mouth once daily.      oxybutynin (DITROPAN-XL) 5 MG TR24 Take 1 tablet (5 mg total) by mouth as needed (urgency, urge incontinence). 30 tablet 11    oxyCODONE (OXY-IR) 5 mg Cap Take 1 capsule (5 mg total) by mouth every 6 (six) hours as needed (Pain not relieved with other methods). 12 capsule 0    rosuvastatin (CRESTOR) 40 MG Tab Take 1 tablet (40 mg total) by mouth every evening. 90 tablet 3    spironolactone (ALDACTONE) 25 MG tablet Take 1 tablet (25 mg total) by mouth once daily. 90 tablet 3    valsartan (DIOVAN) 320 MG tablet Take 1 tablet (320 mg total) by mouth once daily. 90 tablet 3     No current facility-administered medications on file prior to visit.

## 2025-07-02 DIAGNOSIS — N39.0 RECURRENT UTI: ICD-10-CM

## 2025-07-02 DIAGNOSIS — N95.2 VAGINAL ATROPHY: ICD-10-CM

## 2025-07-02 RX ORDER — ESTRADIOL 0.1 MG/G
CREAM VAGINAL
Qty: 42.5 G | Refills: 1 | Status: SHIPPED | OUTPATIENT
Start: 2025-07-02

## 2025-07-07 RX ORDER — FLUTICASONE PROPIONATE 50 MCG
SPRAY, SUSPENSION (ML) NASAL
Qty: 96 ML | Refills: 3 | Status: SHIPPED | OUTPATIENT
Start: 2025-07-07

## 2025-07-08 ENCOUNTER — LAB VISIT (OUTPATIENT)
Dept: LAB | Facility: HOSPITAL | Age: 75
End: 2025-07-08
Payer: MEDICARE

## 2025-07-08 DIAGNOSIS — E21.3 HYPERPARATHYROIDISM: ICD-10-CM

## 2025-07-08 LAB
CA-I BLD-SCNC: 1.3 MMOL/L (ref 1.06–1.42)
CREAT SERPL-MCNC: 1.5 MG/DL (ref 0.5–1.4)
GFR SERPLBLD CREATININE-BSD FMLA CKD-EPI: 37 ML/MIN/1.73/M2
PTH-INTACT SERPL-MCNC: 185.5 PG/ML (ref 9–77)

## 2025-07-08 PROCEDURE — 82565 ASSAY OF CREATININE: CPT

## 2025-07-08 PROCEDURE — 36415 COLL VENOUS BLD VENIPUNCTURE: CPT

## 2025-07-08 PROCEDURE — 83970 ASSAY OF PARATHORMONE: CPT

## 2025-07-08 PROCEDURE — 82330 ASSAY OF CALCIUM: CPT

## 2025-07-16 DIAGNOSIS — E11.9 TYPE 2 DIABETES MELLITUS WITHOUT COMPLICATION: ICD-10-CM

## 2025-08-24 ENCOUNTER — HOSPITAL ENCOUNTER (INPATIENT)
Facility: HOSPITAL | Age: 75
LOS: 2 days | Discharge: HOME HEALTH | DRG: 683 | End: 2025-08-27
Attending: EMERGENCY MEDICINE
Payer: MEDICARE

## 2025-08-24 DIAGNOSIS — E21.3 HYPERPARATHYROIDISM: ICD-10-CM

## 2025-08-24 DIAGNOSIS — R41.0 CONFUSION: ICD-10-CM

## 2025-08-24 DIAGNOSIS — R51.9 ACUTE NONINTRACTABLE HEADACHE, UNSPECIFIED HEADACHE TYPE: ICD-10-CM

## 2025-08-24 DIAGNOSIS — I50.9 HEART FAILURE: ICD-10-CM

## 2025-08-24 DIAGNOSIS — Z78.9 INTERMITTENT SELF-CATHETERIZATION OF BLADDER: ICD-10-CM

## 2025-08-24 DIAGNOSIS — G43.E09 CHRONIC MIGRAINE WITH AURA WITHOUT STATUS MIGRAINOSUS, NOT INTRACTABLE: ICD-10-CM

## 2025-08-24 DIAGNOSIS — N17.9 AKI (ACUTE KIDNEY INJURY): Primary | ICD-10-CM

## 2025-08-24 DIAGNOSIS — R07.9 CHEST PAIN: ICD-10-CM

## 2025-08-24 PROBLEM — R53.1 LEFT-SIDED WEAKNESS: Status: ACTIVE | Noted: 2025-08-24

## 2025-08-24 PROBLEM — N18.32 ACUTE KIDNEY INJURY SUPERIMPOSED ON STAGE 3B CHRONIC KIDNEY DISEASE: Status: ACTIVE | Noted: 2025-08-24

## 2025-08-24 PROBLEM — G43.909 MIGRAINE: Status: ACTIVE | Noted: 2025-08-24

## 2025-08-24 PROBLEM — E83.52 HYPERCALCEMIA: Status: ACTIVE | Noted: 2025-08-24

## 2025-08-24 LAB
ABSOLUTE EOSINOPHIL (OHS): 0.02 K/UL
ABSOLUTE MONOCYTE (OHS): 0.66 K/UL (ref 0.3–1)
ABSOLUTE NEUTROPHIL COUNT (OHS): 5.95 K/UL (ref 1.8–7.7)
ALBUMIN SERPL BCP-MCNC: 4.2 G/DL (ref 3.5–5.2)
ALP SERPL-CCNC: 47 UNIT/L (ref 40–150)
ALT SERPL W/O P-5'-P-CCNC: 120 UNIT/L (ref 0–55)
ANION GAP (OHS): 17 MMOL/L (ref 8–16)
AST SERPL-CCNC: 93 UNIT/L (ref 0–50)
B-OH-BUTYR BLD STRIP-SCNC: 2.6 MMOL/L
BACTERIA #/AREA URNS AUTO: NORMAL /HPF
BASOPHILS # BLD AUTO: 0.04 K/UL
BASOPHILS NFR BLD AUTO: 0.5 %
BILIRUB SERPL-MCNC: 0.8 MG/DL (ref 0.1–1)
BILIRUB UR QL STRIP.AUTO: NEGATIVE
BIPAP: 0
BUN SERPL-MCNC: 58 MG/DL (ref 8–23)
CALCIUM SERPL-MCNC: 10.9 MG/DL (ref 8.7–10.5)
CHLORIDE SERPL-SCNC: 103 MMOL/L (ref 95–110)
CLARITY UR: ABNORMAL
CO2 SERPL-SCNC: 18 MMOL/L (ref 23–29)
COLOR UR AUTO: YELLOW
CORRECTED TEMPERATURE (PCO2): 30 MMHG
CORRECTED TEMPERATURE (PCO2): 37.2 MMHG
CORRECTED TEMPERATURE (PH): 7.36
CORRECTED TEMPERATURE (PH): 7.43
CORRECTED TEMPERATURE (PO2): 118 MMHG
CORRECTED TEMPERATURE (PO2): 140 MMHG
CREAT SERPL-MCNC: 2.2 MG/DL (ref 0.5–1.4)
CREAT UR-MCNC: 113 MG/DL (ref 15–325)
EAG (OHS): 143 MG/DL (ref 68–131)
ERYTHROCYTE [DISTWIDTH] IN BLOOD BY AUTOMATED COUNT: 12.6 % (ref 11.5–14.5)
FIO2: 21 %
FIO2: 21 %
FIO2: 28 %
GFR SERPLBLD CREATININE-BSD FMLA CKD-EPI: 23 ML/MIN/1.73/M2
GLUCOSE SERPL-MCNC: 127 MG/DL (ref 70–110)
GLUCOSE UR QL STRIP: NEGATIVE
HBA1C MFR BLD: 6.6 % (ref 4–5.6)
HCT VFR BLD AUTO: 37.8 % (ref 37–48.5)
HCV AB SERPL QL IA: NORMAL
HGB BLD-MCNC: 12.8 GM/DL (ref 12–16)
HGB UR QL STRIP: ABNORMAL
HIV 1+2 AB+HIV1 P24 AG SERPL QL IA: NORMAL
HYALINE CASTS UR QL AUTO: 0 /LPF (ref 0–1)
IMM GRANULOCYTES # BLD AUTO: 0.01 K/UL (ref 0–0.04)
IMM GRANULOCYTES NFR BLD AUTO: 0.1 % (ref 0–0.5)
KETONES UR QL STRIP: ABNORMAL
LDH SERPL L TO P-CCNC: 1.1 MMOL/L (ref 0.5–2.2)
LEUKOCYTE ESTERASE UR QL STRIP: ABNORMAL
LPM: 2
LYMPHOCYTES # BLD AUTO: 1.83 K/UL (ref 1–4.8)
MAGNESIUM SERPL-MCNC: 2 MG/DL (ref 1.6–2.6)
MCH RBC QN AUTO: 30.5 PG (ref 27–31)
MCHC RBC AUTO-ENTMCNC: 33.9 G/DL (ref 32–36)
MCV RBC AUTO: 90 FL (ref 82–98)
MICROSCOPIC COMMENT: NORMAL
NITRITE UR QL STRIP: NEGATIVE
NT-PROBNP SERPL-MCNC: 666 PG/ML
NUCLEATED RBC (/100WBC) (OHS): 0 /100 WBC
PCO2 BLDA: 30 MMHG (ref 35–45)
PCO2 BLDA: 37.2 MMHG (ref 35–45)
PH SMN: 7.36 [PH] (ref 7.35–7.45)
PH SMN: 7.43 [PH] (ref 7.35–7.45)
PH UR STRIP: 6 [PH]
PLATELET # BLD AUTO: 158 K/UL (ref 150–450)
PMV BLD AUTO: 10.5 FL (ref 9.2–12.9)
PO2 BLDA: 118 MMHG (ref 80–100)
PO2 BLDA: 140 MMHG (ref 80–100)
POC BASE DEFICIT: -3.1 MMOL/L
POC BASE DEFICIT: -4 MMOL/L
POC HCO3: 21.2 MMOL/L (ref 24–28)
POC HCO3: 21.8 MMOL/L (ref 24–28)
POC PERFORMED BY: ABNORMAL
POC PERFORMED BY: ABNORMAL
POC PERFORMED BY: NORMAL
POC SATURATED O2: 97.8 % (ref 95–100)
POC SATURATED O2: 99.2 % (ref 95–100)
POC TEMPERATURE: 37 C
POTASSIUM SERPL-SCNC: 4.9 MMOL/L (ref 3.5–5.1)
PROT SERPL-MCNC: 6.9 GM/DL (ref 6–8.4)
PROT UR QL STRIP: ABNORMAL
RBC # BLD AUTO: 4.2 M/UL (ref 4–5.4)
RBC #/AREA URNS AUTO: 1 /HPF (ref 0–4)
RELATIVE EOSINOPHIL (OHS): 0.2 %
RELATIVE LYMPHOCYTE (OHS): 21.5 % (ref 18–48)
RELATIVE MONOCYTE (OHS): 7.8 % (ref 4–15)
RELATIVE NEUTROPHIL (OHS): 69.9 % (ref 38–73)
SARS-COV-2 RDRP RESP QL NAA+PROBE: NEGATIVE
SODIUM SERPL-SCNC: 138 MMOL/L (ref 136–145)
SODIUM UR-SCNC: 41 MMOL/L (ref 20–250)
SP GR UR STRIP: 1.02
SPECIMEN SOURCE: ABNORMAL
SPECIMEN SOURCE: ABNORMAL
SPECIMEN SOURCE: NORMAL
SQUAMOUS #/AREA URNS AUTO: <1 /HPF
TROPONIN I SERPL HS-MCNC: 123 NG/L
TROPONIN I SERPL HS-MCNC: 142 NG/L
TROPONIN I SERPL HS-MCNC: 169 NG/L
TROPONIN I SERPL HS-MCNC: 202 NG/L
UROBILINOGEN UR STRIP-ACNC: ABNORMAL EU/DL
UUN UR-MCNC: 608 MG/DL (ref 140–1050)
WBC # BLD AUTO: 8.51 K/UL (ref 3.9–12.7)
WBC #/AREA URNS AUTO: 5 /HPF (ref 0–5)
YEAST UR QL AUTO: NORMAL /HPF

## 2025-08-24 PROCEDURE — 84300 ASSAY OF URINE SODIUM: CPT | Performed by: STUDENT IN AN ORGANIZED HEALTH CARE EDUCATION/TRAINING PROGRAM

## 2025-08-24 PROCEDURE — 83735 ASSAY OF MAGNESIUM: CPT | Performed by: PHYSICIAN ASSISTANT

## 2025-08-24 PROCEDURE — 96372 THER/PROPH/DIAG INJ SC/IM: CPT

## 2025-08-24 PROCEDURE — 96365 THER/PROPH/DIAG IV INF INIT: CPT

## 2025-08-24 PROCEDURE — 25000003 PHARM REV CODE 250

## 2025-08-24 PROCEDURE — 81001 URINALYSIS AUTO W/SCOPE: CPT | Performed by: PHYSICIAN ASSISTANT

## 2025-08-24 PROCEDURE — 84484 ASSAY OF TROPONIN QUANT: CPT | Mod: 91 | Performed by: PHYSICIAN ASSISTANT

## 2025-08-24 PROCEDURE — 85025 COMPLETE CBC W/AUTO DIFF WBC: CPT | Performed by: PHYSICIAN ASSISTANT

## 2025-08-24 PROCEDURE — 82010 KETONE BODYS QUAN: CPT | Performed by: EMERGENCY MEDICINE

## 2025-08-24 PROCEDURE — A4216 STERILE WATER/SALINE, 10 ML: HCPCS

## 2025-08-24 PROCEDURE — 99291 CRITICAL CARE FIRST HOUR: CPT

## 2025-08-24 PROCEDURE — 93005 ELECTROCARDIOGRAM TRACING: CPT

## 2025-08-24 PROCEDURE — G0378 HOSPITAL OBSERVATION PER HR: HCPCS

## 2025-08-24 PROCEDURE — 96375 TX/PRO/DX INJ NEW DRUG ADDON: CPT

## 2025-08-24 PROCEDURE — 82962 GLUCOSE BLOOD TEST: CPT

## 2025-08-24 PROCEDURE — 83036 HEMOGLOBIN GLYCOSYLATED A1C: CPT

## 2025-08-24 PROCEDURE — 99900035 HC TECH TIME PER 15 MIN (STAT)

## 2025-08-24 PROCEDURE — 63600175 PHARM REV CODE 636 W HCPCS

## 2025-08-24 PROCEDURE — 25000003 PHARM REV CODE 250: Performed by: PHYSICIAN ASSISTANT

## 2025-08-24 PROCEDURE — 82803 BLOOD GASES ANY COMBINATION: CPT

## 2025-08-24 PROCEDURE — 63600175 PHARM REV CODE 636 W HCPCS: Performed by: PHYSICIAN ASSISTANT

## 2025-08-24 PROCEDURE — 94761 N-INVAS EAR/PLS OXIMETRY MLT: CPT | Mod: XB

## 2025-08-24 PROCEDURE — 82570 ASSAY OF URINE CREATININE: CPT | Performed by: STUDENT IN AN ORGANIZED HEALTH CARE EDUCATION/TRAINING PROGRAM

## 2025-08-24 PROCEDURE — 84484 ASSAY OF TROPONIN QUANT: CPT | Mod: 91

## 2025-08-24 PROCEDURE — 96361 HYDRATE IV INFUSION ADD-ON: CPT

## 2025-08-24 PROCEDURE — 84484 ASSAY OF TROPONIN QUANT: CPT | Mod: 59 | Performed by: PHYSICIAN ASSISTANT

## 2025-08-24 PROCEDURE — U0002 COVID-19 LAB TEST NON-CDC: HCPCS | Performed by: PHYSICIAN ASSISTANT

## 2025-08-24 PROCEDURE — 83880 ASSAY OF NATRIURETIC PEPTIDE: CPT

## 2025-08-24 PROCEDURE — 87389 HIV-1 AG W/HIV-1&-2 AB AG IA: CPT | Performed by: PHYSICIAN ASSISTANT

## 2025-08-24 PROCEDURE — 86803 HEPATITIS C AB TEST: CPT | Performed by: PHYSICIAN ASSISTANT

## 2025-08-24 PROCEDURE — 93010 ELECTROCARDIOGRAM REPORT: CPT | Mod: ,,, | Performed by: INTERNAL MEDICINE

## 2025-08-24 PROCEDURE — 80053 COMPREHEN METABOLIC PANEL: CPT | Performed by: PHYSICIAN ASSISTANT

## 2025-08-24 PROCEDURE — 84540 ASSAY OF URINE/UREA-N: CPT | Performed by: STUDENT IN AN ORGANIZED HEALTH CARE EDUCATION/TRAINING PROGRAM

## 2025-08-24 PROCEDURE — 36600 WITHDRAWAL OF ARTERIAL BLOOD: CPT

## 2025-08-24 PROCEDURE — 83605 ASSAY OF LACTIC ACID: CPT

## 2025-08-24 RX ORDER — ONDANSETRON 4 MG/1
8 TABLET, ORALLY DISINTEGRATING ORAL EVERY 8 HOURS PRN
Status: DISCONTINUED | OUTPATIENT
Start: 2025-08-24 | End: 2025-08-27 | Stop reason: HOSPADM

## 2025-08-24 RX ORDER — SPIRONOLACTONE 25 MG/1
25 TABLET ORAL DAILY
Status: DISCONTINUED | OUTPATIENT
Start: 2025-08-25 | End: 2025-08-24

## 2025-08-24 RX ORDER — BUTALBITAL, ACETAMINOPHEN AND CAFFEINE 50; 325; 40 MG/1; MG/1; MG/1
1 TABLET ORAL ONCE
Status: COMPLETED | OUTPATIENT
Start: 2025-08-24 | End: 2025-08-24

## 2025-08-24 RX ORDER — DIPHENHYDRAMINE HYDROCHLORIDE 50 MG/ML
25 INJECTION, SOLUTION INTRAMUSCULAR; INTRAVENOUS ONCE
Status: DISCONTINUED | OUTPATIENT
Start: 2025-08-24 | End: 2025-08-26

## 2025-08-24 RX ORDER — GLUCAGON 1 MG
1 KIT INJECTION
Status: DISCONTINUED | OUTPATIENT
Start: 2025-08-24 | End: 2025-08-27 | Stop reason: HOSPADM

## 2025-08-24 RX ORDER — NALOXONE HCL 0.4 MG/ML
0.02 VIAL (ML) INJECTION
Status: DISCONTINUED | OUTPATIENT
Start: 2025-08-24 | End: 2025-08-27 | Stop reason: HOSPADM

## 2025-08-24 RX ORDER — OXYBUTYNIN CHLORIDE 5 MG/1
5 TABLET, EXTENDED RELEASE ORAL
Status: DISCONTINUED | OUTPATIENT
Start: 2025-08-24 | End: 2025-08-27 | Stop reason: HOSPADM

## 2025-08-24 RX ORDER — CHOLECALCIFEROL (VITAMIN D3) 25 MCG
1000 TABLET ORAL DAILY
Status: DISCONTINUED | OUTPATIENT
Start: 2025-08-25 | End: 2025-08-27 | Stop reason: HOSPADM

## 2025-08-24 RX ORDER — LEVOTHYROXINE SODIUM 50 UG/1
50 TABLET ORAL
Status: DISCONTINUED | OUTPATIENT
Start: 2025-08-25 | End: 2025-08-27 | Stop reason: HOSPADM

## 2025-08-24 RX ORDER — ALUMINUM HYDROXIDE, MAGNESIUM HYDROXIDE, AND SIMETHICONE 1200; 120; 1200 MG/30ML; MG/30ML; MG/30ML
30 SUSPENSION ORAL 4 TIMES DAILY PRN
Status: DISCONTINUED | OUTPATIENT
Start: 2025-08-24 | End: 2025-08-27 | Stop reason: HOSPADM

## 2025-08-24 RX ORDER — BUTALBITAL, ACETAMINOPHEN AND CAFFEINE 50; 325; 40 MG/1; MG/1; MG/1
1 TABLET ORAL
Status: DISCONTINUED | OUTPATIENT
Start: 2025-08-24 | End: 2025-08-24

## 2025-08-24 RX ORDER — IBUPROFEN 200 MG
16 TABLET ORAL
Status: DISCONTINUED | OUTPATIENT
Start: 2025-08-24 | End: 2025-08-27 | Stop reason: HOSPADM

## 2025-08-24 RX ORDER — GABAPENTIN 300 MG/1
300 CAPSULE ORAL NIGHTLY
Status: DISCONTINUED | OUTPATIENT
Start: 2025-08-24 | End: 2025-08-27 | Stop reason: HOSPADM

## 2025-08-24 RX ORDER — ONDANSETRON HYDROCHLORIDE 2 MG/ML
4 INJECTION, SOLUTION INTRAVENOUS
Status: COMPLETED | OUTPATIENT
Start: 2025-08-24 | End: 2025-08-24

## 2025-08-24 RX ORDER — ATORVASTATIN CALCIUM 40 MG/1
80 TABLET, FILM COATED ORAL DAILY
Status: DISCONTINUED | OUTPATIENT
Start: 2025-08-24 | End: 2025-08-26

## 2025-08-24 RX ORDER — IPRATROPIUM BROMIDE AND ALBUTEROL SULFATE 2.5; .5 MG/3ML; MG/3ML
3 SOLUTION RESPIRATORY (INHALATION) EVERY 6 HOURS PRN
Status: DISCONTINUED | OUTPATIENT
Start: 2025-08-24 | End: 2025-08-27 | Stop reason: HOSPADM

## 2025-08-24 RX ORDER — NAPROXEN SODIUM 220 MG/1
81 TABLET, FILM COATED ORAL DAILY
Status: DISCONTINUED | OUTPATIENT
Start: 2025-08-25 | End: 2025-08-27 | Stop reason: HOSPADM

## 2025-08-24 RX ORDER — POLYETHYLENE GLYCOL 3350 17 G/17G
17 POWDER, FOR SOLUTION ORAL DAILY PRN
Status: DISCONTINUED | OUTPATIENT
Start: 2025-08-24 | End: 2025-08-27 | Stop reason: HOSPADM

## 2025-08-24 RX ORDER — MORPHINE SULFATE 2 MG/ML
2 INJECTION, SOLUTION INTRAMUSCULAR; INTRAVENOUS
Refills: 0 | Status: COMPLETED | OUTPATIENT
Start: 2025-08-24 | End: 2025-08-24

## 2025-08-24 RX ORDER — METOPROLOL SUCCINATE 25 MG/1
25 TABLET, EXTENDED RELEASE ORAL DAILY
Status: DISCONTINUED | OUTPATIENT
Start: 2025-08-25 | End: 2025-08-25

## 2025-08-24 RX ORDER — PROCHLORPERAZINE EDISYLATE 5 MG/ML
5 INJECTION INTRAMUSCULAR; INTRAVENOUS EVERY 6 HOURS PRN
Status: DISCONTINUED | OUTPATIENT
Start: 2025-08-24 | End: 2025-08-27 | Stop reason: HOSPADM

## 2025-08-24 RX ORDER — INSULIN ASPART 100 [IU]/ML
0-5 INJECTION, SOLUTION INTRAVENOUS; SUBCUTANEOUS
Status: DISCONTINUED | OUTPATIENT
Start: 2025-08-24 | End: 2025-08-27 | Stop reason: HOSPADM

## 2025-08-24 RX ORDER — DULOXETIN HYDROCHLORIDE 30 MG/1
30 CAPSULE, DELAYED RELEASE ORAL DAILY
Status: DISCONTINUED | OUTPATIENT
Start: 2025-08-25 | End: 2025-08-27 | Stop reason: HOSPADM

## 2025-08-24 RX ORDER — TALC
6 POWDER (GRAM) TOPICAL NIGHTLY PRN
Status: DISCONTINUED | OUTPATIENT
Start: 2025-08-24 | End: 2025-08-27 | Stop reason: HOSPADM

## 2025-08-24 RX ORDER — PROCHLORPERAZINE EDISYLATE 5 MG/ML
5 INJECTION INTRAMUSCULAR; INTRAVENOUS ONCE
Status: COMPLETED | OUTPATIENT
Start: 2025-08-24 | End: 2025-08-24

## 2025-08-24 RX ORDER — EZETIMIBE 10 MG/1
10 TABLET ORAL DAILY
Status: DISCONTINUED | OUTPATIENT
Start: 2025-08-25 | End: 2025-08-27 | Stop reason: HOSPADM

## 2025-08-24 RX ORDER — VALSARTAN 160 MG/1
320 TABLET ORAL DAILY
Status: DISCONTINUED | OUTPATIENT
Start: 2025-08-25 | End: 2025-08-24

## 2025-08-24 RX ORDER — ASPIRIN 325 MG
325 TABLET ORAL
Status: DISCONTINUED | OUTPATIENT
Start: 2025-08-24 | End: 2025-08-24

## 2025-08-24 RX ORDER — ACETAMINOPHEN 500 MG
1000 TABLET ORAL 3 TIMES DAILY
Status: DISCONTINUED | OUTPATIENT
Start: 2025-08-24 | End: 2025-08-25

## 2025-08-24 RX ORDER — ENOXAPARIN SODIUM 100 MG/ML
30 INJECTION SUBCUTANEOUS EVERY 24 HOURS
Status: DISCONTINUED | OUTPATIENT
Start: 2025-08-24 | End: 2025-08-27 | Stop reason: HOSPADM

## 2025-08-24 RX ORDER — SIMETHICONE 80 MG
1 TABLET,CHEWABLE ORAL 4 TIMES DAILY PRN
Status: DISCONTINUED | OUTPATIENT
Start: 2025-08-24 | End: 2025-08-27 | Stop reason: HOSPADM

## 2025-08-24 RX ORDER — SODIUM CHLORIDE 0.9 % (FLUSH) 0.9 %
10 SYRINGE (ML) INJECTION EVERY 8 HOURS
Status: DISCONTINUED | OUTPATIENT
Start: 2025-08-24 | End: 2025-08-26

## 2025-08-24 RX ORDER — MAGNESIUM SULFATE 1 G/100ML
1000 INJECTION INTRAVENOUS ONCE
Status: COMPLETED | OUTPATIENT
Start: 2025-08-24 | End: 2025-08-24

## 2025-08-24 RX ORDER — ACETAMINOPHEN 500 MG
1000 TABLET ORAL 3 TIMES DAILY
Status: DISCONTINUED | OUTPATIENT
Start: 2025-08-24 | End: 2025-08-24

## 2025-08-24 RX ORDER — ACETAMINOPHEN 325 MG/1
650 TABLET ORAL
Status: COMPLETED | OUTPATIENT
Start: 2025-08-24 | End: 2025-08-24

## 2025-08-24 RX ORDER — IBUPROFEN 200 MG
24 TABLET ORAL
Status: DISCONTINUED | OUTPATIENT
Start: 2025-08-24 | End: 2025-08-27 | Stop reason: HOSPADM

## 2025-08-24 RX ORDER — BUTALBITAL, ACETAMINOPHEN AND CAFFEINE 50; 325; 40 MG/1; MG/1; MG/1
1 TABLET ORAL EVERY 4 HOURS PRN
Status: DISCONTINUED | OUTPATIENT
Start: 2025-08-24 | End: 2025-08-26

## 2025-08-24 RX ADMIN — SODIUM CHLORIDE, POTASSIUM CHLORIDE, SODIUM LACTATE AND CALCIUM CHLORIDE 500 ML: 600; 310; 30; 20 INJECTION, SOLUTION INTRAVENOUS at 12:08

## 2025-08-24 RX ADMIN — SODIUM CHLORIDE, PRESERVATIVE FREE 10 ML: 5 INJECTION INTRAVENOUS at 09:08

## 2025-08-24 RX ADMIN — ACETAMINOPHEN 650 MG: 325 TABLET ORAL at 12:08

## 2025-08-24 RX ADMIN — MAGNESIUM SULFATE 1 G: 500 INJECTION, SOLUTION INTRAMUSCULAR; INTRAVENOUS at 04:08

## 2025-08-24 RX ADMIN — SODIUM CHLORIDE, POTASSIUM CHLORIDE, SODIUM LACTATE AND CALCIUM CHLORIDE 250 ML: 600; 310; 30; 20 INJECTION, SOLUTION INTRAVENOUS at 11:08

## 2025-08-24 RX ADMIN — GABAPENTIN 300 MG: 300 CAPSULE ORAL at 09:08

## 2025-08-24 RX ADMIN — MORPHINE SULFATE 2 MG: 2 INJECTION, SOLUTION INTRAMUSCULAR; INTRAVENOUS at 02:08

## 2025-08-24 RX ADMIN — ACETAMINOPHEN 1000 MG: 500 TABLET ORAL at 09:08

## 2025-08-24 RX ADMIN — SODIUM CHLORIDE 250 ML: 9 INJECTION, SOLUTION INTRAVENOUS at 05:08

## 2025-08-24 RX ADMIN — BUTALBITAL, ACETAMINOPHEN, AND CAFFEINE 1 TABLET: 325; 50; 40 TABLET ORAL at 03:08

## 2025-08-24 RX ADMIN — SODIUM CHLORIDE, PRESERVATIVE FREE 10 ML: 5 INJECTION INTRAVENOUS at 02:08

## 2025-08-24 RX ADMIN — ENOXAPARIN SODIUM 30 MG: 30 INJECTION SUBCUTANEOUS at 03:08

## 2025-08-24 RX ADMIN — PROCHLORPERAZINE EDISYLATE 5 MG: 5 INJECTION INTRAMUSCULAR; INTRAVENOUS at 04:08

## 2025-08-24 RX ADMIN — ATORVASTATIN CALCIUM 80 MG: 40 TABLET, FILM COATED ORAL at 03:08

## 2025-08-24 RX ADMIN — DEXAMETHASONE 10 MG: 1 TABLET ORAL at 09:08

## 2025-08-24 RX ADMIN — ONDANSETRON 4 MG: 2 INJECTION INTRAMUSCULAR; INTRAVENOUS at 02:08

## 2025-08-25 PROBLEM — G89.29 CHRONIC INTRACTABLE HEADACHE: Status: ACTIVE | Noted: 2025-08-25

## 2025-08-25 PROBLEM — I95.9 HYPOTENSION: Status: ACTIVE | Noted: 2025-08-25

## 2025-08-25 PROBLEM — R51.9 CHRONIC INTRACTABLE HEADACHE: Status: ACTIVE | Noted: 2025-08-25

## 2025-08-25 PROBLEM — N17.9 AKI (ACUTE KIDNEY INJURY): Status: ACTIVE | Noted: 2025-08-25

## 2025-08-25 LAB
ABSOLUTE EOSINOPHIL (OHS): 0 K/UL
ABSOLUTE EOSINOPHIL (OHS): 0.02 K/UL
ABSOLUTE MONOCYTE (OHS): 0.3 K/UL (ref 0.3–1)
ABSOLUTE MONOCYTE (OHS): 0.49 K/UL (ref 0.3–1)
ABSOLUTE NEUTROPHIL COUNT (OHS): 7.03 K/UL (ref 1.8–7.7)
ABSOLUTE NEUTROPHIL COUNT (OHS): 7.93 K/UL (ref 1.8–7.7)
ALBUMIN SERPL BCP-MCNC: 3.9 G/DL (ref 3.5–5.2)
ALP SERPL-CCNC: 50 UNIT/L (ref 40–150)
ALT SERPL W/O P-5'-P-CCNC: 141 UNIT/L (ref 0–55)
ANION GAP (OHS): 10 MMOL/L (ref 8–16)
ANION GAP (OHS): 12 MMOL/L (ref 8–16)
AORTIC SIZE INDEX (SOV): 1.6 CM/M2
AORTIC SIZE INDEX: 1.7 CM/M2
ASCENDING AORTA: 3.1 CM
AST SERPL-CCNC: 130 UNIT/L (ref 0–50)
AV AREA BY CONTINUOUS VTI: 4.1 CM2
AV INDEX (PROSTH): 1.14
AV LVOT MEAN GRADIENT: 6 MMHG
AV LVOT PEAK GRADIENT: 7 MMHG
AV MEAN GRADIENT: 4 MMHG
AV PEAK GRADIENT: 6 MMHG
AV VALVE AREA BY VELOCITY RATIO: 3.8 CM²
AV VALVE AREA: 4 CM2
AV VELOCITY RATIO: 1.08
BASOPHILS # BLD AUTO: 0.01 K/UL
BASOPHILS # BLD AUTO: 0.04 K/UL
BASOPHILS NFR BLD AUTO: 0.1 %
BASOPHILS NFR BLD AUTO: 0.4 %
BILIRUB SERPL-MCNC: 0.7 MG/DL (ref 0.1–1)
BSA FOR ECHO PROCEDURE: 1.85 M2
BUN SERPL-MCNC: 51 MG/DL (ref 8–23)
BUN SERPL-MCNC: 51 MG/DL (ref 8–23)
CALCIUM SERPL-MCNC: 10.1 MG/DL (ref 8.7–10.5)
CALCIUM SERPL-MCNC: 10.2 MG/DL (ref 8.7–10.5)
CHLORIDE SERPL-SCNC: 106 MMOL/L (ref 95–110)
CHLORIDE SERPL-SCNC: 107 MMOL/L (ref 95–110)
CO2 SERPL-SCNC: 17 MMOL/L (ref 23–29)
CO2 SERPL-SCNC: 21 MMOL/L (ref 23–29)
CREAT SERPL-MCNC: 1.9 MG/DL (ref 0.5–1.4)
CREAT SERPL-MCNC: 2 MG/DL (ref 0.5–1.4)
CV ECHO LV RWT: 0.46 CM
DOP CALC AO PEAK VEL: 1.2 M/S
DOP CALC AO VTI: 19.6 CM
DOP CALC LVOT AREA: 3.5 CM2
DOP CALC LVOT DIAMETER: 2.1 CM
DOP CALC LVOT PEAK VEL: 1.3 M/S
DOP CALCLVOT PEAK VEL VTI: 22.4 CM
E WAVE DECELERATION TIME: 209 MS
E/A RATIO: 0.96
E/E' RATIO: 8 M/S
ECHO EF ESTIMATED: 57 %
ECHO LV POSTERIOR WALL: 0.8 CM (ref 0.6–1.1)
EJECTION FRACTION: 68 %
ERYTHROCYTE [DISTWIDTH] IN BLOOD BY AUTOMATED COUNT: 12.8 % (ref 11.5–14.5)
ERYTHROCYTE [DISTWIDTH] IN BLOOD BY AUTOMATED COUNT: 12.9 % (ref 11.5–14.5)
FRACTIONAL SHORTENING: 28.6 % (ref 28–44)
GFR SERPLBLD CREATININE-BSD FMLA CKD-EPI: 26 ML/MIN/1.73/M2
GFR SERPLBLD CREATININE-BSD FMLA CKD-EPI: 28 ML/MIN/1.73/M2
GLUCOSE SERPL-MCNC: 143 MG/DL (ref 70–110)
GLUCOSE SERPL-MCNC: 158 MG/DL (ref 70–110)
HCT VFR BLD AUTO: 35.9 % (ref 37–48.5)
HCT VFR BLD AUTO: 37.6 % (ref 37–48.5)
HGB BLD-MCNC: 12.2 GM/DL (ref 12–16)
HGB BLD-MCNC: 12.2 GM/DL (ref 12–16)
HOLD SPECIMEN: NORMAL
IMM GRANULOCYTES # BLD AUTO: 0.02 K/UL (ref 0–0.04)
IMM GRANULOCYTES # BLD AUTO: 0.03 K/UL (ref 0–0.04)
IMM GRANULOCYTES NFR BLD AUTO: 0.2 % (ref 0–0.5)
IMM GRANULOCYTES NFR BLD AUTO: 0.3 % (ref 0–0.5)
INDIRECT COOMBS: NORMAL
INTERVENTRICULAR SEPTUM: 0.6 CM (ref 0.6–1.1)
LA MAJOR: 4.6 CM
LA MINOR: 3.5 CM
LA WIDTH: 3.5 CM
LACTATE SERPL-SCNC: 1 MMOL/L (ref 0.5–2.2)
LACTATE SERPL-SCNC: 1 MMOL/L (ref 0.5–2.2)
LEFT ATRIUM SIZE: 2.6 CM
LEFT ATRIUM VOLUME INDEX MOD: 20 ML/M2
LEFT ATRIUM VOLUME INDEX: 17 ML/M2
LEFT ATRIUM VOLUME MOD: 36 ML
LEFT ATRIUM VOLUME: 31 CM3
LEFT INTERNAL DIMENSION IN SYSTOLE: 2.5 CM (ref 2.1–4)
LEFT VENTRICLE DIASTOLIC VOLUME INDEX: 28.89 ML/M2
LEFT VENTRICLE DIASTOLIC VOLUME: 52 ML
LEFT VENTRICLE MASS INDEX: 34.9 G/M2
LEFT VENTRICLE SYSTOLIC VOLUME INDEX: 12.8 ML/M2
LEFT VENTRICLE SYSTOLIC VOLUME: 23 ML
LEFT VENTRICULAR INTERNAL DIMENSION IN DIASTOLE: 3.5 CM (ref 3.5–6)
LEFT VENTRICULAR MASS: 62.8 G
LV LATERAL E/E' RATIO: 6.5
LV SEPTAL E/E' RATIO: 9.3
LYMPHOCYTES # BLD AUTO: 0.83 K/UL (ref 1–4.8)
LYMPHOCYTES # BLD AUTO: 1.38 K/UL (ref 1–4.8)
Lab: 1.7 CM/M
Lab: 1.9 CM/M
MAGNESIUM SERPL-MCNC: 2.1 MG/DL (ref 1.6–2.6)
MCH RBC QN AUTO: 30 PG (ref 27–31)
MCH RBC QN AUTO: 31 PG (ref 27–31)
MCHC RBC AUTO-ENTMCNC: 32.4 G/DL (ref 32–36)
MCHC RBC AUTO-ENTMCNC: 34 G/DL (ref 32–36)
MCV RBC AUTO: 91 FL (ref 82–98)
MCV RBC AUTO: 92 FL (ref 82–98)
MV PEAK A VEL: 0.68 M/S
MV PEAK E VEL: 0.65 M/S
NUCLEATED RBC (/100WBC) (OHS): 0 /100 WBC
NUCLEATED RBC (/100WBC) (OHS): 0 /100 WBC
OHS CV CPX PATIENT HEIGHT IN: 63
OHS CV RV/LV RATIO: 0.94 CM
OHS QRS DURATION: 90 MS
OHS QTC CALCULATION: 452 MS
PHOSPHATE SERPL-MCNC: 3.4 MG/DL (ref 2.7–4.5)
PLATELET # BLD AUTO: 144 K/UL (ref 150–450)
PLATELET # BLD AUTO: 146 K/UL (ref 150–450)
PMV BLD AUTO: 10.3 FL (ref 9.2–12.9)
PMV BLD AUTO: 10.9 FL (ref 9.2–12.9)
POCT GLUCOSE: 116 MG/DL (ref 70–110)
POCT GLUCOSE: 152 MG/DL (ref 70–110)
POCT GLUCOSE: 184 MG/DL (ref 70–110)
POTASSIUM SERPL-SCNC: 5 MMOL/L (ref 3.5–5.1)
POTASSIUM SERPL-SCNC: 5 MMOL/L (ref 3.5–5.1)
PROT SERPL-MCNC: 6.4 GM/DL (ref 6–8.4)
RA MAJOR: 3.79 CM
RA PRESSURE ESTIMATED: 3 MMHG
RA WIDTH: 2.99 CM
RBC # BLD AUTO: 3.93 M/UL (ref 4–5.4)
RBC # BLD AUTO: 4.07 M/UL (ref 4–5.4)
RELATIVE EOSINOPHIL (OHS): 0 %
RELATIVE EOSINOPHIL (OHS): 0.2 %
RELATIVE LYMPHOCYTE (OHS): 10.1 % (ref 18–48)
RELATIVE LYMPHOCYTE (OHS): 14 % (ref 18–48)
RELATIVE MONOCYTE (OHS): 3.7 % (ref 4–15)
RELATIVE MONOCYTE (OHS): 5 % (ref 4–15)
RELATIVE NEUTROPHIL (OHS): 80.1 % (ref 38–73)
RELATIVE NEUTROPHIL (OHS): 85.9 % (ref 38–73)
RH BLD: NORMAL
RIGHT ATRIAL AREA: 10.9 CM2
RIGHT VENTRICLE DIASTOLIC BASEL DIMENSION: 3.3 CM
RV TISSUE DOPPLER FREE WALL SYSTOLIC VELOCITY 1 (APICAL 4 CHAMBER VIEW): 14.37 CM/S
SINUS: 2.8 CM
SODIUM SERPL-SCNC: 136 MMOL/L (ref 136–145)
SODIUM SERPL-SCNC: 137 MMOL/L (ref 136–145)
SPECIMEN OUTDATE: NORMAL
STJ: 2.7 CM
TDI LATERAL: 0.1 M/S
TDI SEPTAL: 0.07 M/S
TDI: 0.09 M/S
TRICUSPID ANNULAR PLANE SYSTOLIC EXCURSION: 1.8 CM
TROPONIN I SERPL HS-MCNC: 185 NG/L
WBC # BLD AUTO: 8.19 K/UL (ref 3.9–12.7)
WBC # BLD AUTO: 9.89 K/UL (ref 3.9–12.7)
Z-SCORE OF LEFT VENTRICULAR DIMENSION IN END DIASTOLE: -3.52
Z-SCORE OF LEFT VENTRICULAR DIMENSION IN END SYSTOLE: -1.64

## 2025-08-25 PROCEDURE — 99223 1ST HOSP IP/OBS HIGH 75: CPT | Mod: ,,, | Performed by: PHYSICIAN ASSISTANT

## 2025-08-25 PROCEDURE — A4216 STERILE WATER/SALINE, 10 ML: HCPCS

## 2025-08-25 PROCEDURE — 99499 UNLISTED E&M SERVICE: CPT | Mod: ,,, | Performed by: STUDENT IN AN ORGANIZED HEALTH CARE EDUCATION/TRAINING PROGRAM

## 2025-08-25 PROCEDURE — 84100 ASSAY OF PHOSPHORUS: CPT

## 2025-08-25 PROCEDURE — 20600001 HC STEP DOWN PRIVATE ROOM

## 2025-08-25 PROCEDURE — 84484 ASSAY OF TROPONIN QUANT: CPT | Performed by: PHYSICIAN ASSISTANT

## 2025-08-25 PROCEDURE — 63600175 PHARM REV CODE 636 W HCPCS

## 2025-08-25 PROCEDURE — 85025 COMPLETE CBC W/AUTO DIFF WBC: CPT | Mod: 91

## 2025-08-25 PROCEDURE — 83735 ASSAY OF MAGNESIUM: CPT

## 2025-08-25 PROCEDURE — 87040 BLOOD CULTURE FOR BACTERIA: CPT | Mod: 59

## 2025-08-25 PROCEDURE — 25000003 PHARM REV CODE 250

## 2025-08-25 PROCEDURE — 80053 COMPREHEN METABOLIC PANEL: CPT | Performed by: PHYSICIAN ASSISTANT

## 2025-08-25 PROCEDURE — 86901 BLOOD TYPING SEROLOGIC RH(D): CPT | Performed by: PHYSICIAN ASSISTANT

## 2025-08-25 PROCEDURE — 96361 HYDRATE IV INFUSION ADD-ON: CPT

## 2025-08-25 PROCEDURE — 85025 COMPLETE CBC W/AUTO DIFF WBC: CPT | Performed by: PHYSICIAN ASSISTANT

## 2025-08-25 PROCEDURE — 83605 ASSAY OF LACTIC ACID: CPT | Mod: 59 | Performed by: PHYSICIAN ASSISTANT

## 2025-08-25 PROCEDURE — 83605 ASSAY OF LACTIC ACID: CPT | Mod: 91

## 2025-08-25 RX ORDER — ACETAMINOPHEN 325 MG/1
650 TABLET ORAL EVERY 8 HOURS PRN
Status: DISCONTINUED | OUTPATIENT
Start: 2025-08-25 | End: 2025-08-27 | Stop reason: HOSPADM

## 2025-08-25 RX ADMIN — SODIUM CHLORIDE, PRESERVATIVE FREE 10 ML: 5 INJECTION INTRAVENOUS at 09:08

## 2025-08-25 RX ADMIN — SODIUM CHLORIDE, PRESERVATIVE FREE 10 ML: 5 INJECTION INTRAVENOUS at 06:08

## 2025-08-25 RX ADMIN — DULOXETINE 30 MG: 30 CAPSULE, DELAYED RELEASE ORAL at 08:08

## 2025-08-25 RX ADMIN — ATORVASTATIN CALCIUM 80 MG: 40 TABLET, FILM COATED ORAL at 08:08

## 2025-08-25 RX ADMIN — SODIUM CHLORIDE, POTASSIUM CHLORIDE, SODIUM LACTATE AND CALCIUM CHLORIDE 250 ML: 600; 310; 30; 20 INJECTION, SOLUTION INTRAVENOUS at 04:08

## 2025-08-25 RX ADMIN — LEVOTHYROXINE SODIUM 50 MCG: 0.05 TABLET ORAL at 05:08

## 2025-08-25 RX ADMIN — Medication 1000 UNITS: at 08:08

## 2025-08-25 RX ADMIN — GABAPENTIN 300 MG: 300 CAPSULE ORAL at 09:08

## 2025-08-25 RX ADMIN — SODIUM CHLORIDE, PRESERVATIVE FREE 10 ML: 5 INJECTION INTRAVENOUS at 01:08

## 2025-08-25 RX ADMIN — INSULIN ASPART 2 UNITS: 100 INJECTION, SOLUTION INTRAVENOUS; SUBCUTANEOUS at 01:08

## 2025-08-25 RX ADMIN — ASPIRIN 81 MG CHEWABLE TABLET 81 MG: 81 TABLET CHEWABLE at 08:08

## 2025-08-25 RX ADMIN — ENOXAPARIN SODIUM 30 MG: 30 INJECTION SUBCUTANEOUS at 05:08

## 2025-08-26 PROBLEM — R74.01 TRANSAMINITIS: Status: ACTIVE | Noted: 2025-08-26

## 2025-08-26 PROBLEM — E21.0 PRIMARY HYPERPARATHYROIDISM: Status: ACTIVE | Noted: 2025-08-26

## 2025-08-26 LAB
ABSOLUTE EOSINOPHIL (OHS): 0.01 K/UL
ABSOLUTE MONOCYTE (OHS): 1 K/UL (ref 0.3–1)
ABSOLUTE NEUTROPHIL COUNT (OHS): 6.93 K/UL (ref 1.8–7.7)
ALBUMIN SERPL BCP-MCNC: 3.9 G/DL (ref 3.5–5.2)
ALP SERPL-CCNC: 46 UNIT/L (ref 40–150)
ALT SERPL W/O P-5'-P-CCNC: 125 UNIT/L (ref 0–55)
ANION GAP (OHS): 12 MMOL/L (ref 8–16)
AST SERPL-CCNC: 100 UNIT/L (ref 0–50)
BASOPHILS # BLD AUTO: 0.01 K/UL
BASOPHILS NFR BLD AUTO: 0.1 %
BILIRUB SERPL-MCNC: 0.6 MG/DL (ref 0.1–1)
BUN SERPL-MCNC: 46 MG/DL (ref 8–23)
CALCIUM SERPL-MCNC: 10.4 MG/DL (ref 8.7–10.5)
CHLORIDE SERPL-SCNC: 108 MMOL/L (ref 95–110)
CO2 SERPL-SCNC: 18 MMOL/L (ref 23–29)
CREAT SERPL-MCNC: 1.7 MG/DL (ref 0.5–1.4)
ERYTHROCYTE [DISTWIDTH] IN BLOOD BY AUTOMATED COUNT: 12.9 % (ref 11.5–14.5)
GFR SERPLBLD CREATININE-BSD FMLA CKD-EPI: 32 ML/MIN/1.73/M2
GGT SERPL-CCNC: 25 U/L (ref 8–55)
GLUCOSE SERPL-MCNC: 119 MG/DL (ref 70–110)
HCT VFR BLD AUTO: 36.8 % (ref 37–48.5)
HGB BLD-MCNC: 11.9 GM/DL (ref 12–16)
IMM GRANULOCYTES # BLD AUTO: 0.03 K/UL (ref 0–0.04)
IMM GRANULOCYTES NFR BLD AUTO: 0.3 % (ref 0–0.5)
LACTATE SERPL-SCNC: 1.1 MMOL/L (ref 0.5–2.2)
LYMPHOCYTES # BLD AUTO: 1.61 K/UL (ref 1–4.8)
MAGNESIUM SERPL-MCNC: 2.2 MG/DL (ref 1.6–2.6)
MCH RBC QN AUTO: 29.8 PG (ref 27–31)
MCHC RBC AUTO-ENTMCNC: 32.3 G/DL (ref 32–36)
MCV RBC AUTO: 92 FL (ref 82–98)
NUCLEATED RBC (/100WBC) (OHS): 0 /100 WBC
PHOSPHATE SERPL-MCNC: 2.4 MG/DL (ref 2.7–4.5)
PLATELET # BLD AUTO: 149 K/UL (ref 150–450)
PMV BLD AUTO: 11.1 FL (ref 9.2–12.9)
POCT GLUCOSE: 111 MG/DL (ref 70–110)
POCT GLUCOSE: 113 MG/DL (ref 70–110)
POCT GLUCOSE: 154 MG/DL (ref 70–110)
POCT GLUCOSE: 204 MG/DL (ref 70–110)
POCT GLUCOSE: 237 MG/DL (ref 70–110)
POTASSIUM SERPL-SCNC: 4.5 MMOL/L (ref 3.5–5.1)
PROT SERPL-MCNC: 6.8 GM/DL (ref 6–8.4)
PTH-INTACT SERPL-MCNC: 308.9 PG/ML (ref 9–77)
RBC # BLD AUTO: 3.99 M/UL (ref 4–5.4)
RELATIVE EOSINOPHIL (OHS): 0.1 %
RELATIVE LYMPHOCYTE (OHS): 16.8 % (ref 18–48)
RELATIVE MONOCYTE (OHS): 10.4 % (ref 4–15)
RELATIVE NEUTROPHIL (OHS): 72.3 % (ref 38–73)
SODIUM SERPL-SCNC: 138 MMOL/L (ref 136–145)
WBC # BLD AUTO: 9.59 K/UL (ref 3.9–12.7)

## 2025-08-26 PROCEDURE — 63600175 PHARM REV CODE 636 W HCPCS

## 2025-08-26 PROCEDURE — 82977 ASSAY OF GGT: CPT

## 2025-08-26 PROCEDURE — 20600001 HC STEP DOWN PRIVATE ROOM

## 2025-08-26 PROCEDURE — 63600175 PHARM REV CODE 636 W HCPCS: Performed by: FAMILY MEDICINE

## 2025-08-26 PROCEDURE — 83735 ASSAY OF MAGNESIUM: CPT

## 2025-08-26 PROCEDURE — 36415 COLL VENOUS BLD VENIPUNCTURE: CPT | Performed by: FAMILY MEDICINE

## 2025-08-26 PROCEDURE — 25000003 PHARM REV CODE 250

## 2025-08-26 PROCEDURE — 84100 ASSAY OF PHOSPHORUS: CPT

## 2025-08-26 PROCEDURE — A4216 STERILE WATER/SALINE, 10 ML: HCPCS

## 2025-08-26 PROCEDURE — 83605 ASSAY OF LACTIC ACID: CPT | Performed by: FAMILY MEDICINE

## 2025-08-26 PROCEDURE — 80053 COMPREHEN METABOLIC PANEL: CPT

## 2025-08-26 PROCEDURE — 83970 ASSAY OF PARATHORMONE: CPT

## 2025-08-26 PROCEDURE — 85025 COMPLETE CBC W/AUTO DIFF WBC: CPT

## 2025-08-26 PROCEDURE — 36415 COLL VENOUS BLD VENIPUNCTURE: CPT

## 2025-08-26 RX ORDER — BACLOFEN 10 MG/1
10 TABLET ORAL 3 TIMES DAILY PRN
Status: DISCONTINUED | OUTPATIENT
Start: 2025-08-26 | End: 2025-08-27 | Stop reason: HOSPADM

## 2025-08-26 RX ORDER — LEVETIRACETAM 500 MG/1
500 TABLET ORAL ONCE
Status: COMPLETED | OUTPATIENT
Start: 2025-08-26 | End: 2025-08-26

## 2025-08-26 RX ORDER — MUPIROCIN 20 MG/G
OINTMENT TOPICAL 2 TIMES DAILY
Status: DISCONTINUED | OUTPATIENT
Start: 2025-08-26 | End: 2025-08-27 | Stop reason: HOSPADM

## 2025-08-26 RX ORDER — LEVETIRACETAM 250 MG/1
250 TABLET ORAL 2 TIMES DAILY
Status: DISCONTINUED | OUTPATIENT
Start: 2025-08-26 | End: 2025-08-27 | Stop reason: HOSPADM

## 2025-08-26 RX ORDER — SODIUM,POTASSIUM PHOSPHATES 280-250MG
2 POWDER IN PACKET (EA) ORAL ONCE
Status: COMPLETED | OUTPATIENT
Start: 2025-08-26 | End: 2025-08-26

## 2025-08-26 RX ADMIN — SODIUM CHLORIDE, POTASSIUM CHLORIDE, SODIUM LACTATE AND CALCIUM CHLORIDE 500 ML: 600; 310; 30; 20 INJECTION, SOLUTION INTRAVENOUS at 04:08

## 2025-08-26 RX ADMIN — Medication 1000 UNITS: at 09:08

## 2025-08-26 RX ADMIN — LEVETIRACETAM 250 MG: 250 TABLET, FILM COATED ORAL at 09:08

## 2025-08-26 RX ADMIN — ENOXAPARIN SODIUM 30 MG: 30 INJECTION SUBCUTANEOUS at 04:08

## 2025-08-26 RX ADMIN — BACLOFEN 10 MG: 10 TABLET ORAL at 11:08

## 2025-08-26 RX ADMIN — LEVOTHYROXINE SODIUM 50 MCG: 0.05 TABLET ORAL at 06:08

## 2025-08-26 RX ADMIN — MUPIROCIN: 20 OINTMENT TOPICAL at 09:08

## 2025-08-26 RX ADMIN — LEVETIRACETAM 500 MG: 500 TABLET, FILM COATED ORAL at 09:08

## 2025-08-26 RX ADMIN — SODIUM CHLORIDE, PRESERVATIVE FREE 10 ML: 5 INJECTION INTRAVENOUS at 02:08

## 2025-08-26 RX ADMIN — SODIUM CHLORIDE, POTASSIUM CHLORIDE, SODIUM LACTATE AND CALCIUM CHLORIDE 500 ML: 600; 310; 30; 20 INJECTION, SOLUTION INTRAVENOUS at 09:08

## 2025-08-26 RX ADMIN — ASPIRIN 81 MG CHEWABLE TABLET 81 MG: 81 TABLET CHEWABLE at 09:08

## 2025-08-26 RX ADMIN — DULOXETINE 30 MG: 30 CAPSULE, DELAYED RELEASE ORAL at 09:08

## 2025-08-26 RX ADMIN — POTASSIUM & SODIUM PHOSPHATES POWDER PACK 280-160-250 MG 2 PACKET: 280-160-250 PACK at 09:08

## 2025-08-26 RX ADMIN — INSULIN ASPART 1 UNITS: 100 INJECTION, SOLUTION INTRAVENOUS; SUBCUTANEOUS at 09:08

## 2025-08-26 RX ADMIN — SODIUM CHLORIDE, PRESERVATIVE FREE 10 ML: 5 INJECTION INTRAVENOUS at 06:08

## 2025-08-26 RX ADMIN — EZETIMIBE 10 MG: 10 TABLET ORAL at 09:08

## 2025-08-27 ENCOUNTER — TELEPHONE (OUTPATIENT)
Dept: OPHTHALMOLOGY | Facility: CLINIC | Age: 75
End: 2025-08-27
Payer: MEDICARE

## 2025-08-27 VITALS
TEMPERATURE: 98 F | HEIGHT: 63 IN | HEART RATE: 69 BPM | DIASTOLIC BLOOD PRESSURE: 47 MMHG | WEIGHT: 173.75 LBS | RESPIRATION RATE: 18 BRPM | OXYGEN SATURATION: 98 % | SYSTOLIC BLOOD PRESSURE: 97 MMHG | BODY MASS INDEX: 30.79 KG/M2

## 2025-08-27 PROBLEM — N17.9 ACUTE KIDNEY INJURY SUPERIMPOSED ON STAGE 3B CHRONIC KIDNEY DISEASE: Status: RESOLVED | Noted: 2025-08-24 | Resolved: 2025-08-27

## 2025-08-27 PROBLEM — N18.32 ACUTE KIDNEY INJURY SUPERIMPOSED ON STAGE 3B CHRONIC KIDNEY DISEASE: Status: RESOLVED | Noted: 2025-08-24 | Resolved: 2025-08-27

## 2025-08-27 LAB
ABSOLUTE EOSINOPHIL (OHS): 0.09 K/UL
ABSOLUTE MONOCYTE (OHS): 0.65 K/UL (ref 0.3–1)
ABSOLUTE NEUTROPHIL COUNT (OHS): 3.81 K/UL (ref 1.8–7.7)
ALBUMIN SERPL BCP-MCNC: 3.4 G/DL (ref 3.5–5.2)
ALP SERPL-CCNC: 43 UNIT/L (ref 40–150)
ALT SERPL W/O P-5'-P-CCNC: 150 UNIT/L (ref 0–55)
ANION GAP (OHS): 9 MMOL/L (ref 8–16)
AST SERPL-CCNC: 135 UNIT/L (ref 0–50)
BASOPHILS # BLD AUTO: 0.03 K/UL
BASOPHILS NFR BLD AUTO: 0.4 %
BILIRUB SERPL-MCNC: 0.5 MG/DL (ref 0.1–1)
BUN SERPL-MCNC: 41 MG/DL (ref 8–23)
CALCIUM SERPL-MCNC: 9.9 MG/DL (ref 8.7–10.5)
CHLORIDE SERPL-SCNC: 109 MMOL/L (ref 95–110)
CO2 SERPL-SCNC: 20 MMOL/L (ref 23–29)
CREAT SERPL-MCNC: 1.5 MG/DL (ref 0.5–1.4)
ERYTHROCYTE [DISTWIDTH] IN BLOOD BY AUTOMATED COUNT: 13.2 % (ref 11.5–14.5)
GFR SERPLBLD CREATININE-BSD FMLA CKD-EPI: 37 ML/MIN/1.73/M2
GLUCOSE SERPL-MCNC: 114 MG/DL (ref 70–110)
HCT VFR BLD AUTO: 34.8 % (ref 37–48.5)
HGB BLD-MCNC: 11.6 GM/DL (ref 12–16)
HOLD SPECIMEN: NORMAL
IMM GRANULOCYTES # BLD AUTO: 0.03 K/UL (ref 0–0.04)
IMM GRANULOCYTES NFR BLD AUTO: 0.4 % (ref 0–0.5)
LYMPHOCYTES # BLD AUTO: 2.18 K/UL (ref 1–4.8)
MAGNESIUM SERPL-MCNC: 1.9 MG/DL (ref 1.6–2.6)
MCH RBC QN AUTO: 30.3 PG (ref 27–31)
MCHC RBC AUTO-ENTMCNC: 33.3 G/DL (ref 32–36)
MCV RBC AUTO: 91 FL (ref 82–98)
NUCLEATED RBC (/100WBC) (OHS): 0 /100 WBC
PHOSPHATE SERPL-MCNC: 2.9 MG/DL (ref 2.7–4.5)
PLATELET # BLD AUTO: 143 K/UL (ref 150–450)
PMV BLD AUTO: 10.8 FL (ref 9.2–12.9)
POCT GLUCOSE: 111 MG/DL (ref 70–110)
POTASSIUM SERPL-SCNC: 4.4 MMOL/L (ref 3.5–5.1)
PROT SERPL-MCNC: 6.1 GM/DL (ref 6–8.4)
RBC # BLD AUTO: 3.83 M/UL (ref 4–5.4)
RELATIVE EOSINOPHIL (OHS): 1.3 %
RELATIVE LYMPHOCYTE (OHS): 32.1 % (ref 18–48)
RELATIVE MONOCYTE (OHS): 9.6 % (ref 4–15)
RELATIVE NEUTROPHIL (OHS): 56.2 % (ref 38–73)
SODIUM SERPL-SCNC: 138 MMOL/L (ref 136–145)
WBC # BLD AUTO: 6.79 K/UL (ref 3.9–12.7)

## 2025-08-27 PROCEDURE — 80053 COMPREHEN METABOLIC PANEL: CPT

## 2025-08-27 PROCEDURE — 85025 COMPLETE CBC W/AUTO DIFF WBC: CPT

## 2025-08-27 PROCEDURE — 97542 WHEELCHAIR MNGMENT TRAINING: CPT

## 2025-08-27 PROCEDURE — 83735 ASSAY OF MAGNESIUM: CPT

## 2025-08-27 PROCEDURE — 97161 PT EVAL LOW COMPLEX 20 MIN: CPT

## 2025-08-27 PROCEDURE — 36415 COLL VENOUS BLD VENIPUNCTURE: CPT

## 2025-08-27 PROCEDURE — 84100 ASSAY OF PHOSPHORUS: CPT

## 2025-08-27 PROCEDURE — 25000003 PHARM REV CODE 250

## 2025-08-27 PROCEDURE — 97530 THERAPEUTIC ACTIVITIES: CPT

## 2025-08-27 PROCEDURE — 97165 OT EVAL LOW COMPLEX 30 MIN: CPT

## 2025-08-27 RX ORDER — LEVETIRACETAM 250 MG/1
250 TABLET ORAL 2 TIMES DAILY
Qty: 180 TABLET | Refills: 3 | Status: SHIPPED | OUTPATIENT
Start: 2025-08-27 | End: 2026-08-27

## 2025-08-27 RX ORDER — METFORMIN HYDROCHLORIDE 500 MG/1
500 TABLET ORAL 2 TIMES DAILY
Qty: 180 TABLET | Refills: 3 | Status: SHIPPED | OUTPATIENT
Start: 2025-08-27

## 2025-08-27 RX ORDER — AZELASTINE 1 MG/ML
1 SPRAY, METERED NASAL
Qty: 30 ML | Refills: 1 | Status: SHIPPED | OUTPATIENT
Start: 2025-08-27

## 2025-08-27 RX ADMIN — LEVOTHYROXINE SODIUM 50 MCG: 0.05 TABLET ORAL at 05:08

## 2025-08-27 RX ADMIN — Medication 1000 UNITS: at 09:08

## 2025-08-27 RX ADMIN — DULOXETINE 30 MG: 30 CAPSULE, DELAYED RELEASE ORAL at 09:08

## 2025-08-27 RX ADMIN — ASPIRIN 81 MG CHEWABLE TABLET 81 MG: 81 TABLET CHEWABLE at 09:08

## 2025-08-27 RX ADMIN — LEVETIRACETAM 250 MG: 250 TABLET, FILM COATED ORAL at 09:08

## 2025-08-27 RX ADMIN — EZETIMIBE 10 MG: 10 TABLET ORAL at 09:08

## 2025-08-30 LAB
BACTERIA BLD CULT: NORMAL
BACTERIA BLD CULT: NORMAL

## (undated) DEVICE — DRAPE STERI-DRAPE 1000 17X11IN

## (undated) DEVICE — HEMOSTAT VASC BAND REG 24CM

## (undated) DEVICE — KIT CUSTOM MANIFOLD

## (undated) DEVICE — GUIDEWIRE STF .035X180CM ANG

## (undated) DEVICE — GUIDEWIRE EMERALD 150CM PTFE

## (undated) DEVICE — CATH MICRO CORSAIR PRO 135CM

## (undated) DEVICE — DRESSING ADAPTIC TOUCH 3X4

## (undated) DEVICE — CATH BLLN FG APEX MR 2.00X20MM

## (undated) DEVICE — SUT 4/0 18IN ETHILON BL P3

## (undated) DEVICE — BANDAGE ELASTIC ACE 2IN 10/CA

## (undated) DEVICE — OMNIPAQUE 350 200ML

## (undated) DEVICE — INTRODUCER BRITE TIP 8F 35CM

## (undated) DEVICE — CATH BLLN FG APEX MR 3.50X20MM

## (undated) DEVICE — INFLATOR ENCORE 26 BLLN INFL

## (undated) DEVICE — SHEATH INTRODUCER 6FR 11CM

## (undated) DEVICE — WIRE PILOT .014X190

## (undated) DEVICE — CATH ANGIO GUIDE AL75 8FRX90CM

## (undated) DEVICE — SPIKE CONTRAST CONTROLLER

## (undated) DEVICE — GUIDEWIRE FIELDER XT.014X190CM

## (undated) DEVICE — GAUZE SPONGE 4X4 12PLY

## (undated) DEVICE — PAD CAST 2 IN X 4YDS STERILE

## (undated) DEVICE — CATH INFINITI JUDKINS JR4

## (undated) DEVICE — DRESSING N ADH OIL EMUL 3X3

## (undated) DEVICE — CATH MINI TREK 1.20MMX20MM

## (undated) DEVICE — CATH EAGLE EYE PLATINUM

## (undated) DEVICE — PROTECTION STATION PLUS

## (undated) DEVICE — CATH ANGIO GUIDE EBU3.5 8FRX90

## (undated) DEVICE — GLOVE BIOGEL SKINSENSE PI 7.0

## (undated) DEVICE — CATH BLLN FG APEX MR 3.00X20MM

## (undated) DEVICE — SPLINT PLASTER F.S 4INX15IN

## (undated) DEVICE — NDL 18GA X1 1/2 REG BEVEL

## (undated) DEVICE — DRESSING SPONGE 8PLY 4X4 STRL

## (undated) DEVICE — KIT MICROINTRO 4F .018X40X7CM

## (undated) DEVICE — Device

## (undated) DEVICE — KIT CO-PILOT

## (undated) DEVICE — TOURNIQUET SB QC SP 18X4IN

## (undated) DEVICE — SEE MEDLINE ITEM 157131

## (undated) DEVICE — SEE MEDLINE ITEM 157187

## (undated) DEVICE — CATH BLLN FG APEX MR 2.50X12MM

## (undated) DEVICE — BANDAGE ACE NON LATEX 2IN

## (undated) DEVICE — KIT GLIDESHEATH SLEND 6FR 10CM

## (undated) DEVICE — SEE MEDLINE ITEM 156894

## (undated) DEVICE — SYR 10CC LUER LOCK

## (undated) DEVICE — GUIDE WIRE BMW 014 X190

## (undated) DEVICE — WIRE GUIDE SAFE-T-J .035 260CM

## (undated) DEVICE — CATH JACKY RADIAL 5FR 100CM

## (undated) DEVICE — NDL 22GA X1 1/2 REG BEVEL